# Patient Record
Sex: MALE | Race: BLACK OR AFRICAN AMERICAN | Employment: OTHER | RURAL
[De-identification: names, ages, dates, MRNs, and addresses within clinical notes are randomized per-mention and may not be internally consistent; named-entity substitution may affect disease eponyms.]

---

## 2017-01-16 ENCOUNTER — TELEPHONE (OUTPATIENT)
Dept: FAMILY MEDICINE CLINIC | Age: 82
End: 2017-01-16

## 2017-01-17 NOTE — TELEPHONE ENCOUNTER
Would try arthritis strength Tylenol supplemented with Aleve twice daily first let us know if not improved

## 2017-01-25 ENCOUNTER — OFFICE VISIT (OUTPATIENT)
Dept: FAMILY MEDICINE CLINIC | Age: 82
End: 2017-01-25

## 2017-01-25 VITALS
DIASTOLIC BLOOD PRESSURE: 86 MMHG | OXYGEN SATURATION: 99 % | SYSTOLIC BLOOD PRESSURE: 152 MMHG | TEMPERATURE: 98.1 F | BODY MASS INDEX: 34.4 KG/M2 | HEIGHT: 72 IN | RESPIRATION RATE: 12 BRPM | WEIGHT: 254 LBS | HEART RATE: 72 BPM

## 2017-01-25 DIAGNOSIS — G31.84 MCI (MILD COGNITIVE IMPAIRMENT) WITH MEMORY LOSS: ICD-10-CM

## 2017-01-25 DIAGNOSIS — R91.8 MULTIPLE LUNG NODULES: ICD-10-CM

## 2017-01-25 DIAGNOSIS — J45.909 UNCOMPLICATED ASTHMA, UNSPECIFIED ASTHMA SEVERITY: ICD-10-CM

## 2017-01-25 DIAGNOSIS — I10 ESSENTIAL HYPERTENSION: Primary | ICD-10-CM

## 2017-01-25 DIAGNOSIS — R35.1 BPH ASSOCIATED WITH NOCTURIA: ICD-10-CM

## 2017-01-25 DIAGNOSIS — E78.5 HYPERLIPIDEMIA, UNSPECIFIED HYPERLIPIDEMIA TYPE: ICD-10-CM

## 2017-01-25 DIAGNOSIS — N40.1 BPH ASSOCIATED WITH NOCTURIA: ICD-10-CM

## 2017-01-25 DIAGNOSIS — M10.9 GOUTY ARTHROPATHY: ICD-10-CM

## 2017-01-25 DIAGNOSIS — Z85.038 HISTORY OF MALIGNANT NEOPLASM OF LARGE INTESTINE: ICD-10-CM

## 2017-01-25 DIAGNOSIS — N18.2 CHRONIC KIDNEY DISEASE, STAGE 2 (MILD): ICD-10-CM

## 2017-01-25 NOTE — PROGRESS NOTES
Subjective:  Jennifer Low presents for follow-up of chronic problems. Doing well no interval problems. No chest pain, no angina no shortness of breath. No orthopnea or PND. No dependent edema. No abdominal pain, change in bowel habits, no blood in stool or black stools. No urinary frequency, urgency, dysuria. No change in voiding pattern or stream, no significant nocturia. No problems with medications and is compliant. Misunderstood medication directions last visit started on losartan Benicar was DC'd. He stopped taking HCTZ and amlodipine also. Brings in blood pressure readings systolic 589-874 range diastolic's in the 15G-27M. Continues to complain of low back pain had been on tramadol weaned off of that now on acetaminophen. Doing fairly well occasional exacerbation. No radicular symptoms no falls or injuries      Problem list reviewed as part of this encounter. Past Medical History   Diagnosis Date    Asthma     Chronic kidney disease     Dermatophytosis of groin and perianal area 2010    Edema 2008    Encounter for long-term (current) use of other medications 2010    Gout, unspecified 2010    Gouty arthropathy, unspecified 2010    Hypertension     Hypertrophy of prostate without urinary obstruction and other lower urinary tract symptoms (LUTS) 2008    Impotence of organic origin 2008    Memory loss 2009    Obesity, unspecified 2010    Orthostatic hypotension 2008    Other and unspecified hyperlipidemia 2008    Other atopic dermatitis and related conditions 2012    Palpitations 2010    Peripheral angiopathy in diseases classified elsewhere [facility]) 2010    Personal history of malignant neoplasm of rectum, rectosigmoid junction, and anus 2011    Tinea nigra 2011    Unspecified pruritic disorder 2011      Medication list reviewed and updated.    Review of Systems -as per the above in previous documentation    Objective:  Visit Vitals    /86 (BP 1 Location: Left arm, BP Patient Position: Sitting)    Pulse 72    Temp 98.1 °F (36.7 °C) (Temporal)    Resp 12    Ht 6' (1.829 m)    Wt 254 lb (115.2 kg)    SpO2 99%    BMI 34.45 kg/m2     Alert and oriented. No acute distress. Slow to change position and initiate gait  HEENT   Eyes pupils equal, round, react to light and accommodation. Extraocular movements intact. Nose patent. Mouth and throat is clear. Neck supple full range of motion no thyromegaly. No carotid bruits. No significant lymphadenopathy  Lungs clear to auscultation without wheezes, rales, or rhonchi. Heart  RRR,  S1 & S2 are normal intensity. No murmur; no gallop  Abdomen bowel sounds active. No tenderness, guarding, rebound, masses, organomegaly. Back no CVA tenderness. Extremities without clubbing, cyanosis, + edema bilateral  Neuro HMF intact. Cranial nerves II through XII grossly normal.  Motor is 5 over 5 and symmetrical.   Results for orders placed or performed in visit on 31/75/28   METABOLIC PANEL, COMPREHENSIVE   Result Value Ref Range    Glucose 83 65 - 99 mg/dL    BUN 19 8 - 27 mg/dL    Creatinine 1.18 0.76 - 1.27 mg/dL    GFR est non-AA 58 (L) >59 mL/min/1.73    GFR est AA 67 >59 mL/min/1.73    BUN/Creatinine ratio 16 10 - 22    Sodium 144 134 - 144 mmol/L    Potassium 4.3 3.5 - 5.2 mmol/L    Chloride 106 97 - 108 mmol/L    CO2 20 18 - 29 mmol/L    Calcium 9.0 8.6 - 10.2 mg/dL    Protein, total 6.7 6.0 - 8.5 g/dL    Albumin 3.6 3.5 - 4.7 g/dL    GLOBULIN, TOTAL 3.1 1.5 - 4.5 g/dL    A-G Ratio 1.2 1.1 - 2.5    Bilirubin, total 0.5 0.0 - 1.2 mg/dL    Alk.  phosphatase 64 39 - 117 IU/L    AST 16 0 - 40 IU/L    ALT 11 0 - 44 IU/L   LIPID PANEL   Result Value Ref Range    Cholesterol, total 252 (H) 100 - 199 mg/dL    Triglyceride 87 0 - 149 mg/dL    HDL Cholesterol 55 >39 mg/dL    VLDL, calculated 17 5 - 40 mg/dL    LDL, calculated 180 (H) 0 - 99 mg/dL   CBC WITH AUTOMATED DIFF   Result Value Ref Range    WBC 4.8 3.4 - 10.8 x10E3/uL    RBC 4.13 (L) 4.14 - 5.80 x10E6/uL    HGB 13.0 12.6 - 17.7 g/dL    HCT 39.9 37.5 - 51.0 %    MCV 97 79 - 97 fL    MCH 31.5 26.6 - 33.0 pg    MCHC 32.6 31.5 - 35.7 g/dL    RDW 17.6 (H) 12.3 - 15.4 %    PLATELET 125 472 - 541 x10E3/uL    NEUTROPHILS 61 %    Lymphocytes 27 %    MONOCYTES 11 %    EOSINOPHILS 0 %    BASOPHILS 1 %    ABS. NEUTROPHILS 3.0 1.4 - 7.0 x10E3/uL    Abs Lymphocytes 1.3 0.7 - 3.1 x10E3/uL    ABS. MONOCYTES 0.5 0.1 - 0.9 x10E3/uL    ABS. EOSINOPHILS 0.0 0.0 - 0.4 x10E3/uL    ABS. BASOPHILS 0.0 0.0 - 0.2 x10E3/uL    IMMATURE GRANULOCYTES 0 %    ABS. IMM. GRANS. 0.0 0.0 - 0.1 x10E3/uL   TSH 3RD GENERATION   Result Value Ref Range    TSH 1.240 0.450 - 4.500 uIU/mL   CEA   Result Value Ref Range    CEA 2.8 0.0 - 4.7 ng/mL   CKD REPORT   Result Value Ref Range    Interpretation Note          Assessment:  Encounter Diagnoses   Name Primary? 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 Orders Placed This Encounter    METABOLIC PANEL, COMPREHENSIVE    LIPID PANEL   He is resistant to starting statin, will repeat lipid study. Will restart HCTZ and continue losartan given his tendency to lower extremity edema will continue to hold on amlodipine which he has not been taking.   Elevate the legs as much as possible  Continue to monitor blood pressure bring in for review  Follow-up 3 months or as needed            (Please note that portions of this note were completed with a voice recognition program. Efforts were made to edit the dictations but occasionally words are mis-transcribed.)

## 2017-01-25 NOTE — MR AVS SNAPSHOT
Visit Information Date & Time Provider Department Dept. Phone Encounter #  
 1/25/2017  3:45 PM Daphney Hunt [phone] [id number] Follow-up Instructions Return in about 4 months (around 5/25/2017) for Follow up. Upcoming Health Maintenance Date Due DTaP/Tdap/Td series (1 - Tdap) 3/16/1956 GLAUCOMA SCREENING Q2Y 3/16/2001 Pneumococcal 65+ High/Highest Risk (2 of 2 - PCV13) 12/6/2014 MEDICARE YEARLY EXAM 9/21/2017 Allergies as of 1/25/2017  Review Complete On: 1/25/2017 By: Iesha Benitez RN Severity Noted Reaction Type Reactions Cat Hair Std Allergenic Ext  07/17/2013    Unknown (comments) Codeine  07/17/2013    Unknown (comments) Ragweed  07/17/2013    Unknown (comments) Mixed ragweed/pollen extract,tree extract Current Immunizations  Reviewed on 11/17/2015 Name Date Influenza High Dose Vaccine PF 11/2/2016  4:24 PM  
 Influenza Vaccine 10/7/2014, 12/6/2013 Influenza Vaccine Maria Isabel Goody) 11/17/2015 Pneumococcal Polysaccharide (PPSV-23) 12/6/2013 Not reviewed this visit You Were Diagnosed With   
  
 Codes Comments Essential hypertension    -  Primary ICD-10-CM: I10 
ICD-9-CM: 401.9 Obesity, Class II, BMI 35-39.9, with comorbidity (HCC)     ICD-10-CM: E66.01 
ICD-9-CM: 278.01 Gouty arthropathy     ICD-10-CM: M10.9 ICD-9-CM: 274.00 Uncomplicated asthma, unspecified asthma severity     ICD-10-CM: J45.909 ICD-9-CM: 493.90 Chronic kidney disease, stage 2 (mild)     ICD-10-CM: N18.2 ICD-9-CM: 087. 2 Hyperlipidemia, unspecified hyperlipidemia type     ICD-10-CM: E78.5 ICD-9-CM: 272.4 Multiple lung nodules     ICD-10-CM: R91.8 ICD-9-CM: 793.19 History of malignant neoplasm of large intestine     ICD-10-CM: Z85.038 
ICD-9-CM: V10.05   
 BPH associated with nocturia     ICD-10-CM: N40.1, R35.1 ICD-9-CM: 600.01, 788.43   
 MCI (mild cognitive impairment) with memory loss     ICD-10-CM: G31.84 ICD-9-CM: 331.83, 780.93 Vitals BP Pulse Temp Resp Height(growth percentile) 152/86 (BP 1 Location: Left arm, BP Patient Position: Sitting) 72 98.1 °F (36.7 °C) (Temporal) 12 6' (1.829 m) Weight(growth percentile) SpO2 BMI Smoking Status 254 lb (115.2 kg) 99% 34.45 kg/m2 Former Smoker BMI and BSA Data Body Mass Index Body Surface Area 34.45 kg/m 2 2.42 m 2 Preferred Pharmacy Pharmacy Name [address], [facility] [phone] Your Updated Medication List  
  
   
This list is accurate as of: 1/25/17  4:26 PM.  Always use your most recent med list.  
  
  
  
  
 aspirin 325 mg tablet Commonly known as:  ASPIRIN Take 162.5 mg by mouth. 1/2 qd Benefiber Clear 3 gram/3.5 gram Powd Generic drug:  wheat dextrin Take  by mouth. docusate sodium 100 mg capsule Commonly known as:  Angie Dilling Take 1 Cap by mouth daily. doxazosin 4 mg tablet Commonly known as:  CARDURA Take  by mouth daily. hydroCHLOROthiazide 12.5 mg tablet Commonly known as:  HYDRODIURIL Take 1 Tab by mouth daily. Indications: HYPERTENSION  
  
 losartan 50 mg tablet Commonly known as:  COZAAR Take 1 Tab by mouth daily. meclizine 12.5 mg tablet Commonly known as:  ANTIVERT Take  by mouth as needed. Indications: VERTIGO We Performed the Following LIPID PANEL [24764 CPT(R)] METABOLIC PANEL, COMPREHENSIVE [46731 CPT(R)] Follow-up Instructions Return in about 4 months (around 5/25/2017) for Follow up. Introducing \[facility] [place] [facility]\"" & HEALTH SERVICES! Rowan Garcia introduces [employer] patient portal. Now you can access parts of your medical record, email your doctor's office, and request medication refills online. 1. In your internet browser, go to [email or web address] 2. Click on the First Time User? Click Here link in the Sign In box. You will see the New Member Sign Up page. 3. Enter your Triage Access Code exactly as it appears below. You will not need to use this code after youve completed the sign-up process. If you do not sign up before the expiration date, you must request a new code. · Triage Access Code: [email or web address] Expires: 4/25/2017  4:11 PM 
 
4. Enter the last four digits of your Social Security Number (xxxx) and Date of Birth (mm/dd/yyyy) as indicated and click Submit. You will be taken to the next sign-up page. 5. Create a Triage ID. This will be your Triage login ID and cannot be changed, so think of one that is secure and easy to remember. 6. Create a Triage password. You can change your password at any time. 7. Enter your Password Reset Question and Answer. This can be used at a later time if you forget your password. 8. Enter your e-mail address. You will receive e-mail notification when new information is available in [address] 9. Click Sign Up. You can now view and download portions of your medical record. 10. Click the Download Summary menu link to download a portable copy of your medical information. If you have questions, please visit the Frequently Asked Questions section of the Triage website. Remember, Triage is NOT to be used for urgent needs. For medical emergencies, dial 911. Now available from your iPhone and Android! Please provide this summary of care documentation to your next provider. Your primary care clinician is listed as Johnny Jean. If you have any questions after today's visit, please call [phone].

## 2017-01-26 LAB
ALBUMIN SERPL-MCNC: 3.6 G/DL (ref 3.5–4.7)
ALBUMIN/GLOB SERPL: 1.2 {RATIO} (ref 1.1–2.5)
ALP SERPL-CCNC: 63 IU/L (ref 39–117)
ALT SERPL-CCNC: 17 IU/L (ref 0–44)
AST SERPL-CCNC: 21 IU/L (ref 0–40)
BILIRUB SERPL-MCNC: 0.5 MG/DL (ref 0–1.2)
BUN SERPL-MCNC: 15 MG/DL (ref 8–27)
BUN/CREAT SERPL: 13 (ref 10–22)
CALCIUM SERPL-MCNC: 8.9 MG/DL (ref 8.6–10.2)
CHLORIDE SERPL-SCNC: 104 MMOL/L (ref 96–106)
CHOLEST SERPL-MCNC: 227 MG/DL (ref 100–199)
CO2 SERPL-SCNC: 23 MMOL/L (ref 18–29)
CREAT SERPL-MCNC: 1.14 MG/DL (ref 0.76–1.27)
GLOBULIN SER CALC-MCNC: 3.1 G/DL (ref 1.5–4.5)
GLUCOSE SERPL-MCNC: 79 MG/DL (ref 65–99)
HDLC SERPL-MCNC: 63 MG/DL
LDLC SERPL CALC-MCNC: 148 MG/DL (ref 0–99)
POTASSIUM SERPL-SCNC: 4.5 MMOL/L (ref 3.5–5.2)
PROT SERPL-MCNC: 6.7 G/DL (ref 6–8.5)
SODIUM SERPL-SCNC: 146 MMOL/L (ref 134–144)
SPECIMEN STATUS REPORT, ROLRST: NORMAL
TRIGL SERPL-MCNC: 78 MG/DL (ref 0–149)
VLDLC SERPL CALC-MCNC: 16 MG/DL (ref 5–40)

## 2017-01-26 NOTE — PROGRESS NOTES
Comprehensive metabolic panel is normal, Restoril is elevated at 227 LDL cholesterol 148 slightly improved from what he had previously, HDL level is good at 63.   Would continue to watch diet cholesterol and saturated fats especially

## 2017-03-23 RX ORDER — LOSARTAN POTASSIUM 50 MG/1
TABLET ORAL
Qty: 90 TAB | Refills: 4 | Status: SHIPPED | OUTPATIENT
Start: 2017-03-23 | End: 2018-06-04 | Stop reason: SDUPTHER

## 2017-07-10 ENCOUNTER — OFFICE VISIT (OUTPATIENT)
Dept: FAMILY MEDICINE CLINIC | Age: 82
End: 2017-07-10

## 2017-07-10 VITALS
SYSTOLIC BLOOD PRESSURE: 160 MMHG | TEMPERATURE: 98.1 F | WEIGHT: 242.8 LBS | OXYGEN SATURATION: 100 % | HEART RATE: 69 BPM | BODY MASS INDEX: 32.89 KG/M2 | DIASTOLIC BLOOD PRESSURE: 80 MMHG | HEIGHT: 72 IN | RESPIRATION RATE: 16 BRPM

## 2017-07-10 DIAGNOSIS — N40.1 BPH ASSOCIATED WITH NOCTURIA: ICD-10-CM

## 2017-07-10 DIAGNOSIS — H61.23 CERUMEN DEBRIS ON TYMPANIC MEMBRANE, BILATERAL: ICD-10-CM

## 2017-07-10 DIAGNOSIS — R35.1 BPH ASSOCIATED WITH NOCTURIA: ICD-10-CM

## 2017-07-10 DIAGNOSIS — H81.10 BPV (BENIGN POSITIONAL VERTIGO), UNSPECIFIED LATERALITY: ICD-10-CM

## 2017-07-10 DIAGNOSIS — E53.8 VITAMIN B12 DEFICIENCY: ICD-10-CM

## 2017-07-10 DIAGNOSIS — E78.5 HYPERLIPIDEMIA, UNSPECIFIED HYPERLIPIDEMIA TYPE: ICD-10-CM

## 2017-07-10 DIAGNOSIS — I10 ESSENTIAL HYPERTENSION: Primary | ICD-10-CM

## 2017-07-10 DIAGNOSIS — R41.3 MEMORY IMPAIRMENT: ICD-10-CM

## 2017-07-10 DIAGNOSIS — D75.89 MACROCYTOSIS WITHOUT ANEMIA: ICD-10-CM

## 2017-07-10 RX ORDER — NAPROXEN SODIUM 220 MG
220 TABLET ORAL 2 TIMES DAILY WITH MEALS
COMMUNITY
End: 2018-05-11

## 2017-07-10 RX ORDER — MECLIZINE HCL 12.5 MG 12.5 MG/1
12.5 TABLET ORAL
Qty: 30 TAB | Refills: 1 | Status: SHIPPED | OUTPATIENT
Start: 2017-07-10 | End: 2018-05-05

## 2017-07-10 RX ORDER — AMMONIUM LACTATE 12 G/100G
LOTION TOPICAL
Refills: 10 | COMMUNITY
Start: 2017-05-10 | End: 2021-01-01

## 2017-07-10 NOTE — PROGRESS NOTES
Ina Sutherland is a 81 y.o. male who presents to the office today with the following:  Chief Complaint   Patient presents with    Medication Refill    Dizziness       Allergies   Allergen Reactions    Cat Hair Std Allergenic Ext Unknown (comments)    Codeine Unknown (comments)    Ragweed Unknown (comments)     Mixed ragweed/pollen extract,tree extract       Current Outpatient Prescriptions   Medication Sig    ammonium lactate (LAC-HYDRIN) 12 % lotion APPLY THIN LAYER TWICE A DAY FOR 60 DAYS FOR DRY SCALY SKIN BOTH FEET    naproxen sodium (ALEVE) 220 mg tablet Take 220 mg by mouth two (2) times daily (with meals).  meclizine (ANTIVERT) 12.5 mg tablet Take 1 Tab by mouth three (3) times daily as needed. Indications: VERTIGO    losartan (COZAAR) 50 mg tablet TAKE 1 TABLET DAILY    docusate sodium (COLACE) 100 mg capsule Take 1 Cap by mouth daily.  hydrochlorothiazide (HYDRODIURIL) 12.5 mg tablet Take 1 Tab by mouth daily. Indications: HYPERTENSION    aspirin (ASPIRIN) 325 mg tablet Take 162.5 mg by mouth. 1/2 qd    doxazosin (CARDURA) 4 mg tablet Take  by mouth daily. No current facility-administered medications for this visit.         Past Medical History:   Diagnosis Date    Asthma     Chronic kidney disease     Dermatophytosis of groin and perianal area 2010    Edema 2008    Encounter for long-term (current) use of other medications 2010    Gout, unspecified 2010    Gouty arthropathy, unspecified 2010    Hypertension     Hypertrophy of prostate without urinary obstruction and other lower urinary tract symptoms (LUTS) 2008    Impotence of organic origin 2008    Memory loss 2009    Obesity, unspecified 2010    Orthostatic hypotension 2008    Other and unspecified hyperlipidemia 2008    Other atopic dermatitis and related conditions 2012    Palpitations 2010    Peripheral angiopathy in diseases classified elsewhere [facility]) 2010    Personal history of malignant neoplasm of rectum, rectosigmoid junction, and anus 2011    Tinea nigra 2011    Unspecified pruritic disorder 2011       Past Surgical History:   Procedure Laterality Date    ENDOSCOPY, COLON, DIAGNOSTIC  06/2011 05/2007, 01/2004,  12/2000    HX COLECTOMY  1991    COLON CANCER S/P RESECTION    HX HERNIA REPAIR  1992    INCISIONAL       History   Smoking Status    Former Smoker    Packs/day: 1.00    Years: 29.00    Types: Cigarettes    Start date: 1/1/1949   Garlin  Quit date: 1/1/1978   Smokeless Tobacco    Never Used       Family History   Problem Relation Age of Onset    Other Mother      PULMONARY EDEMA    Other Father      PAD    Heart Attack Father     Coronary Artery Disease Father          History of Present Illness:  Patient here for medication refill and complaints of dizziness    Patient states over the last 2 weeks he is intermittently feeling dizzy and unsteady on his feet. He states this occurs mostly when he walks. He states the room does not spin but states the symptoms are very much like his previous episodes of benign positional vertigo he has had in the past.  He was previously given a prescription for Antivert which did help. It is out now and is requesting a refill. No associated chest pain palpitations. No syncope. He does have a cane at home but has not been using it. I recommended he do so. He does have some impacted cerumen but otherwise no ENT complaints. He has had a history of orthostatic hypotension in the past    Patient does have hypertension. Blood pressure is up a bit today. He tells me he is compliant with his medications. He is on low-dose hydrochlorothiazide. Again no cardiac complaints. Most recent basic metabolic profile normal.    He does have chronic lower extremity edema. As noted on low-dose hydrochlorothiazide. He states that is unchanged over time. No complaints of chest pain shortness of breath today. He does have hyperlipidemia. Most recent cholesterol 227. He used to be on medications but stopped it because of cost.  He does not want to go back on medications at this point. He does have a history of BPH. He is on Cardura. He tells me he does follow with urology. Remote history of gout. On no medications with no recent complaints. Remote history of colon cancer. He has had follow-up scopes in the past.  He was told the last time he had a scope that he did not need a repeat scope until he was 81years old. No current GI complaints. History of a lung nodule found on CAT scan. Follow-up CT in 2016 shows her to be stable and appears benign. Some history in the chart of memory impairment. Patient's memory today of recent events medications and appointment seems quite good. Review of Systems:    Review of systems negative except as noted above      Physical Exam:  Visit Vitals    /80    Pulse 69    Temp 98.1 °F (36.7 °C) (Oral)    Resp 16    Ht 6' (1.829 m)    Wt 242 lb 12.8 oz (110.1 kg)    SpO2 100%    BMI 32.93 kg/m2     Vitals:    07/10/17 0929 07/10/17 1021   BP: 122/80 160/80   Pulse: 69    Resp: 16    Temp: 98.1 °F (36.7 °C)    TempSrc: Oral    SpO2: 100%    Weight: 242 lb 12.8 oz (110.1 kg)    Height: 6' (1.829 m)     patient was in no acute distress. Vital signs as above on recheck. No orthostatic changes  Patient did move slowly. It took several attempts for him to get out of the chair. There was no tremor noted. Head was normal cephalic  Both TMs are obscured with cerumen  Nose within normal limits no significant congestion  OP within normal limits  Neck no nodes or masses no bruits  Chest was clear no wheezes rhonchi or rales  Cor regular rate and rhythm no S3 no S4 no murmurs no ectopy  Abdomen obese no masses soft nontender  Prostate exam from urology  Extremities had 2+ edema bilaterally  EKG showed normal sinus rhythm. Negative T waves lateral leads.   T-wave changes appear less and and fewer leads than on previous EKGs. Patient has no cardiac complaints. We did discuss referral back to cardiology for screening. He declines at this point    Office course. Ears were flushed with peroxide and water. He tolerated the procedure well. Underlying TM and ear canals appeared normal.  Patient is hopeful that this will take care of his dizziness. Assessment/Plan:  1. Essential hypertension  Blood pressure up a bit up from previous today. However given his symptoms of postural lightheadedness I am not going to increase his regiment today pending further workup. I am getting some routine lab work and EKG today. I have asked him to increase his fluids. I will see him back in 2 weeks for review and follow-up    2. Hyperlipidemia, unspecified hyperlipidemia type  Patient declining therapy  - AMB POC EKG ROUTINE W/ 12 LEADS, INTER & REP  - CBC WITH AUTOMATED DIFF  - METABOLIC PANEL, BASIC  - TSH 3RD GENERATION  - URINALYSIS W/ RFLX MICROSCOPIC    3. BPH associated with nocturia  Followed by urology    4. BPV (benign positional vertigo), unspecified laterality  Symptoms today could be consistent with some postural lightheadedness. It has been quite warm lately. I am encouraging patient to increase his fluids. He however is quite clear that his symptoms are exactly like his previous vertigo symptoms which did improve with Antivert. He did ask for refill today. I told him to use this with caution as it will make him drowsy  - meclizine (ANTIVERT) 12.5 mg tablet; Take 1 Tab by mouth three (3) times daily as needed. Indications: VERTIGO  Dispense: 30 Tab; Refill: 1    5. Cerumen debris on tympanic membrane, bilateral    - REMOVAL IMPACTED CERUMEN IRRIGATION/LVG UNILAT      Patient Instructions   same meds    Use antivert if needed  Stand slowly  Use cane   recheck 2 weeks    Increase fluid intake        Continue current therapy plan except for indicated above.  Verbal and written instructions (see AVS) provided.  Patient expresses understanding of diagnosis and treatment plan. Follow-up Disposition:  Return in about 2 weeks (around 7/24/2017). Bisi Sifuentes.  Tg Mccray MD

## 2017-07-10 NOTE — PATIENT INSTRUCTIONS
same meds    Use antivert if needed  Stand slowly  Use cane   recheck 2 weeks    Increase fluid intake

## 2017-07-10 NOTE — MR AVS SNAPSHOT
Visit Information Date & Time Provider Department Dept. Phone Encounter #  
 7/10/2017  9:30 AM Juan Conley MD [address] [place] [address] [phone] [id number] Follow-up Instructions Return in about 2 weeks (around 7/24/2017). Follow-up and Disposition History Upcoming Health Maintenance Date Due DTaP/Tdap/Td series (1 - Tdap) 3/16/1957 GLAUCOMA SCREENING Q2Y 3/16/2001 Pneumococcal 65+ Low/Medium Risk (2 of 2 - PCV13) 12/6/2014 INFLUENZA AGE 9 TO ADULT 8/1/2017 MEDICARE YEARLY EXAM 9/21/2017 Allergies as of 7/10/2017  Review Complete On: 7/10/2017 By: Juan Conley MD  
  
 Severity Noted Reaction Type Reactions Cat Hair Std Allergenic Ext  07/17/2013    Unknown (comments) Codeine  07/17/2013    Unknown (comments) Ragweed  07/17/2013    Unknown (comments) Mixed ragweed/pollen extract,tree extract Current Immunizations  Reviewed on 11/17/2015 Name Date Influenza High Dose Vaccine PF 11/2/2016  4:24 PM  
 Influenza Vaccine 10/7/2014, 12/6/2013 Influenza Vaccine Paulino Montilla) 11/17/2015 Pneumococcal Polysaccharide (PPSV-23) 12/6/2013 Not reviewed this visit You Were Diagnosed With   
  
 Codes Comments Essential hypertension    -  Primary ICD-10-CM: I10 
ICD-9-CM: 401.9 Hyperlipidemia, unspecified hyperlipidemia type     ICD-10-CM: E78.5 ICD-9-CM: 272.4 BPH associated with nocturia     ICD-10-CM: N40.1, R35.1 ICD-9-CM: 600.01, 788.43   
 BPV (benign positional vertigo), unspecified laterality     ICD-10-CM: H81.10 ICD-9-CM: 386.11 Cerumen debris on tympanic membrane, bilateral     ICD-10-CM: H61.23 
ICD-9-CM: 380.4 Vitals BP Pulse Temp Resp Height(growth percentile) Weight(growth percentile) 160/80 69 98.1 °F (36.7 °C) (Oral) 16 6' (1.829 m) 242 lb 12.8 oz (110.1 kg) SpO2 BMI Smoking Status 100% 32.93 kg/m2 Former Smoker Vitals History BMI and BSA Data Body Mass Index Body Surface Area  
 32.93 kg/m 2 2.36 m 2 Preferred Pharmacy Pharmacy Name Phone [address] [facility] [phone] Your Updated Medication List  
  
   
This list is accurate as of: 7/10/17 11:13 AM.  Always use your most recent med list.  
  
  
  
  
 ALEVE 220 mg tablet Generic drug:  naproxen sodium Take 220 mg by mouth two (2) times daily (with meals). ammonium lactate 12 % lotion Commonly known as:  LAC-HYDRIN  
APPLY THIN LAYER TWICE A DAY FOR 60 DAYS FOR DRY SCALY SKIN BOTH FEET  
  
 aspirin 325 mg tablet Commonly known as:  ASPIRIN Take 162.5 mg by mouth. 1/2 qd  
  
 docusate sodium 100 mg capsule Commonly known as:  Iola Dolin Take 1 Cap by mouth daily. doxazosin 4 mg tablet Commonly known as:  CARDURA Take  by mouth daily. hydroCHLOROthiazide 12.5 mg tablet Commonly known as:  HYDRODIURIL Take 1 Tab by mouth daily. Indications: HYPERTENSION  
  
 losartan 50 mg tablet Commonly known as:  COZAAR  
TAKE 1 TABLET DAILY  
  
 meclizine 12.5 mg tablet Commonly known as:  ANTIVERT Take 1 Tab by mouth three (3) times daily as needed. Indications: VERTIGO Prescriptions Sent to Pharmacy Refills  
 meclizine (ANTIVERT) 12.5 mg tablet 1 Sig: Take 1 Tab by mouth three (3) times daily as needed. Indications: VERTIGO Class: Normal  
 Pharmacy: [address], [address] Ph #: [phone] Route: Oral  
  
We Performed the Following AMB POC EKG ROUTINE W/ 12 LEADS, INTER & REP [63473 CPT(R)] CBC WITH AUTOMATED DIFF [34723 CPT(R)] METABOLIC PANEL, BASIC [14347 CPT(R)] REMOVAL IMPACTED CERUMEN IRRIGATION/LVG UNILAT U643147 CPT(R)] TSH 3RD GENERATION [12384 CPT(R)] URINALYSIS W/ RFLX MICROSCOPIC [57519 CPT(R)] Follow-up Instructions Return in about 2 weeks (around 7/24/2017). Patient Instructions   
same meds Use antivert if needed Stand slowly Use cane 
 recheck 2 weeks Increase fluid intake Patient Instructions History Introducing [facility] & HEALTH SERVICES! Radha Nava introduces [employer] patient portal. Now you can access parts of your medical record, email your doctor's office, and request medication refills online. 1. In your internet browser, go to [email or web address] 2. Click on the First Time User? Click Here link in the Sign In box. You will see the New Member Sign Up page. 3. Enter your [employer] Access Code exactly as it appears below. You will not need to use this code after youve completed the sign-up process. If you do not sign up before the expiration date, you must request a new code. · [employer] Access Code: [email or web address] Expires: 10/8/2017 10:16 AM 
 
4. Enter the last four digits of your Social Security Number (xxxx) and Date of Birth (mm/dd/yyyy) as indicated and click Submit. You will be taken to the next sign-up page. 5. Create a H[employer]ernater ID. This will be your [employer] login ID and cannot be changed, so think of one that is secure and easy to remember. 6. Create a [employer] password. You can change your password at any time. 7. Enter your Password Reset Question and Answer. This can be used at a later time if you forget your password. 8. Enter your e-mail address. You will receive e-mail notification when new information is available in [address] 9. Click Sign Up. You can now view and download portions of your medical record. 10. Click the Download Summary menu link to download a portable copy of your medical information. If you have questions, please visit the Frequently Asked Questions section of the H[employer] website. Remember, [employer] is NOT to be used for urgent needs. For medical emergencies, dial 911. Now available from your iPhone and Android! Please provide this summary of care documentation to your next provider. Your primary care clinician is listed as Fiordaliza Ricardo. If you have any questions after today's visit, please call [phone].

## 2017-07-11 LAB
BASOPHILS # BLD AUTO: 0 X10E3/UL (ref 0–0.2)
BASOPHILS NFR BLD AUTO: 0 %
BUN SERPL-MCNC: 22 MG/DL (ref 8–27)
BUN/CREAT SERPL: 21 (ref 10–24)
CALCIUM SERPL-MCNC: 9.3 MG/DL (ref 8.6–10.2)
CHLORIDE SERPL-SCNC: 106 MMOL/L (ref 96–106)
CO2 SERPL-SCNC: 25 MMOL/L (ref 18–29)
CREAT SERPL-MCNC: 1.03 MG/DL (ref 0.76–1.27)
EOSINOPHIL # BLD AUTO: 0 X10E3/UL (ref 0–0.4)
EOSINOPHIL NFR BLD AUTO: 0 %
ERYTHROCYTE [DISTWIDTH] IN BLOOD BY AUTOMATED COUNT: 17.4 % (ref 12.3–15.4)
GLUCOSE SERPL-MCNC: 93 MG/DL (ref 65–99)
HCT VFR BLD AUTO: 44.3 % (ref 37.5–51)
HGB BLD-MCNC: 14.8 G/DL (ref 12.6–17.7)
IMM GRANULOCYTES # BLD: 0 X10E3/UL (ref 0–0.1)
IMM GRANULOCYTES NFR BLD: 0 %
LYMPHOCYTES # BLD AUTO: 1 X10E3/UL (ref 0.7–3.1)
LYMPHOCYTES NFR BLD AUTO: 26 %
MCH RBC QN AUTO: 33.6 PG (ref 26.6–33)
MCHC RBC AUTO-ENTMCNC: 33.4 G/DL (ref 31.5–35.7)
MCV RBC AUTO: 101 FL (ref 79–97)
MONOCYTES # BLD AUTO: 0.3 X10E3/UL (ref 0.1–0.9)
MONOCYTES NFR BLD AUTO: 8 %
NEUTROPHILS # BLD AUTO: 2.5 X10E3/UL (ref 1.4–7)
NEUTROPHILS NFR BLD AUTO: 66 %
PLATELET # BLD AUTO: 260 X10E3/UL (ref 150–379)
POTASSIUM SERPL-SCNC: 4.3 MMOL/L (ref 3.5–5.2)
RBC # BLD AUTO: 4.41 X10E6/UL (ref 4.14–5.8)
SODIUM SERPL-SCNC: 144 MMOL/L (ref 134–144)
TSH SERPL DL<=0.005 MIU/L-ACNC: 0.88 UIU/ML (ref 0.45–4.5)
WBC # BLD AUTO: 3.8 X10E3/UL (ref 3.4–10.8)

## 2017-07-11 NOTE — PROGRESS NOTES
Labs all normal except mild increase in MCV  Given his dizziness I would like to check a B12 level  Patient to continue his current medication  Come back to the clinic for B12 level  Order is in chart  He should keep his plan follow-up with me in 2 weeks

## 2017-07-11 NOTE — PROGRESS NOTES
Patient advised of lab results per Dr. Sharri Araujo. Patient also advised to come in to have B12 level drawn per Dr. Sharri Araujo.

## 2017-07-20 ENCOUNTER — CLINICAL SUPPORT (OUTPATIENT)
Dept: FAMILY MEDICINE CLINIC | Age: 82
End: 2017-07-20

## 2017-07-20 DIAGNOSIS — R63.4 WEIGHT LOSS: Primary | ICD-10-CM

## 2017-07-20 NOTE — MR AVS SNAPSHOT
Visit Information Date & Time Provider Department Dept. Phone Encounter #  
 7/20/2017  1:15 PM [address] [address] [address] [phone] [id number] Your Appointments 7/20/2017  1:15 PM  
Nurse Visit with [address] [facility] ([facility]) Appt Note: B12 level for Dr. Ananth Cartwright; .  
 [address]  
[phone]  
  
   
 [address] [facility] [address]  
  
    
 8/7/2017 10:00 AM  
ESTABLISHED PATIENT with Darlin Meza MD  
[address] ([facility]) Appt Note: 2 wk F/U which patient wanted to wait a month [address] [facility] [address]  
[address] [facility] [address] [facility] [address] Upcoming Health Maintenance Date Due DTaP/Tdap/Td series (1 - Tdap) 3/16/1956 GLAUCOMA SCREENING Q2Y 3/16/2001 Pneumococcal 65+ Low/Medium Risk (2 of 2 - PCV13) 12/6/2014 INFLUENZA AGE 9 TO ADULT 8/1/2017 MEDICARE YEARLY EXAM 9/21/2017 Allergies as of 7/20/2017  Review Complete On: 7/10/2017 By: Darlin Meza MD  
  
 Severity Noted Reaction Type Reactions Cat Hair Std Allergenic Ext  07/17/2013    Unknown (comments) Codeine  07/17/2013    Unknown (comments) Ragweed  07/17/2013    Unknown (comments) Mixed ragweed/pollen extract,tree extract Current Immunizations  Reviewed on 11/17/2015 Name Date Influenza High Dose Vaccine PF 11/2/2016  4:24 PM  
 Influenza Vaccine 10/7/2014, 12/6/2013 Influenza Vaccine Aung Kappa) 11/17/2015 Pneumococcal Polysaccharide (PPSV-23) 12/6/2013 Not reviewed this visit Vitals Smoking Status Former Smoker Preferred Pharmacy Pharmacy Name Phone [address] [facility] [phone] Your Updated Medication List  
  
   
This list is accurate as of: 7/20/17  1:00 PM.  Always use your most recent med list.  
  
  
  
  
 ALEVE 220 mg tablet Generic drug:  naproxen sodium Take 220 mg by mouth two (2) times daily (with meals). ammonium lactate 12 % lotion Commonly known as:  LAC-HYDRIN  
APPLY THIN LAYER TWICE A DAY FOR 60 DAYS FOR DRY SCALY SKIN BOTH FEET  
  
 aspirin 325 mg tablet Commonly known as:  ASPIRIN Take 162.5 mg by mouth. 1/2 qd  
  
 docusate sodium 100 mg capsule Commonly known as:  Eilleen Schneiders Take 1 Cap by mouth daily. doxazosin 4 mg tablet Commonly known as:  CARDURA Take  by mouth daily. hydroCHLOROthiazide 12.5 mg tablet Commonly known as:  HYDRODIURIL Take 1 Tab by mouth daily. Indications: HYPERTENSION  
  
 losartan 50 mg tablet Commonly known as:  COZAAR  
TAKE 1 TABLET DAILY  
  
 meclizine 12.5 mg tablet Commonly known as:  ANTIVERT Take 1 Tab by mouth three (3) times daily as needed. Indications: VERTIGO Introducing \[facility]\"" & HEALTH SERVICES! Dori Ro introduces [employer] patient portal. Now you can access parts of your medical record, email your doctor's office, and request medication refills online. 1. In your internet browser, go to [email or web address] 2. Click on the First Time User? Click Here link in the Sign In box. You will see the New Member Sign Up page. 3. Enter your [facility] Access Code exactly as it appears below. You will not need to use this code after youve completed the sign-up process. If you do not sign up before the expiration date, you must request a new code. · [employer] [facility] Access Code: [email or web address] Expires: 10/8/2017 10:16 AM 
 
4. Enter the last four digits of your Social Security Number (xxxx) and Date of Birth (mm/dd/yyyy) as indicated and click Submit. You will be taken to the next sign-up page. 5. Create a [facility] ID.  This will be your [facility] login ID and cannot be changed, so think of one that is secure and easy to remember. 6. Create a StudyEgg password. You can change your password at any time. 7. Enter your Password Reset Question and Answer. This can be used at a later time if you forget your password. 8. Enter your e-mail address. You will receive e-mail notification when new information is available in [address] 9. Click Sign Up. You can now view and download portions of your medical record. 10. Click the Download Summary menu link to download a portable copy of your medical information. If you have questions, please visit the Frequently Asked Questions section of the [employer] website. Remember, [employer] is NOT to be used for urgent needs. For medical emergencies, dial 911. Now available from your iPhone and Android! Please provide this summary of care documentation to your next provider. Your primary care clinician is listed as Meagan Brown. If you have any questions after today's visit, please call [phone].

## 2017-07-21 LAB
FOLATE SERPL-MCNC: 5 NG/ML
VIT B12 SERPL-MCNC: 56 PG/ML (ref 211–946)

## 2017-07-23 PROBLEM — E53.8 VITAMIN B12 DEFICIENCY: Status: ACTIVE | Noted: 2017-07-23

## 2017-07-23 RX ORDER — LANOLIN ALCOHOL/MO/W.PET/CERES
CREAM (GRAM) TOPICAL
Qty: 180 TAB | Refills: 3 | Status: SHIPPED | OUTPATIENT
Start: 2017-07-23 | End: 2018-06-04 | Stop reason: SDUPTHER

## 2017-07-23 NOTE — PROGRESS NOTES
Vitamin B12 levels are low  Patient should start vitamin D B12 supplementation 1000 mcg daily(2  500 mcg tabs)  Prescription has been called to [facility]  He should keep planned follow-up with me in 2 weeks  Recheck vitamin B12 level 1 month

## 2017-08-07 ENCOUNTER — OFFICE VISIT (OUTPATIENT)
Dept: FAMILY MEDICINE CLINIC | Age: 82
End: 2017-08-07

## 2017-08-07 VITALS
TEMPERATURE: 98.1 F | BODY MASS INDEX: 32.94 KG/M2 | HEIGHT: 72 IN | SYSTOLIC BLOOD PRESSURE: 160 MMHG | HEART RATE: 76 BPM | WEIGHT: 243.2 LBS | RESPIRATION RATE: 16 BRPM | DIASTOLIC BLOOD PRESSURE: 70 MMHG | OXYGEN SATURATION: 100 %

## 2017-08-07 DIAGNOSIS — I10 ESSENTIAL HYPERTENSION: Primary | ICD-10-CM

## 2017-08-07 DIAGNOSIS — E53.8 VITAMIN B12 DEFICIENCY: ICD-10-CM

## 2017-08-07 DIAGNOSIS — H81.10 BPV (BENIGN POSITIONAL VERTIGO), UNSPECIFIED LATERALITY: ICD-10-CM

## 2017-08-07 DIAGNOSIS — R25.1 TREMOR: ICD-10-CM

## 2017-08-07 RX ORDER — HYDROCHLOROTHIAZIDE 25 MG/1
25 TABLET ORAL DAILY
Qty: 90 TAB | Refills: 1 | Status: SHIPPED | OUTPATIENT
Start: 2017-08-07 | End: 2018-05-18

## 2017-08-07 NOTE — PATIENT INSTRUCTIONS
Continue current medications  increase HCTZ to 25 mg daily (replace the 12.5 mg)    Work on diet and exercise    Consider seeing neurologist to discuss possible parkinsons    Keep planned follow up visit

## 2017-08-07 NOTE — MR AVS SNAPSHOT
Visit Information Date & Time Provider Department Dept. Phone Encounter #  
 8/7/2017 10:00 AM Darlin Meza MD [address] [place] [address] [phone] [id number] Follow-up Instructions Return in about 2 months (around 10/7/2017) for recheck and med wellness. Upcoming Health Maintenance Date Due DTaP/Tdap/Td series (1 - Tdap) 3[birth year]16/1956 GLAUCOMA SCREENING Q2Y 3/16/2001 Pneumococcal 65+ Low/Medium Risk (2 of 2 - PCV13) 12/6/2014 INFLUENZA AGE 9 TO ADULT 8/1/2017 MEDICARE YEARLY EXAM 9/21/2017 Allergies as of 8/7/2017  Review Complete On: 8/7/2017 By: Isaiah Chavez LPN Severity Noted Reaction Type Reactions Cat Hair Std Allergenic Ext  07/17/2013    Unknown (comments) Codeine  07/17/2013    Unknown (comments) Ragweed  07/17/2013    Unknown (comments) Mixed ragweed/pollen extract,tree extract Current Immunizations  Reviewed on 11/17/2015 Name Date Influenza High Dose Vaccine PF 11/2/2016  4:24 PM  
 Influenza Vaccine 10/7/2014, 12/6/2013 Influenza Vaccine Aung Kappa) 11/17/2015 Pneumococcal Polysaccharide (PPSV-23) 12/6/2013 Not reviewed this visit You Were Diagnosed With   
  
 Codes Comments Essential hypertension    -  Primary ICD-10-CM: I10 
ICD-9-CM: 401.9 Gouty arthropathy     ICD-10-CM: M10.9 ICD-9-CM: 274.00 Vitamin B12 deficiency     ICD-10-CM: E53.8 ICD-9-CM: 266.2 Tremor     ICD-10-CM: R25.1 ICD-9-CM: 911. 0 Vitals BP Pulse Temp Resp Height(growth percentile) Weight(growth percentile) 142/80 76 98.1 °F (36.7 °C) (Oral) 16 6' (1.829 m) 243 lb 3.2 oz (110.3 kg) SpO2 BMI Smoking Status 100% 32.98 kg/m2 Former Smoker BMI and BSA Data Body Mass Index Body Surface Area 32.98 kg/m 2 2.37 m 2 Preferred Pharmacy Pharmacy Name Phone [address] [facility] [phone] Your Updated Medication List  
  
   
This list is accurate as of: 8/7/17 10:12 AM.  Always use your most recent med list.  
  
  
  
  
 ALEVE 220 mg tablet Generic drug:  naproxen sodium Take 220 mg by mouth two (2) times daily (with meals). ammonium lactate 12 % lotion Commonly known as:  LAC-HYDRIN  
APPLY THIN LAYER TWICE A DAY FOR 60 DAYS FOR DRY SCALY SKIN BOTH FEET  
  
 aspirin 325 mg tablet Commonly known as:  ASPIRIN Take 162.5 mg by mouth. 1/2 qd  
  
 cyanocobalamin 500 mcg tablet Commonly known as:  VITAMIN B12  
2 tablets daily  
  
 docusate sodium 100 mg capsule Commonly known as:  Norma Booze Take 1 Cap by mouth daily. doxazosin 4 mg tablet Commonly known as:  CARDURA Take  by mouth daily. hydroCHLOROthiazide 25 mg tablet Commonly known as:  HYDRODIURIL Take 1 Tab by mouth daily. losartan 50 mg tablet Commonly known as:  COZAAR  
TAKE 1 TABLET DAILY  
  
 meclizine 12.5 mg tablet Commonly known as:  ANTIVERT Take 1 Tab by mouth three (3) times daily as needed. Indications: VERTIGO Prescriptions Sent to Pharmacy Refills  
 hydroCHLOROthiazide (HYDRODIURIL) 25 mg tablet 1 Sig: Take 1 Tab by mouth daily. Class: Normal  
 Pharmacy: [address], [address] [place] [address] #: [phone] Route: Oral  
  
Follow-up Instructions Return in about 2 months (around 10/7/2017) for recheck and med wellness. Patient Instructions Continue current medications 
increase HCTZ to 25 mg daily (replace the 12.5 mg) Work on diet and exercise Consider seeing neurologist to discuss possible parkinsons Keep planned follow up visit Introducing [facility] & HEALTH SERVICES! Jimenez Johnson introduces [employer] patient portal. Now you can access parts of your medical record, email your doctor's office, and request medication refills online.    
 
1. In your internet browser, go to [email or web address] 2. Click on the First Time User? Click Here link in the Sign In box. You will see the New Member Sign Up page. 3. Enter your [facility] Access Code exactly as it appears below. You will not need to use this code after youve completed the sign-up process. If you do not sign up before the expiration date, you must request a new code. · [facility] Access Code: [email or web address] Expires: 10/8/2017 10:16 AM 
 
4. Enter the last four digits of your Social Security Number (xxxx) and Date of Birth (mm/dd/yyyy) as indicated and click Submit. You will be taken to the next sign-up page. 5. Create a Archivet ID. This will be your [facility] login ID and cannot be changed, so think of one that is secure and easy to remember. 6. Create a [facility] password. You can change your password at any time. 7. Enter your Password Reset Question and Answer. This can be used at a later time if you forget your password. 8. Enter your e-mail address. You will receive e-mail notification when new information is available in [address] 9. Click Sign Up. You can now view and download portions of your medical record. 10. Click the Download Summary menu link to download a portable copy of your medical information. If you have questions, please visit the Frequently Asked Questions section of the [facility] website. Remember, [facility] is NOT to be used for urgent needs. For medical emergencies, dial 911. Now available from your iPhone and Android! Please provide this summary of care documentation to your next provider. Your primary care clinician is listed as Juliann Williamson. If you have any questions after today's visit, please call [phone].

## 2017-08-07 NOTE — PROGRESS NOTES
Abdirahman Wang is a 81 y.o. male who presents to the office today with the following:  Chief Complaint   Patient presents with    Hypertension    Follow-up       Allergies   Allergen Reactions    Cat Hair Std Allergenic Ext Unknown (comments)    Codeine Unknown (comments)    Ragweed Unknown (comments)     Mixed ragweed/pollen extract,tree extract       Current Outpatient Prescriptions   Medication Sig    hydroCHLOROthiazide (HYDRODIURIL) 25 mg tablet Take 1 Tab by mouth daily.  cyanocobalamin (VITAMIN B12) 500 mcg tablet 2 tablets daily    ammonium lactate (LAC-HYDRIN) 12 % lotion APPLY THIN LAYER TWICE A DAY FOR 60 DAYS FOR DRY SCALY SKIN BOTH FEET    naproxen sodium (ALEVE) 220 mg tablet Take 220 mg by mouth two (2) times daily (with meals).  meclizine (ANTIVERT) 12.5 mg tablet Take 1 Tab by mouth three (3) times daily as needed. Indications: VERTIGO    losartan (COZAAR) 50 mg tablet TAKE 1 TABLET DAILY    docusate sodium (COLACE) 100 mg capsule Take 1 Cap by mouth daily.  aspirin (ASPIRIN) 325 mg tablet Take 162.5 mg by mouth. 1/2 qd    doxazosin (CARDURA) 4 mg tablet Take  by mouth daily. No current facility-administered medications for this visit.         Past Medical History:   Diagnosis Date    Asthma     Chronic kidney disease     Dermatophytosis of groin and perianal area 2010    Edema 2008    Encounter for long-term (current) use of other medications 2010    Gout, unspecified 2010    Gouty arthropathy, unspecified 2010    Hypertension     Hypertrophy of prostate without urinary obstruction and other lower urinary tract symptoms (LUTS) 2008    Impotence of organic origin 2008    Memory loss 2009    Obesity, unspecified 2010    Orthostatic hypotension 2008    Other and unspecified hyperlipidemia 2008    Other atopic dermatitis and related conditions 2012    Palpitations 2010    Peripheral angiopathy in diseases classified elsewhere [facility]) 2010    Personal history of malignant neoplasm of rectum, rectosigmoid junction, and anus 2011    Tinea nigra 2011    Unspecified pruritic disorder 2011    Vitamin B12 deficiency 7/23/2017       Past Surgical History:   Procedure Laterality Date    ENDOSCOPY, COLON, DIAGNOSTIC  06/2011 05/2007, 01/2004,  12/2000    HX COLECTOMY  1991    COLON CANCER S/P RESECTION    HX HERNIA REPAIR  1992    INCISIONAL       History   Smoking Status    Former Smoker    Packs/day: 1.00    Years: 29.00    Types: Cigarettes    Start date: 1/1/1949   [facility] Quit date: 1/1/1978   Smokeless Tobacco    Never Used       Family History   Problem Relation Age of Onset    Other Mother      PULMONARY EDEMA    Other Father      PAD    Heart Attack Father     Coronary Artery Disease Father          History of Present Illness:  Patient here for follow-up from visit last month    At the last visit patient was complaining of dizziness and being off balance. He states it felt like his previous vertigo. Exam was relatively benign except for his blood pressure was up and he did have cerumen impaction. We did flush his ears. Lab work including CBC thyroid functions and chemistry panel essentially normal I did refill his prescription for Antivert. He states he never needed to use it because the dizziness stopped. No further complaints    Patient does have hypertension. Blood pressure is up again today. He tells me he is compliant with his medications. He is on Cozaar and low-dose hydrochlorothiazide. Again no cardiac complaints. recent basic metabolic profile normal.    He does have chronic lower extremity edema. As noted on low-dose hydrochlorothiazide. He states that is unchanged over time. No complaints of chest pain or shortness of breath today. He does have hyperlipidemia. Most recent cholesterol 227. He used to be on medications but stopped it because of cost.  He does not want to go back on medications at this point.     He does have a history of BPH. He is on Cardura. He tells me he does follow with urology. CBC did reveal some macrocytosis. I did check B12 and folate levels. His B12 levels were low. I did start B12 replacement. He is compliant with this    Remote history of gout. On no medications with no recent complaints. Remote history of colon cancer. He has had follow-up scopes in the past.  He was told the last time he had a scope that he did not need a repeat scope until he was 81years old. No current GI complaints. History of a lung nodule found on CAT scan. Follow-up CT in 2016 shows her to be stable and appears benign. Some history in the chart of memory impairment. Patient's memory today of recent events medications and appointment seems quite good. As noted recent thyroid functions were normal.  His B12 level was low when he is now on replacement    I did note the patient was somewhat hypokinetic. He had some minimal cogwheeling. He states he occasionally does have some tremor. I think he may have some early parkinsonian features. We discussed having him seen by neurology for their thoughts and evaluation. He declines at present. I asked him to think about this    Patient has been losing weight. His weight was up a bit today      He will be due for routine Medicare wellness visit in September and we are scheduling this for the first part of October      Review of Systems:    Review of systems negative except as noted above      Physical Exam:  Visit Vitals    /80    Pulse 76    Temp 98.1 °F (36.7 °C) (Oral)    Resp 16    Ht 6' (1.829 m)    Wt 243 lb 3.2 oz (110.3 kg)    SpO2 100%    BMI 32.98 kg/m2     Vitals:    08/07/17 0937   BP: 142/80   Pulse: 76   Resp: 16   Temp: 98.1 °F (36.7 °C)   TempSrc: Oral   SpO2: 100%   Weight: 243 lb 3.2 oz (110.3 kg)   Height: 6' (1.829 m)    patient was in no acute distress. Vital signs as above on recheck. Patient did move slowly.   It took several attempts for him to get out of the chair. He did have an intermittent tremor left hand   head was normal cephalic  Neck no nodes or masses no bruits  Chest was clear no wheezes rhonchi or rales  Cor regular rate and rhythm no S3 no S4 no murmurs no ectopy  Abdomen obese no masses soft nontender  Prostate exam from urology  Extremities had 2+ edema bilaterally  EKG 2017 showed normal sinus rhythm. Negative T waves lateral leads. T-wave changes appear less and and fewer leads than on previous EKGs. Patient has no cardiac complaints. We did discuss referral back to cardiology for screening. He declines at this point  As noted patient was hypokinetic. He did walk slowly. He did have some minimal cogwheeling noted    1. Essential hypertension  Not optimally controlled. Given his edema I will increase his HIDA chlorothiazide. I am bringing back the first part of October for recheck, lab work and [facility] wellness visit    2. BPV (benign positional vertigo), unspecified laterality  Resolved    3. Vitamin B12 deficiency  Now on replacement    4. Tremor  Suspicious for early Parkinson's. Recommending neurology evaluation      Patient Instructions   Continue current medications  increase HCTZ to 25 mg daily (replace the 12.5 mg)    Work on diet and exercise    Consider seeing neurologist to discuss possible parkinsons    Keep planned follow up visit         Continue current therapy plan except for indicated above. Verbal and written instructions (see AVS) provided.  Patient expresses understanding of diagnosis and treatment plan. Follow-up Disposition:  Return in about 2 months (around 10/7/2017) for recheck and med wellness. Pretty Caro.  Juan Glaser MD

## 2017-10-30 ENCOUNTER — OFFICE VISIT (OUTPATIENT)
Dept: FAMILY MEDICINE CLINIC | Age: 82
End: 2017-10-30

## 2017-10-30 VITALS
RESPIRATION RATE: 16 BRPM | HEART RATE: 69 BPM | WEIGHT: 237 LBS | SYSTOLIC BLOOD PRESSURE: 130 MMHG | HEIGHT: 72 IN | TEMPERATURE: 97.9 F | DIASTOLIC BLOOD PRESSURE: 70 MMHG | BODY MASS INDEX: 32.1 KG/M2 | OXYGEN SATURATION: 100 %

## 2017-10-30 DIAGNOSIS — Z23 ENCOUNTER FOR IMMUNIZATION: Primary | ICD-10-CM

## 2017-10-30 DIAGNOSIS — E53.8 VITAMIN B12 DEFICIENCY: ICD-10-CM

## 2017-10-30 DIAGNOSIS — K59.00 CONSTIPATION, UNSPECIFIED CONSTIPATION TYPE: ICD-10-CM

## 2017-10-30 DIAGNOSIS — I10 ESSENTIAL HYPERTENSION: ICD-10-CM

## 2017-10-30 DIAGNOSIS — R63.4 WEIGHT LOSS: ICD-10-CM

## 2017-10-30 DIAGNOSIS — Z85.038 HISTORY OF MALIGNANT NEOPLASM OF LARGE INTESTINE: ICD-10-CM

## 2017-10-30 DIAGNOSIS — Z00.00 MEDICARE ANNUAL WELLNESS VISIT, SUBSEQUENT: ICD-10-CM

## 2017-10-30 DIAGNOSIS — E78.5 HYPERLIPIDEMIA, UNSPECIFIED HYPERLIPIDEMIA TYPE: ICD-10-CM

## 2017-10-30 DIAGNOSIS — R25.1 TREMOR: ICD-10-CM

## 2017-10-30 RX ORDER — LACTULOSE 10 G/15ML
30 SOLUTION ORAL; RECTAL
Qty: 480 ML | Refills: 2 | Status: SHIPPED | OUTPATIENT
Start: 2017-10-30 | End: 2018-05-05

## 2017-10-30 NOTE — MR AVS SNAPSHOT
Visit Information Date & Time Provider Department Dept. Phone Encounter #  
 10/30/2017  9:00 AM Shelia Alba MD [address] [place] [address] [phone] [id number] Follow-up Instructions Return in about 2 months (around 12/30/2017). Upcoming Health Maintenance Date Due DTaP/Tdap/Td series (1 - Tdap) 3/16/1956 Pneumococcal 65+ Low/Medium Risk (2 of 2 - PCV13) 12/6/2014 INFLUENZA AGE 9 TO ADULT 8/1/2017 MEDICARE YEARLY EXAM 9/21/2017 GLAUCOMA SCREENING Q2Y 10/30/2019 Allergies as of 10/30/2017  Review Complete On: 10/30/2017 By: Shelia Alba MD  
  
 Severity Noted Reaction Type Reactions Cat Hair Std Allergenic Ext  07/17/2013    Unknown (comments) Codeine  07/17/2013    Unknown (comments) Ragweed  07/17/2013    Unknown (comments) Mixed ragweed/pollen extract,tree extract Current Immunizations  Reviewed on 11/17/2015 Name Date Influenza High Dose Vaccine PF  Incomplete, 11/2/2016  4:24 PM  
 Influenza Vaccine 10/7/2014, 12/6/2013 Influenza Vaccine Nadean Morris) 11/17/2015 Pneumococcal Conjugate (PCV-13)  Incomplete Pneumococcal Polysaccharide (PPSV-23) 12/6/2013 Not reviewed this visit You Were Diagnosed With   
  
 Codes Comments Encounter for immunization    -  Primary ICD-10-CM: O20 ICD-9-CM: V03.89 Medicare annual wellness visit, subsequent     ICD-10-CM: Z00.00 ICD-9-CM: V70.0 Essential hypertension     ICD-10-CM: I10 
ICD-9-CM: 401.9 Vitamin B12 deficiency     ICD-10-CM: E53.8 ICD-9-CM: 266.2 Tremor     ICD-10-CM: R25.1 ICD-9-CM: 781.0 Weight loss     ICD-10-CM: R63.4 ICD-9-CM: 783.21 Hyperlipidemia, unspecified hyperlipidemia type     ICD-10-CM: E78.5 ICD-9-CM: 272.4 Constipation, unspecified constipation type     ICD-10-CM: K59.00 ICD-9-CM: 564.00 History of malignant neoplasm of large intestine     ICD-10-CM: Z85.038 
ICD-9-CM: V10.05 Vitals BP Pulse Temp Resp Height(growth percentile) Weight(growth percentile) 130/70 69 97.9 °F (36.6 °C) (Oral) 16 6' (1.829 m) 237 lb (107.5 kg) SpO2 BMI Smoking Status 100% 32.14 kg/m2 Former Smoker BMI and BSA Data Body Mass Index Body Surface Area  
 32.14 kg/m 2 2.34 m 2 Preferred Pharmacy Pharmacy Name Phone [address] [facility] [phone] Your Updated Medication List  
  
   
This list is accurate as of: 10/30/17 10:07 AM.  Always use your most recent med list.  
  
  
  
  
 ALEVE 220 mg tablet Generic drug:  naproxen sodium Take 220 mg by mouth two (2) times daily (with meals). ammonium lactate 12 % lotion Commonly known as:  LAC-HYDRIN  
APPLY THIN LAYER TWICE A DAY FOR 60 DAYS FOR DRY SCALY SKIN BOTH FEET  
  
 aspirin 325 mg tablet Commonly known as:  ASPIRIN Take 162.5 mg by mouth. 1/2 qd  
  
 cyanocobalamin 500 mcg tablet Commonly known as:  VITAMIN B12  
2 tablets daily  
  
 doxazosin 4 mg tablet Commonly known as:  CARDURA Take  by mouth daily. hydroCHLOROthiazide 25 mg tablet Commonly known as:  HYDRODIURIL Take 1 Tab by mouth daily. lactulose 10 gram/15 mL solution Commonly known as:  Sherre Fonder Take 45 mL by mouth nightly. losartan 50 mg tablet Commonly known as:  COZAAR  
TAKE 1 TABLET DAILY  
  
 meclizine 12.5 mg tablet Commonly known as:  ANTIVERT Take 1 Tab by mouth three (3) times daily as needed. Indications: VERTIGO Prescriptions Sent to Pharmacy Refills  
 lactulose (CHRONULAC) 10 gram/15 mL solution 2 Sig: Take 45 mL by mouth nightly. Class: Normal  
 Pharmacy: [address], [address] [place] [address] Ph #: [phone] Route: Oral  
  
We Performed the Following CBC WITH AUTOMATED DIFF [32243 CPT(R)] INFLUENZA VIRUS VACCINE, HIGH DOSE SEASONAL, PRESERVATIVE FREE [27250 CPT(R)] METABOLIC PANEL, COMPREHENSIVE [81337 CPT(R)] PNEUMOCOCCAL CONJ VACCINE 13 VALENT IM [id number] CPT(R)] VITAMIN B12 [id number] CPT(R)] Follow-up Instructions Return in about 2 months (around 12/30/2017). To-Do List   
 10/30/2017 Lab:  OCCULT BLOOD, IMMUNOASSAY (FIT) Patient Instructions Vaccine Information Statement Influenza (Flu) Vaccine (Inactivated or Recombinant): What you need to know Many Vaccine Information Statements are available in Mongolian and other languages. See www.immunize.org/vis Hojas de Información Sobre Vacunas están disponibles en Español y en muchos otros idiomas. Visite www.immunize.org/vis 1. Why get vaccinated? Influenza (flu) is a contagious disease that spreads around the [place] [facility] every year, usually between October and May. Flu is caused by influenza viruses, and is spread mainly by coughing, sneezing, and close contact. Anyone can get flu. Flu strikes suddenly and can last several days. Symptoms vary by age, but can include: 
 fever/chills  sore throat  muscle aches  fatigue  cough  headache  runny or stuffy nose Flu can also lead to pneumonia and blood infections, and cause diarrhea and seizures in children. If you have a medical condition, such as heart or lung disease, flu can make it worse. Flu is more dangerous for some people. Infants and young children, people 72years of age and older, pregnant women, and people with certain health conditions or a weakened immune system are at greatest risk. Each year thousands of people in the [facility] die from flu, and many more are hospitalized. Flu vaccine can: 
 keep you from getting flu, 
 make flu less severe if you do get it, and 
 keep you from spreading flu to your family and other people. 2. Inactivated and recombinant flu vaccines A dose of flu vaccine is recommended every flu season. Children 6 months through 6years of age may need two doses during the same flu season. Everyone else needs only one dose each flu season. Some inactivated flu vaccines contain a very small amount of a mercury-based preservative called thimerosal. Studies have not shown thimerosal in vaccines to be harmful, but flu vaccines that do not contain thimerosal are available. There is no live flu virus in flu shots. They cannot cause the flu. There are many flu viruses, and they are always changing. Each year a new flu vaccine is made to protect against three or four viruses that are likely to cause disease in the upcoming flu season. But even when the vaccine doesnt exactly match these viruses, it may still provide some protection Flu vaccine cannot prevent: 
 flu that is caused by a virus not covered by the vaccine, or 
 illnesses that look like flu but are not. It takes about 2 weeks for protection to develop after vaccination, and protection lasts through the flu season. 3. Some people should not get this vaccine Tell the person who is giving you the vaccine:  If you have any severe, life-threatening allergies. If you ever had a life-threatening allergic reaction after a dose of flu vaccine, or have a severe allergy to any part of this vaccine, you may be advised not to get vaccinated. Most, but not all, types of flu vaccine contain a small amount of egg protein.  If you ever had Guillain-Barré Syndrome (also called GBS). Some people with a history of GBS should not get this vaccine. This should be discussed with your doctor.  If you are not feeling well. It is usually okay to get flu vaccine when you have a mild illness, but you might be asked to come back when you feel better. 4. Risks of a vaccine reaction With any medicine, including vaccines, there is a chance of reactions. These are usually mild and go away on their own, but serious reactions are also possible. Most people who get a flu shot do not have any problems with it. Minor problems following a flu shot include:  
 soreness, redness, or swelling where the shot was given  hoarseness  sore, red or itchy eyes  cough  fever  aches  headache  itching  fatigue If these problems occur, they usually begin soon after the shot and last 1 or 2 days. More serious problems following a flu shot can include the following:  There may be a small increased risk of Guillain-Barré Syndrome (GBS) after inactivated flu vaccine. This risk has been estimated at 1 or 2 additional cases per million people vaccinated. This is much lower than the risk of severe complications from flu, which can be prevented by flu vaccine.  Young children who get the flu shot along with pneumococcal vaccine (PCV13) and/or DTaP vaccine at the same time might be slightly more likely to have a seizure caused by fever. Ask your doctor for more information. Tell your doctor if a child who is getting flu vaccine has ever had a seizure. Problems that could happen after any injected vaccine:  People sometimes faint after a medical procedure, including vaccination. Sitting or lying down for about 15 minutes can help prevent fainting, and injuries caused by a fall. Tell your doctor if you feel dizzy, or have vision changes or ringing in the ears.  Some people get severe pain in the shoulder and have difficulty moving the arm where a shot was given. This happens very rarely.  Any medication can cause a severe allergic reaction. Such reactions from a vaccine are very rare, estimated at about 1 in a million doses, and would happen within a few minutes to a few hours after the vaccination. As with any medicine, there is a very remote chance of a vaccine causing a serious injury or death. The safety of vaccines is always being monitored. For more information, visit: www.cdc.gov/vaccinesafety/ 
 
 
The [facility] Vaccine Injury Compensation Program (VICP) is a federal program that was created to compensate people who may have been injured by certain vaccines. Persons who believe they may have been injured by a vaccine can learn about the program and about filing a claim by calling 1-461.903.7641 or visiting the [employer] website at www.[facility].gov/vaccinecompensation. There is a time limit to file a claim for compensation. 7. How can I learn more?  Ask your healthcare provider. He or she can give you the vaccine package insert or suggest other sources of information.  Call your local or state health department.  Contact the Centers for Disease Control and Prevention (CDC): 
- Call 5-515.927.1764 (1-800-CDC-INFO) or 
- Visit CDCs website at www.cdc.gov/flu Vaccine Information Statement Inactivated Influenza Vaccine 8/7/2015 
42 ROSALINDAustyn Cruz [employer] [id number] Department of Health and [employer] Centers for Disease Control and Prevention Office Use Only Continue current medications Healthy diet Lab work today Stool test 
 
Keep planned follow up visit Dr Evaristo Flores recommends colon scope and referral to neurology Introducing [facility] SERVICES! [facility] introduces OnLive patient portal. Now you can access parts of your medical record, email your doctor's office, and request medication refills online. 1. In your internet browser, go to [email or web address]://[email or web address]. [employer] 2. Click on the First Time User? Click Here link in the Sign In box. You will see the New Member Sign Up page. 3. Enter your OnLive Access Code exactly as it appears below. You will not need to use this code after youve completed the sign-up process. If you do not sign up before the expiration date, you must request a new code. · OnLive Access Code: [email or web address] Expires: 1/28/2018  9:10 AM 
 
4. Enter the last four digits of your Social Security Number (xxxx) and Date of Birth (mm/dd/yyyy) as indicated and click Submit. You will be taken to the next sign-up page. 5. Create a OnLive ID. This will be your OnLive login ID and cannot be changed, so think of one that is secure and easy to remember. 6. Create a OnLive password. You can change your password at any time. 7. Enter your Password Reset Question and Answer. This can be used at a later time if you forget your password. 8. Enter your e-mail address. You will receive e-mail notification when new information is available in [address] 9. Click Sign Up. You can now view and download portions of your medical record. 10. Click the Download Summary menu link to download a portable copy of your medical information. If you have questions, please visit the Frequently Asked Questions section of the OnLive website.  Remember, OnLive is NOT to be used for urgent needs. For medical emergencies, dial 911. Now available from your iPhone and Android! Please provide this summary of care documentation to your next provider. Your primary care clinician is listed as Nova Goldberg. If you have any questions after today's visit, please call [phone].

## 2017-10-30 NOTE — PROGRESS NOTES
Richard Shafer is a 81 y.o. male and presents for annual Medicare Wellness Visit. Problem List: Reviewed with patient and discussed risk factors. Patient Active Problem List   Diagnosis Code    Gouty arthropathy M10.9    Asthma J45.909    Chronic kidney disease N18.9    Hyperlipidemia E78.5    Hypertension I10    Weight loss R63.4    Constipation K59.00    Multiple lung nodules R91.8    History of malignant neoplasm of large intestine Z85.038    EKG abnormality R94.31    Obesity, Class II, BMI 35-39.9, with comorbidity E66.9    BPH associated with nocturia N40.1, R35.1    MCI (mild cognitive impairment) with memory loss G31.84    Vitamin B12 deficiency E53.8       Current medical providers:  Patient Care Team:  RAMIRO Chi as PCP - General    PSH: Reviewed with patient  Past Surgical History:   Procedure Laterality Date    ENDOSCOPY, COLON, DIAGNOSTIC  06/2011 05/2007, 01/2004,  12/2000    HX COLECTOMY  1991    COLON CANCER S/P RESECTION    HX HERNIA Jose Benjamin        SH: Reviewed with patient  Social History   Substance Use Topics    Smoking status: Former Smoker     Packs/day: 1.00     Years: 29.00     Types: Cigarettes     Start date: 1/1/1949     Quit date: 1/1/1978    Smokeless tobacco: Never Used    Alcohol use No       FH: Reviewed with patient  Family History   Problem Relation Age of Onset    Other Mother      PULMONARY EDEMA    Other Father      PAD    Heart Attack Father     Coronary Artery Disease Father        Medications/Allergies: Reviewed with patient  Current Outpatient Prescriptions on File Prior to Visit   Medication Sig Dispense Refill    hydroCHLOROthiazide (HYDRODIURIL) 25 mg tablet Take 1 Tab by mouth daily.  90 Tab 1    cyanocobalamin (VITAMIN B12) 500 mcg tablet 2 tablets daily 180 Tab 3    ammonium lactate (LAC-HYDRIN) 12 % lotion APPLY THIN LAYER TWICE A DAY FOR 60 DAYS FOR DRY SCALY SKIN BOTH FEET  10    naproxen sodium (ALEVE) 220 mg tablet Take 220 mg by mouth two (2) times daily (with meals).  meclizine (ANTIVERT) 12.5 mg tablet Take 1 Tab by mouth three (3) times daily as needed. Indications: VERTIGO 30 Tab 1    losartan (COZAAR) 50 mg tablet TAKE 1 TABLET DAILY 90 Tab 4    docusate sodium (COLACE) 100 mg capsule Take 1 Cap by mouth daily. 90 Cap 1    aspirin (ASPIRIN) 325 mg tablet Take 162.5 mg by mouth. 1/2 qd      doxazosin (CARDURA) 4 mg tablet Take  by mouth daily. No current facility-administered medications on file prior to visit. Allergies   Allergen Reactions    Cat Hair Std Allergenic Ext Unknown (comments)    Codeine Unknown (comments)    Ragweed Unknown (comments)     Mixed ragweed/pollen extract,tree extract       Objective:  Visit Vitals    /70    Pulse 69    Temp 97.9 °F (36.6 °C) (Oral)    Resp 16    Ht 6' (1.829 m)    Wt 237 lb (107.5 kg)    SpO2 100%    BMI 32.14 kg/m2    Body mass index is 32.14 kg/(m^2). Assessment of cognitive impairment: Alert and oriented x 3    Depression Screen:   PHQ over the last two weeks 10/30/2017   Little interest or pleasure in doing things Not at all   Feeling down, depressed or hopeless Not at all   Total Score PHQ 2 0       Fall Risk Assessment:    Fall Risk Assessment, last 12 mths 10/30/2017   Able to walk? Yes   Fall in past 12 months? No   Fall with injury? -   Number of falls in past 12 months -   Fall Risk Score -       Functional Ability:   Does the patient exhibit a steady gait? yes   How long did it take the patient to get up and walk from a sitting position? 4 seconds   Is the patient self reliant?  (ie can do own laundry, meals, household chores)  yes   Does the patient handle his/her own medications? yes     Does the patient handle his/her own money? yes     Is the patients home safe (ie good lighting, handrails on stairs and bath, etc.)? yes     Did you notice or did patient express any hearing difficulties?    yes Did you notice or did patient express any vision difficulties?   no     Were distance and reading eye charts used? no       Advance Care Planning:   Patient was offered the opportunity to discuss advance care planning:  yes     Does patient have an Advance Directive:  no   If no, did you provide information on [facility]? yes       Plan: Flu shot and Prevnar given today  See separate ACP note    No orders of the defined types were placed in this encounter. Health Maintenance   Topic Date Due    DTaP/Tdap/Td series (1 - Tdap) 03/16/1956    GLAUCOMA SCREENING Q2Y  03/16/2001    Pneumococcal 65+ Low/Medium Risk (2 of 2 - PCV13) 12/06/2014    INFLUENZA AGE 9 TO ADULT  08/01/2017    MEDICARE YEARLY EXAM  09/21/2017    ZOSTER VACCINE AGE 60>  Addressed       *Patient verbalized understanding and agreement with the plan. A copy of the After Visit Summary with personalized health plan was given to the patient today.

## 2017-10-30 NOTE — PATIENT INSTRUCTIONS
Vaccine Information Statement    Influenza (Flu) Vaccine (Inactivated or Recombinant): What you need to know    Many Vaccine Information Statements are available in Vietnamese and other languages. See www.immunize.org/vis  Hojas de Información Sobre Vacunas están disponibles en Español y en muchos otros idiomas. Visite www.immunize.org/vis    1. Why get vaccinated? Influenza (flu) is a contagious disease that spreads around the [place] every year, usually between October and May. Flu is caused by influenza viruses, and is spread mainly by coughing, sneezing, and close contact. Anyone can get flu. Flu strikes suddenly and can last several days. Symptoms vary by age, but can include:   fever/chills   sore throat   muscle aches   fatigue   cough   headache    runny or stuffy nose    Flu can also lead to pneumonia and blood infections, and cause diarrhea and seizures in children. If you have a medical condition, such as heart or lung disease, flu can make it worse. Flu is more dangerous for some people. Infants and young children, people 72years of age and older, pregnant women, and people with certain health conditions or a weakened immune system are at greatest risk. Each year thousands of people in the [facility] die from flu, and many more are hospitalized. Flu vaccine can:   keep you from getting flu,   make flu less severe if you do get it, and   keep you from spreading flu to your family and other people. 2. Inactivated and recombinant flu vaccines    A dose of flu vaccine is recommended every flu season. Children 6 months through 6years of age may need two doses during the same flu season. Everyone else needs only one dose each flu season.        Some inactivated flu vaccines contain a very small amount of a mercury-based preservative called thimerosal. Studies have not shown thimerosal in vaccines to be harmful, but flu vaccines that do not contain thimerosal are available. There is no live flu virus in flu shots. They cannot cause the flu. There are many flu viruses, and they are always changing. Each year a new flu vaccine is made to protect against three or four viruses that are likely to cause disease in the upcoming flu season. But even when the vaccine doesnt exactly match these viruses, it may still provide some protection    Flu vaccine cannot prevent:   flu that is caused by a virus not covered by the vaccine, or   illnesses that look like flu but are not. It takes about 2 weeks for protection to develop after vaccination, and protection lasts through the flu season. 3. Some people should not get this vaccine    Tell the person who is giving you the vaccine:     If you have any severe, life-threatening allergies. If you ever had a life-threatening allergic reaction after a dose of flu vaccine, or have a severe allergy to any part of this vaccine, you may be advised not to get vaccinated. Most, but not all, types of flu vaccine contain a small amount of egg protein.  If you ever had Guillain-Barré Syndrome (also called GBS). Some people with a history of GBS should not get this vaccine. This should be discussed with your doctor.  If you are not feeling well. It is usually okay to get flu vaccine when you have a mild illness, but you might be asked to come back when you feel better. 4. Risks of a vaccine reaction    With any medicine, including vaccines, there is a chance of reactions. These are usually mild and go away on their own, but serious reactions are also possible. Most people who get a flu shot do not have any problems with it.      Minor problems following a flu shot include:    soreness, redness, or swelling where the shot was given     hoarseness   sore, red or itchy eyes   cough   fever   aches   headache   itching   fatigue  If these problems occur, they usually begin soon after the shot and last 1 or 2 days. More serious problems following a flu shot can include the following:     There may be a small increased risk of Guillain-Barré Syndrome (GBS) after inactivated flu vaccine. This risk has been estimated at 1 or 2 additional cases per million people vaccinated. This is much lower than the risk of severe complications from flu, which can be prevented by flu vaccine.  Young children who get the flu shot along with pneumococcal vaccine (PCV13) and/or DTaP vaccine at the same time might be slightly more likely to have a seizure caused by fever. Ask your doctor for more information. Tell your doctor if a child who is getting flu vaccine has ever had a seizure. Problems that could happen after any injected vaccine:      People sometimes faint after a medical procedure, including vaccination. Sitting or lying down for about 15 minutes can help prevent fainting, and injuries caused by a fall. Tell your doctor if you feel dizzy, or have vision changes or ringing in the ears.  Some people get severe pain in the shoulder and have difficulty moving the arm where a shot was given. This happens very rarely.  Any medication can cause a severe allergic reaction. Such reactions from a vaccine are very rare, estimated at about 1 in a million doses, and would happen within a few minutes to a few hours after the vaccination. As with any medicine, there is a very remote chance of a vaccine causing a serious injury or death. The safety of vaccines is always being monitored. For more information, visit: www.cdc.gov/vaccinesafety/    5. What if there is a serious reaction? What should I look for?  Look for anything that concerns you, such as signs of a severe allergic reaction, very high fever, or unusual behavior.     Signs of a severe allergic reaction can include hives, swelling of the face and throat, difficulty breathing, a fast heartbeat, dizziness, and weakness  usually within a few minutes to a few hours after the vaccination. What should I do?  If you think it is a severe allergic reaction or other emergency that cant wait, call 9-1-1 and get the person to the nearest hospital. Otherwise, call your doctor.  Reactions should be reported to the Vaccine Adverse Event Reporting System (VAERS). Your doctor should file this report, or you can do it yourself through  the VAERS web site at www.vaers. [facility].gov, or by calling 2-139.806.1938. VAERS does not give medical advice. 6. The National Vaccine Injury Compensation Program    The [facility] Vaccine Injury Compensation Program (VICP) is a federal program that was created to compensate people who may have been injured by certain vaccines. Persons who believe they may have been injured by a vaccine can learn about the program and about filing a claim by calling 5-689.907.2925 or visiting the [employer] website at www.[facility].gov/vaccinecompensation. There is a time limit to file a claim for compensation. 7. How can I learn more?  Ask your healthcare provider. He or she can give you the vaccine package insert or suggest other sources of information.  Call your local or state health department.  Contact the Centers for Disease Control and Prevention (CDC):  - Call 1-223.138.1164 (1-800-CDC-INFO) or  - Visit CDCs website at www.cdc.gov/flu    Vaccine Information Statement   Inactivated Influenza Vaccine   8/7/2015  42 POLLY Kim [id number]    Department of Health and Human Services  Centers for Disease Control and Prevention    Office Use Only            Continue current medications    Healthy diet    Lab work today    Stool test    Keep planned follow up visit     Dr Bhanu Lauren recommends colon scope and referral to neurology

## 2017-10-30 NOTE — PROGRESS NOTES
Marcene Primrose is a 81 y.o. male who presents to the office today with the following:  Chief Complaint   Patient presents with    Annual Wellness Visit       Allergies   Allergen Reactions    Cat Hair Std Allergenic Ext Unknown (comments)    Codeine Unknown (comments)    Ragweed Unknown (comments)     Mixed ragweed/pollen extract,tree extract       Current Outpatient Prescriptions   Medication Sig    lactulose (CHRONULAC) 10 gram/15 mL solution Take 45 mL by mouth nightly.  hydroCHLOROthiazide (HYDRODIURIL) 25 mg tablet Take 1 Tab by mouth daily.  cyanocobalamin (VITAMIN B12) 500 mcg tablet 2 tablets daily    ammonium lactate (LAC-HYDRIN) 12 % lotion APPLY THIN LAYER TWICE A DAY FOR 60 DAYS FOR DRY SCALY SKIN BOTH FEET    naproxen sodium (ALEVE) 220 mg tablet Take 220 mg by mouth two (2) times daily (with meals).  meclizine (ANTIVERT) 12.5 mg tablet Take 1 Tab by mouth three (3) times daily as needed. Indications: VERTIGO    losartan (COZAAR) 50 mg tablet TAKE 1 TABLET DAILY    aspirin (ASPIRIN) 325 mg tablet Take 162.5 mg by mouth. 1/2 qd    doxazosin (CARDURA) 4 mg tablet Take  by mouth daily. No current facility-administered medications for this visit.         Past Medical History:   Diagnosis Date    Asthma     Chronic kidney disease     Dermatophytosis of groin and perianal area 2010    Edema 2008    Encounter for long-term (current) use of other medications 2010    Gout, unspecified 2010    Gouty arthropathy, unspecified 2010    Hypertension     Hypertrophy of prostate without urinary obstruction and other lower urinary tract symptoms (LUTS) 2008    Impotence of organic origin 2008    Memory loss 2009    Obesity, unspecified 2010    Orthostatic hypotension 2008    Other and unspecified hyperlipidemia 2008    Other atopic dermatitis and related conditions 2012    Palpitations 2010    Peripheral angiopathy in diseases classified elsewhere [facility]) 2010    Personal history of malignant neoplasm of rectum, rectosigmoid junction, and anus 2011    Tinea nigra 2011    Unspecified pruritic disorder 2011    Vitamin B12 deficiency 7/23/2017       Past Surgical History:   Procedure Laterality Date    ENDOSCOPY, COLON, DIAGNOSTIC  06/2011 05/2007, 01/2004,  12/2000    HX COLECTOMY  1991    COLON CANCER S/P RESECTION    HX HERNIA REPAIR  1992    INCISIONAL       History   Smoking Status    Former Smoker    Packs/day: 1.00    Years: 29.00    Types: Cigarettes    Start date: 1/1/1949   [facility] Quit date: 1/1/1978   Smokeless Tobacco    Never Used       Family History   Problem Relation Age of Onset    Other Mother      PULMONARY EDEMA    Other Father      PAD    Heart Attack Father     Coronary Artery Disease Father          History of Present Illness:  Patient here for Medicare wellness visit and ongoing medical follow-up    Patient does have hypertension. Hydrochlorothiazide was increased last visit. Compliant with his medications. Blood pressure in good control today. Patient on aspirin. .  Again no cardiac complaints. Most recent basic metabolic profile normal.  Repeat lab work ordered today. He does have chronic lower extremity edema. As noted on low-dose hydrochlorothiazide. He states that is unchanged over time. No complaints of chest pain or shortness of breath today. He does have hyperlipidemia. Most recent cholesterol 227. He used to be on medications but stopped it because of cost.  He does not want to go back on medications at this point. Does not want his lipids checked    He does have a history of BPH. He is on Cardura. He tells me he does follow with urology. Patient states his recent PSA was normal.  He has a follow-up with them in the spring. No current complaints    CBC did reveal some macrocytosis. I did check B12 and folate levels. His B12 levels were low. I did start B12 replacement. He is compliant with this.   Repeat lab work ordered today    Remote history of gout. On no medications with no recent complaints. Remote history of colon cancer. He has had follow-up scopes in the past.  He was told the last time he had a scope that he did not need a repeat scope until he was 81years old. No current GI complaints. Patient has had some ongoing weight loss. I have recommended repeat colon evaluation. He declines colonoscopy at this point. I will check Hemoccult fit. He is aware I think colonoscopy is important    History of a lung nodule found on CAT scan. Follow-up CT in 2016 shows her to be stable and appears benign. Some history in the chart of memory impairment. Patient's memory today of recent events medications and appointment seems quite good. As noted recent thyroid functions were normal.  His B12 level was low when he is now on replacement    I did note the patient was somewhat hypokinetic. He had some minimal cogwheeling. He states he occasionally does have some tremor. I think he has some early parkinsonian features. We discussed having him seen by neurology for their thoughts and evaluation. He continues to decline evaluation by neurology. I asked him again to think about this    Patient has been losing weight. His weight was up a bit at the last visit the back down again today. Patient states he is not depressed. He is eating well. He has no GI complaints. Previous lab work including recent thyroid functions normal.  No other specific complaints. He thinks it is because he is getting older. I think it may well be due to his underlying Parkinson's. I am also concerned given his history of colon cancer. I am checking lab work today and a Hemoccult fit. I am going to bring him back in 2 months. He is can let me know if he changes his mind about colonoscopy or referral to neurology    Patient does have some issues with constipation. He was on Colace. He states that his was not helping.   He was only moving his bowels about once every 5 days. He borrowed his sister's lactulose and is taking 2 tablespoons in the morning and this is working very well and he would like to continue with that. Prescription was written today      Flu shot 10/17  Pneumovax in the past  Prevnar given 10/17  Patient has declined Zostavax      Review of Systems:    Review of systems negative except as noted above      Physical Exam:  Visit Vitals    /70    Pulse 69    Temp 97.9 °F (36.6 °C) (Oral)    Resp 16    Ht 6' (1.829 m)    Wt 237 lb (107.5 kg)    SpO2 100%    BMI 32.14 kg/m2     Vitals:    10/30/17 0901   BP: 130/70   Pulse: 69   Resp: 16   Temp: 97.9 °F (36.6 °C)   TempSrc: Oral   SpO2: 100%   Weight: 237 lb (107.5 kg)   Height: 6' (1.829 m)    patient was in no acute distress. Vital signs as above on recheck. Patient did move slowly. It took several attempts for him to get out of the chair. He did have an intermittent tremor left hand   head was normal cephalic   External ears were normal.  Ear canals did have cerumen. Visualized TMs clear. Hearing was normal  Eyes PERRLA. EOMs full. He does see his eye doctor every year. No glaucoma  OP was within normal limits  Neck no nodes or masses no bruits  Chest was clear no wheezes rhonchi or rales  Cor regular rate and rhythm no S3 no S4 no murmurs no ectopy  Abdomen obese no masses soft nontender  Prostate exam from urology  Extremities had 2+ edema bilaterally  EKG 2017 showed normal sinus rhythm. Negative T waves lateral leads. T-wave changes appear less and and fewer leads than on previous EKGs. Patient has no cardiac complaints. We did discuss referral back to cardiology for screening. He declines at this point  As noted patient was hypokinetic. He did walk slowly. He did have some minimal cogwheeling noted    1. Medicare annual wellness visit, subsequent  See separate Medicare wellness note    2.  Encounter for immunization    - Influenza virus vaccine (FLUZONE HIGH-DOSE) 65 years and older (79674)  - PNEUMOCOCCAL CONJ VACCINE 13 VALENT IM (Age 48 and over)    3. Essential hypertension  Controlled on his current medication. Will check lab work today and continue current meds  - CBC WITH AUTOMATED DIFF  - METABOLIC PANEL, COMPREHENSIVE    4. Vitamin B12 deficiency  Now on replacement  - VITAMIN B12    5. Tremor  Suspect Parkinson's. Recommending neurology evaluation. Patient declining    6. Weight loss  Suspect secondary to underlying Parkinson's. Concern given history of colon cancer. Patient declining colonoscopy or neurology evaluation. Checking lab work and stool tests. 7. Hyperlipidemia, unspecified hyperlipidemia type  Declining therapy    8. Constipation, unspecified constipation type    - lactulose (CHRONULAC) 10 gram/15 mL solution; Take 45 mL by mouth nightly. Dispense: 480 mL; Refill: 2    9. History of malignant neoplasm of large intestine    - OCCULT BLOOD, IMMUNOASSAY (FIT); Future      Patient Instructions     Vaccine Information Statement    Influenza (Flu) Vaccine (Inactivated or Recombinant): What you need to know    Many Vaccine Information Statements are available in [place]ic and other languages. See www.immunize.org/vis  Hojas de Información Sobre Vacunas están disponibles en Español y en muchos otros idiomas. Visite www.immunize.org/vis    1. Why get vaccinated? Influenza (flu) is a contagious disease that spreads around the [place] every year, usually between October and May. Flu is caused by influenza viruses, and is spread mainly by coughing, sneezing, and close contact. Anyone can get flu. Flu strikes suddenly and can last several days. Symptoms vary by age, but can include:   fever/chills   sore throat   muscle aches   fatigue   cough   headache    runny or stuffy nose    Flu can also lead to pneumonia and blood infections, and cause diarrhea and seizures in children.   If you have a medical condition, such as heart or lung disease, flu can make it worse. Flu is more dangerous for some people. Infants and young children, people 72years of age and older, pregnant women, and people with certain health conditions or a weakened immune system are at greatest risk. Each year thousands of people in the [facility] die from flu, and many more are hospitalized. Flu vaccine can:   keep you from getting flu,   make flu less severe if you do get it, and   keep you from spreading flu to your family and other people. 2. Inactivated and recombinant flu vaccines    A dose of flu vaccine is recommended every flu season. Children 6 months through 6years of age may need two doses during the same flu season. Everyone else needs only one dose each flu season. Some inactivated flu vaccines contain a very small amount of a mercury-based preservative called thimerosal. Studies have not shown thimerosal in vaccines to be harmful, but flu vaccines that do not contain thimerosal are available. There is no live flu virus in flu shots. They cannot cause the flu. There are many flu viruses, and they are always changing. Each year a new flu vaccine is made to protect against three or four viruses that are likely to cause disease in the upcoming flu season. But even when the vaccine doesnt exactly match these viruses, it may still provide some protection    Flu vaccine cannot prevent:   flu that is caused by a virus not covered by the vaccine, or   illnesses that look like flu but are not. It takes about 2 weeks for protection to develop after vaccination, and protection lasts through the flu season. 3. Some people should not get this vaccine    Tell the person who is giving you the vaccine:     If you have any severe, life-threatening allergies.     If you ever had a life-threatening allergic reaction after a dose of flu vaccine, or have a severe allergy to any part of this vaccine, you may be advised not to get vaccinated. Most, but not all, types of flu vaccine contain a small amount of egg protein.  If you ever had Guillain-Barré Syndrome (also called GBS). Some people with a history of GBS should not get this vaccine. This should be discussed with your doctor.  If you are not feeling well. It is usually okay to get flu vaccine when you have a mild illness, but you might be asked to come back when you feel better. 4. Risks of a vaccine reaction    With any medicine, including vaccines, there is a chance of reactions. These are usually mild and go away on their own, but serious reactions are also possible. Most people who get a flu shot do not have any problems with it. Minor problems following a flu shot include:    soreness, redness, or swelling where the shot was given     hoarseness   sore, red or itchy eyes   cough   fever   aches   headache   itching   fatigue  If these problems occur, they usually begin soon after the shot and last 1 or 2 days. More serious problems following a flu shot can include the following:     There may be a small increased risk of Guillain-Barré Syndrome (GBS) after inactivated flu vaccine. This risk has been estimated at 1 or 2 additional cases per million people vaccinated. This is much lower than the risk of severe complications from flu, which can be prevented by flu vaccine.  Young children who get the flu shot along with pneumococcal vaccine (PCV13) and/or DTaP vaccine at the same time might be slightly more likely to have a seizure caused by fever. Ask your doctor for more information. Tell your doctor if a child who is getting flu vaccine has ever had a seizure. Problems that could happen after any injected vaccine:      People sometimes faint after a medical procedure, including vaccination. Sitting or lying down for about 15 minutes can help prevent fainting, and injuries caused by a fall.  Tell your doctor if you feel dizzy, or have vision changes or ringing in the ears.  Some people get severe pain in the shoulder and have difficulty moving the arm where a shot was given. This happens very rarely.  Any medication can cause a severe allergic reaction. Such reactions from a vaccine are very rare, estimated at about 1 in a million doses, and would happen within a few minutes to a few hours after the vaccination. As with any medicine, there is a very remote chance of a vaccine causing a serious injury or death. The safety of vaccines is always being monitored. For more information, visit: www.cdc.gov/vaccinesafety/    5. What if there is a serious reaction? What should I look for?  Look for anything that concerns you, such as signs of a severe allergic reaction, very high fever, or unusual behavior. Signs of a severe allergic reaction can include hives, swelling of the face and throat, difficulty breathing, a fast heartbeat, dizziness, and weakness  usually within a few minutes to a few hours after the vaccination. What should I do?  If you think it is a severe allergic reaction or other emergency that cant wait, call 9-1-1 and get the person to the nearest hospital. Otherwise, call your doctor.  Reactions should be reported to the Vaccine Adverse Event Reporting System (VAERS). Your doctor should file this report, or you can do it yourself through  the VAERS web site at www.vaers. hhs.gov, or by calling 9-435.399.4025. VAERS does not give medical advice. 6. The National Vaccine Injury Compensation Program    The [facility] Vaccine Injury Compensation Program (VICP) is a federal program that was created to compensate people who may have been injured by certain vaccines. Persons who believe they may have been injured by a vaccine can learn about the program and about filing a claim by calling 3-709.697.9744 or visiting the [employer] website at www.[facility].gov/vaccinecompensation.   There is a time limit to file a claim for compensation. 7. How can I learn more?  Ask your healthcare provider. He or she can give you the vaccine package insert or suggest other sources of information.  Call your local or state health department.  Contact the Centers for Disease Control and Prevention (CDC):  - Call 2-325.370.5698 (1-800-CDC-INFO) or  - Visit CDCs website at www.cdc.gov/flu    Vaccine Information Statement   Inactivated Influenza Vaccine   8/7/2015  42 UAustyn Guzman [id number]    Department of Health and Human Services  Centers for Disease Control and Prevention    Office Use Only            Continue current medications    Healthy diet    Lab work today    Stool test    Keep planned follow up visit     Dr Delmar Boeck recommends colon scope and referral to neurology        Continue current therapy plan except for indicated above. Verbal and written instructions (see AVS) provided.  Patient expresses understanding of diagnosis and treatment plan. Follow-up Disposition:  Return in about 2 months (around 12/30/2017). Bjorn Balloon. Delmar Boeck, MD

## 2017-10-30 NOTE — ACP (ADVANCE CARE PLANNING)
Advance care planning discussed with patient,he do not have one form given so he can discuss this with his family.

## 2017-11-01 LAB
ALBUMIN SERPL-MCNC: 3.9 G/DL (ref 3.5–4.7)
ALBUMIN/GLOB SERPL: 1 {RATIO} (ref 1.2–2.2)
ALP SERPL-CCNC: 68 IU/L (ref 39–117)
ALT SERPL-CCNC: 9 IU/L (ref 0–44)
AST SERPL-CCNC: 21 IU/L (ref 0–40)
BASOPHILS # BLD AUTO: 0 X10E3/UL (ref 0–0.2)
BASOPHILS NFR BLD AUTO: 0 %
BILIRUB SERPL-MCNC: 0.5 MG/DL (ref 0–1.2)
BUN SERPL-MCNC: 19 MG/DL (ref 8–27)
BUN/CREAT SERPL: 14 (ref 10–24)
CALCIUM SERPL-MCNC: 9.4 MG/DL (ref 8.6–10.2)
CHLORIDE SERPL-SCNC: 107 MMOL/L (ref 96–106)
CO2 SERPL-SCNC: 17 MMOL/L (ref 18–29)
CREAT SERPL-MCNC: 1.4 MG/DL (ref 0.76–1.27)
EOSINOPHIL # BLD AUTO: 0 X10E3/UL (ref 0–0.4)
EOSINOPHIL NFR BLD AUTO: 0 %
ERYTHROCYTE [DISTWIDTH] IN BLOOD BY AUTOMATED COUNT: 13.7 % (ref 12.3–15.4)
GFR SERPLBLD CREATININE-BSD FMLA CKD-EPI: 47 ML/MIN/1.73
GFR SERPLBLD CREATININE-BSD FMLA CKD-EPI: 54 ML/MIN/1.73
GLOBULIN SER CALC-MCNC: 3.8 G/DL (ref 1.5–4.5)
GLUCOSE SERPL-MCNC: 95 MG/DL (ref 65–99)
HCT VFR BLD AUTO: 45 % (ref 37.5–51)
HEMOCCULT STL QL IA: NEGATIVE
HGB BLD-MCNC: 14.6 G/DL (ref 12.6–17.7)
IMM GRANULOCYTES # BLD: 0 X10E3/UL (ref 0–0.1)
IMM GRANULOCYTES NFR BLD: 0 %
INTERPRETATION: NORMAL
LYMPHOCYTES # BLD AUTO: 1.3 X10E3/UL (ref 0.7–3.1)
LYMPHOCYTES NFR BLD AUTO: 24 %
MCH RBC QN AUTO: 28.8 PG (ref 26.6–33)
MCHC RBC AUTO-ENTMCNC: 32.4 G/DL (ref 31.5–35.7)
MCV RBC AUTO: 89 FL (ref 79–97)
MONOCYTES # BLD AUTO: 0.4 X10E3/UL (ref 0.1–0.9)
MONOCYTES NFR BLD AUTO: 7 %
NEUTROPHILS # BLD AUTO: 3.7 X10E3/UL (ref 1.4–7)
NEUTROPHILS NFR BLD AUTO: 69 %
PLATELET # BLD AUTO: 240 X10E3/UL (ref 150–379)
POTASSIUM SERPL-SCNC: 4.9 MMOL/L (ref 3.5–5.2)
PROT SERPL-MCNC: 7.7 G/DL (ref 6–8.5)
RBC # BLD AUTO: 5.07 X10E6/UL (ref 4.14–5.8)
SODIUM SERPL-SCNC: 145 MMOL/L (ref 134–144)
VIT B12 SERPL-MCNC: 555 PG/ML (ref 211–946)
WBC # BLD AUTO: 5.4 X10E3/UL (ref 3.4–10.8)

## 2017-11-01 NOTE — PROGRESS NOTES
Stool test negative for blood  This is good news  Patient should continue his current medication and keep planned follow-up

## 2017-11-01 NOTE — PROGRESS NOTES
Labs all stable and acceptable except slight rising creatinine which is not significant at this point.   We will follow  Patient to continue his current medication  Turn in Hemoccult fit test  Keep planned follow-up

## 2018-02-12 ENCOUNTER — OFFICE VISIT (OUTPATIENT)
Dept: FAMILY MEDICINE CLINIC | Age: 83
End: 2018-02-12

## 2018-02-12 VITALS
WEIGHT: 237 LBS | RESPIRATION RATE: 16 BRPM | SYSTOLIC BLOOD PRESSURE: 170 MMHG | HEART RATE: 62 BPM | OXYGEN SATURATION: 98 % | BODY MASS INDEX: 32.1 KG/M2 | DIASTOLIC BLOOD PRESSURE: 78 MMHG | HEIGHT: 72 IN

## 2018-02-12 DIAGNOSIS — I10 ESSENTIAL HYPERTENSION: Primary | ICD-10-CM

## 2018-02-12 DIAGNOSIS — G20 PARKINSON DISEASE (HCC): ICD-10-CM

## 2018-02-12 RX ORDER — DOCUSATE SODIUM 100 MG/1
100 CAPSULE, LIQUID FILLED ORAL DAILY
COMMUNITY
End: 2018-05-05

## 2018-02-12 NOTE — PROGRESS NOTES
Chief Complaint   Patient presents with    Hypertension         HPI:      Holli Guthrie is a 81 y.o. male. History of tremor. He feels he has slowed down. Speech is slow. Handwriting is difficult, small, shaky. Notes tremor in right hand when doing dishes. Still driving. No close calls or accidents. Right knee is painful at times. Does not take NSAIDs dailyl       BP has been up. Has history of renal cyst and is followed by Dr Tye Leos for BPH. There is a history of surgery for colon cancer. Allergies   Allergen Reactions    Cat Hair Std Allergenic Ext Unknown (comments)    Codeine Unknown (comments)    Ragweed Unknown (comments)     Mixed ragweed/pollen extract,tree extract       Current Outpatient Prescriptions   Medication Sig    docusate sodium (COLACE) 100 mg capsule Take 100 mg by mouth daily. Indications: [facility] liquid brand, 2 tbs daily    hydroCHLOROthiazide (HYDRODIURIL) 25 mg tablet Take 1 Tab by mouth daily.  cyanocobalamin (VITAMIN B12) 500 mcg tablet 2 tablets daily    ammonium lactate (LAC-HYDRIN) 12 % lotion APPLY THIN LAYER TWICE A DAY FOR 60 DAYS FOR DRY SCALY SKIN BOTH FEET    naproxen sodium (ALEVE) 220 mg tablet Take 220 mg by mouth two (2) times daily (with meals).  meclizine (ANTIVERT) 12.5 mg tablet Take 1 Tab by mouth three (3) times daily as needed. Indications: VERTIGO    losartan (COZAAR) 50 mg tablet TAKE 1 TABLET DAILY    aspirin (ASPIRIN) 325 mg tablet Take 162.5 mg by mouth. 1/2 qd    doxazosin (CARDURA) 4 mg tablet Take  by mouth daily.  lactulose (CHRONULAC) 10 gram/15 mL solution Take 45 mL by mouth nightly. No current facility-administered medications for this visit.         Past Medical History:   Diagnosis Date    Asthma     Chronic kidney disease     Dermatophytosis of groin and perianal area 2010    Edema 2008    Encounter for long-term (current) use of other medications 2010    Gout, unspecified 2010    Gouty arthropathy, unspecified 2010    Hypertension     Hypertrophy of prostate without urinary obstruction and other lower urinary tract symptoms (LUTS) 2008    Impotence of organic origin 2008    Memory loss 2009    Obesity, unspecified 2010    Orthostatic hypotension 2008    Other and unspecified hyperlipidemia 2008    Other atopic dermatitis and related conditions 2012    Palpitations 2010    Peripheral angiopathy in diseases classified elsewhere [facility]) 2010    Personal history of malignant neoplasm of rectum, rectosigmoid junction, and anus 2011    Tinea nigra 2011    Unspecified pruritic disorder 2011    Vitamin B12 deficiency 7/23/2017         ROS:  Denies fever, chills, cough, chest pain, SOB,  nausea, vomiting, or diarrhea. Denies wt loss, wt gain, hemoptysis, hematochezia or melena. Physical Examination:    /78 (BP 1 Location: Left arm, BP Patient Position: Sitting)  Pulse 62  Resp 16  Ht 6' (1.829 m)  Wt 237 lb (107.5 kg)  SpO2 98%  BMI 32.14 kg/m2    General: Alert and Ox3, very slow speech. HEENT:  NC/AT, EOMI, OP: clear  Neck:  Supple, no adenopathy, JVD, mass or bruit  Chest:  Clear to Ausculation, without wheezes, rales, rubs or ronchi  Cardiac: RRR  Abdomen:  +BS, soft, nontender without palpable HSM  Extremities:  No cyanosis, clubbing or edema  Neurologic:  Bradykinetic. Virtually no arm swing. cogwheeling bilaterally. En bloc turning. Masked facies with reduced frequency of blinking. Skin: no rash  Lymphadenopathy: no cervical or supraclavicular nodes      ASSESSMENT AND PLAN:     1. He appears to have Parkinson's disease: Will arrange for expert consultation with neurology to confirm diagnosis and to obtain best treatment options for him. 2.  HTN:  Remains up. Home BP readings would help before increasing meds  3. Rising Cr: repeat labs today  4. RTC in 3 months. Orders Placed This Encounter    docusate sodium (COLACE) 100 mg capsule     Sig: Take 100 mg by mouth daily.  Indications: [facility] liquid brand, 2 tbs daily       Matheus Marcum MD, [id number]

## 2018-02-12 NOTE — MR AVS SNAPSHOT
[address] [facility], [place] 
 
 
 [address] 
[phone] Patient: Melvin Fuentes MRN: [id number] Visit Information Date & Time Provider Department Dept. Phone Encounter #  
 2/12/2018 10:00 AM Sammie Tao [place] [phone] [id number] Your Appointments 5/8/2018 10:30 AM  
ESTABLISHED PATIENT with Sammie Tao MD  
[address] [place] ([facility]) Appt Note: 3 mo f/u  
 [address] [facility] [address]  
[address] [facility] 9449 [facility] [address] Upcoming Health Maintenance Date Due  
 MEDICARE YEARLY EXAM 10/31/2018 GLAUCOMA SCREENING Q2Y 10/30/2019 DTaP/Tdap/Td series (2 - Td) 2/12/2028 Allergies as of 2/12/2018  Review Complete On: 2/12/2018 By: Sammie Tao MD  
  
 Severity Noted Reaction Type Reactions Cat Hair Std Allergenic Ext  07/17/2013    Unknown (comments) Codeine  07/17/2013    Unknown (comments) Ragweed  07/17/2013    Unknown (comments) Mixed ragweed/pollen extract,tree extract Current Immunizations  Reviewed on 11/17/2015 Name Date Influenza High Dose Vaccine PF 10/30/2017, 11/2/2016  4:24 PM  
 Influenza Vaccine 10/7/2014, 12/6/2013 Influenza Vaccine [place] Birks) 11/17/2015 Pneumococcal Conjugate (PCV-13) 10/30/2017 Pneumococcal Polysaccharide (PPSV-23) 12/6/2013 Not reviewed this visit You Were Diagnosed With   
  
 Codes Comments Essential hypertension    -  Primary ICD-10-CM: I10 
ICD-9-CM: 401.9 Parkinson disease ([facility] [address]     ICD-10-CM: G20 
ICD-9-CM: 332.0 Vitals BP Pulse Resp Height(growth percentile) Weight(growth percentile) SpO2  
 170/78 (BP 1 Location: Left arm, BP Patient Position: Sitting) 62 16 6' (1.829 m) 237 lb (107.5 kg) 98% BMI Smoking Status 32.14 kg/m2 Former Smoker Vitals History BMI and BSA Data Body Mass Index Body Surface Area  
 32.14 kg/m 2 2.34 m 2 Preferred Pharmacy Pharmacy Name Phone 100 Linda [facility] [phone] Your Updated Medication List  
  
   
This list is accurate as of: 2/12/18 11:17 AM.  Always use your most recent med list.  
  
  
  
  
 ALEVE 220 mg tablet Generic drug:  naproxen sodium Take 220 mg by mouth two (2) times daily (with meals). ammonium lactate 12 % lotion Commonly known as:  LAC-HYDRIN  
APPLY THIN LAYER TWICE A DAY FOR 60 DAYS FOR DRY SCALY SKIN BOTH FEET  
  
 aspirin 325 mg tablet Commonly known as:  ASPIRIN Take 162.5 mg by mouth. 1/2 qd COLACE 100 mg capsule Generic drug:  docusate sodium Take 100 mg by mouth daily. Indications: [facility] liquid brand, 2 tbs daily  
  
 cyanocobalamin 500 mcg tablet Commonly known as:  VITAMIN B12  
2 tablets daily  
  
 doxazosin 4 mg tablet Commonly known as:  CARDURA Take  by mouth daily. hydroCHLOROthiazide 25 mg tablet Commonly known as:  HYDRODIURIL Take 1 Tab by mouth daily. lactulose 10 gram/15 mL solution Commonly known as:  Jeanell Downs Take 45 mL by mouth nightly. losartan 50 mg tablet Commonly known as:  COZAAR  
TAKE 1 TABLET DAILY  
  
 meclizine 12.5 mg tablet Commonly known as:  ANTIVERT Take 1 Tab by mouth three (3) times daily as needed. Indications: VERTIGO We Performed the Following METABOLIC PANEL, COMPREHENSIVE [40553 CPT(R)] REFERRAL TO NEUROLOGY [AWS60 Custom] Comments:  
 Walks in as a new patient without a previous diagnosis of Parkinson's disease. Has  Classic findings. Please evaluate for diagnosis and optimal treatment recommendations Thanks Bhaskar Mcgrath Referral Information Referral ID Referred By Referred To  
  
 [id number] Star PAZ MD   
   [address]   
   MOB 3 L[address] , [address] Phone: [phone] Fax: [phone] Visits Status Start Date End Date 1 New Request 2/12/18 2/12/19 If your referral has a status of pending review or denied, additional information will be sent to support the outcome of this decision. Introducing [facility] SERVICES! Alison Ceballos introduces Shopline patient portal. Now you can access parts of your medical record, email your doctor's office, and request medication refills online. 1. In your internet browser, go to [email or web address] 2. Click on the First Time User? Click Here link in the Sign In box. You will see the New Member Sign Up page. 3. Enter your Shopline Access Code exactly as it appears below. You will not need to use this code after youve completed the sign-up process. If you do not sign up before the expiration date, you must request a new code. · Shopline Access Code: [email or web address] Expires: 5/2/2018  2:18 PM 
 
4. Enter the last four digits of your Social Security Number (xxxx) and Date of Birth (mm/dd/yyyy) as indicated and click Submit. You will be taken to the next sign-up page. 5. Create a Shopline ID. This will be your Shopline login ID and cannot be changed, so think of one that is secure and easy to remember. 6. Create a Shopline password. You can change your password at any time. 7. Enter your Password Reset Question and Answer. This can be used at a later time if you forget your password. 8. Enter your e-mail address. You will receive e-mail notification when new information is available in [address] 9. Click Sign Up. You can now view and download portions of your medical record. 10. Click the Download Summary menu link to download a portable copy of your medical information. If you have questions, please visit the Frequently Asked Questions section of the Shopline website.  Remember, Shopline is NOT to be used for urgent needs. For medical emergencies, dial 911. Now available from your iPhone and Android! Please provide this summary of care documentation to your next provider. Your primary care clinician is listed as Babar Hernandez. If you have any questions after today's visit, please call [phone].

## 2018-02-12 NOTE — ACP (ADVANCE CARE PLANNING)
Patient states they do not have an Advance Directive but wife Luiz Martines would make decisions for him.

## 2018-02-13 LAB
ALBUMIN SERPL-MCNC: 3.8 G/DL (ref 3.5–4.7)
ALBUMIN/GLOB SERPL: 1.1 {RATIO} (ref 1.2–2.2)
ALP SERPL-CCNC: 60 IU/L (ref 39–117)
ALT SERPL-CCNC: 10 IU/L (ref 0–44)
AST SERPL-CCNC: 19 IU/L (ref 0–40)
BILIRUB SERPL-MCNC: 0.5 MG/DL (ref 0–1.2)
BUN SERPL-MCNC: 22 MG/DL (ref 8–27)
BUN/CREAT SERPL: 18 (ref 10–24)
CALCIUM SERPL-MCNC: 9.5 MG/DL (ref 8.6–10.2)
CHLORIDE SERPL-SCNC: 106 MMOL/L (ref 96–106)
CO2 SERPL-SCNC: 20 MMOL/L (ref 18–29)
CREAT SERPL-MCNC: 1.25 MG/DL (ref 0.76–1.27)
GFR SERPLBLD CREATININE-BSD FMLA CKD-EPI: 54 ML/MIN/1.73
GFR SERPLBLD CREATININE-BSD FMLA CKD-EPI: 62 ML/MIN/1.73
GLOBULIN SER CALC-MCNC: 3.5 G/DL (ref 1.5–4.5)
GLUCOSE SERPL-MCNC: 90 MG/DL (ref 65–99)
INTERPRETATION: NORMAL
POTASSIUM SERPL-SCNC: 5.2 MMOL/L (ref 3.5–5.2)
PROT SERPL-MCNC: 7.3 G/DL (ref 6–8.5)
SODIUM SERPL-SCNC: 144 MMOL/L (ref 134–144)

## 2018-05-08 ENCOUNTER — OFFICE VISIT (OUTPATIENT)
Dept: FAMILY MEDICINE CLINIC | Age: 83
End: 2018-05-08

## 2018-05-08 VITALS
HEART RATE: 68 BPM | RESPIRATION RATE: 22 BRPM | TEMPERATURE: 97.6 F | HEIGHT: 72 IN | WEIGHT: 231.4 LBS | OXYGEN SATURATION: 95 % | BODY MASS INDEX: 31.34 KG/M2 | SYSTOLIC BLOOD PRESSURE: 122 MMHG | DIASTOLIC BLOOD PRESSURE: 68 MMHG

## 2018-05-08 DIAGNOSIS — G20 PARKINSON DISEASE (HCC): ICD-10-CM

## 2018-05-08 DIAGNOSIS — R31.0 GROSS HEMATURIA: Primary | ICD-10-CM

## 2018-05-08 DIAGNOSIS — R53.1 WEAKNESS: ICD-10-CM

## 2018-05-08 NOTE — MR AVS SNAPSHOT
[address] [place] [address]  
[phone] Patient: Evelin Sullivan MRN: [id number] Visit Information Date & Time Provider Department Dept. Phone Encounter #  
 5/8/2018 10:30 AM Ema Hilario MD [address] [place] [address] [phone] [id number] Upcoming Health Maintenance Date Due Influenza Age 5 to Adult 8/1/2018 MEDICARE YEARLY EXAM 10/31/2018 GLAUCOMA SCREENING Q2Y 10/30/2019 DTaP/Tdap/Td series (2 - Td) 2/12/2028 Allergies as of 5/8/2018  Review Complete On: 5/8/2018 By: Tommy Bañuelos RN Severity Noted Reaction Type Reactions Cat Hair Std Allergenic Ext  07/17/2013    Unknown (comments) Codeine  07/17/2013    Unknown (comments) Ragweed  07/17/2013    Unknown (comments) Mixed ragweed/pollen extract,tree extract Current Immunizations  Reviewed on 11/17/2015 Name Date Influenza High Dose Vaccine PF 10/30/2017, 11/2/2016  4:24 PM  
 Influenza Vaccine 10/7/2014, 12/6/2013 Influenza Vaccine Mace Payor) 11/17/2015 Pneumococcal Conjugate (PCV-13) 10/30/2017 Pneumococcal Polysaccharide (PPSV-23) 12/6/2013 Not reviewed this visit Vitals BP Pulse Temp Resp Height(growth percentile) 122/68 (BP 1 Location: Left arm, BP Patient Position: Sitting) 68 97.6 °F (36.4 °C) (Temporal) 22 6' (1.829 m) Weight(growth percentile) SpO2 BMI Smoking Status 231 lb 6.4 oz (105 kg) 95% 31.38 kg/m2 Former Smoker Vitals History BMI and BSA Data Body Mass Index Body Surface Area  
 31.38 kg/m 2 2.31 m 2 Preferred Pharmacy Pharmacy Name Phone Sue Trejo, [facility] [phone] Your Updated Medication List  
  
   
This list is accurate as of 5/8/18 11:55 AM.  Always use your most recent med list.  
  
  
  
  
 ALEVE 220 mg tablet Generic drug:  naproxen sodium Take 220 mg by mouth two (2) times daily (with meals). ammonium lactate 12 % lotion Commonly known as:  LAC-HYDRIN  
APPLY THIN LAYER TWICE A DAY FOR 60 DAYS FOR DRY SCALY SKIN BOTH FEET  
  
 aspirin 325 mg tablet Commonly known as:  ASPIRIN Take 162.5 mg by mouth. 1/2 qd  
  
 ciprofloxacin HCl 500 mg tablet Commonly known as:  CIPRO Take 1 Tab by mouth two (2) times a day for 10 days. cyanocobalamin 500 mcg tablet Commonly known as:  VITAMIN B12  
2 tablets daily  
  
 doxazosin 4 mg tablet Commonly known as:  CARDURA Take  by mouth daily. hydroCHLOROthiazide 25 mg tablet Commonly known as:  HYDRODIURIL Take 1 Tab by mouth daily. losartan 50 mg tablet Commonly known as:  COZAAR  
TAKE 1 TABLET DAILY Introducing [facility] & HEALTH SERVICES! [employer] introduces CrowdRise patient portal. Now you can access parts of your medical record, email your doctor's office, and request medication refills online. 1. In your internet browser, go to [email or web address] 2. Click on the First Time User? Click Here link in the Sign In box. You will see the New Member Sign Up page. 3. Enter your CrowdRise Access Code exactly as it appears below. You will not need to use this code after youve completed the sign-up process. If you do not sign up before the expiration date, you must request a new code. · CrowdRise Access Code: [email or web address] Expires: 8/3/2018  4:58 PM 
 
4. Enter the last four digits of your Social Security Number (xxxx) and Date of Birth (mm/dd/yyyy) as indicated and click Submit. You will be taken to the next sign-up page. 5. Create a The Fabrict ID. This will be your CrowdRise login ID and cannot be changed, so think of one that is secure and easy to remember. 6. Create a CrowdRise password. You can change your password at any time. 7. Enter your Password Reset Question and Answer. This can be used at a later time if you forget your password. 8. Enter your e-mail address. You will receive e-mail notification when new information is available in [address] 9. Click Sign Up. You can now view and download portions of your medical record. 10. Click the Download Summary menu link to download a portable copy of your medical information. If you have questions, please visit the Frequently Asked Questions section of the Offerboxx website. Remember, Offerboxx is NOT to be used for urgent needs. For medical emergencies, dial 911. Now available from your iPhone and Android! Please provide this summary of care documentation to your next provider. Your primary care clinician is listed as Alize Siddiqi. If you have any questions after today's visit, please call [phone].

## 2018-05-08 NOTE — PROGRESS NOTES
Chief Complaint   Patient presents with    Blood in Urine     f/u [facility] ER Sunday 5-05-18    Lethargy     f/u [facility] ER Saturday 5-         HPI:      Liz Leavitt is a 81 y.o. male who very likely has end stage parkinson's disease--last seen in Feb 2018 and scheduled to see Neurology--was unable to keep appt. He fell 4 days ago and was noted to be sluggish and lethargic and his wife brought him to the ER at [facility] Sat:  Workup included CXR, CT Head and labs. UA remarkable for hematuria. Discharged home. Sunday, he awoke and was unable to void. Returned to the ER at [facility] and a espinosa catheter was placed. Discharged home with advice to see Dr Sonja Garza who has followed him for years for a renal cyst.  No known prostate cancer. Last PSA 2.8. Arrives in the office today weak and noting that he has had pure blood in the espinosa bag for the past 2 days. Remains weak and unsteady when standing or walking. Allergies   Allergen Reactions    Cat Hair Std Allergenic Ext Unknown (comments)    Codeine Unknown (comments)    Ragweed Unknown (comments)     Mixed ragweed/pollen extract,tree extract       Current Outpatient Prescriptions   Medication Sig    ciprofloxacin HCl (CIPRO) 500 mg tablet Take 1 Tab by mouth two (2) times a day for 10 days.  hydroCHLOROthiazide (HYDRODIURIL) 25 mg tablet Take 1 Tab by mouth daily.  cyanocobalamin (VITAMIN B12) 500 mcg tablet 2 tablets daily    ammonium lactate (LAC-HYDRIN) 12 % lotion APPLY THIN LAYER TWICE A DAY FOR 60 DAYS FOR DRY SCALY SKIN BOTH FEET    naproxen sodium (ALEVE) 220 mg tablet Take 220 mg by mouth two (2) times daily (with meals).  losartan (COZAAR) 50 mg tablet TAKE 1 TABLET DAILY    aspirin (ASPIRIN) 325 mg tablet Take 162.5 mg by mouth. 1/2 qd    doxazosin (CARDURA) 4 mg tablet Take  by mouth daily. No current facility-administered medications for this visit.         Past Medical History:   Diagnosis Date    Asthma     Chronic kidney disease     Dermatophytosis of groin and perianal area 2010    Edema 2008    Encounter for long-term (current) use of other medications 2010    Gout, unspecified 2010    Gouty arthropathy, unspecified 2010    Hypertension     Hypertrophy of prostate without urinary obstruction and other lower urinary tract symptoms (LUTS) 2008    Impotence of organic origin 2008    Memory loss 2009    Obesity, unspecified 2010    Orthostatic hypotension 2008    Other and unspecified hyperlipidemia 2008    Other atopic dermatitis and related conditions 2012    Palpitations 2010    Peripheral angiopathy in diseases classified elsewhere [facility]) 2010    Personal history of malignant neoplasm of rectum, rectosigmoid junction, and anus 2011    Tinea nigra 2011    Unspecified pruritic disorder 2011    Vitamin B12 deficiency 7/23/2017         ROS:  Denies fever, chills, cough, chest pain, SOB,  nausea, vomiting, or diarrhea. Denies wt loss, wt gain, hemoptysis, hematochezia or melena. Physical Examination:    /68 (BP 1 Location: Left arm, BP Patient Position: Sitting)  Pulse 68  Temp 97.6 °F (36.4 °C) (Temporal)   Resp 22  Ht 6' (1.829 m)  Wt 231 lb 6.4 oz (105 kg)  SpO2 95%  BMI 31.38 kg/m2    General: Alert and Ox3, very slow speech. Appears weak and frail. Requires assist x2 to safely stand  HEENT:  NC/AT, EOMI, OP: clear  Neck:  Supple, no adenopathy, JVD, mass or bruit  Chest:  Clear to Ausculation, without wheezes, rales, rubs or ronchi  Cardiac: RRR  Abdomen:  +BS, soft, nontender without palpable HSM; Stone bag contains bright red blood. Extremities:  No cyanosis, clubbing or edema  Neurologic:  Bradykinetic. Virtually no arm swing. cogwheeling bilaterally. En bloc turning. Masked facies with reduced frequency of blinking. Skin: no rash  Lymphadenopathy: no cervical or supraclavicular nodes      ASSESSMENT AND PLAN:     1. Hematuria:  Etiology is not clear.   Will need imaging of the kidneys and bladder. Will leave Stone in place. Spoke with Dr Jarvis Kelly who would be able to see him on Thursday. Need to exclude bladder or renal tumor as well as other concerns. 2.  Weakness and debility:  Failing as an outpatient. Would benefit from PT  3. Parkinson's disease:  Tele neuro eval for PD.  4.  Spoke with Dr Joy Uriarte who will see him in the ER at [facility]. I was able to contact Dr Jarvis Kelly who is in [place] and he indicated that he could see him Thursday if needed. No orders of the defined types were placed in this encounter.       Mj Schultz MD, Evin Del Valle

## 2018-05-08 NOTE — PROGRESS NOTES
1. Have you been to the ER, urgent care clinic since your last visit? Hospitalized since your last visit? yes [facility] ER 5-  lethargyand 5- hematuria    2. Have you seen or consulted any other health care providers outside of the [address] [place] [address] since your last visit? Include any pap smears or colon screening.  No

## 2018-05-11 ENCOUNTER — TELEPHONE (OUTPATIENT)
Dept: FAMILY MEDICINE CLINIC | Age: 83
End: 2018-05-11

## 2018-05-11 PROBLEM — Z51.89 ENCOUNTER FOR REHABILITATION: Status: ACTIVE | Noted: 2018-05-11

## 2018-05-11 NOTE — TELEPHONE ENCOUNTER
----- Message from [facility]anner sent at 5/11/2018  9:43 AM EDT -----  Regarding: Dr. Doron Leon  Pt is d/c from  [facility] 5/11 for gross hemoteria and needs a f/up appt with Dr. Kristen Segovia within two weeks? Pt best contact [phone].

## 2018-05-18 ENCOUNTER — HOME HEALTH ADMISSION (OUTPATIENT)
Dept: HOME HEALTH SERVICES | Facility: HOME HEALTH | Age: 83
End: 2018-05-18
Payer: MEDICARE

## 2018-05-20 ENCOUNTER — HOME CARE VISIT (OUTPATIENT)
Dept: SCHEDULING | Facility: HOME HEALTH | Age: 83
End: 2018-05-20
Payer: MEDICARE

## 2018-05-20 VITALS
TEMPERATURE: 99.4 F | DIASTOLIC BLOOD PRESSURE: 60 MMHG | WEIGHT: 237 LBS | RESPIRATION RATE: 18 BRPM | HEIGHT: 72 IN | OXYGEN SATURATION: 98 % | BODY MASS INDEX: 32.1 KG/M2 | HEART RATE: 77 BPM | SYSTOLIC BLOOD PRESSURE: 100 MMHG

## 2018-05-20 PROCEDURE — 3331090001 HH PPS REVENUE CREDIT

## 2018-05-20 PROCEDURE — G0299 HHS/HOSPICE OF RN EA 15 MIN: HCPCS

## 2018-05-20 PROCEDURE — 400013 HH SOC

## 2018-05-20 PROCEDURE — 3331090002 HH PPS REVENUE DEBIT

## 2018-05-20 NOTE — Clinical Note
dr muro, admitted to [facility] for sn, p.t.,o.t., hha and MSW. pt doesnot have and no scripts given on discharge for flomax and proscar.  pt to see dr Honora Najjar 5-24-18 re  this. thanks

## 2018-05-21 ENCOUNTER — HOME CARE VISIT (OUTPATIENT)
Dept: SCHEDULING | Facility: HOME HEALTH | Age: 83
End: 2018-05-21
Payer: MEDICARE

## 2018-05-21 ENCOUNTER — HOME CARE VISIT (OUTPATIENT)
Dept: HOME HEALTH SERVICES | Facility: HOME HEALTH | Age: 83
End: 2018-05-21
Payer: MEDICARE

## 2018-05-21 VITALS
OXYGEN SATURATION: 98 % | TEMPERATURE: 98.3 F | SYSTOLIC BLOOD PRESSURE: 148 MMHG | HEART RATE: 61 BPM | RESPIRATION RATE: 18 BRPM | DIASTOLIC BLOOD PRESSURE: 62 MMHG

## 2018-05-21 PROCEDURE — 3331090001 HH PPS REVENUE CREDIT

## 2018-05-21 PROCEDURE — G0152 HHCP-SERV OF OT,EA 15 MIN: HCPCS

## 2018-05-21 PROCEDURE — 3331090002 HH PPS REVENUE DEBIT

## 2018-05-22 ENCOUNTER — HOME CARE VISIT (OUTPATIENT)
Dept: HOME HEALTH SERVICES | Facility: HOME HEALTH | Age: 83
End: 2018-05-22
Payer: MEDICARE

## 2018-05-22 PROCEDURE — G0155 HHCP-SVS OF CSW,EA 15 MIN: HCPCS

## 2018-05-22 PROCEDURE — 3331090002 HH PPS REVENUE DEBIT

## 2018-05-22 PROCEDURE — 3331090001 HH PPS REVENUE CREDIT

## 2018-05-23 PROCEDURE — 3331090002 HH PPS REVENUE DEBIT

## 2018-05-23 PROCEDURE — 3331090001 HH PPS REVENUE CREDIT

## 2018-05-24 PROCEDURE — 3331090002 HH PPS REVENUE DEBIT

## 2018-05-24 PROCEDURE — 3331090001 HH PPS REVENUE CREDIT

## 2018-05-25 ENCOUNTER — HOME CARE VISIT (OUTPATIENT)
Dept: SCHEDULING | Facility: HOME HEALTH | Age: 83
End: 2018-05-25
Payer: MEDICARE

## 2018-05-25 ENCOUNTER — HOME CARE VISIT (OUTPATIENT)
Dept: HOME HEALTH SERVICES | Facility: HOME HEALTH | Age: 83
End: 2018-05-25
Payer: MEDICARE

## 2018-05-25 PROCEDURE — 3331090001 HH PPS REVENUE CREDIT

## 2018-05-25 PROCEDURE — G0299 HHS/HOSPICE OF RN EA 15 MIN: HCPCS

## 2018-05-25 PROCEDURE — 3331090002 HH PPS REVENUE DEBIT

## 2018-05-26 VITALS
TEMPERATURE: 98.3 F | SYSTOLIC BLOOD PRESSURE: 148 MMHG | DIASTOLIC BLOOD PRESSURE: 62 MMHG | HEART RATE: 60 BPM | RESPIRATION RATE: 18 BRPM | OXYGEN SATURATION: 98 %

## 2018-05-26 PROCEDURE — 3331090002 HH PPS REVENUE DEBIT

## 2018-05-26 PROCEDURE — 3331090001 HH PPS REVENUE CREDIT

## 2018-05-27 PROCEDURE — 3331090002 HH PPS REVENUE DEBIT

## 2018-05-27 PROCEDURE — 3331090001 HH PPS REVENUE CREDIT

## 2018-05-28 PROCEDURE — 3331090001 HH PPS REVENUE CREDIT

## 2018-05-28 PROCEDURE — 3331090002 HH PPS REVENUE DEBIT

## 2018-05-29 ENCOUNTER — HOME CARE VISIT (OUTPATIENT)
Dept: SCHEDULING | Facility: HOME HEALTH | Age: 83
End: 2018-05-29
Payer: MEDICARE

## 2018-05-29 VITALS
OXYGEN SATURATION: 99 % | RESPIRATION RATE: 18 BRPM | DIASTOLIC BLOOD PRESSURE: 76 MMHG | TEMPERATURE: 98.4 F | HEART RATE: 58 BPM | SYSTOLIC BLOOD PRESSURE: 138 MMHG

## 2018-05-29 PROCEDURE — 3331090002 HH PPS REVENUE DEBIT

## 2018-05-29 PROCEDURE — G0299 HHS/HOSPICE OF RN EA 15 MIN: HCPCS

## 2018-05-29 PROCEDURE — G0151 HHCP-SERV OF PT,EA 15 MIN: HCPCS

## 2018-05-29 PROCEDURE — 3331090001 HH PPS REVENUE CREDIT

## 2018-05-30 ENCOUNTER — HOME CARE VISIT (OUTPATIENT)
Dept: SCHEDULING | Facility: HOME HEALTH | Age: 83
End: 2018-05-30
Payer: MEDICARE

## 2018-05-30 VITALS
TEMPERATURE: 98.8 F | HEART RATE: 50 BPM | SYSTOLIC BLOOD PRESSURE: 178 MMHG | OXYGEN SATURATION: 98 % | RESPIRATION RATE: 18 BRPM | DIASTOLIC BLOOD PRESSURE: 72 MMHG

## 2018-05-30 VITALS
HEART RATE: 56 BPM | SYSTOLIC BLOOD PRESSURE: 160 MMHG | OXYGEN SATURATION: 98 % | TEMPERATURE: 98.4 F | DIASTOLIC BLOOD PRESSURE: 72 MMHG | RESPIRATION RATE: 16 BRPM

## 2018-05-30 PROCEDURE — G0152 HHCP-SERV OF OT,EA 15 MIN: HCPCS

## 2018-05-30 PROCEDURE — 3331090002 HH PPS REVENUE DEBIT

## 2018-05-30 PROCEDURE — 3331090001 HH PPS REVENUE CREDIT

## 2018-05-31 ENCOUNTER — HOME CARE VISIT (OUTPATIENT)
Dept: HOME HEALTH SERVICES | Facility: HOME HEALTH | Age: 83
End: 2018-05-31
Payer: MEDICARE

## 2018-05-31 ENCOUNTER — HOME CARE VISIT (OUTPATIENT)
Dept: SCHEDULING | Facility: HOME HEALTH | Age: 83
End: 2018-05-31
Payer: MEDICARE

## 2018-05-31 PROCEDURE — 3331090001 HH PPS REVENUE CREDIT

## 2018-05-31 PROCEDURE — G0299 HHS/HOSPICE OF RN EA 15 MIN: HCPCS

## 2018-05-31 PROCEDURE — 3331090002 HH PPS REVENUE DEBIT

## 2018-06-01 ENCOUNTER — HOME CARE VISIT (OUTPATIENT)
Dept: SCHEDULING | Facility: HOME HEALTH | Age: 83
End: 2018-06-01
Payer: MEDICARE

## 2018-06-01 VITALS
SYSTOLIC BLOOD PRESSURE: 172 MMHG | TEMPERATURE: 98.7 F | HEART RATE: 67 BPM | OXYGEN SATURATION: 96 % | DIASTOLIC BLOOD PRESSURE: 88 MMHG | RESPIRATION RATE: 16 BRPM

## 2018-06-01 VITALS
SYSTOLIC BLOOD PRESSURE: 152 MMHG | OXYGEN SATURATION: 98 % | RESPIRATION RATE: 18 BRPM | HEART RATE: 55 BPM | DIASTOLIC BLOOD PRESSURE: 80 MMHG | TEMPERATURE: 97.3 F

## 2018-06-01 PROCEDURE — 3331090001 HH PPS REVENUE CREDIT

## 2018-06-01 PROCEDURE — 3331090002 HH PPS REVENUE DEBIT

## 2018-06-01 PROCEDURE — G0151 HHCP-SERV OF PT,EA 15 MIN: HCPCS

## 2018-06-01 PROCEDURE — G0152 HHCP-SERV OF OT,EA 15 MIN: HCPCS

## 2018-06-01 NOTE — PROGRESS NOTES
I have called [facility] and spoke to Reji, I have let her know that Dr. Jazmine Steiner is not the PCP for this patient, Dr. Beena Mendez is. She will let Consuelo Montana RN know so she can send notes/orders to the correct provider.

## 2018-06-02 VITALS
HEART RATE: 56 BPM | TEMPERATURE: 99.2 F | SYSTOLIC BLOOD PRESSURE: 156 MMHG | OXYGEN SATURATION: 99 % | DIASTOLIC BLOOD PRESSURE: 78 MMHG | RESPIRATION RATE: 18 BRPM

## 2018-06-02 PROCEDURE — 3331090001 HH PPS REVENUE CREDIT

## 2018-06-02 PROCEDURE — 3331090002 HH PPS REVENUE DEBIT

## 2018-06-03 PROCEDURE — 3331090001 HH PPS REVENUE CREDIT

## 2018-06-03 PROCEDURE — 3331090002 HH PPS REVENUE DEBIT

## 2018-06-04 ENCOUNTER — HOME CARE VISIT (OUTPATIENT)
Dept: SCHEDULING | Facility: HOME HEALTH | Age: 83
End: 2018-06-04
Payer: MEDICARE

## 2018-06-04 ENCOUNTER — OFFICE VISIT (OUTPATIENT)
Dept: FAMILY MEDICINE CLINIC | Age: 83
End: 2018-06-04

## 2018-06-04 VITALS
SYSTOLIC BLOOD PRESSURE: 160 MMHG | WEIGHT: 239 LBS | DIASTOLIC BLOOD PRESSURE: 68 MMHG | RESPIRATION RATE: 16 BRPM | HEART RATE: 54 BPM | OXYGEN SATURATION: 100 % | HEIGHT: 72 IN | BODY MASS INDEX: 32.37 KG/M2

## 2018-06-04 VITALS
RESPIRATION RATE: 18 BRPM | OXYGEN SATURATION: 98 % | DIASTOLIC BLOOD PRESSURE: 70 MMHG | HEART RATE: 55 BPM | SYSTOLIC BLOOD PRESSURE: 138 MMHG | TEMPERATURE: 98.3 F

## 2018-06-04 DIAGNOSIS — I10 ESSENTIAL HYPERTENSION: ICD-10-CM

## 2018-06-04 DIAGNOSIS — R60.0 BILATERAL LEG EDEMA: ICD-10-CM

## 2018-06-04 DIAGNOSIS — G20 PARKINSON DISEASE (HCC): Primary | ICD-10-CM

## 2018-06-04 PROCEDURE — 3331090001 HH PPS REVENUE CREDIT

## 2018-06-04 PROCEDURE — G0152 HHCP-SERV OF OT,EA 15 MIN: HCPCS

## 2018-06-04 PROCEDURE — 3331090002 HH PPS REVENUE DEBIT

## 2018-06-04 RX ORDER — CHLORTHALIDONE 25 MG/1
25 TABLET ORAL DAILY
Qty: 90 TAB | Refills: 4 | Status: SHIPPED | OUTPATIENT
Start: 2018-06-04 | End: 2019-09-17 | Stop reason: SDUPTHER

## 2018-06-04 RX ORDER — CARBIDOPA AND LEVODOPA 25; 100 MG/1; MG/1
1 TABLET ORAL 3 TIMES DAILY
Qty: 90 TAB | Refills: 4 | Status: SHIPPED | OUTPATIENT
Start: 2018-06-04 | End: 2018-12-11

## 2018-06-04 RX ORDER — LANOLIN ALCOHOL/MO/W.PET/CERES
CREAM (GRAM) TOPICAL
Qty: 180 TAB | Refills: 4 | Status: SHIPPED | OUTPATIENT
Start: 2018-06-04 | End: 2019-07-23 | Stop reason: SDUPTHER

## 2018-06-04 RX ORDER — CARBIDOPA AND LEVODOPA 25; 100 MG/1; MG/1
1 TABLET ORAL 3 TIMES DAILY
Qty: 270 TAB | Refills: 4 | Status: SHIPPED | OUTPATIENT
Start: 2018-06-04 | End: 2018-06-04 | Stop reason: SDUPTHER

## 2018-06-04 NOTE — MR AVS SNAPSHOT
[address] [place] [address] [place] [address] 
[phone] Patient: Oscar Dejesus MRN: [id number] Visit Information Date & Time Provider Department Dept. Phone Encounter #  
 6/4/2018  8:00 AM Hank Vinson MD [address] [place] [address] [phone] [id number] Follow-up Instructions Return in about 6 weeks (around 7/16/2018). Follow-up and Disposition History Upcoming Health Maintenance Date Due Influenza Age 5 to Adult 8/1/2018 MEDICARE YEARLY EXAM 10/31/2018 GLAUCOMA SCREENING Q2Y 10/30/2019 DTaP/Tdap/Td series (2 - Td) 2/12/2028 Allergies as of 6/4/2018  Review Complete On: 6/4/2018 By: Hank Vinson MD  
  
 Severity Noted Reaction Type Reactions Cat Hair Std Allergenic Ext  07/17/2013    Unknown (comments) Codeine  07/17/2013    Unknown (comments) Ragweed  07/17/2013    Unknown (comments) Mixed ragweed/pollen extract,tree extract Current Immunizations  Reviewed on 11/17/2015 Name Date Influenza High Dose Vaccine PF 10/30/2017, 11/2/2016  4:24 PM  
 Influenza Vaccine 10/7/2014, 12/6/2013 Influenza Vaccine Geoffry Gourd) 11/17/2015 Pneumococcal Conjugate (PCV-13) 10/30/2017 Pneumococcal Polysaccharide (PPSV-23) 12/6/2013 Not reviewed this visit You Were Diagnosed With   
  
 Codes Comments Parkinson disease ([facility]    -  Primary ICD-10-CM: G20 
ICD-9-CM: 332.0 Essential hypertension     ICD-10-CM: I10 
ICD-9-CM: 401.9 Bilateral leg edema     ICD-10-CM: R60.0 ICD-9-CM: 461. 3 Vitals BP Pulse Resp Height(growth percentile) Weight(growth percentile) SpO2  
 160/68 (BP 1 Location: Left arm, BP Patient Position: Sitting) (!) 54 16 6' (1.829 m) 239 lb (108.4 kg) 100% BMI Smoking Status 32.41 kg/m2 Former Smoker BMI and BSA Data  Body Mass Index Body Surface Area  
 32.41 kg/m 2 2.35 m 2  
  
  
 Preferred Pharmacy Pharmacy Name Phone Sue Trejo, [facility] [phone] Your Updated Medication List  
  
   
This list is accurate as of 18  8:51 AM.  Always use your most recent med list.  
  
  
  
  
 ammonium lactate 12 % lotion Commonly known as:  LAC-HYDRIN  
APPLY THIN LAYER TWICE A DAY FOR 60 DAYS FOR DRY SCALY SKIN BOTH FEET  
  
 aspirin 325 mg tablet Commonly known as:  ASPIRIN Take 162.5 mg by mouth. 1/2 qd  
  
 carbidopa-levodopa  mg per tablet Commonly known as:  SINEMET Take 1 Tab by mouth three (3) times daily. chlorthalidone 25 mg tablet Commonly known as:  Charlesetta Clear Take 1 Tab by mouth daily. cyanocobalamin 500 mcg tablet Commonly known as:  VITAMIN B12  
2 po daily  
  
 finasteride 5 mg tablet Commonly known as:  PROSCAR Take 5 mg by mouth daily. take at bedtime  
  
 losartan 50 mg tablet Commonly known as:  COZAAR Take 50 mg by mouth daily. 1 tab by mouth  
  
 tamsulosin 0.4 mg capsule Commonly known as:  FLOMAX Take 1 Cap by mouth daily. Prescriptions Sent to Pharmacy Refills  
 carbidopa-levodopa (SINEMET)  mg per tablet 4 Sig: Take 1 Tab by mouth three (3) times daily. Class: Normal  
 Pharmacy: [facility]/pharmacy #[id number] Haley Mortensen, [address] Ph #: [phone] Route: Oral  
 chlorthalidone (HYGROTEN) 25 mg tablet 4 Sig: Take 1 Tab by mouth daily. Class: Normal  
 Pharmacy: [address] [place] [address], [address] [place] [address] Ph #: [phone] Route: Oral  
 cyanocobalamin (VITAMIN B12) 500 mcg tablet 4 Si po daily Class: Normal  
 Pharmacy: [address] [place] [address], [address] [place] [address] Ph #: [phone] Follow-up Instructions Return in about 6 weeks (around 2018). To-Do List   
 2018 2:00 PM  
 Appointment with Lorenzo Clark OT at [place] [address]  
  
 06/05/2018 To Be Determined Appointment with Mikael Kowalski RN at [place] [address]  
  
 06/07/2018 To Be Determined Appointment with Fiordaliza Duggan PTA at [place] [address]  
  
 06/08/2018 To Be Determined Appointment with Mikael Kowalski RN at [place] [address]  
  
 06/08/2018 2:00 PM  
  Appointment with Lorenzo Clark OT at [place] [address]  
  
 06/11/2018 To Be Determined Appointment with Fiordaliza Duggan PTA at [place] [address]  
  
 06/14/2018 To Be Determined Appointment with Fiordaliza Duggan PTA at [place] [address] Introducing [facility] & HEALTH SERVICES! Katya Clayton introduces 42Networks patient portal. Now you can access parts of your medical record, email your doctor's office, and request medication refills online. 1. In your internet browser, go to [email or web address] 2. Click on the First Time User? Click Here link in the Sign In box. You will see the New Member Sign Up page. 3. Enter your 42Networks Access Code exactly as it appears below. You will not need to use this code after youve completed the sign-up process. If you do not sign up before the expiration date, you must request a new code. · 42Networks Access Code: [email or web address] Expires: 8/3/2018  4:58 PM 
 
4. Enter the last four digits of your Social Security Number (xxxx) and Date of Birth (mm/dd/yyyy) as indicated and click Submit. You will be taken to the next sign-up page. 5. Create a Tailt ID. This will be your 42Networks login ID and cannot be changed, so think of one that is secure and easy to remember. 6. Create a Tailt password. You can change your password at any time. 7. Enter your Password Reset Question and Answer.  This can be used at a later time if you forget your password. 8. Enter your e-mail address. You will receive e-mail notification when new information is available in [employer]. 9. Click Sign Up. You can now view and download portions of your medical record. 10. Click the Download Summary menu link to download a portable copy of your medical information. If you have questions, please visit the Frequently Asked Questions section of the [email or web address] website. Remember, Swink.tv is NOT to be used for urgent needs. For medical emergencies, dial 911. Now available from your iPhone and Android! Please provide this summary of care documentation to your next provider. Your primary care clinician is listed as Angel Phillip. If you have any questions after today's visit, please call [phone].

## 2018-06-04 NOTE — PROGRESS NOTES
Chief Complaint   Patient presents with   [facility] Follow Up         HPI:      Rima Knapp is a 81 y.o. male discharged May 11 after admission to [facility] . [facility] with LUTS and Parkinsons. He still has HH and PT/OT. Doing much better with Sinemet. No longer taking HCTZ. He has previously weighed as much as 260 as recently as 2014. Allergies   Allergen Reactions    Cat Hair Std Allergenic Ext Unknown (comments)    Codeine Unknown (comments)    Ragweed Unknown (comments)     Mixed ragweed/pollen extract,tree extract       Current Outpatient Prescriptions   Medication Sig    carbidopa-levodopa (SINEMET)  mg per tablet Take 1 Tab by mouth three (3) times daily.  finasteride (PROSCAR) 5 mg tablet Take 5 mg by mouth daily. take at bedtime    losartan (COZAAR) 50 mg tablet Take 50 mg by mouth daily. 1 tab by mouth    cyanocobalamin (VITAMIN B12) 500 mcg tablet Take 500 mcg by mouth daily. takes 2 tabs by mouth    ammonium lactate (LAC-HYDRIN) 12 % lotion APPLY THIN LAYER TWICE A DAY FOR 60 DAYS FOR DRY SCALY SKIN BOTH FEET    aspirin (ASPIRIN) 325 mg tablet Take 162.5 mg by mouth. 1/2 qd    tamsulosin (FLOMAX) 0.4 mg capsule Take 1 Cap by mouth daily. No current facility-administered medications for this visit.         Past Medical History:   Diagnosis Date    Asthma     Chronic kidney disease     Dermatophytosis of groin and perianal area 2010    Edema 2008    Encounter for long-term (current) use of other medications 2010    Gout, unspecified 2010    Gouty arthropathy, unspecified 2010    Hypertension     Hypertrophy of prostate without urinary obstruction and other lower urinary tract symptoms (LUTS) 2008    Impotence of organic origin 2008    Memory loss 2009    Obesity, unspecified 2010    Orthostatic hypotension 2008    Other and unspecified hyperlipidemia 2008    Other atopic dermatitis and related conditions 2012    Palpitations 2010    Peripheral angiopathy in diseases classified elsewhere [facility]) 2010    Personal history of malignant neoplasm of rectum, rectosigmoid junction, and anus 2011    Tinea nigra 2011    Unspecified pruritic disorder 2011    Vitamin B12 deficiency 7/23/2017         ROS:  Denies fever, chills, cough, chest pain, SOB,  nausea, vomiting, or diarrhea. Denies wt loss, wt gain, hemoptysis, hematochezia or melena. Complains of burning sensation in feet--sees Dr Michelle Alvarado. Physical Examination:    /68 (BP 1 Location: Left arm, BP Patient Position: Sitting)  Pulse (!) 54  Resp 16  Ht 6' (1.829 m)  Wt 239 lb (108.4 kg)  SpO2 100%  BMI 32.41 kg/m2    General: Alert and Ox3, Fluent speech  HEENT:  NC/AT, EOMI, OP: clear  Neck:  Supple, no adenopathy, JVD, mass or bruit  Chest:  Clear to Ausculation, without wheezes, rales, rubs or ronchi  Cardiac: RRR  Abdomen:  +BS, soft, nontender without palpable HSM  Extremities:  BLE edema  Neurologic:  Ambulatory with wheeled walker, CN 2-12 grossly intact. Moves all extremities. Slow speech. Bradykinetic. Skin: no rash  Lymphadenopathy: no cervical or supraclavicular nodes    Wt Readings from Last 3 Encounters:   06/04/18 239 lb (108.4 kg)   05/20/18 237 lb (107.5 kg)   05/18/18 220 lb 9.6 oz (100.1 kg)       ASSESSMENT AND PLAN:     1. Parkinsons disease:  Needs refills sent to [facility] and [facility]       He is much less bradykinetic today  2. Hematuria: resolved and is under the care of Dr Fab Elizabeth  3. LE edema:  Needs to avoid salt and salty foods. Low dose diuretic  4. Follow up in 6 weeks. 5.  HTN:  Start Chlorthalidone 25 mg daily  Orders Placed This Encounter    DISCONTD: carbidopa-levodopa (SINEMET)  mg per tablet     Sig: Take 1 Tab by mouth three (3) times daily. Dispense:  270 Tab     Refill:  4    carbidopa-levodopa (SINEMET)  mg per tablet     Sig: Take 1 Tab by mouth three (3) times daily.      Dispense:  90 Tab     Refill:  4       Zach Johnson MD, [address]

## 2018-06-05 ENCOUNTER — HOME CARE VISIT (OUTPATIENT)
Dept: SCHEDULING | Facility: HOME HEALTH | Age: 83
End: 2018-06-05
Payer: MEDICARE

## 2018-06-05 VITALS
TEMPERATURE: 97.7 F | RESPIRATION RATE: 18 BRPM | OXYGEN SATURATION: 98 % | SYSTOLIC BLOOD PRESSURE: 138 MMHG | HEART RATE: 60 BPM | DIASTOLIC BLOOD PRESSURE: 70 MMHG

## 2018-06-05 VITALS
SYSTOLIC BLOOD PRESSURE: 185 MMHG | DIASTOLIC BLOOD PRESSURE: 82 MMHG | HEART RATE: 55 BPM | OXYGEN SATURATION: 98 % | TEMPERATURE: 98.1 F | RESPIRATION RATE: 16 BRPM

## 2018-06-05 PROCEDURE — 3331090002 HH PPS REVENUE DEBIT

## 2018-06-05 PROCEDURE — G0299 HHS/HOSPICE OF RN EA 15 MIN: HCPCS

## 2018-06-05 PROCEDURE — 3331090001 HH PPS REVENUE CREDIT

## 2018-06-05 PROCEDURE — G0157 HHC PT ASSISTANT EA 15: HCPCS

## 2018-06-06 PROCEDURE — 3331090001 HH PPS REVENUE CREDIT

## 2018-06-06 PROCEDURE — 3331090002 HH PPS REVENUE DEBIT

## 2018-06-07 PROCEDURE — 3331090001 HH PPS REVENUE CREDIT

## 2018-06-07 PROCEDURE — 3331090002 HH PPS REVENUE DEBIT

## 2018-06-08 ENCOUNTER — HOME CARE VISIT (OUTPATIENT)
Dept: SCHEDULING | Facility: HOME HEALTH | Age: 83
End: 2018-06-08
Payer: MEDICARE

## 2018-06-08 VITALS
OXYGEN SATURATION: 99 % | SYSTOLIC BLOOD PRESSURE: 135 MMHG | RESPIRATION RATE: 17 BRPM | TEMPERATURE: 97.7 F | HEART RATE: 67 BPM | DIASTOLIC BLOOD PRESSURE: 64 MMHG

## 2018-06-08 VITALS — RESPIRATION RATE: 18 BRPM

## 2018-06-08 VITALS
RESPIRATION RATE: 18 BRPM | DIASTOLIC BLOOD PRESSURE: 60 MMHG | SYSTOLIC BLOOD PRESSURE: 122 MMHG | OXYGEN SATURATION: 98 % | HEART RATE: 79 BPM | TEMPERATURE: 98.3 F

## 2018-06-08 PROCEDURE — G0157 HHC PT ASSISTANT EA 15: HCPCS

## 2018-06-08 PROCEDURE — 3331090001 HH PPS REVENUE CREDIT

## 2018-06-08 PROCEDURE — 3331090002 HH PPS REVENUE DEBIT

## 2018-06-08 PROCEDURE — G0299 HHS/HOSPICE OF RN EA 15 MIN: HCPCS

## 2018-06-08 PROCEDURE — G0152 HHCP-SERV OF OT,EA 15 MIN: HCPCS

## 2018-06-09 PROCEDURE — 3331090002 HH PPS REVENUE DEBIT

## 2018-06-09 PROCEDURE — 3331090001 HH PPS REVENUE CREDIT

## 2018-06-10 PROCEDURE — 3331090002 HH PPS REVENUE DEBIT

## 2018-06-10 PROCEDURE — 3331090001 HH PPS REVENUE CREDIT

## 2018-06-11 ENCOUNTER — HOME CARE VISIT (OUTPATIENT)
Dept: SCHEDULING | Facility: HOME HEALTH | Age: 83
End: 2018-06-11
Payer: MEDICARE

## 2018-06-11 VITALS
RESPIRATION RATE: 18 BRPM | TEMPERATURE: 98 F | HEART RATE: 62 BPM | SYSTOLIC BLOOD PRESSURE: 108 MMHG | DIASTOLIC BLOOD PRESSURE: 60 MMHG | OXYGEN SATURATION: 99 %

## 2018-06-11 PROCEDURE — 3331090001 HH PPS REVENUE CREDIT

## 2018-06-11 PROCEDURE — 3331090002 HH PPS REVENUE DEBIT

## 2018-06-11 PROCEDURE — G0299 HHS/HOSPICE OF RN EA 15 MIN: HCPCS

## 2018-06-12 PROCEDURE — 3331090002 HH PPS REVENUE DEBIT

## 2018-06-12 PROCEDURE — 3331090001 HH PPS REVENUE CREDIT

## 2018-06-13 ENCOUNTER — HOME CARE VISIT (OUTPATIENT)
Dept: SCHEDULING | Facility: HOME HEALTH | Age: 83
End: 2018-06-13
Payer: MEDICARE

## 2018-06-13 VITALS
SYSTOLIC BLOOD PRESSURE: 140 MMHG | OXYGEN SATURATION: 98 % | DIASTOLIC BLOOD PRESSURE: 80 MMHG | TEMPERATURE: 98.3 F | RESPIRATION RATE: 18 BRPM | HEART RATE: 64 BPM

## 2018-06-13 VITALS
RESPIRATION RATE: 18 BRPM | DIASTOLIC BLOOD PRESSURE: 84 MMHG | HEART RATE: 58 BPM | SYSTOLIC BLOOD PRESSURE: 172 MMHG | OXYGEN SATURATION: 98 % | TEMPERATURE: 98.7 F

## 2018-06-13 PROCEDURE — 3331090002 HH PPS REVENUE DEBIT

## 2018-06-13 PROCEDURE — G0152 HHCP-SERV OF OT,EA 15 MIN: HCPCS

## 2018-06-13 PROCEDURE — 3331090001 HH PPS REVENUE CREDIT

## 2018-06-13 PROCEDURE — G0151 HHCP-SERV OF PT,EA 15 MIN: HCPCS

## 2018-06-14 ENCOUNTER — HOME CARE VISIT (OUTPATIENT)
Dept: SCHEDULING | Facility: HOME HEALTH | Age: 83
End: 2018-06-14
Payer: MEDICARE

## 2018-06-14 ENCOUNTER — HOME CARE VISIT (OUTPATIENT)
Dept: HOME HEALTH SERVICES | Facility: HOME HEALTH | Age: 83
End: 2018-06-14
Payer: MEDICARE

## 2018-06-14 PROCEDURE — 3331090001 HH PPS REVENUE CREDIT

## 2018-06-14 PROCEDURE — 3331090002 HH PPS REVENUE DEBIT

## 2018-06-14 PROCEDURE — G0157 HHC PT ASSISTANT EA 15: HCPCS

## 2018-06-15 PROCEDURE — 3331090001 HH PPS REVENUE CREDIT

## 2018-06-15 PROCEDURE — 3331090002 HH PPS REVENUE DEBIT

## 2018-06-16 PROCEDURE — 3331090002 HH PPS REVENUE DEBIT

## 2018-06-16 PROCEDURE — 3331090001 HH PPS REVENUE CREDIT

## 2018-06-17 PROCEDURE — 3331090002 HH PPS REVENUE DEBIT

## 2018-06-17 PROCEDURE — 3331090001 HH PPS REVENUE CREDIT

## 2018-06-18 ENCOUNTER — HOME CARE VISIT (OUTPATIENT)
Dept: SCHEDULING | Facility: HOME HEALTH | Age: 83
End: 2018-06-18
Payer: MEDICARE

## 2018-06-18 VITALS
DIASTOLIC BLOOD PRESSURE: 66 MMHG | RESPIRATION RATE: 18 BRPM | OXYGEN SATURATION: 97 % | SYSTOLIC BLOOD PRESSURE: 146 MMHG | TEMPERATURE: 97.7 F | HEART RATE: 56 BPM

## 2018-06-18 VITALS
OXYGEN SATURATION: 99 % | DIASTOLIC BLOOD PRESSURE: 91 MMHG | HEART RATE: 61 BPM | TEMPERATURE: 97.9 F | RESPIRATION RATE: 18 BRPM | SYSTOLIC BLOOD PRESSURE: 176 MMHG

## 2018-06-18 PROCEDURE — 3331090002 HH PPS REVENUE DEBIT

## 2018-06-18 PROCEDURE — G0157 HHC PT ASSISTANT EA 15: HCPCS

## 2018-06-18 PROCEDURE — 3331090001 HH PPS REVENUE CREDIT

## 2018-06-19 ENCOUNTER — HOME CARE VISIT (OUTPATIENT)
Dept: SCHEDULING | Facility: HOME HEALTH | Age: 83
End: 2018-06-19
Payer: MEDICARE

## 2018-06-19 VITALS
SYSTOLIC BLOOD PRESSURE: 100 MMHG | DIASTOLIC BLOOD PRESSURE: 58 MMHG | BODY MASS INDEX: 30.95 KG/M2 | TEMPERATURE: 98.1 F | HEART RATE: 78 BPM | OXYGEN SATURATION: 98 % | WEIGHT: 228.2 LBS | RESPIRATION RATE: 18 BRPM

## 2018-06-19 PROCEDURE — G0299 HHS/HOSPICE OF RN EA 15 MIN: HCPCS

## 2018-06-19 PROCEDURE — 3331090001 HH PPS REVENUE CREDIT

## 2018-06-19 PROCEDURE — 3331090002 HH PPS REVENUE DEBIT

## 2018-06-20 PROCEDURE — 3331090001 HH PPS REVENUE CREDIT

## 2018-06-20 PROCEDURE — 3331090002 HH PPS REVENUE DEBIT

## 2018-06-21 ENCOUNTER — HOME CARE VISIT (OUTPATIENT)
Dept: HOME HEALTH SERVICES | Facility: HOME HEALTH | Age: 83
End: 2018-06-21
Payer: MEDICARE

## 2018-06-21 ENCOUNTER — HOME CARE VISIT (OUTPATIENT)
Dept: SCHEDULING | Facility: HOME HEALTH | Age: 83
End: 2018-06-21
Payer: MEDICARE

## 2018-06-21 VITALS
HEART RATE: 65 BPM | OXYGEN SATURATION: 99 % | RESPIRATION RATE: 18 BRPM | TEMPERATURE: 97.7 F | DIASTOLIC BLOOD PRESSURE: 54 MMHG | SYSTOLIC BLOOD PRESSURE: 108 MMHG

## 2018-06-21 PROCEDURE — 3331090001 HH PPS REVENUE CREDIT

## 2018-06-21 PROCEDURE — 3331090002 HH PPS REVENUE DEBIT

## 2018-06-21 PROCEDURE — G0157 HHC PT ASSISTANT EA 15: HCPCS

## 2018-06-22 ENCOUNTER — HOME CARE VISIT (OUTPATIENT)
Dept: SCHEDULING | Facility: HOME HEALTH | Age: 83
End: 2018-06-22
Payer: MEDICARE

## 2018-06-22 VITALS
DIASTOLIC BLOOD PRESSURE: 64 MMHG | HEART RATE: 62 BPM | SYSTOLIC BLOOD PRESSURE: 134 MMHG | OXYGEN SATURATION: 98 % | RESPIRATION RATE: 18 BRPM | TEMPERATURE: 98.4 F

## 2018-06-22 PROCEDURE — G0152 HHCP-SERV OF OT,EA 15 MIN: HCPCS

## 2018-06-22 PROCEDURE — 3331090002 HH PPS REVENUE DEBIT

## 2018-06-22 PROCEDURE — 3331090001 HH PPS REVENUE CREDIT

## 2018-06-23 PROCEDURE — 3331090002 HH PPS REVENUE DEBIT

## 2018-06-23 PROCEDURE — 3331090001 HH PPS REVENUE CREDIT

## 2018-06-24 PROCEDURE — 3331090002 HH PPS REVENUE DEBIT

## 2018-06-24 PROCEDURE — 3331090001 HH PPS REVENUE CREDIT

## 2018-06-25 PROCEDURE — 3331090002 HH PPS REVENUE DEBIT

## 2018-06-25 PROCEDURE — 3331090001 HH PPS REVENUE CREDIT

## 2018-06-26 ENCOUNTER — HOME CARE VISIT (OUTPATIENT)
Dept: SCHEDULING | Facility: HOME HEALTH | Age: 83
End: 2018-06-26
Payer: MEDICARE

## 2018-06-26 PROCEDURE — 3331090001 HH PPS REVENUE CREDIT

## 2018-06-26 PROCEDURE — 3331090002 HH PPS REVENUE DEBIT

## 2018-06-26 PROCEDURE — G0151 HHCP-SERV OF PT,EA 15 MIN: HCPCS

## 2018-06-27 VITALS
SYSTOLIC BLOOD PRESSURE: 119 MMHG | TEMPERATURE: 98.1 F | HEART RATE: 65 BPM | DIASTOLIC BLOOD PRESSURE: 64 MMHG | OXYGEN SATURATION: 98 %

## 2018-06-27 PROCEDURE — 3331090001 HH PPS REVENUE CREDIT

## 2018-06-27 PROCEDURE — 3331090002 HH PPS REVENUE DEBIT

## 2018-06-28 PROCEDURE — 3331090001 HH PPS REVENUE CREDIT

## 2018-06-28 PROCEDURE — 3331090002 HH PPS REVENUE DEBIT

## 2018-06-29 ENCOUNTER — HOME CARE VISIT (OUTPATIENT)
Dept: SCHEDULING | Facility: HOME HEALTH | Age: 83
End: 2018-06-29
Payer: MEDICARE

## 2018-06-29 VITALS
SYSTOLIC BLOOD PRESSURE: 142 MMHG | DIASTOLIC BLOOD PRESSURE: 72 MMHG | HEART RATE: 69 BPM | TEMPERATURE: 98.5 F | OXYGEN SATURATION: 96 %

## 2018-06-29 PROCEDURE — G0151 HHCP-SERV OF PT,EA 15 MIN: HCPCS

## 2018-06-29 PROCEDURE — 3331090001 HH PPS REVENUE CREDIT

## 2018-06-29 PROCEDURE — 3331090002 HH PPS REVENUE DEBIT

## 2018-06-30 PROCEDURE — 3331090001 HH PPS REVENUE CREDIT

## 2018-06-30 PROCEDURE — 3331090002 HH PPS REVENUE DEBIT

## 2018-07-01 PROCEDURE — 3331090002 HH PPS REVENUE DEBIT

## 2018-07-01 PROCEDURE — 3331090001 HH PPS REVENUE CREDIT

## 2018-07-02 PROCEDURE — 3331090002 HH PPS REVENUE DEBIT

## 2018-07-02 PROCEDURE — 3331090001 HH PPS REVENUE CREDIT

## 2018-07-03 ENCOUNTER — HOME CARE VISIT (OUTPATIENT)
Dept: SCHEDULING | Facility: HOME HEALTH | Age: 83
End: 2018-07-03
Payer: MEDICARE

## 2018-07-03 PROCEDURE — G0157 HHC PT ASSISTANT EA 15: HCPCS

## 2018-07-03 PROCEDURE — 3331090001 HH PPS REVENUE CREDIT

## 2018-07-03 PROCEDURE — 3331090002 HH PPS REVENUE DEBIT

## 2018-07-04 VITALS — DIASTOLIC BLOOD PRESSURE: 80 MMHG | HEART RATE: 65 BPM | OXYGEN SATURATION: 98 % | SYSTOLIC BLOOD PRESSURE: 138 MMHG

## 2018-07-04 PROCEDURE — 3331090001 HH PPS REVENUE CREDIT

## 2018-07-04 PROCEDURE — 3331090002 HH PPS REVENUE DEBIT

## 2018-07-05 PROCEDURE — 3331090001 HH PPS REVENUE CREDIT

## 2018-07-05 PROCEDURE — 3331090002 HH PPS REVENUE DEBIT

## 2018-07-06 ENCOUNTER — HOME CARE VISIT (OUTPATIENT)
Dept: SCHEDULING | Facility: HOME HEALTH | Age: 83
End: 2018-07-06
Payer: MEDICARE

## 2018-07-06 PROCEDURE — 3331090003 HH PPS REVENUE ADJ

## 2018-07-06 PROCEDURE — 3331090001 HH PPS REVENUE CREDIT

## 2018-07-06 PROCEDURE — 3331090002 HH PPS REVENUE DEBIT

## 2018-07-06 PROCEDURE — G0151 HHCP-SERV OF PT,EA 15 MIN: HCPCS

## 2018-07-07 PROCEDURE — 3331090002 HH PPS REVENUE DEBIT

## 2018-07-07 PROCEDURE — 3331090001 HH PPS REVENUE CREDIT

## 2018-07-08 PROCEDURE — 3331090002 HH PPS REVENUE DEBIT

## 2018-07-08 PROCEDURE — 3331090001 HH PPS REVENUE CREDIT

## 2018-07-09 VITALS
TEMPERATURE: 98.3 F | SYSTOLIC BLOOD PRESSURE: 132 MMHG | DIASTOLIC BLOOD PRESSURE: 69 MMHG | OXYGEN SATURATION: 96 % | HEART RATE: 60 BPM

## 2018-07-09 PROCEDURE — 3331090002 HH PPS REVENUE DEBIT

## 2018-07-09 PROCEDURE — 3331090001 HH PPS REVENUE CREDIT

## 2018-07-10 PROCEDURE — 3331090001 HH PPS REVENUE CREDIT

## 2018-07-10 PROCEDURE — 3331090002 HH PPS REVENUE DEBIT

## 2018-07-11 PROCEDURE — 3331090002 HH PPS REVENUE DEBIT

## 2018-07-11 PROCEDURE — 3331090001 HH PPS REVENUE CREDIT

## 2018-07-11 RX ORDER — LOSARTAN POTASSIUM 50 MG/1
TABLET ORAL
Qty: 90 TAB | Refills: 4 | Status: SHIPPED | OUTPATIENT
Start: 2018-07-11 | End: 2018-07-16 | Stop reason: ALTCHOICE

## 2018-07-12 PROCEDURE — 3331090001 HH PPS REVENUE CREDIT

## 2018-07-12 PROCEDURE — 3331090002 HH PPS REVENUE DEBIT

## 2018-07-16 ENCOUNTER — OFFICE VISIT (OUTPATIENT)
Dept: FAMILY MEDICINE CLINIC | Age: 83
End: 2018-07-16

## 2018-07-16 VITALS
WEIGHT: 226 LBS | OXYGEN SATURATION: 99 % | DIASTOLIC BLOOD PRESSURE: 54 MMHG | SYSTOLIC BLOOD PRESSURE: 104 MMHG | BODY MASS INDEX: 30.61 KG/M2 | HEART RATE: 68 BPM | HEIGHT: 72 IN | RESPIRATION RATE: 20 BRPM

## 2018-07-16 DIAGNOSIS — G20 PARKINSON DISEASE (HCC): Primary | ICD-10-CM

## 2018-07-16 DIAGNOSIS — I10 ESSENTIAL HYPERTENSION: ICD-10-CM

## 2018-07-16 DIAGNOSIS — R35.1 BPH ASSOCIATED WITH NOCTURIA: ICD-10-CM

## 2018-07-16 DIAGNOSIS — R60.0 BILATERAL LEG EDEMA: ICD-10-CM

## 2018-07-16 DIAGNOSIS — N40.1 BPH ASSOCIATED WITH NOCTURIA: ICD-10-CM

## 2018-07-16 NOTE — MR AVS SNAPSHOT
[address] [place] [address] [place] [address] 
[phone] Patient: Bety Pereira MRN: [id number] Visit Information Date & Time Provider Department Dept. Phone Encounter #  
 7/16/2018  1:30 PM Wilton EchevarriaDaphney 72 [phone] [id number] Follow-up Instructions Return in about 2 months (around 9/16/2018). Follow-up and Disposition History Your Appointments 9/12/2018 11:30 AM  
ESTABLISHED PATIENT with Janann Gitelman, MD [address] ([facility]) Appt Note: 2 mo F/U  
 1000 [facility]. [address] [facility],5[address] [address] [phone]  
  
   
 [address] [address] Upcoming Health Maintenance Date Due Influenza Age 5 to Adult 8/1/2018 MEDICARE YEARLY EXAM 10/31/2018 GLAUCOMA SCREENING Q2Y 10/30/2019 DTaP/Tdap/Td series (2 - Td) 2/12/2028 Allergies as of 7/16/2018  Review Complete On: 7/16/2018 By: Wilton Echevarria MD  
  
 Severity Noted Reaction Type Reactions Cat Hair Std Allergenic Ext  07/17/2013    Unknown (comments) Codeine  07/17/2013    Unknown (comments) Ragweed  07/17/2013    Unknown (comments) Mixed ragweed/pollen extract,tree extract Current Immunizations  Reviewed on 11/17/2015 Name Date Influenza High Dose Vaccine PF 10/30/2017, 11/2/2016  4:24 PM  
 Influenza Vaccine 10/7/2014, 12/6/2013 Influenza Vaccine Breanne Rosemarie) 11/17/2015 Pneumococcal Conjugate (PCV-13) 10/30/2017 Pneumococcal Polysaccharide (PPSV-23) 12/6/2013 Not reviewed this visit You Were Diagnosed With   
  
 Codes Comments Parkinson disease ([facility] [address]    -  Primary ICD-10-CM: G20 
ICD-9-CM: 332.0 Essential hypertension     ICD-10-CM: I10 
ICD-9-CM: 401.9 BPH associated with nocturia     ICD-10-CM: N40.1, R35.1 ICD-9-CM: 600.01, 788.43 Bilateral leg edema     ICD-10-CM: R60.0 ICD-9-CM: 922. 3 Vitals BP Pulse Resp Height(growth percentile) Weight(growth percentile) SpO2  
 104/54 (BP 1 Location: Left arm, BP Patient Position: Sitting) 68 20 6' (1.829 m) 226 lb (102.5 kg) 99% BMI Smoking Status 30.65 kg/m2 Former Smoker BMI and BSA Data Body Mass Index Body Surface Area  
 30.65 kg/m 2 2.28 m 2 Preferred Pharmacy Pharmacy Name Phone Sue Trejo, [facility] [phone] Your Updated Medication List  
  
   
This list is accurate as of 7/16/18  2:24 PM.  Always use your most recent med list.  
  
  
  
  
 ammonium lactate 12 % lotion Commonly known as:  LAC-HYDRIN  
APPLY THIN LAYER TWICE A DAY FOR 60 DAYS FOR DRY SCALY SKIN BOTH FEET  
  
 aspirin 325 mg tablet Commonly known as:  ASPIRIN Take 162.5 mg by mouth. 1/2 qd  
  
 carbidopa-levodopa  mg per tablet Commonly known as:  SINEMET Take 1 Tab by mouth three (3) times daily. chlorthalidone 25 mg tablet Commonly known as:  Geetha Nicely Take 1 Tab by mouth daily. cyanocobalamin 500 mcg tablet Commonly known as:  VITAMIN B12  
2 po daily  
  
 finasteride 5 mg tablet Commonly known as:  PROSCAR Take 5 mg by mouth daily. take at bedtime  
  
 tamsulosin 0.4 mg capsule Commonly known as:  FLOMAX Take 1 Cap by mouth daily. Follow-up Instructions Return in about 2 months (around 9/16/2018). Introducing [facility]! Romayne Duster introduces ExtendEvent patient portal. Now you can access parts of your medical record, email your doctor's office, and request medication refills online. 1. In your internet browser, go to [email or web address] 2. Click on the First Time User? Click Here link in the Sign In box. You will see the New Member Sign Up page. 3. Enter your Extend[employer] Access Code exactly as it appears below.  You will not need to use this code after youve completed the sign-up process. If you do not sign up before the expiration date, you must request a new code. · [employer] Access Code: [email or web address] Expires: 8/3/2018  4:58 PM 
 
4. Enter the last four digits of your Social Security Number (xxxx) and Date of Birth (mm/dd/yyyy) as indicated and click Submit. You will be taken to the next sign-up page. 5. Create a [employer] ID. This will be your [employer] login ID and cannot be changed, so think of one that is secure and easy to remember. 6. Create a [employer] password. You can change your password at any time. 7. Enter your Password Reset Question and Answer. This can be used at a later time if you forget your password. 8. Enter your e-mail address. You will receive e-mail notification when new information is available in [address] 9. Click Sign Up. You can now view and download portions of your medical record. 10. Click the Download Summary menu link to download a portable copy of your medical information. If you have questions, please visit the Frequently Asked Questions section of the [employer] website. Remember, [employer] is NOT to be used for urgent needs. For medical emergencies, dial 911. Now available from your iPhone and Android! Please provide this summary of care documentation to your next provider. Your primary care clinician is listed as Wilton Echevarria. If you have any questions after today's visit, please call [phone].

## 2018-07-16 NOTE — PROGRESS NOTES
Chief Complaint   Patient presents with   [facility] Follow Up         HPI:      Annamarie Medina is a 81 y.o. male discharged May 11 after admission to [facility] with LUTS and Parkinsons. He still has HH and PT/OT. Doing much better with Sinemet. No longer taking HCTZ. He has previously weighed as much as 260 as recently as 2014. Started on Chlorthalidone 2 weeks ago    Allergies   Allergen Reactions    Cat Hair Std Allergenic Ext Unknown (comments)    Codeine Unknown (comments)    Ragweed Unknown (comments)     Mixed ragweed/pollen extract,tree extract       Current Outpatient Prescriptions   Medication Sig    carbidopa-levodopa (SINEMET)  mg per tablet Take 1 Tab by mouth three (3) times daily.  finasteride (PROSCAR) 5 mg tablet Take 5 mg by mouth daily. take at bedtime    losartan (COZAAR) 50 mg tablet Take 50 mg by mouth daily. 1 tab by mouth    cyanocobalamin (VITAMIN B12) 500 mcg tablet Take 500 mcg by mouth daily. takes 2 tabs by mouth    ammonium lactate (LAC-HYDRIN) 12 % lotion APPLY THIN LAYER TWICE A DAY FOR 60 DAYS FOR DRY SCALY SKIN BOTH FEET    aspirin (ASPIRIN) 325 mg tablet Take 162.5 mg by mouth. 1/2 qd    tamsulosin (FLOMAX) 0.4 mg capsule Take 1 Cap by mouth daily. No current facility-administered medications for this visit.         Past Medical History:   Diagnosis Date    Asthma     Chronic kidney disease     Dermatophytosis of groin and perianal area 2010    Edema 2008    Encounter for long-term (current) use of other medications 2010    Gout, unspecified 2010    Gouty arthropathy, unspecified 2010    Hypertension     Hypertrophy of prostate without urinary obstruction and other lower urinary tract symptoms (LUTS) 2008    Impotence of organic origin 2008    Memory loss 2009    Obesity, unspecified 2010    Orthostatic hypotension 2008    Other and unspecified hyperlipidemia 2008    Other atopic dermatitis and related conditions 2012    Palpitations 2010    Peripheral angiopathy in diseases classified elsewhere [facility]) 2010    Personal history of malignant neoplasm of rectum, rectosigmoid junction, and anus 2011    Tinea nigra 2011    Unspecified pruritic disorder 2011    Vitamin B12 deficiency 7/23/2017         ROS:  Denies fever, chills, cough, chest pain, SOB,  nausea, vomiting, or diarrhea. Denies wt loss, wt gain, hemoptysis, hematochezia or melena. Complains of burning sensation in feet--sees Dr Leonel Yadav. Physical Examination:    /68 (BP 1 Location: Left arm, BP Patient Position: Sitting)  Pulse (!) 54  Resp 16  Ht 6' (1.829 m)  Wt 239 lb (108.4 kg)  SpO2 100%  BMI 32.41 kg/m2    General: Alert and Ox3, Fluent speech  HEENT:  NC/AT, EOMI, OP: clear  Neck:  Supple, no adenopathy, JVD, mass or bruit  Chest:  Clear to Ausculation, without wheezes, rales, rubs or ronchi  Cardiac: RRR  Abdomen:  +BS, soft, nontender without palpable HSM  Extremities:  BLE edema  Neurologic:  Ambulatory with wheeled walker, CN 2-12 grossly intact. Moves all extremities. Slow speech. Bradykinetic. Skin: no rash  Lymphadenopathy: no cervical or supraclavicular nodes    Wt Readings from Last 3 Encounters:   07/16/18 226 lb (102.5 kg)   06/19/18 228 lb 3.2 oz (103.5 kg)   06/04/18 239 lb (108.4 kg)       ASSESSMENT AND PLAN:     1. Parkinsons disease:  He is much less bradykinetic today  2. Hematuria: resolved and is under the care of Dr Tino Haywood  3. LE edema:  Improved. Weight is down 13 pounds  4. Follow up in 6 weeks. 5.  HTN:  Excellent--in fact, it is too low. Stop Losartan for now  6. He has agreed to no longer drive  Orders Placed This Encounter    DISCONTD: carbidopa-levodopa (SINEMET)  mg per tablet     Sig: Take 1 Tab by mouth three (3) times daily. Dispense:  270 Tab     Refill:  4    carbidopa-levodopa (SINEMET)  mg per tablet     Sig: Take 1 Tab by mouth three (3) times daily.      Dispense:  90 Tab     Refill:  4       Shaan Corolla Esthela Galindo MD, [address]

## 2019-03-04 ENCOUNTER — OFFICE VISIT (OUTPATIENT)
Dept: NEUROLOGY | Age: 84
End: 2019-03-04

## 2019-03-04 VITALS
SYSTOLIC BLOOD PRESSURE: 146 MMHG | WEIGHT: 234 LBS | DIASTOLIC BLOOD PRESSURE: 67 MMHG | OXYGEN SATURATION: 97 % | HEART RATE: 70 BPM | BODY MASS INDEX: 31.69 KG/M2 | HEIGHT: 72 IN

## 2019-03-04 DIAGNOSIS — I50.9 CONGESTIVE HEART FAILURE, UNSPECIFIED HF CHRONICITY, UNSPECIFIED HEART FAILURE TYPE (HCC): Primary | ICD-10-CM

## 2019-03-04 RX ORDER — CARBIDOPA AND LEVODOPA 25; 250 MG/1; MG/1
TABLET ORAL
Qty: 120 TAB | Refills: 5 | Status: SHIPPED | OUTPATIENT
Start: 2019-03-04 | End: 2019-03-07 | Stop reason: SDUPTHER

## 2019-03-04 NOTE — LETTER
3/4/2019 1:25 PM 
 
Patient:  Ky Macias YOB: 1935 Date of Visit: 3/4/2019 Dear Peng Chun MD 
[address] [address] VIA In Basket 
 : Thank you for referring Mr. Fili Dinero to me for evaluation/treatment. Below are the relevant portions of my assessment and plan of care. If you have questions, please do not hesitate to call me. I look forward to following Mr. MachadoRosecami Muñiz along with you. Sincerely, Abraham Davis MD

## 2019-03-04 NOTE — LETTER
3/4/2019 1:22 PM 
 
Patient:  Tito Chavez YOB: 1935 Date of Visit: 3/4/2019 Dear Manuelito Malcolm MD 
[address] [facility],[address] [address] VIA In Basket 
 : Thank you for referring Mr. Ancelmo Guo to me for evaluation/treatment. Below are the relevant portions of my assessment and plan of care. HISTORY OF PRESENT ILLNESS Tito Chavez is a 81 y.o. male. This patient is an 45-year-old -American male who comes in today for problems with his gait. He has been diagnosed with Parkinson's disease. His wife states that his walking has been abnormal for 3-4 years. He is not a terribly good historian. His wife states that he will get in the bed at night and stay in there to an hour to an hour and a half and then get up and sit in his motorized chair that has the ability to recline. He does have a 4+ lower extremity edema. He is currently taking carbidopa/levodopa 25/100 p.o. 4 times daily. Review of Systems Constitutional:  
     Review of systems is positive for constipation, memory loss, muscle weakness, shortness of breath, complete review of systems done all others negative Current Outpatient Medications on File Prior to Visit Medication Sig Dispense Refill  chlorthalidone (HYGROTEN) 25 mg tablet Take 1 Tab by mouth daily. 90 Tab 4  
 cyanocobalamin (VITAMIN B12) 500 mcg tablet 2 po daily 180 Tab 4  
 finasteride (PROSCAR) 5 mg tablet Take 5 mg by mouth daily. take at bedtime  tamsulosin (FLOMAX) 0.4 mg capsule Take 1 Cap by mouth daily. 30 Cap 0  
 ammonium lactate (LAC-HYDRIN) 12 % lotion APPLY THIN LAYER TWICE A DAY FOR 60 DAYS FOR DRY SCALY SKIN BOTH FEET  10  
 aspirin (ASPIRIN) 325 mg tablet Take 162.5 mg by mouth. 1/2 qd No current facility-administered medications on file prior to visit. He is on Sinemet 25/101 p.o. 4 times daily Past Medical History:  
Diagnosis Date  Asthma  Chronic kidney disease  Dermatophytosis of groin and perianal area 2010  Edema 2008  Encounter for long-term (current) use of other medications 2010  Gout, unspecified 2010  Gouty arthropathy, unspecified 2010  Hypertension  Hypertrophy of prostate without urinary obstruction and other lower urinary tract symptoms (LUTS) 2008  Impotence of organic origin 2008  Memory loss 2009  Obesity, unspecified 2010  Orthostatic hypotension 2008  Other and unspecified hyperlipidemia 2008  Other atopic dermatitis and related conditions 2012  Palpitations 2010  Peripheral angiopathy in diseases classified elsewhere [facility]) 2010  Personal history of malignant neoplasm of rectum, rectosigmoid junction, and anus 2011  Tinea nigra 2011  Unspecified pruritic disorder 2011  Vitamin B12 deficiency 2017 Family History Problem Relation Age of Onset [place] Other Mother PULMONARY EDEMA  Other Father PAD  Heart Attack Father  Coronary Artery Disease Father Social History Tobacco Use  Smoking status: Former Smoker Packs/day: 1.00 Years: 29.00 Pack years: 29.00 Types: Cigarettes Start date: 1949 Last attempt to quit: 1978 Years since quittin.1  Smokeless tobacco: Never Used Substance Use Topics  Alcohol use: No  
  Frequency: Never Binge frequency: Never  Drug use: No  
 
/67   Pulse 70   Ht 6' (1.829 m)   Wt 234 lb (106.1 kg)   SpO2 97%   BMI 31.74 kg/m² Physical Exam 
Constitutional: Oriented to person, place, and time, appears well-developed and well-nourished. No distress. He sits in a stooped posture, he has difficulty elevating his head to stay level with the examiner. HENT:  
Head: Normocephalic and atraumatic. Mouth/Throat: Oropharynx is clear and moist. No oropharyngeal exudate.   
Eyes: Conjunctivae and EOM are normal. Pupils are equal, round, and reactive to light. No scleral icterus. Neck: Normal range of motion. Neck supple. No thyromegaly present. Cardiovascular: Normal rate, regular rhythm and normal heart sounds. Musculoskeletal: Normal range of motion, he has marked edema in both feet and lower extremities Lymphadenopathy: no cervical adenopathy. Neurological: Alert and oriented to person, place, and time. Strength testing is abnormal throughout, he has diffuse weakness, and deep tendon reflexes are normal in his upper extremities I can get none in his lower extremities Displays no atrophy and he has problems with told him that that we will will let see what the medicine does not does not do for that No cranial nerve deficit or sensory deficit. Exhibits normal muscle tone. Displays a negative Romberg sign, no seizure activity. Gait is very parkinsonian. No Babinski's sign on the right side. No Babinski's sign on the left side. Speech, language are normal, he is mildly demented. Visual fields are full to confrontation, funduscopic exam reveals flat discs, the retina and vasculature are normal  
Skin: Skin is warm and dry. No rash noted, not diaphoretic. No erythema. Psychiatric: Normal mood and affect,  behavior is normal. J 
Vitals reviewed. ASSESSMENT and PLAN  Or He has marked parkinsonism, he is currently taking Sinemet 25 100 4 times daily. I am going to increase his Sinemet to the 25-50 and put him on a 4 times daily, I have asked his wife to call us if he develops any nausea or excess movements. I also cautioned her that he may have some visual hallucinations of the carbidopa is increased suddenly. He is quite parkinsonian at this point. I explained to them that this was a progressive disease that he could not slow. I will see him back in 4 months, I have asked his wife to call me if he has any problems with the medication BILATERAL LE EDEMA I am going to refer him to cardiology, I do know if it is possible to diurese this man because I do not know his cardiac function, If you have questions, please do not hesitate to call me. I look forward to following Austyn Jamshid Roque along with you. Sincerely, Yousuf Chavez MD

## 2019-03-04 NOTE — PATIENT INSTRUCTIONS

## 2019-03-04 NOTE — PROGRESS NOTES
HISTORY OF PRESENT ILLNESS  Leatha Salmeron is a 81 y.o. male. This patient is an 71-year-old -American male who comes in today for problems with his gait. He has been diagnosed with Parkinson's disease. His wife states that his walking has been abnormal for 3-4 years. He is not a terribly good historian. His wife states that he will get in the bed at night and stay in there to an hour to an hour and a half and then get up and sit in his motorized chair that has the ability to recline. He does have a 4+ lower extremity edema. He is currently taking carbidopa/levodopa 25/100 p.o. 4 times daily. Review of Systems   Constitutional:        Review of systems is positive for constipation, memory loss, muscle weakness, shortness of breath, complete review of systems done all others negative     Current Outpatient Medications on File Prior to Visit   Medication Sig Dispense Refill    chlorthalidone (HYGROTEN) 25 mg tablet Take 1 Tab by mouth daily. 90 Tab 4    cyanocobalamin (VITAMIN B12) 500 mcg tablet 2 po daily 180 Tab 4    finasteride (PROSCAR) 5 mg tablet Take 5 mg by mouth daily. take at bedtime      tamsulosin (FLOMAX) 0.4 mg capsule Take 1 Cap by mouth daily. 30 Cap 0    ammonium lactate (LAC-HYDRIN) 12 % lotion APPLY THIN LAYER TWICE A DAY FOR 60 DAYS FOR DRY SCALY SKIN BOTH FEET  10    aspirin (ASPIRIN) 325 mg tablet Take 162.5 mg by mouth. 1/2 qd       No current facility-administered medications on file prior to visit.     He is on Sinemet 25/101 p.o. 4 times daily  Past Medical History:   Diagnosis Date    Asthma     Chronic kidney disease     Dermatophytosis of groin and perianal area 2010    Edema 2008    Encounter for long-term (current) use of other medications 2010    Gout, unspecified 2010    Gouty arthropathy, unspecified 2010    Hypertension     Hypertrophy of prostate without urinary obstruction and other lower urinary tract symptoms (LUTS) 2008    Impotence of organic origin 2008    Memory loss 2009    Obesity, unspecified 2010    Orthostatic hypotension 2008    Other and unspecified hyperlipidemia 2008    Other atopic dermatitis and related conditions 2012    Palpitations 2010    Peripheral angiopathy in diseases classified elsewhere [facility]) 2010    Personal history of malignant neoplasm of rectum, rectosigmoid junction, and anus 2011    Tinea nigra 2011    Unspecified pruritic disorder 2011    Vitamin B12 deficiency 2017     Family History   Problem Relation Age of Onset    Other Mother         PULMONARY EDEMA    Other Father         PAD    Heart Attack Father     Coronary Artery Disease Father      Social History     Tobacco Use    Smoking status: Former Smoker     Packs/day: 1.00     Years: 29.00     Pack years: 29.00     Types: Cigarettes     Start date: 1949     Last attempt to quit: 1978     Years since quittin.1    Smokeless tobacco: Never Used   Substance Use Topics    Alcohol use: No     Frequency: Never     Binge frequency: Never    Drug use: No     /67   Pulse 70   Ht 6' (1.829 m)   Wt 234 lb (106.1 kg)   SpO2 97%   BMI 31.74 kg/m²     Physical Exam  Constitutional: Oriented to person, place, and time, appears well-developed and well-nourished. No distress. He sits in a stooped posture, he has difficulty elevating his head to stay level with the examiner. HENT:   Head: Normocephalic and atraumatic. Mouth/Throat: Oropharynx is clear and moist. No oropharyngeal exudate. Eyes: Conjunctivae and EOM are normal. Pupils are equal, round, and reactive to light. No scleral icterus. Neck: Normal range of motion. Neck supple. No thyromegaly present. Cardiovascular: Normal rate, regular rhythm and normal heart sounds. Musculoskeletal: Normal range of motion, he has marked edema in both feet and lower extremities  Lymphadenopathy: no cervical adenopathy.    Neurological: Alert and oriented to person, place, and time.   Strength testing is abnormal throughout, he has diffuse weakness, and deep tendon reflexes are normal in his upper extremities I can get none in his lower extremities  Displays no atrophy and he has problems with told him that that we will will let see what the medicine does not does not do for that  No cranial nerve deficit or sensory deficit. Exhibits normal muscle tone. Displays a negative Romberg sign, no seizure activity. Gait is very parkinsonian. No Babinski's sign on the right side. No Babinski's sign on the left side. Speech, language are normal, he is mildly demented. Visual fields are full to confrontation, funduscopic exam reveals flat discs, the retina and vasculature are normal   Skin: Skin is warm and dry. No rash noted, not diaphoretic. No erythema. Psychiatric: Normal mood and affect,  behavior is normal. J  Vitals reviewed. ASSESSMENT and PLAN  PARKINSONS  He has marked parkinsonism, he is currently taking Sinemet 25 100 4 times daily. I am going to increase his Sinemet to the 25-50 and put him on a 4 times daily, I have asked his wife to call us if he develops any nausea or excess movements. I also cautioned her that he may have some visual hallucinations of the carbidopa is increased suddenly. He is quite parkinsonian at this point. I explained to them that this was a progressive disease that he could not slow.   I will see him back in 4 months, I have asked his wife to call me if he has any problems with the medication  BILATERAL LE EDEMA  I am going to refer him to cardiology, I do know if it is possible to diurese this man because I do not know his cardiac function,

## 2019-03-07 ENCOUNTER — DOCUMENTATION ONLY (OUTPATIENT)
Dept: NEUROLOGY | Age: 84
End: 2019-03-07

## 2019-03-07 RX ORDER — CARBIDOPA AND LEVODOPA 25; 250 MG/1; MG/1
TABLET ORAL
Qty: 360 TAB | Refills: 3 | Status: SHIPPED | OUTPATIENT
Start: 2019-03-07 | End: 2019-09-19 | Stop reason: SDUPTHER

## 2019-03-07 NOTE — TELEPHONE ENCOUNTER
Received refill request for 90 day supply for  carbidopa from [facility]   Last filled for 30 days on 3/4/19  Dr. Royce Mckeon please fill

## 2019-03-11 RX ORDER — CARBIDOPA AND LEVODOPA 25; 250 MG/1; MG/1
TABLET ORAL
Qty: 360 TAB | Refills: 3 | Status: CANCELLED | OUTPATIENT
Start: 2019-03-11

## 2019-04-15 ENCOUNTER — OFFICE VISIT (OUTPATIENT)
Dept: CARDIOLOGY CLINIC | Age: 84
End: 2019-04-15

## 2019-04-15 VITALS
SYSTOLIC BLOOD PRESSURE: 114 MMHG | OXYGEN SATURATION: 98 % | RESPIRATION RATE: 16 BRPM | WEIGHT: 232.4 LBS | BODY MASS INDEX: 30.8 KG/M2 | DIASTOLIC BLOOD PRESSURE: 58 MMHG | HEART RATE: 73 BPM | HEIGHT: 73 IN

## 2019-04-15 DIAGNOSIS — G20 PARKINSON DISEASE (HCC): ICD-10-CM

## 2019-04-15 DIAGNOSIS — I10 ESSENTIAL HYPERTENSION: ICD-10-CM

## 2019-04-15 DIAGNOSIS — R60.0 BILATERAL LEG EDEMA: Primary | ICD-10-CM

## 2019-04-15 DIAGNOSIS — E78.2 MIXED HYPERLIPIDEMIA: ICD-10-CM

## 2019-04-15 RX ORDER — LANOLIN ALCOHOL/MO/W.PET/CERES
28 CREAM (GRAM) TOPICAL
COMMUNITY

## 2019-04-15 NOTE — PROGRESS NOTES
1. Have you been to the ER, urgent care clinic since your last visit? Hospitalized since your last visit? NO.    2. Have you seen or consulted any other health care providers outside of the [address] [place] [address] since your last visit? Include any pap smears or colon screening. SEEN BY PCP. SEEN BY PODIATRY.         Chief Complaint   Patient presents with    New Patient     REFERRED BY NEUROLOGY - SWELLING IN FEET AND LEGS, FAMILY HX OF HEART DISEASE

## 2019-04-15 NOTE — PROGRESS NOTES
Norma Orellana, [facility]    Subjective/HPI:     Mr. Zechariah Unger is a 81 y.o. male is here for new patient consultation. He has a PMHx of Parkinson's disease, HTN and HLD. He was referred by Dr. Luz Winslow for evaluation of bilateral LE edema  He is here with his wife, who provides most of the history. She notes lower extremity edema has been chronic for years. She notes improvement in swelling when he elevates his legs. He notes no complaints of leg pain with walking. He does follow with podiatry who perform routine foot care including clipping toenails and tinea nigra of the left foot. He denies complaints of shortness of breath, chest pains, dizziness, orthopnea, or PND. His wife denies previous cardiac history of MI, arrhythmias or heart failure. He has family history of heart disease -- he reports his sister's \"heart shudders\" as a result of chemotherapy. He was a previous smoker, quit 33 years ago.          PCP Provider  Amira Rogers MD  Past Medical History:   Diagnosis Date    Asthma     Chronic kidney disease     Dermatophytosis of groin and perianal area 2010    Edema 2008    Encounter for long-term (current) use of other medications 2010    Gout, unspecified 2010    Gouty arthropathy, unspecified 2010    Hypertension     Hypertrophy of prostate without urinary obstruction and other lower urinary tract symptoms (LUTS) 2008    Impotence of organic origin 2008    Memory loss 2009    Obesity, unspecified 2010    Orthostatic hypotension 2008    Other and unspecified hyperlipidemia 2008    Other atopic dermatitis and related conditions 2012    Palpitations 2010    Peripheral angiopathy in diseases classified elsewhere [facility]) 2010    Personal history of malignant neoplasm of rectum, rectosigmoid junction, and anus 2011    Tinea nigra 2011    Unspecified pruritic disorder 2011    Vitamin B12 deficiency 7/23/2017      Past Surgical History:   Procedure Laterality Date    ENDOSCOPY, COLON, DIAGNOSTIC  2011, 2004,  2000    HX COLECTOMY      COLON CANCER S/P RESECTION    HX HERNIA REPAIR  1992    INCISIONAL     Family History   Problem Relation Age of Onset    Other Mother         PULMONARY EDEMA    Other Father         PAD    Heart Attack Father     Coronary Artery Disease Father      Social History     Socioeconomic History    Marital status:      Spouse name: Not on file    Number of children: Not on file    Years of education: Not on file    Highest education level: Not on file   Occupational History    Not on file   Social Needs    Financial resource strain: Not on file    Food insecurity:     Worry: Not on file     Inability: Not on file    Transportation needs:     Medical: Not on file     Non-medical: Not on file   Tobacco Use    Smoking status: Former Smoker     Packs/day: 1.00     Years: 29.00     Pack years: 29.00     Types: Cigarettes     Start date: 1949     Last attempt to quit: 1978     Years since quittin.3    Smokeless tobacco: Never Used   Substance and Sexual Activity    Alcohol use: No     Frequency: Never     Binge frequency: Never    Drug use: No    Sexual activity: Never   Lifestyle    Physical activity:     Days per week: Not on file     Minutes per session: Not on file    Stress: Not on file   Relationships    Social connections:     Talks on phone: Not on file     Gets together: Not on file     Attends Restoration service: Not on file     Active member of club or organization: Not on file     Attends meetings of clubs or organizations: Not on file     Relationship status: Not on file    Intimate partner violence:     Fear of current or ex partner: Not on file     Emotionally abused: Not on file     Physically abused: Not on file     Forced sexual activity: Not on file   Other Topics Concern     Service No    Blood Transfusions No    Caffeine Concern No    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    Hobby Hazards No    Sleep Concern Not Asked    Stress Concern No    Weight Concern Yes     Comment: loss of weight    Special Diet No    Back Care Yes     Comment: pain from kidney cyst    Exercise No    Bike Helmet No     Comment: n/a    Seat Belt Yes    Self-Exams Not Asked   Social History Narrative    Not on file       Allergies   Allergen Reactions    Cat Hair Std Allergenic Ext Unknown (comments)    Codeine Unknown (comments)    Ragweed Unknown (comments)     Mixed ragweed/pollen extract,tree extract        Current Outpatient Medications   Medication Sig    ferrous sulfate (IRON) 325 mg (65 mg iron) tablet Take 28 mg by mouth. Indications: 1 TAB ON MONDAY,WEDNESDAY, AND FRIDAY    carbidopa-levodopa (SINEMET-25/250)  mg per tablet 1 po qid    chlorthalidone (HYGROTEN) 25 mg tablet Take 1 Tab by mouth daily.  cyanocobalamin (VITAMIN B12) 500 mcg tablet 2 po daily    finasteride (PROSCAR) 5 mg tablet Take 5 mg by mouth daily. take at bedtime    tamsulosin (FLOMAX) 0.4 mg capsule Take 1 Cap by mouth daily.  ammonium lactate (LAC-HYDRIN) 12 % lotion APPLY THIN LAYER TWICE A DAY FOR 60 DAYS FOR DRY SCALY SKIN BOTH FEET    aspirin (ASPIRIN) 325 mg tablet Take 162.5 mg by mouth. 1/2 qd     No current facility-administered medications for this visit. I have reviewed the problem list, allergy list, medical history, family, social history and medications. Review of Symptoms:    ROS    Physical Exam:      General: Well developed, in no acute distress, cooperative and alert  HEENT: No carotid bruits, no JVD, trach is midline. Neck Supple, PEERL, EOM intact. Heart:  reg rate and rhythm; normal S1/S2; no murmurs, gallops or rubs. Respiratory: Clear bilaterally x 4, no wheezing or rales  Abdomen:   Soft, non-tender, no distention, no masses. + BS. Extremities:  Normal cap refill, no cyanosis, atraumatic.   Bilateral 2-3+ edema, non-pitting LE; Tinea nigra of the sole of the left foot. Neuro: A&Ox3, speech clear, gait stable. Skin: Skin color is normal. No rashes or lesions. Non diaphoretic  Vascular: 2+ pulses symmetric in all extremities    Vitals:    04/15/19 0955 04/15/19 1009   BP: 116/66 114/58   Pulse: 73    Resp: 16    SpO2: 98%    Weight: 232 lb 6.4 oz (105.4 kg)    Height: 6' 1\" (1.854 m)        Cardiographics    ECG: sinus rhythm  No results found for this or any previous visit. Cardiology Labs:  Lab Results   Component Value Date/Time    Cholesterol, total 227 (H) 01/25/2017 04:11 PM    HDL Cholesterol 63 01/25/2017 04:11 PM    LDL, calculated 148 (H) 01/25/2017 04:11 PM    Triglyceride 78 01/25/2017 04:11 PM       Lab Results   Component Value Date/Time    Sodium 138 05/16/2018 05:15 AM    Potassium 3.9 05/16/2018 05:15 AM    Chloride 104 05/16/2018 05:15 AM    CO2 30 05/16/2018 05:15 AM    Anion gap 4 (L) 05/16/2018 05:15 AM    Glucose 93 05/16/2018 05:15 AM    BUN 29 (H) 05/16/2018 05:15 AM    Creatinine 1.45 (H) 05/16/2018 05:15 AM    BUN/Creatinine ratio 20 05/16/2018 05:15 AM    GFR est AA 56 (L) 05/16/2018 05:15 AM    GFR est non-AA 47 (L) 05/16/2018 05:15 AM    Calcium 8.9 05/16/2018 05:15 AM    Bilirubin, total 0.6 05/08/2018 01:22 PM    AST (SGOT) 26 05/08/2018 01:22 PM    Alk. phosphatase 56 05/08/2018 01:22 PM    Protein, total 7.6 05/08/2018 01:22 PM    Albumin 3.0 (L) 05/08/2018 01:22 PM    Globulin 4.6 (H) 05/08/2018 01:22 PM    A-G Ratio 0.7 (L) 05/08/2018 01:22 PM    ALT (SGPT) 20 05/08/2018 01:22 PM           Assessment:     Assessment:       ICD-10-CM ICD-9-CM    1. Bilateral leg edema R60.0 782.3 DUPLEX LOWER EXT VENOUS BILAT   2. Essential hypertension I10 401.9 AMB POC EKG ROUTINE W/ 12 LEADS, INTER & REP   3. Mixed hyperlipidemia E78.2 272.2 LIPID PANEL        Plan:     1. Bilateral leg edema  Likely dependent edema given sedentary lifestyle and limited mobility 2/2 Parkinson's disease.   Echo in 5/2018 with preserved LVEF 60-65% with grade 1 DD, with no significant valvular disease. Will obtain venous reflux study  Would recommend conservative management of his edema, with elevation as much as possible and to consider compression stockings. Diuretics were previously prescribed, however were discontinued due to worsening renal function. 2. Essential hypertension  BP well controlled; continue anti-hypertensive therapy and low sodium diet. - AMB POC EKG ROUTINE W/ 12 LEADS, INTER & REP    3. Mixed hyperlipidemia  Will check lipids today. Was previously on statin therapy, but this was discontinued for unknown reason. Encouraged low fat, low cholesterol diet. Dillon Winters NP       Patient seen and examined by me with nurse practitioner. José Miguel Garsia personally performed all components of the history, physical, and medical decision making and agree with the assessment and plan with minor modifications as noted. Check LE venous reflux study. D/w pt and family.      Tran Gracia MD

## 2019-05-17 ENCOUNTER — TELEPHONE (OUTPATIENT)
Dept: CARDIOLOGY CLINIC | Age: 84
End: 2019-05-17

## 2019-05-17 NOTE — TELEPHONE ENCOUNTER
----- Message from Gogo Vasquez MD sent at 5/15/2019  5:34 PM EDT -----  Inform him on LE venous doppler no DVT is noted. He has some superficial vein insufficiency. But with his baseline medical condition recommend compression stocking and leg elevation.

## 2019-05-17 NOTE — TELEPHONE ENCOUNTER
Attempted to contact patient however gentleman who answered the phone stated he was not available at the time

## 2019-05-28 ENCOUNTER — TELEPHONE (OUTPATIENT)
Dept: CARDIOLOGY CLINIC | Age: 84
End: 2019-05-28

## 2019-07-01 ENCOUNTER — OFFICE VISIT (OUTPATIENT)
Dept: NEUROLOGY | Age: 84
End: 2019-07-01

## 2019-07-01 VITALS
HEIGHT: 73 IN | OXYGEN SATURATION: 98 % | HEART RATE: 74 BPM | BODY MASS INDEX: 31.19 KG/M2 | DIASTOLIC BLOOD PRESSURE: 65 MMHG | SYSTOLIC BLOOD PRESSURE: 143 MMHG

## 2019-07-01 DIAGNOSIS — R53.1 WEAKNESS GENERALIZED: ICD-10-CM

## 2019-07-01 DIAGNOSIS — G20 PARKINSON DISEASE (HCC): Primary | ICD-10-CM

## 2019-07-01 DIAGNOSIS — G20 PARKINSON DISEASE (HCC): ICD-10-CM

## 2019-07-01 DIAGNOSIS — R60.0 BILATERAL LEG EDEMA: ICD-10-CM

## 2019-07-01 DIAGNOSIS — G31.84 MCI (MILD COGNITIVE IMPAIRMENT) WITH MEMORY LOSS: ICD-10-CM

## 2019-07-01 RX ORDER — RASAGILINE 1 MG/1
TABLET ORAL
Qty: 30 TAB | Refills: 3 | Status: SHIPPED | OUTPATIENT
Start: 2019-07-01 | End: 2019-07-01 | Stop reason: SDUPTHER

## 2019-07-01 RX ORDER — RASAGILINE 1 MG/1
TABLET ORAL
Qty: 30 TAB | Refills: 3 | Status: SHIPPED | OUTPATIENT
Start: 2019-07-01 | End: 2020-01-29

## 2019-07-01 NOTE — PROGRESS NOTES
[address] [facility],[address]  [address]   [address] [facility] [address] [place] [address] [facility]   [phone] Office   [phone] Fax           Date:  19     Name:  Marty Marmolejo  :  1935  MRN:  [id number]     PCP:  Kellen Rubio MD    Chief Complaint   Patient presents with    Follow-up     parkinson     HISTORY OF PRESENT ILLNESS: Follow up for Parkinson's disease. At his last office visit, the Sinemet was increased to 25/250. This is made only some minimal improvement. He has not had any increase in rigidity, tremors, or freezing. His movements are still slow but he is steady. No falls. No hallucinations or delusions. Short-term memory loss. Current Outpatient Medications   Medication Sig    ferrous sulfate (IRON) 325 mg (65 mg iron) tablet Take 28 mg by mouth. Indications: 1 TAB ON MONDAY,WEDNESDAY, AND FRIDAY    carbidopa-levodopa (SINEMET-25/250)  mg per tablet 1 po qid    chlorthalidone (HYGROTEN) 25 mg tablet Take 1 Tab by mouth daily.  cyanocobalamin (VITAMIN B12) 500 mcg tablet 2 po daily    finasteride (PROSCAR) 5 mg tablet Take 5 mg by mouth daily. take at bedtime    tamsulosin (FLOMAX) 0.4 mg capsule Take 1 Cap by mouth daily.  ammonium lactate (LAC-HYDRIN) 12 % lotion APPLY THIN LAYER TWICE A DAY FOR 60 DAYS FOR DRY SCALY SKIN BOTH FEET    aspirin (ASPIRIN) 325 mg tablet Take 162.5 mg by mouth. 1/2 qd     No current facility-administered medications for this visit.       Allergies   Allergen Reactions    Cat Hair Std Allergenic Ext Unknown (comments)    Codeine Unknown (comments)    Ragweed Unknown (comments)     Mixed ragweed/pollen extract,tree extract     Past Medical History:   Diagnosis Date    Asthma     Chronic kidney disease     Dermatophytosis of groin and perianal area 2010    Edema 2008    Encounter for long-term (current) use of other medications 2010    Gout, unspecified 2010    Gouty arthropathy, unspecified 2010    Hypertension     Hypertrophy of prostate without urinary obstruction and other lower urinary tract symptoms (LUTS) 2008    Impotence of organic origin 2008    Memory loss 2009    Obesity, unspecified 2010    Orthostatic hypotension 2008    Other and unspecified hyperlipidemia 2008    Other atopic dermatitis and related conditions 2012    Palpitations 2010    Peripheral angiopathy in diseases classified elsewhere [facility]) 2010    Personal history of malignant neoplasm of rectum, rectosigmoid junction, and anus 2011    Tinea nigra 2011    Unspecified pruritic disorder 2011    Vitamin B12 deficiency 2017     Past Surgical History:   Procedure Laterality Date    ENDOSCOPY, COLON, DIAGNOSTIC  2011, 2004,  2000    HX COLECTOMY      COLON CANCER S/P RESECTION    HX HERNIA REPAIR  1992    INCISIONAL     Social History     Socioeconomic History    Marital status:      Spouse name: Not on file    Number of children: Not on file    Years of education: Not on file    Highest education level: Not on file   Occupational History    Not on file   Social Needs    Financial resource strain: Not on file    Food insecurity:     Worry: Not on file     Inability: Not on file    Transportation needs:     Medical: Not on file     Non-medical: Not on file   Tobacco Use    Smoking status: Former Smoker     Packs/day: 1.00     Years: 29.00     Pack years: 29.00     Types: Cigarettes     Start date: 1949     Last attempt to quit: 1978     Years since quittin.5    Smokeless tobacco: Never Used   Substance and Sexual Activity    Alcohol use: No     Frequency: Never     Binge frequency: Never    Drug use: No    Sexual activity: Not Currently   Lifestyle    Physical activity:     Days per week: Not on file     Minutes per session: Not on file    Stress: Not on file   Relationships    Social connections:     Talks on phone: Not on file     Gets together: Not on file     Attends Scientologist service: Not on file     Active member of club or organization: Not on file     Attends meetings of clubs or organizations: Not on file     Relationship status: Not on file    Intimate partner violence:     Fear of current or ex partner: Not on file     Emotionally abused: Not on file     Physically abused: Not on file     Forced sexual activity: Not on file   Other Topics Concern     Service No    Blood Transfusions No    Caffeine Concern No    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    Hobby Hazards No    Sleep Concern Not Asked    Stress Concern No    Weight Concern Yes     Comment: loss of weight    Special Diet No    Back Care Yes     Comment: pain from kidney cyst    Exercise No    Bike Helmet No     Comment: n/a    Seat Belt Yes    Self-Exams Not Asked   Social History Narrative    Not on file     Family History   Problem Relation Age of Onset    Other Mother         PULMONARY EDEMA    Other Father         PAD    Heart Attack Father     Coronary Artery Disease Father        PHYSICAL EXAMINATION:    Visit Vitals  /65   Pulse 74   Ht 6' 1\" (1.854 m)   SpO2 98%   BMI 31.19 kg/m²     General:  Well defined, nourished, and groomed individual in no acute distress. Neck: Supple, nontender, no bruits, no pain with resistance to active range of motion. Heart: Regular rate and rhythm, no murmurs, rub, or gallop. Normal S1S2. Lungs:  Clear to auscultation bilaterally with equal chest expansion, no cough, no wheeze  Musculoskeletal:  Bilateral lower extremity edema. Full range of motion of joints. Psych:  Good mood and bright affect    NEUROLOGICAL EXAMINATION:     Mental Status:   Alert and oriented to person, place, and time with recent and remote memory intact. Attention span and concentration are normal. Speech is fluent with a full fund of knowledge. Voice quality is low but clear.      Cranial Nerves:  I: smell Not tested   II: visual fields Full to confrontation   II: pupils Equal, round, reactive to light   II: optic disc No papilledema   III,VII: ptosis none   III,IV,VI: extraocular muscles  Saccadic pursuit. V: mastication normal   V: facial light touch sensation  normal   VII: facial muscle function   symmetric   VIII: hearing symmetric   IX: soft palate elevation  normal   XI: trapezius strength  5/5   XI: sternocleidomastoid strength 5/5   XI: neck flexion strength  5/5   XII: tongue  midline     Motor Examination: Normal tone and bulk with generalized weakness. No cogwheel rigidity or clonus present. Sensory exam:  Normal throughout to pinprick, temperature, and vibration sense. Normal proprioception. Coordination:  Finger to nose and rapid arm movement testing demonstrated significant bradykinesia. No significant tremor. Gait and Station:  Decreased arm swing, shuffled gait, stooped posture. No pronator drift. No muscle wasting or fasiculations noted. Reflexes:  DTRs 2+ throughout. ASSESSMENT AND PLAN    ICD-10-CM ICD-9-CM    1. Parkinson disease ([facility] [address] G20 332.0 REFERRAL TO PHYSICAL THERAPY      DISCONTINUED: rasagiline (AZILECT) 1 mg tablet   2. MCI (mild cognitive impairment) with memory loss G31.84 331.83      780.93    3. Bilateral leg edema R60.0 782.3    4. Weakness generalized R53.1 780.79 REFERRAL TO PHYSICAL THERAPY     Continue present sInemet  Add Azilect as he still seems under treated. PT for LSVT Big, strength and balance  Follow up in three months    Radha Chery

## 2019-07-01 NOTE — PATIENT INSTRUCTIONS

## 2019-07-09 ENCOUNTER — DOCUMENTATION ONLY (OUTPATIENT)
Dept: NEUROLOGY | Age: 84
End: 2019-07-09

## 2019-07-23 RX ORDER — LANOLIN ALCOHOL/MO/W.PET/CERES
CREAM (GRAM) TOPICAL
Qty: 180 TAB | Refills: 4 | Status: SHIPPED | OUTPATIENT
Start: 2019-07-23 | End: 2020-08-24

## 2019-09-17 RX ORDER — CHLORTHALIDONE 25 MG/1
TABLET ORAL
Qty: 90 TAB | Refills: 4 | Status: SHIPPED | OUTPATIENT
Start: 2019-09-17 | End: 2019-09-19 | Stop reason: SDUPTHER

## 2020-01-01 ENCOUNTER — OFFICE VISIT (OUTPATIENT)
Dept: CARDIOLOGY CLINIC | Age: 85
End: 2020-01-01
Payer: MEDICARE

## 2020-01-01 VITALS
TEMPERATURE: 98.1 F | OXYGEN SATURATION: 99 % | SYSTOLIC BLOOD PRESSURE: 122 MMHG | BODY MASS INDEX: 27.83 KG/M2 | DIASTOLIC BLOOD PRESSURE: 74 MMHG | RESPIRATION RATE: 16 BRPM | WEIGHT: 210 LBS | HEART RATE: 61 BPM | HEIGHT: 73 IN

## 2020-01-01 DIAGNOSIS — I10 ESSENTIAL HYPERTENSION: ICD-10-CM

## 2020-01-01 DIAGNOSIS — I48.0 PAROXYSMAL ATRIAL FIBRILLATION (HCC): Primary | ICD-10-CM

## 2020-01-01 DIAGNOSIS — Z09 HOSPITAL DISCHARGE FOLLOW-UP: ICD-10-CM

## 2020-01-01 DIAGNOSIS — G20 PARKINSON DISEASE (HCC): ICD-10-CM

## 2020-01-01 PROCEDURE — G8419 CALC BMI OUT NRM PARAM NOF/U: HCPCS | Performed by: INTERNAL MEDICINE

## 2020-01-01 PROCEDURE — G8536 NO DOC ELDER MAL SCRN: HCPCS | Performed by: INTERNAL MEDICINE

## 2020-01-01 PROCEDURE — G8752 SYS BP LESS 140: HCPCS | Performed by: INTERNAL MEDICINE

## 2020-01-01 PROCEDURE — 1111F DSCHRG MED/CURRENT MED MERGE: CPT | Performed by: INTERNAL MEDICINE

## 2020-01-01 PROCEDURE — G8754 DIAS BP LESS 90: HCPCS | Performed by: INTERNAL MEDICINE

## 2020-01-01 PROCEDURE — G8427 DOCREV CUR MEDS BY ELIG CLIN: HCPCS | Performed by: INTERNAL MEDICINE

## 2020-01-01 PROCEDURE — 99214 OFFICE O/P EST MOD 30 MIN: CPT | Performed by: INTERNAL MEDICINE

## 2020-01-01 PROCEDURE — 3288F FALL RISK ASSESSMENT DOCD: CPT | Performed by: INTERNAL MEDICINE

## 2020-01-01 PROCEDURE — 1100F PTFALLS ASSESS-DOCD GE2>/YR: CPT | Performed by: INTERNAL MEDICINE

## 2020-01-01 PROCEDURE — G8432 DEP SCR NOT DOC, RNG: HCPCS | Performed by: INTERNAL MEDICINE

## 2020-01-01 RX ORDER — CHLORTHALIDONE 25 MG/1
TABLET ORAL
Qty: 90 TAB | Refills: 3 | Status: SHIPPED | OUTPATIENT
Start: 2020-01-01 | End: 2021-01-01

## 2020-01-28 DIAGNOSIS — G20 PARKINSON DISEASE (HCC): ICD-10-CM

## 2020-01-29 RX ORDER — RASAGILINE 1 MG/1
TABLET ORAL
Qty: 30 TAB | Refills: 12 | Status: SHIPPED | OUTPATIENT
Start: 2020-01-29 | End: 2020-01-01 | Stop reason: ALTCHOICE

## 2020-08-24 RX ORDER — LANOLIN ALCOHOL/MO/W.PET/CERES
CREAM (GRAM) TOPICAL
Qty: 180 TAB | Refills: 3 | Status: SHIPPED | OUTPATIENT
Start: 2020-08-24 | End: 2021-01-01 | Stop reason: SDUPTHER

## 2020-11-23 PROBLEM — I48.91 ATRIAL FIBRILLATION (HCC): Status: ACTIVE | Noted: 2020-01-01

## 2020-11-23 PROBLEM — I48.91 A-FIB (HCC): Status: ACTIVE | Noted: 2020-01-01

## 2020-12-15 NOTE — PROGRESS NOTES
Chief Complaint   Patient presents with   [facility] Follow Up     follow up from er for A FIB     Verified patient with two patient identifiers. Medications reviewed/approved by Dr. Domitila Garcia. 1. Have you been to the ER, urgent care clinic since your last visit? Hospitalized since your last visit?no    2. Have you seen or consulted any other health care providers outside of the [address] [place] [address] since your last visit? Include any pap smears or colon screening.  No    Wife temp 98.0

## 2020-12-15 NOTE — PROGRESS NOTES
Billy Pina is a 81 y.o. male is here for hospital f/u/establish cardiology care. Hx hypertension, Parkinson's, colon ca, BPH, dyslipidemia, memory issues, CKD, gout, orthostatic hypotension. At [facility] 11/23-11/24/20 with new onset paroxysmal afib. Patient presented to the ED with  symptoms of palpitation and lightheadedness during the night.  He awoke during the night and noted balance difficulty ambulating. Dusty Davison states he felt like his blood pressure was elevated.  He was brought to the ED via EMS, his wife accompanied them. Dusty Davison apparently has had recurrent falls recently. Nazario Chavira has been no head injury or loss of consciousness.  His wife noted 3 fall episodes during the past week.  She felt he was weaker today than usual. Dusty Davison has appreciated some increased memory loss over the past several months.  Patient denied any complaint of chest discomfort or shortness of breath. Monitoring on arrival in the ED documented atrial fibrillation with heart rates up to 140, with usual heart rate in the 100-1 10 range.  He was administered Cardizem  mg and Eliquis 2.5 mg in the ED. Trop neg, EKG non-ischemic, other labs as noted. Converted back to NSR and remained in sinus rhythm overnight with resolution of sx. Echo 11/23/20 with mild LVH, LVEF 65-70, mild MR, otherwise normal.. CHADs2-VASC 3, started on Eliquis 2.5mg bid + Cardizem  qd  Doing well post hosp d/c, no current CV sx or complaints. Has seen PCP in f/u The patient denies chest pain/ shortness of breath, orthopnea, PND, LE edema, palpitations, syncope, presyncope or fatigue.        Patient Active Problem List    Diagnosis Date Noted    Atrial fibrillation ([address] 11/23/2020    A-fib ([address] 11/23/2020    Parkinson disease ([address] 06/04/2018    Bilateral leg edema 06/04/2018    Encounter for rehabilitation 05/11/2018   Ermelindalynn Purchase hematuria 05/08/2018    Tremor 10/30/2017    Essential hypertension 10/30/2017    Vitamin B12 deficiency 07/23/2017    BPH associated with nocturia 01/13/2016    MCI (mild cognitive impairment) with memory loss 01/13/2016    History of malignant neoplasm of large intestine 01/15/2015    EKG abnormality 01/15/2015    Obesity, Class II, BMI 35-39.9, with comorbidity 01/15/2015    Multiple lung nodules 07/23/2014    Weight loss 07/08/2014    Constipation 07/08/2014    Gouty arthropathy     Asthma     Chronic kidney disease     Hyperlipidemia     Hypertension       Darrian Juarez MD  Past Medical History:   Diagnosis Date    Asthma     Chronic kidney disease     Dermatophytosis of groin and perianal area 2010    Edema 2008    Encounter for long-term (current) use of other medications 2010    Gout, unspecified 2010    Gouty arthropathy, unspecified 2010    Hypertension     Hypertrophy of prostate without urinary obstruction and other lower urinary tract symptoms (LUTS) 2008    Impotence of organic origin 2008    Memory loss 2009    Obesity, unspecified 2010    Orthostatic hypotension 2008    Other and unspecified hyperlipidemia 2008    Other atopic dermatitis and related conditions 2012    Palpitations 2010    Peripheral angiopathy in diseases classified elsewhere [facility]) 2010    Personal history of malignant neoplasm of rectum, rectosigmoid junction, and anus 2011    Tinea nigra 2011    Unspecified pruritic disorder 2011    Vitamin B12 deficiency 7/23/2017      Past Surgical History:   Procedure Laterality Date    ENDOSCOPY, COLON, DIAGNOSTIC  06/2011 05/2007, 01/2004,  12/2000    HX COLECTOMY  1991    COLON CANCER S/P RESECTION    HX HERNIA REPAIR  1992    INCISIONAL     Allergies   Allergen Reactions    Cat Hair Std Allergenic Ext Unknown (comments)    Codeine Unknown (comments)    Ragweed Unknown (comments)     Mixed ragweed/pollen extract,tree extract      Family History   Problem Relation Age of Onset    Other Mother         PULMONARY EDEMA    Other Father         PAD    Heart Attack Father     Coronary Artery Disease Father       Social History     Socioeconomic History    Marital status:      Spouse name: Not on file    Number of children: Not on file    Years of education: Not on file    Highest education level: Not on file   Occupational History    Not on file   Social Needs    Financial resource strain: Not on file    Food insecurity     Worry: Not on file     Inability: Not on file    Transportation needs     Medical: Not on file     Non-medical: Not on file   Tobacco Use    Smoking status: Former Smoker     Packs/day: 1.00     Years: 29.00     Pack years: 29.00     Types: Cigarettes     Start date: 1949     Last attempt to quit: 1978     Years since quittin.9    Smokeless tobacco: Never Used   Substance and Sexual Activity    Alcohol use: No     Frequency: Never     Binge frequency: Never    Drug use: No    Sexual activity: Not Currently   Lifestyle    Physical activity     Days per week: Not on file     Minutes per session: Not on file    Stress: Not on file   Relationships    Social connections     Talks on phone: Not on file     Gets together: Not on file     Attends [employer]ism service: Not on file     Active member of club or organization: Not on file     Attends meetings of clubs or organizations: Not on file     Relationship status: Not on file    Intimate partner violence     Fear of current or ex partner: Not on file     Emotionally abused: Not on file     Physically abused: Not on file     Forced sexual activity: Not on file   Other Topics Concern     Service No    Blood Transfusions No    Caffeine Concern No    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  apixaban (ELIQUIS) 2.5 mg tablet Take 1 Tab by mouth two (2) times a day.  aspirin 81 mg chewable tablet Take 1 Tab by mouth daily.  carbidopa-levodopa (SINEMET)  mg per tablet TAKE 1 TABLET FOUR TIMES A DAY FOR PARKINSONS DISEASE    cyanocobalamin (VITAMIN B12) 500 mcg tablet TAKE 2 TABLETS DAILY    ferrous sulfate (IRON) 325 mg (65 mg iron) tablet Take 28 mg by mouth. Indications: 1 TAB ON MONDAY,WEDNESDAY, AND FRIDAY    finasteride (PROSCAR) 5 mg tablet Take 5 mg by mouth daily. take at bedtime    tamsulosin (FLOMAX) 0.4 mg capsule Take 1 Cap by mouth daily.  ammonium lactate (LAC-HYDRIN) 12 % lotion APPLY THIN LAYER TWICE A DAY FOR 60 DAYS FOR DRY SCALY SKIN BOTH FEET     No current facility-administered medications for this visit. Review of Symptoms:    CONST  No weight change. No fever, chills, sweats    ENT No visual changes, URI sx, sore throat    CV  See HPI   RESP  No cough, or sputum, wheezing. Also see HPI   GI  No abdominal pain or change in bowel habits. No heartburn or dysphagia. No melena or rectal bleeding.   No dysuria, urgency, frequency, hematuria   MSKEL  No joint pain, swelling. No muscle pain. SKIN  No rash or lesions. NEURO  No headache, syncope, or seizure. No weakness, loss of sensation, or paresthesias. PSYCH  No low mood or depression  No anxiety. HE/LYMPH  No easy bruising, abnormal bleeding, or enlarged glands.         Physical ExamPhysical Exam:    Visit Vitals  /74 (BP 1 Location: Left arm, BP Patient Position: Sitting)   Pulse 61   Temp 98.1 °F (36.7 °C)   Resp 16   Ht 6' 1\" (1.854 m)   Wt 210 lb (95.3 kg)   SpO2 99%   BMI 27.71 kg/m²     Gen: NAD  HEENT:  PERRL, throat clear  Neck: no adenopathy, no thyromegaly, no JVD   Heart:  Regular,Nl S1S2,  no murmur, gallop or rub. Lungs:  clear  Abdomen:   Soft, non-tender, bowel sounds are active.    Extremities:  No edema  Pulse: symmetric  Neuro: A&O times 3, No focal neuro deficits    Cardiographics      Labs:   Lab Results   Component Value Date/Time    Sodium 139 11/24/2020 06:06 AM    Sodium 139 11/23/2020 09:24 AM    Sodium 142 10/13/2020 11:59 AM    Sodium 138 08/13/2019 11:07 AM    Sodium 138 05/16/2018 05:15 AM    Potassium 3.6 11/24/2020 06:06 AM    Potassium 3.1 (L) 11/23/2020 09:24 AM    Potassium 3.8 10/13/2020 11:59 AM    Potassium 4.0 08/13/2019 11:07 AM    Potassium 3.9 05/16/2018 05:15 AM    Chloride 103 11/24/2020 06:06 AM    Chloride 104 11/23/2020 09:24 AM    Chloride 102 10/13/2020 11:59 AM    Chloride 98 08/13/2019 11:07 AM    Chloride 104 05/16/2018 05:15 AM    CO2 29 11/24/2020 06:06 AM    CO2 29 11/23/2020 09:24 AM    CO2 25 10/13/2020 11:59 AM    CO2 27 08/13/2019 11:07 AM    CO2 30 05/16/2018 05:15 AM    Anion gap 7 11/24/2020 06:06 AM    Anion gap 6 11/23/2020 09:24 AM    Anion gap 4 (L) 05/16/2018 05:15 AM    Anion gap 5 05/15/2018 05:05 AM    Anion gap 8 05/13/2018 04:00 AM    Glucose 91 11/24/2020 06:06 AM    Glucose 94 11/23/2020 09:24 AM    Glucose 85 10/13/2020 11:59 AM    Glucose 84 08/13/2019 11:07 AM    Glucose 93 05/16/2018 05:15 AM    BUN 25 (H) 11/24/2020 06:06 AM    BUN 27 (H) 11/23/2020 09:24 AM    BUN 28 (H) 10/13/2020 11:59 AM    BUN 23 08/13/2019 11:07 AM    BUN 29 (H) 05/16/2018 05:15 AM    Creatinine 1.33 (H) 11/24/2020 06:06 AM    Creatinine 1.68 (H) 11/23/2020 09:24 AM    Creatinine 1.37 (H) 10/13/2020 11:59 AM    Creatinine 1.50 (H) 08/13/2019 11:07 AM    Creatinine 1.45 (H) 05/16/2018 05:15 AM    BUN/Creatinine ratio 19 11/24/2020 06:06 AM    BUN/Creatinine ratio 16 11/23/2020 09:24 AM    BUN/Creatinine ratio 20 10/13/2020 11:59 AM    BUN/Creatinine ratio 15 08/13/2019 11:07 AM BUN/Creatinine ratio 20 05/16/2018 05:15 AM    GFR est AA >60 11/24/2020 06:06 AM    GFR est AA 47 (L) 11/23/2020 09:24 AM    GFR est AA 54 (L) 10/13/2020 11:59 AM    GFR est AA 49 (L) 08/13/2019 11:07 AM    GFR est AA 56 (L) 05/16/2018 05:15 AM    GFR est non-AA 51 (L) 11/24/2020 06:06 AM    GFR est non-AA 39 (L) 11/23/2020 09:24 AM    GFR est non-AA 47 (L) 10/13/2020 11:59 AM    GFR est non-AA 42 (L) 08/13/2019 11:07 AM    GFR est non-AA 47 (L) 05/16/2018 05:15 AM    Calcium 8.8 11/24/2020 06:06 AM    Calcium 9.4 11/23/2020 09:24 AM    Calcium 9.4 10/13/2020 11:59 AM    Calcium 9.4 08/13/2019 11:07 AM    Calcium 8.9 05/16/2018 05:15 AM    Bilirubin, total 0.5 11/23/2020 09:24 AM    Bilirubin, total 0.5 10/13/2020 11:59 AM    Bilirubin, total 0.5 08/13/2019 11:07 AM    Bilirubin, total 0.6 05/08/2018 01:22 PM    Bilirubin, total 0.5 05/05/2018 05:30 PM    Alk. phosphatase 56 11/23/2020 09:24 AM    Alk. phosphatase 51 10/13/2020 11:59 AM    Alk. phosphatase 52 08/13/2019 11:07 AM    Alk. phosphatase 56 05/08/2018 01:22 PM    Alk.  phosphatase 60 05/05/2018 05:30 PM    Protein, total 6.9 11/23/2020 09:24 AM    Protein, total 6.6 10/13/2020 11:59 AM    Protein, total 6.8 08/13/2019 11:07 AM    Protein, total 7.6 05/08/2018 01:22 PM    Protein, total 7.4 05/05/2018 05:30 PM    Albumin 2.8 (L) 11/23/2020 09:24 AM    Albumin 3.4 (L) 10/13/2020 11:59 AM    Albumin 3.8 08/13/2019 11:07 AM    Albumin 3.0 (L) 05/08/2018 01:22 PM    Albumin 3.1 (L) 05/05/2018 05:30 PM    Globulin 4.1 (H) 11/23/2020 09:24 AM    Globulin 4.6 (H) 05/08/2018 01:22 PM    Globulin 4.3 (H) 05/05/2018 05:30 PM    A-G Ratio 0.7 (L) 11/23/2020 09:24 AM    A-G Ratio 1.1 (L) 10/13/2020 11:59 AM    A-G Ratio 1.3 08/13/2019 11:07 AM    A-G Ratio 0.7 (L) 05/08/2018 01:22 PM    A-G Ratio 0.7 (L) 05/05/2018 05:30 PM    ALT (SGPT) 7 (L) 11/23/2020 09:24 AM    ALT (SGPT) 5 10/13/2020 11:59 AM    ALT (SGPT) 5 08/13/2019 11:07 AM    ALT (SGPT) 20 05/08/2018 01:22 PM    ALT (SGPT) 21 05/05/2018 05:30 PM     Lab Results   Component Value Date/Time    CK 94 05/05/2018 06:00 PM     Lab Results   Component Value Date/Time    Cholesterol, total 261 (H) 10/13/2020 11:59 AM    Cholesterol, total 227 (H) 01/25/2017 04:11 PM    Cholesterol, total 252 (H) 09/20/2016 09:33 AM    Cholesterol, total 285 (H) 09/10/2015 09:39 AM    Cholesterol, total 160 10/07/2014 10:35 AM    HDL Cholesterol 81 10/13/2020 11:59 AM    HDL Cholesterol 63 01/25/2017 04:11 PM    HDL Cholesterol 55 09/20/2016 09:33 AM    HDL Cholesterol 54 09/10/2015 09:39 AM    HDL Cholesterol 53 10/07/2014 10:35 AM    LDL, calculated 148 (H) 01/25/2017 04:11 PM    LDL, calculated 180 (H) 09/20/2016 09:33 AM    LDL, calculated 215 (H) 09/10/2015 09:39 AM    LDL, calculated 89 10/07/2014 10:35 AM    LDL, calculated 70 05/19/2014 10:47 AM    LDL Chol Calc (NIH) 172 (H) 10/13/2020 11:59 AM    Triglyceride 54 10/13/2020 11:59 AM    Triglyceride 78 01/25/2017 04:11 PM    Triglyceride 87 09/20/2016 09:33 AM    Triglyceride 82 09/10/2015 09:39 AM    Triglyceride 92 10/07/2014 10:35 AM     No results found for this or any previous visit.     Assessment:         Patient Active Problem List    Diagnosis Date Noted    Atrial fibrillation ([facility] [address] 11/23/2020    A-fib ([facility] 11/23/2020    Parkinson disease ([facility] 06/04/2018    Bilateral leg edema 06/04/2018    Encounter for rehabilitation 05/11/2018   Livia Snooks hematuria 05/08/2018    Tremor 10/30/2017    Essential hypertension 10/30/2017    Vitamin B12 deficiency 07/23/2017    BPH associated with nocturia 01/13/2016    MCI (mild cognitive impairment) with memory loss 01/13/2016    History of malignant neoplasm of large intestine 01/15/2015    EKG abnormality 01/15/2015    Obesity, Class II, BMI 35-39.9, with comorbidity 01/15/2015    Multiple lung nodules 07/23/2014    Weight loss 07/08/2014    Constipation 07/08/2014    Gouty arthropathy     Asthma     Chronic kidney disease     Hyperlipidemia     Hypertension       81 y.o. male is here for hospital f/u/establish cardiology care. Hx hypertension, Parkinson's, colon ca, BPH, dyslipidemia, memory issues, CKD, gout, orthostatic hypotension. At [facility] 11/23-11/24/20 with new onset paroxysmal afib. Patient presented to the ED with  symptoms of palpitation and lightheadedness during the night.  He awoke during the night and noted balance difficulty ambulating. Evelyn Shi states he felt like his blood pressure was elevated.  He was brought to the ED via EMS, his wife accompanied them. Evelyn Shi apparently has had recurrent falls recently. Miracle Guzmán has been no head injury or loss of consciousness.  His wife noted 3 fall episodes during the past week.  She felt he was weaker today than usual. Evelyn Shi has appreciated some increased memory loss over the past several months.  Patient denied any complaint of chest discomfort or shortness of breath. Monitoring on arrival in the ED documented atrial fibrillation with heart rates up to 140, with usual heart rate in the 100-1 10 range.  He was administered Cardizem  mg and Eliquis 2.5 mg in the ED. Trop neg, EKG non-ischemic, other labs as noted. Converted back to NSR and remained in sinus rhythm overnight with resolution of sx. Echo 11/23/20 with mild LVH, LVEF 65-70, mild MR, otherwise normal.. CHADs2-VASC 3, started on Eliquis 2.5mg bid + Cardizem  qd  Doing well post hosp d/c, no current CV sx or complaints. Has seen PCP in f/u      Plan:     Doing well with no adverse cardiac symptoms. Lipids and labs followed by PCP. Continue current care and f/u in 6 months.     Malik Rod MD

## 2021-01-01 ENCOUNTER — OFFICE VISIT (OUTPATIENT)
Dept: CARDIOLOGY CLINIC | Age: 86
End: 2021-01-01
Payer: MEDICARE

## 2021-01-01 ENCOUNTER — TRANSCRIBE ORDER (OUTPATIENT)
Dept: FAMILY MEDICINE CLINIC | Age: 86
End: 2021-01-01

## 2021-01-01 ENCOUNTER — APPOINTMENT (OUTPATIENT)
Dept: CT IMAGING | Age: 86
DRG: 812 | End: 2021-01-01
Attending: EMERGENCY MEDICINE
Payer: MEDICARE

## 2021-01-01 ENCOUNTER — ANESTHESIA EVENT (OUTPATIENT)
Dept: SURGERY | Age: 86
DRG: 812 | End: 2021-01-01
Payer: MEDICARE

## 2021-01-01 ENCOUNTER — HOSPITAL ENCOUNTER (OUTPATIENT)
Age: 86
Setting detail: OBSERVATION
Discharge: NURSING, REHAB OR LONG-TERM CARE | End: 2021-07-30
Attending: EMERGENCY MEDICINE | Admitting: HOSPITALIST
Payer: MEDICARE

## 2021-01-01 ENCOUNTER — TELEPHONE (OUTPATIENT)
Dept: FAMILY MEDICINE CLINIC | Age: 86
End: 2021-01-01

## 2021-01-01 ENCOUNTER — APPOINTMENT (OUTPATIENT)
Dept: CT IMAGING | Age: 86
End: 2021-01-01
Attending: EMERGENCY MEDICINE
Payer: MEDICARE

## 2021-01-01 ENCOUNTER — HOSPITAL ENCOUNTER (OUTPATIENT)
Age: 86
Setting detail: OBSERVATION
Discharge: HOME HEALTH | End: 2021-06-10
Attending: FAMILY MEDICINE | Admitting: FAMILY MEDICINE
Payer: MEDICARE

## 2021-01-01 ENCOUNTER — APPOINTMENT (OUTPATIENT)
Dept: GENERAL RADIOLOGY | Age: 86
End: 2021-01-01
Attending: FAMILY MEDICINE
Payer: MEDICARE

## 2021-01-01 ENCOUNTER — OFFICE VISIT (OUTPATIENT)
Dept: FAMILY MEDICINE CLINIC | Age: 86
End: 2021-01-01
Payer: MEDICARE

## 2021-01-01 ENCOUNTER — HOSPITAL ENCOUNTER (EMERGENCY)
Age: 86
Discharge: HOME | End: 2021-07-14
Attending: EMERGENCY MEDICINE
Payer: MEDICARE

## 2021-01-01 ENCOUNTER — HOSPITAL ENCOUNTER (INPATIENT)
Age: 86
LOS: 7 days | Discharge: HOME HEALTH | DRG: 948 | End: 2021-05-24
Attending: INTERNAL MEDICINE | Admitting: INTERNAL MEDICINE
Payer: MEDICARE

## 2021-01-01 ENCOUNTER — HOSPITAL ENCOUNTER (INPATIENT)
Age: 86
LOS: 21 days | Discharge: HOME HEALTH | DRG: 948 | End: 2021-08-20
Attending: INTERNAL MEDICINE | Admitting: INTERNAL MEDICINE
Payer: MEDICARE

## 2021-01-01 ENCOUNTER — TELEPHONE (OUTPATIENT)
Dept: CARDIOLOGY CLINIC | Age: 86
End: 2021-01-01

## 2021-01-01 ENCOUNTER — APPOINTMENT (OUTPATIENT)
Dept: GENERAL RADIOLOGY | Age: 86
DRG: 812 | End: 2021-01-01
Attending: EMERGENCY MEDICINE
Payer: MEDICARE

## 2021-01-01 ENCOUNTER — VIRTUAL VISIT (OUTPATIENT)
Dept: FAMILY MEDICINE CLINIC | Age: 86
End: 2021-01-01

## 2021-01-01 ENCOUNTER — ANESTHESIA (OUTPATIENT)
Dept: SURGERY | Age: 86
DRG: 812 | End: 2021-01-01
Payer: MEDICARE

## 2021-01-01 ENCOUNTER — HOSPITAL ENCOUNTER (EMERGENCY)
Age: 86
Discharge: HOME | End: 2021-08-23
Attending: FAMILY MEDICINE
Payer: MEDICARE

## 2021-01-01 ENCOUNTER — HOSPITAL ENCOUNTER (INPATIENT)
Age: 86
LOS: 1 days | Discharge: NURSING, REHAB OR LONG-TERM CARE | DRG: 812 | End: 2021-05-17
Attending: EMERGENCY MEDICINE | Admitting: INTERNAL MEDICINE
Payer: MEDICARE

## 2021-01-01 VITALS
HEART RATE: 62 BPM | WEIGHT: 210.2 LBS | SYSTOLIC BLOOD PRESSURE: 136 MMHG | HEIGHT: 75 IN | RESPIRATION RATE: 20 BRPM | BODY MASS INDEX: 26.14 KG/M2 | OXYGEN SATURATION: 97 % | TEMPERATURE: 96.9 F | DIASTOLIC BLOOD PRESSURE: 80 MMHG

## 2021-01-01 VITALS
RESPIRATION RATE: 20 BRPM | DIASTOLIC BLOOD PRESSURE: 64 MMHG | BODY MASS INDEX: 25.83 KG/M2 | WEIGHT: 212.08 LBS | SYSTOLIC BLOOD PRESSURE: 138 MMHG | HEART RATE: 70 BPM | OXYGEN SATURATION: 98 % | HEIGHT: 76 IN | TEMPERATURE: 98.6 F

## 2021-01-01 VITALS
RESPIRATION RATE: 16 BRPM | TEMPERATURE: 99.4 F | SYSTOLIC BLOOD PRESSURE: 156 MMHG | WEIGHT: 203.8 LBS | BODY MASS INDEX: 25.34 KG/M2 | HEIGHT: 75 IN | DIASTOLIC BLOOD PRESSURE: 66 MMHG | HEART RATE: 81 BPM | OXYGEN SATURATION: 98 %

## 2021-01-01 VITALS
SYSTOLIC BLOOD PRESSURE: 120 MMHG | OXYGEN SATURATION: 99 % | RESPIRATION RATE: 22 BRPM | DIASTOLIC BLOOD PRESSURE: 60 MMHG | BODY MASS INDEX: 27.35 KG/M2 | TEMPERATURE: 97.3 F | WEIGHT: 206.4 LBS | HEIGHT: 73 IN | HEART RATE: 63 BPM

## 2021-01-01 VITALS
OXYGEN SATURATION: 96 % | HEART RATE: 72 BPM | TEMPERATURE: 97.8 F | DIASTOLIC BLOOD PRESSURE: 91 MMHG | BODY MASS INDEX: 30.11 KG/M2 | HEIGHT: 72 IN | SYSTOLIC BLOOD PRESSURE: 181 MMHG | WEIGHT: 222.3 LBS | RESPIRATION RATE: 20 BRPM

## 2021-01-01 VITALS
HEART RATE: 70 BPM | BODY MASS INDEX: 28.85 KG/M2 | TEMPERATURE: 98.4 F | SYSTOLIC BLOOD PRESSURE: 154 MMHG | HEIGHT: 72 IN | WEIGHT: 213 LBS | RESPIRATION RATE: 16 BRPM | OXYGEN SATURATION: 95 % | DIASTOLIC BLOOD PRESSURE: 71 MMHG

## 2021-01-01 VITALS
DIASTOLIC BLOOD PRESSURE: 82 MMHG | WEIGHT: 220.9 LBS | TEMPERATURE: 98.4 F | BODY MASS INDEX: 29.96 KG/M2 | RESPIRATION RATE: 18 BRPM | OXYGEN SATURATION: 99 % | HEART RATE: 74 BPM | SYSTOLIC BLOOD PRESSURE: 181 MMHG

## 2021-01-01 VITALS
SYSTOLIC BLOOD PRESSURE: 118 MMHG | WEIGHT: 225.3 LBS | BODY MASS INDEX: 30.51 KG/M2 | HEART RATE: 83 BPM | RESPIRATION RATE: 18 BRPM | TEMPERATURE: 97.7 F | DIASTOLIC BLOOD PRESSURE: 64 MMHG | DIASTOLIC BLOOD PRESSURE: 56 MMHG | HEART RATE: 73 BPM | SYSTOLIC BLOOD PRESSURE: 126 MMHG | HEIGHT: 72 IN | OXYGEN SATURATION: 93 % | OXYGEN SATURATION: 100 % | TEMPERATURE: 97.5 F | RESPIRATION RATE: 16 BRPM

## 2021-01-01 VITALS
WEIGHT: 215 LBS | HEIGHT: 72 IN | RESPIRATION RATE: 20 BRPM | BODY MASS INDEX: 29.12 KG/M2 | OXYGEN SATURATION: 100 % | TEMPERATURE: 96.6 F

## 2021-01-01 VITALS
WEIGHT: 218.48 LBS | TEMPERATURE: 98.3 F | OXYGEN SATURATION: 100 % | RESPIRATION RATE: 20 BRPM | HEIGHT: 72 IN | SYSTOLIC BLOOD PRESSURE: 171 MMHG | DIASTOLIC BLOOD PRESSURE: 73 MMHG | BODY MASS INDEX: 29.59 KG/M2 | HEART RATE: 77 BPM

## 2021-01-01 VITALS — SYSTOLIC BLOOD PRESSURE: 160 MMHG | DIASTOLIC BLOOD PRESSURE: 80 MMHG

## 2021-01-01 VITALS
HEART RATE: 66 BPM | OXYGEN SATURATION: 99 % | RESPIRATION RATE: 18 BRPM | SYSTOLIC BLOOD PRESSURE: 136 MMHG | DIASTOLIC BLOOD PRESSURE: 78 MMHG | TEMPERATURE: 97.8 F

## 2021-01-01 DIAGNOSIS — T14.8XXA ABRASION: ICD-10-CM

## 2021-01-01 DIAGNOSIS — M10.9 GOUTY ARTHROPATHY: ICD-10-CM

## 2021-01-01 DIAGNOSIS — R29.6 FREQUENT FALLS: ICD-10-CM

## 2021-01-01 DIAGNOSIS — R53.1 WEAKNESS: Primary | ICD-10-CM

## 2021-01-01 DIAGNOSIS — I10 ESSENTIAL HYPERTENSION: ICD-10-CM

## 2021-01-01 DIAGNOSIS — E86.1 HYPOVOLEMIA: ICD-10-CM

## 2021-01-01 DIAGNOSIS — G20 PARKINSON DISEASE (HCC): ICD-10-CM

## 2021-01-01 DIAGNOSIS — I95.1 ORTHOSTATIC HYPOTENSION: Primary | ICD-10-CM

## 2021-01-01 DIAGNOSIS — R03.0 ELEVATED BLOOD PRESSURE READING: ICD-10-CM

## 2021-01-01 DIAGNOSIS — K27.4 GASTROINTESTINAL HEMORRHAGE ASSOCIATED WITH PEPTIC ULCER: Primary | ICD-10-CM

## 2021-01-01 DIAGNOSIS — E87.6 HYPOKALEMIA: ICD-10-CM

## 2021-01-01 DIAGNOSIS — Z00.00 MEDICARE ANNUAL WELLNESS VISIT, SUBSEQUENT: Primary | ICD-10-CM

## 2021-01-01 DIAGNOSIS — R60.0 BILATERAL LEG EDEMA: ICD-10-CM

## 2021-01-01 DIAGNOSIS — N30.01 ACUTE CYSTITIS WITH HEMATURIA: Primary | ICD-10-CM

## 2021-01-01 DIAGNOSIS — N30.01 ACUTE CYSTITIS WITH HEMATURIA: ICD-10-CM

## 2021-01-01 DIAGNOSIS — D64.9 ANEMIA, UNSPECIFIED TYPE: Primary | ICD-10-CM

## 2021-01-01 DIAGNOSIS — G20 PARKINSON DISEASE (HCC): Primary | ICD-10-CM

## 2021-01-01 DIAGNOSIS — K27.4 GASTROINTESTINAL HEMORRHAGE ASSOCIATED WITH PEPTIC ULCER: ICD-10-CM

## 2021-01-01 DIAGNOSIS — I48.0 PAROXYSMAL ATRIAL FIBRILLATION (HCC): Primary | ICD-10-CM

## 2021-01-01 DIAGNOSIS — L03.116 CELLULITIS OF HEEL, LEFT: ICD-10-CM

## 2021-01-01 DIAGNOSIS — I48.0 PAROXYSMAL ATRIAL FIBRILLATION (HCC): ICD-10-CM

## 2021-01-01 DIAGNOSIS — E87.6 HYPOKALEMIA: Primary | ICD-10-CM

## 2021-01-01 DIAGNOSIS — I95.1 ORTHOSTATIC HYPOTENSION: ICD-10-CM

## 2021-01-01 DIAGNOSIS — R53.1 WEAKNESS GENERALIZED: ICD-10-CM

## 2021-01-01 DIAGNOSIS — R29.6 FALLS FREQUENTLY: Primary | ICD-10-CM

## 2021-01-01 DIAGNOSIS — L03.116 CELLULITIS OF HEEL, LEFT: Primary | ICD-10-CM

## 2021-01-01 DIAGNOSIS — G20 PARKINSON'S DISEASE (HCC): ICD-10-CM

## 2021-01-01 DIAGNOSIS — M10.9 GOUTY ARTHROPATHY: Primary | ICD-10-CM

## 2021-01-01 DIAGNOSIS — E53.8 CYANOCOBALAMIN DEFICIENCY: ICD-10-CM

## 2021-01-01 DIAGNOSIS — M54.6 MIDLINE THORACIC BACK PAIN, UNSPECIFIED CHRONICITY: ICD-10-CM

## 2021-01-01 DIAGNOSIS — G60.9 IDIOPATHIC PERIPHERAL NEUROPATHY: ICD-10-CM

## 2021-01-01 DIAGNOSIS — R60.9 PERIPHERAL EDEMA: ICD-10-CM

## 2021-01-01 LAB
ALBUMIN SERPL-MCNC: 2.4 G/DL (ref 3.5–5)
ALBUMIN SERPL-MCNC: 2.5 G/DL (ref 3.5–5)
ALBUMIN SERPL-MCNC: 2.6 G/DL (ref 3.5–5)
ALBUMIN SERPL-MCNC: 2.7 G/DL (ref 3.5–5)
ALBUMIN SERPL-MCNC: 2.9 G/DL (ref 3.5–5)
ALBUMIN SERPL-MCNC: 3.2 G/DL (ref 3.5–5)
ALBUMIN/GLOB SERPL: 0.6 {RATIO} (ref 1.1–2.2)
ALBUMIN/GLOB SERPL: 0.7 {RATIO} (ref 1.1–2.2)
ALBUMIN/GLOB SERPL: 0.8 {RATIO} (ref 1.1–2.2)
ALP SERPL-CCNC: 47 U/L (ref 45–117)
ALP SERPL-CCNC: 48 U/L (ref 45–117)
ALP SERPL-CCNC: 48 U/L (ref 45–117)
ALP SERPL-CCNC: 54 U/L (ref 45–117)
ALP SERPL-CCNC: 58 U/L (ref 45–117)
ALP SERPL-CCNC: 61 U/L (ref 45–117)
ALP SERPL-CCNC: 62 U/L (ref 45–117)
ALP SERPL-CCNC: 62 U/L (ref 45–117)
ALP SERPL-CCNC: 65 U/L (ref 45–117)
ALT SERPL-CCNC: 10 U/L (ref 12–78)
ALT SERPL-CCNC: 10 U/L (ref 12–78)
ALT SERPL-CCNC: 18 U/L (ref 12–78)
ALT SERPL-CCNC: 6 U/L (ref 12–78)
ALT SERPL-CCNC: 7 U/L (ref 12–78)
ALT SERPL-CCNC: 7 U/L (ref 12–78)
ALT SERPL-CCNC: 8 U/L (ref 12–78)
ALT SERPL-CCNC: 9 U/L (ref 12–78)
ALT SERPL-CCNC: 9 U/L (ref 12–78)
ANION GAP SERPL CALC-SCNC: 10 MMOL/L (ref 5–15)
ANION GAP SERPL CALC-SCNC: 10 MMOL/L (ref 5–15)
ANION GAP SERPL CALC-SCNC: 11 MMOL/L (ref 5–15)
ANION GAP SERPL CALC-SCNC: 11 MMOL/L (ref 5–15)
ANION GAP SERPL CALC-SCNC: 5 MMOL/L (ref 5–15)
ANION GAP SERPL CALC-SCNC: 5 MMOL/L (ref 5–15)
ANION GAP SERPL CALC-SCNC: 6 MMOL/L (ref 5–15)
ANION GAP SERPL CALC-SCNC: 7 MMOL/L (ref 5–15)
ANION GAP SERPL CALC-SCNC: 8 MMOL/L (ref 5–15)
ANION GAP SERPL CALC-SCNC: 9 MMOL/L (ref 5–15)
ANION GAP SERPL CALC-SCNC: 9 MMOL/L (ref 5–15)
APPEARANCE UR: ABNORMAL
APPEARANCE UR: CLEAR
AST SERPL-CCNC: 10 U/L (ref 15–37)
AST SERPL-CCNC: 11 U/L (ref 15–37)
AST SERPL-CCNC: 16 U/L (ref 15–37)
AST SERPL-CCNC: 19 U/L (ref 15–37)
AST SERPL-CCNC: 20 U/L (ref 15–37)
AST SERPL-CCNC: 21 U/L (ref 15–37)
AST SERPL-CCNC: 21 U/L (ref 15–37)
AST SERPL-CCNC: 24 U/L (ref 15–37)
AST SERPL-CCNC: 28 U/L (ref 15–37)
ATRIAL RATE: 122 BPM
ATRIAL RATE: 78 BPM
ATRIAL RATE: 80 BPM
ATRIAL RATE: 81 BPM
ATRIAL RATE: 84 BPM
ATRIAL RATE: 88 BPM
BACTERIA SPEC CULT: NORMAL
BACTERIA SPEC CULT: NORMAL
BACTERIA URNS QL MICRO: ABNORMAL /HPF
BACTERIA URNS QL MICRO: NEGATIVE /HPF
BASOPHILS # BLD: 0 K/UL (ref 0–0.1)
BASOPHILS NFR BLD: 0 % (ref 0–1)
BASOPHILS NFR BLD: 1 % (ref 0–1)
BILIRUB SERPL-MCNC: 0.4 MG/DL (ref 0.2–1)
BILIRUB SERPL-MCNC: 0.5 MG/DL (ref 0.2–1)
BILIRUB SERPL-MCNC: 0.6 MG/DL (ref 0.2–1)
BILIRUB SERPL-MCNC: 0.8 MG/DL (ref 0.2–1)
BILIRUB SERPL-MCNC: 1.1 MG/DL (ref 0.2–1)
BILIRUB SERPL-MCNC: 1.1 MG/DL (ref 0.2–1)
BILIRUB SERPL-MCNC: 1.2 MG/DL (ref 0.2–1)
BILIRUB UR QL CFM: NEGATIVE
BILIRUB UR QL: NEGATIVE
BNP SERPL-MCNC: 3736 PG/ML (ref 0–450)
BUN SERPL-MCNC: 15 MG/DL (ref 6–20)
BUN SERPL-MCNC: 16 MG/DL (ref 6–20)
BUN SERPL-MCNC: 16 MG/DL (ref 6–20)
BUN SERPL-MCNC: 17 MG/DL (ref 6–20)
BUN SERPL-MCNC: 18 MG/DL (ref 6–20)
BUN SERPL-MCNC: 18 MG/DL (ref 6–20)
BUN SERPL-MCNC: 19 MG/DL (ref 6–20)
BUN SERPL-MCNC: 19 MG/DL (ref 6–20)
BUN SERPL-MCNC: 20 MG/DL (ref 6–20)
BUN SERPL-MCNC: 21 MG/DL (ref 6–20)
BUN SERPL-MCNC: 25 MG/DL (ref 6–20)
BUN SERPL-MCNC: 28 MG/DL (ref 6–20)
BUN SERPL-MCNC: 28 MG/DL (ref 6–20)
BUN SERPL-MCNC: 30 MG/DL (ref 6–20)
BUN SERPL-MCNC: 38 MG/DL (ref 6–20)
BUN/CREAT SERPL: 12 (ref 12–20)
BUN/CREAT SERPL: 13 (ref 12–20)
BUN/CREAT SERPL: 13 (ref 12–20)
BUN/CREAT SERPL: 14 (ref 12–20)
BUN/CREAT SERPL: 14 (ref 12–20)
BUN/CREAT SERPL: 15 (ref 12–20)
BUN/CREAT SERPL: 15 (ref 12–20)
BUN/CREAT SERPL: 16 (ref 12–20)
BUN/CREAT SERPL: 16 (ref 12–20)
BUN/CREAT SERPL: 17 (ref 12–20)
BUN/CREAT SERPL: 17 (ref 12–20)
BUN/CREAT SERPL: 18 (ref 12–20)
BUN/CREAT SERPL: 19 (ref 12–20)
BUN/CREAT SERPL: 19 (ref 12–20)
BUN/CREAT SERPL: 21 (ref 12–20)
BUN/CREAT SERPL: 22 (ref 12–20)
BUN/CREAT SERPL: 25 (ref 12–20)
CALCIUM SERPL-MCNC: 8 MG/DL (ref 8.5–10.1)
CALCIUM SERPL-MCNC: 8.2 MG/DL (ref 8.5–10.1)
CALCIUM SERPL-MCNC: 8.2 MG/DL (ref 8.5–10.1)
CALCIUM SERPL-MCNC: 8.3 MG/DL (ref 8.5–10.1)
CALCIUM SERPL-MCNC: 8.3 MG/DL (ref 8.5–10.1)
CALCIUM SERPL-MCNC: 8.4 MG/DL (ref 8.5–10.1)
CALCIUM SERPL-MCNC: 8.4 MG/DL (ref 8.5–10.1)
CALCIUM SERPL-MCNC: 8.5 MG/DL (ref 8.5–10.1)
CALCIUM SERPL-MCNC: 8.6 MG/DL (ref 8.5–10.1)
CALCIUM SERPL-MCNC: 8.6 MG/DL (ref 8.5–10.1)
CALCIUM SERPL-MCNC: 8.8 MG/DL (ref 8.5–10.1)
CALCIUM SERPL-MCNC: 8.9 MG/DL (ref 8.5–10.1)
CALCIUM SERPL-MCNC: 9 MG/DL (ref 8.5–10.1)
CALCIUM SERPL-MCNC: 9.1 MG/DL (ref 8.5–10.1)
CALCIUM SERPL-MCNC: 9.4 MG/DL (ref 8.5–10.1)
CALCULATED P AXIS, ECG09: 110 DEGREES
CALCULATED P AXIS, ECG09: 19 DEGREES
CALCULATED P AXIS, ECG09: 36 DEGREES
CALCULATED P AXIS, ECG09: 39 DEGREES
CALCULATED R AXIS, ECG10: -11 DEGREES
CALCULATED R AXIS, ECG10: -5 DEGREES
CALCULATED R AXIS, ECG10: -9 DEGREES
CALCULATED R AXIS, ECG10: 1 DEGREES
CALCULATED R AXIS, ECG10: 22 DEGREES
CALCULATED R AXIS, ECG10: 8 DEGREES
CALCULATED T AXIS, ECG11: -164 DEGREES
CALCULATED T AXIS, ECG11: -169 DEGREES
CALCULATED T AXIS, ECG11: -179 DEGREES
CALCULATED T AXIS, ECG11: 155 DEGREES
CALCULATED T AXIS, ECG11: 170 DEGREES
CALCULATED T AXIS, ECG11: 79 DEGREES
CEA SERPL-MCNC: 1.7 NG/ML
CHLORIDE SERPL-SCNC: 103 MMOL/L (ref 97–108)
CHLORIDE SERPL-SCNC: 103 MMOL/L (ref 97–108)
CHLORIDE SERPL-SCNC: 104 MMOL/L (ref 97–108)
CHLORIDE SERPL-SCNC: 105 MMOL/L (ref 97–108)
CHLORIDE SERPL-SCNC: 106 MMOL/L (ref 97–108)
CHLORIDE SERPL-SCNC: 106 MMOL/L (ref 97–108)
CHLORIDE SERPL-SCNC: 107 MMOL/L (ref 97–108)
CHLORIDE SERPL-SCNC: 108 MMOL/L (ref 97–108)
CHLORIDE SERPL-SCNC: 109 MMOL/L (ref 97–108)
CHLORIDE SERPL-SCNC: 111 MMOL/L (ref 97–108)
CHOLEST SERPL-MCNC: 229 MG/DL
CO2 SERPL-SCNC: 24 MMOL/L (ref 21–32)
CO2 SERPL-SCNC: 24 MMOL/L (ref 21–32)
CO2 SERPL-SCNC: 25 MMOL/L (ref 21–32)
CO2 SERPL-SCNC: 26 MMOL/L (ref 21–32)
CO2 SERPL-SCNC: 27 MMOL/L (ref 21–32)
CO2 SERPL-SCNC: 28 MMOL/L (ref 21–32)
CO2 SERPL-SCNC: 28 MMOL/L (ref 21–32)
CO2 SERPL-SCNC: 29 MMOL/L (ref 21–32)
CO2 SERPL-SCNC: 30 MMOL/L (ref 21–32)
COLOR UR: ABNORMAL
COLOR UR: NORMAL
COMMENT, HOLDF: NORMAL
COMMENT, HOLDF: NORMAL
CREAT SERPL-MCNC: 1.09 MG/DL (ref 0.7–1.3)
CREAT SERPL-MCNC: 1.09 MG/DL (ref 0.7–1.3)
CREAT SERPL-MCNC: 1.12 MG/DL (ref 0.7–1.3)
CREAT SERPL-MCNC: 1.14 MG/DL (ref 0.7–1.3)
CREAT SERPL-MCNC: 1.15 MG/DL (ref 0.7–1.3)
CREAT SERPL-MCNC: 1.18 MG/DL (ref 0.7–1.3)
CREAT SERPL-MCNC: 1.22 MG/DL (ref 0.7–1.3)
CREAT SERPL-MCNC: 1.22 MG/DL (ref 0.7–1.3)
CREAT SERPL-MCNC: 1.23 MG/DL (ref 0.7–1.3)
CREAT SERPL-MCNC: 1.27 MG/DL (ref 0.7–1.3)
CREAT SERPL-MCNC: 1.29 MG/DL (ref 0.7–1.3)
CREAT SERPL-MCNC: 1.31 MG/DL (ref 0.7–1.3)
CREAT SERPL-MCNC: 1.32 MG/DL (ref 0.7–1.3)
CREAT SERPL-MCNC: 1.33 MG/DL (ref 0.7–1.3)
CREAT SERPL-MCNC: 1.34 MG/DL (ref 0.7–1.3)
CREAT SERPL-MCNC: 1.51 MG/DL (ref 0.7–1.3)
CREAT SERPL-MCNC: 1.54 MG/DL (ref 0.7–1.3)
CRP SERPL-MCNC: 1.68 MG/DL (ref 0–0.6)
DIAGNOSIS, 93000: NORMAL
DIFFERENTIAL METHOD BLD: ABNORMAL
EOSINOPHIL # BLD: 0 K/UL (ref 0–0.4)
EOSINOPHIL NFR BLD: 0 % (ref 0–7)
EPITH CASTS URNS QL MICRO: ABNORMAL /LPF
EPITH CASTS URNS QL MICRO: NORMAL /LPF
ERYTHROCYTE [DISTWIDTH] IN BLOOD BY AUTOMATED COUNT: 14.1 % (ref 11.5–14.5)
ERYTHROCYTE [DISTWIDTH] IN BLOOD BY AUTOMATED COUNT: 14.2 % (ref 11.5–14.5)
ERYTHROCYTE [DISTWIDTH] IN BLOOD BY AUTOMATED COUNT: 14.3 % (ref 11.5–14.5)
ERYTHROCYTE [DISTWIDTH] IN BLOOD BY AUTOMATED COUNT: 14.4 % (ref 11.5–14.5)
ERYTHROCYTE [DISTWIDTH] IN BLOOD BY AUTOMATED COUNT: 14.4 % (ref 11.5–14.5)
ERYTHROCYTE [DISTWIDTH] IN BLOOD BY AUTOMATED COUNT: 14.5 % (ref 11.5–14.5)
ERYTHROCYTE [DISTWIDTH] IN BLOOD BY AUTOMATED COUNT: 14.5 % (ref 11.5–14.5)
ERYTHROCYTE [DISTWIDTH] IN BLOOD BY AUTOMATED COUNT: 14.6 % (ref 11.5–14.5)
ERYTHROCYTE [DISTWIDTH] IN BLOOD BY AUTOMATED COUNT: 14.7 % (ref 11.5–14.5)
ERYTHROCYTE [DISTWIDTH] IN BLOOD BY AUTOMATED COUNT: 15.2 % (ref 11.5–14.5)
ERYTHROCYTE [DISTWIDTH] IN BLOOD BY AUTOMATED COUNT: 15.3 % (ref 11.5–14.5)
ERYTHROCYTE [DISTWIDTH] IN BLOOD BY AUTOMATED COUNT: 15.3 % (ref 11.5–14.5)
FERRITIN SERPL-MCNC: 50 NG/ML (ref 26–388)
FOLATE SERPL-MCNC: 4.1 NG/ML (ref 5–21)
GLOBULIN SER CALC-MCNC: 3.7 G/DL (ref 2–4)
GLOBULIN SER CALC-MCNC: 3.7 G/DL (ref 2–4)
GLOBULIN SER CALC-MCNC: 3.9 G/DL (ref 2–4)
GLOBULIN SER CALC-MCNC: 3.9 G/DL (ref 2–4)
GLOBULIN SER CALC-MCNC: 4 G/DL (ref 2–4)
GLOBULIN SER CALC-MCNC: 4.1 G/DL (ref 2–4)
GLOBULIN SER CALC-MCNC: 4.2 G/DL (ref 2–4)
GLOBULIN SER CALC-MCNC: 4.3 G/DL (ref 2–4)
GLOBULIN SER CALC-MCNC: 4.3 G/DL (ref 2–4)
GLUCOSE BLD STRIP.AUTO-MCNC: 70 MG/DL (ref 65–117)
GLUCOSE BLD STRIP.AUTO-MCNC: 87 MG/DL (ref 65–117)
GLUCOSE SERPL-MCNC: 100 MG/DL (ref 65–100)
GLUCOSE SERPL-MCNC: 101 MG/DL (ref 65–100)
GLUCOSE SERPL-MCNC: 118 MG/DL (ref 65–100)
GLUCOSE SERPL-MCNC: 80 MG/DL (ref 65–100)
GLUCOSE SERPL-MCNC: 81 MG/DL (ref 65–100)
GLUCOSE SERPL-MCNC: 82 MG/DL (ref 65–100)
GLUCOSE SERPL-MCNC: 85 MG/DL (ref 65–100)
GLUCOSE SERPL-MCNC: 88 MG/DL (ref 65–100)
GLUCOSE SERPL-MCNC: 89 MG/DL (ref 65–100)
GLUCOSE SERPL-MCNC: 90 MG/DL (ref 65–100)
GLUCOSE SERPL-MCNC: 94 MG/DL (ref 65–100)
GLUCOSE SERPL-MCNC: 94 MG/DL (ref 65–100)
GLUCOSE SERPL-MCNC: 95 MG/DL (ref 65–100)
GLUCOSE SERPL-MCNC: 96 MG/DL (ref 65–100)
GLUCOSE SERPL-MCNC: 99 MG/DL (ref 65–100)
GLUCOSE UR STRIP.AUTO-MCNC: NEGATIVE MG/DL
HCT VFR BLD AUTO: 26.3 % (ref 36.6–50.3)
HCT VFR BLD AUTO: 27.3 % (ref 36.6–50.3)
HCT VFR BLD AUTO: 27.8 % (ref 36.6–50.3)
HCT VFR BLD AUTO: 28 % (ref 36.6–50.3)
HCT VFR BLD AUTO: 29.6 % (ref 36.6–50.3)
HCT VFR BLD AUTO: 34 % (ref 36.6–50.3)
HCT VFR BLD AUTO: 34.8 % (ref 36.6–50.3)
HCT VFR BLD AUTO: 34.8 % (ref 36.6–50.3)
HCT VFR BLD AUTO: 35 % (ref 36.6–50.3)
HCT VFR BLD AUTO: 37.3 % (ref 36.6–50.3)
HCT VFR BLD AUTO: 40.2 % (ref 36.6–50.3)
HCT VFR BLD AUTO: 40.7 % (ref 36.6–50.3)
HCT VFR BLD AUTO: 42.2 % (ref 36.6–50.3)
HCT VFR BLD AUTO: 43.7 % (ref 36.6–50.3)
HDLC SERPL-MCNC: 84 MG/DL
HDLC SERPL: 2.7 {RATIO} (ref 0–5)
HEMOCCULT STL QL: POSITIVE
HGB BLD-MCNC: 10.6 G/DL (ref 12.1–17)
HGB BLD-MCNC: 10.9 G/DL (ref 12.1–17)
HGB BLD-MCNC: 11.2 G/DL (ref 12.1–17)
HGB BLD-MCNC: 11.4 G/DL (ref 12.1–17)
HGB BLD-MCNC: 11.9 G/DL (ref 12.1–17)
HGB BLD-MCNC: 13 G/DL (ref 12.1–17)
HGB BLD-MCNC: 13.1 G/DL (ref 12.1–17)
HGB BLD-MCNC: 13.6 G/DL (ref 12.1–17)
HGB BLD-MCNC: 13.8 G/DL (ref 12.1–17)
HGB BLD-MCNC: 8.5 G/DL (ref 12.1–17)
HGB BLD-MCNC: 8.7 G/DL (ref 12.1–17)
HGB BLD-MCNC: 8.8 G/DL (ref 12.1–17)
HGB BLD-MCNC: 8.8 G/DL (ref 12.1–17)
HGB BLD-MCNC: 8.9 G/DL (ref 12.1–17)
HGB BLD-MCNC: 9.6 G/DL (ref 12.1–17)
HGB UR QL STRIP: ABNORMAL
HGB UR QL STRIP: ABNORMAL
HGB UR QL STRIP: NEGATIVE
HGB UR QL STRIP: NEGATIVE
HYALINE CASTS URNS QL MICRO: ABNORMAL /LPF (ref 0–5)
IMM GRANULOCYTES # BLD AUTO: 0 K/UL (ref 0–0.04)
IMM GRANULOCYTES NFR BLD AUTO: 0 % (ref 0–0.5)
IMM GRANULOCYTES NFR BLD AUTO: 1 % (ref 0–0.5)
IRON SATN MFR SERPL: 18 % (ref 20–50)
IRON SERPL-MCNC: 53 UG/DL (ref 65–175)
KETONES UR QL STRIP.AUTO: NEGATIVE MG/DL
LACTATE SERPL-SCNC: 0.8 MMOL/L (ref 0.4–2)
LDLC SERPL CALC-MCNC: 131.8 MG/DL (ref 0–100)
LEUKOCYTE ESTERASE UR QL STRIP.AUTO: ABNORMAL
LEUKOCYTE ESTERASE UR QL STRIP.AUTO: NEGATIVE
LIPID PROFILE,FLP: ABNORMAL
LYMPHOCYTES # BLD: 0.4 K/UL (ref 0.8–3.5)
LYMPHOCYTES # BLD: 0.6 K/UL (ref 0.8–3.5)
LYMPHOCYTES # BLD: 0.8 K/UL (ref 0.8–3.5)
LYMPHOCYTES # BLD: 0.9 K/UL (ref 0.8–3.5)
LYMPHOCYTES # BLD: 1 K/UL (ref 0.8–3.5)
LYMPHOCYTES # BLD: 1 K/UL (ref 0.8–3.5)
LYMPHOCYTES # BLD: 1.1 K/UL (ref 0.8–3.5)
LYMPHOCYTES # BLD: 1.2 K/UL (ref 0.8–3.5)
LYMPHOCYTES # BLD: 1.3 K/UL (ref 0.8–3.5)
LYMPHOCYTES # BLD: 1.3 K/UL (ref 0.8–3.5)
LYMPHOCYTES NFR BLD: 14 % (ref 12–49)
LYMPHOCYTES NFR BLD: 14 % (ref 12–49)
LYMPHOCYTES NFR BLD: 17 % (ref 12–49)
LYMPHOCYTES NFR BLD: 17 % (ref 12–49)
LYMPHOCYTES NFR BLD: 18 % (ref 12–49)
LYMPHOCYTES NFR BLD: 19 % (ref 12–49)
LYMPHOCYTES NFR BLD: 20 % (ref 12–49)
LYMPHOCYTES NFR BLD: 23 % (ref 12–49)
LYMPHOCYTES NFR BLD: 26 % (ref 12–49)
LYMPHOCYTES NFR BLD: 27 % (ref 12–49)
LYMPHOCYTES NFR BLD: 31 % (ref 12–49)
LYMPHOCYTES NFR BLD: 8 % (ref 12–49)
MAGNESIUM SERPL-MCNC: 1.7 MG/DL (ref 1.6–2.4)
MAGNESIUM SERPL-MCNC: 1.7 MG/DL (ref 1.6–2.4)
MAGNESIUM SERPL-MCNC: 1.8 MG/DL (ref 1.6–2.4)
MAGNESIUM SERPL-MCNC: 1.9 MG/DL (ref 1.6–2.4)
MAGNESIUM SERPL-MCNC: 2 MG/DL (ref 1.6–2.4)
MAGNESIUM SERPL-MCNC: 2 MG/DL (ref 1.6–2.4)
MAGNESIUM SERPL-MCNC: 2.1 MG/DL (ref 1.6–2.4)
MCH RBC QN AUTO: 26.8 PG (ref 26–34)
MCH RBC QN AUTO: 26.9 PG (ref 26–34)
MCH RBC QN AUTO: 27 PG (ref 26–34)
MCH RBC QN AUTO: 27 PG (ref 26–34)
MCH RBC QN AUTO: 27.3 PG (ref 26–34)
MCH RBC QN AUTO: 27.4 PG (ref 26–34)
MCH RBC QN AUTO: 27.7 PG (ref 26–34)
MCH RBC QN AUTO: 27.8 PG (ref 26–34)
MCH RBC QN AUTO: 28 PG (ref 26–34)
MCH RBC QN AUTO: 28.3 PG (ref 26–34)
MCHC RBC AUTO-ENTMCNC: 31.1 G/DL (ref 30–36.5)
MCHC RBC AUTO-ENTMCNC: 31.2 G/DL (ref 30–36.5)
MCHC RBC AUTO-ENTMCNC: 31.3 G/DL (ref 30–36.5)
MCHC RBC AUTO-ENTMCNC: 31.4 G/DL (ref 30–36.5)
MCHC RBC AUTO-ENTMCNC: 31.9 G/DL (ref 30–36.5)
MCHC RBC AUTO-ENTMCNC: 32 G/DL (ref 30–36.5)
MCHC RBC AUTO-ENTMCNC: 32.2 G/DL (ref 30–36.5)
MCHC RBC AUTO-ENTMCNC: 32.3 G/DL (ref 30–36.5)
MCHC RBC AUTO-ENTMCNC: 32.4 G/DL (ref 30–36.5)
MCHC RBC AUTO-ENTMCNC: 32.6 G/DL (ref 30–36.5)
MCHC RBC AUTO-ENTMCNC: 32.7 G/DL (ref 30–36.5)
MCHC RBC AUTO-ENTMCNC: 33.1 G/DL (ref 30–36.5)
MCV RBC AUTO: 82.6 FL (ref 80–99)
MCV RBC AUTO: 82.9 FL (ref 80–99)
MCV RBC AUTO: 82.9 FL (ref 80–99)
MCV RBC AUTO: 83.4 FL (ref 80–99)
MCV RBC AUTO: 83.5 FL (ref 80–99)
MCV RBC AUTO: 84 FL (ref 80–99)
MCV RBC AUTO: 85.7 FL (ref 80–99)
MCV RBC AUTO: 85.8 FL (ref 80–99)
MCV RBC AUTO: 85.8 FL (ref 80–99)
MCV RBC AUTO: 87.2 FL (ref 80–99)
MCV RBC AUTO: 87.4 FL (ref 80–99)
MCV RBC AUTO: 87.6 FL (ref 80–99)
MCV RBC AUTO: 88.5 FL (ref 80–99)
MCV RBC AUTO: 89 FL (ref 80–99)
MONOCYTES # BLD: 0.2 K/UL (ref 0–1)
MONOCYTES # BLD: 0.5 K/UL (ref 0–1)
MONOCYTES # BLD: 0.6 K/UL (ref 0–1)
MONOCYTES # BLD: 0.6 K/UL (ref 0–1)
MONOCYTES # BLD: 0.7 K/UL (ref 0–1)
MONOCYTES # BLD: 1 K/UL (ref 0–1)
MONOCYTES NFR BLD: 10 % (ref 5–13)
MONOCYTES NFR BLD: 11 % (ref 5–13)
MONOCYTES NFR BLD: 12 % (ref 5–13)
MONOCYTES NFR BLD: 13 % (ref 5–13)
MONOCYTES NFR BLD: 14 % (ref 5–13)
MONOCYTES NFR BLD: 4 % (ref 5–13)
MONOCYTES NFR BLD: 9 % (ref 5–13)
NEUTS BAND NFR BLD MANUAL: 2 %
NEUTS SEG # BLD: 2.3 K/UL (ref 1.8–8)
NEUTS SEG # BLD: 2.4 K/UL (ref 1.8–8)
NEUTS SEG # BLD: 2.5 K/UL (ref 1.8–8)
NEUTS SEG # BLD: 2.7 K/UL (ref 1.8–8)
NEUTS SEG # BLD: 2.9 K/UL (ref 1.8–8)
NEUTS SEG # BLD: 3.3 K/UL (ref 1.8–8)
NEUTS SEG # BLD: 3.4 K/UL (ref 1.8–8)
NEUTS SEG # BLD: 3.4 K/UL (ref 1.8–8)
NEUTS SEG # BLD: 3.7 K/UL (ref 1.8–8)
NEUTS SEG # BLD: 4.5 K/UL (ref 1.8–8)
NEUTS SEG # BLD: 4.5 K/UL (ref 1.8–8)
NEUTS SEG # BLD: 4.6 K/UL (ref 1.8–8)
NEUTS SEG # BLD: 4.8 K/UL (ref 1.8–8)
NEUTS SEG # BLD: 4.9 K/UL (ref 1.8–8)
NEUTS SEG NFR BLD: 55 % (ref 32–75)
NEUTS SEG NFR BLD: 59 % (ref 32–75)
NEUTS SEG NFR BLD: 61 % (ref 32–75)
NEUTS SEG NFR BLD: 64 % (ref 32–75)
NEUTS SEG NFR BLD: 69 % (ref 32–75)
NEUTS SEG NFR BLD: 70 % (ref 32–75)
NEUTS SEG NFR BLD: 73 % (ref 32–75)
NEUTS SEG NFR BLD: 74 % (ref 32–75)
NEUTS SEG NFR BLD: 76 % (ref 32–75)
NEUTS SEG NFR BLD: 86 % (ref 32–75)
NITRITE UR QL STRIP.AUTO: NEGATIVE
NRBC # BLD: 0 K/UL (ref 0–0.01)
NRBC BLD-RTO: 0 PER 100 WBC
P-R INTERVAL, ECG05: 134 MS
P-R INTERVAL, ECG05: 136 MS
P-R INTERVAL, ECG05: 136 MS
P-R INTERVAL, ECG05: 168 MS
PH UR STRIP: 6 [PH] (ref 5–8)
PH UR STRIP: 6 [PH] (ref 5–8)
PH UR STRIP: 6.5 [PH] (ref 5–8)
PH UR STRIP: 6.5 [PH] (ref 5–8)
PHOSPHATE SERPL-MCNC: 2.5 MG/DL (ref 2.6–4.7)
PHOSPHATE SERPL-MCNC: 2.7 MG/DL (ref 2.6–4.7)
PHOSPHATE SERPL-MCNC: 3.2 MG/DL (ref 2.6–4.7)
PLATELET # BLD AUTO: 130 K/UL (ref 150–400)
PLATELET # BLD AUTO: 134 K/UL (ref 150–400)
PLATELET # BLD AUTO: 191 K/UL (ref 150–400)
PLATELET # BLD AUTO: 207 K/UL (ref 150–400)
PLATELET # BLD AUTO: 210 K/UL (ref 150–400)
PLATELET # BLD AUTO: 213 K/UL (ref 150–400)
PLATELET # BLD AUTO: 215 K/UL (ref 150–400)
PLATELET # BLD AUTO: 228 K/UL (ref 150–400)
PLATELET # BLD AUTO: 235 K/UL (ref 150–400)
PLATELET # BLD AUTO: 241 K/UL (ref 150–400)
PLATELET # BLD AUTO: 251 K/UL (ref 150–400)
PLATELET # BLD AUTO: 262 K/UL (ref 150–400)
PLATELET # BLD AUTO: 277 K/UL (ref 150–400)
PLATELET # BLD AUTO: 298 K/UL (ref 150–400)
PLATELET COMMENTS,PCOM: ABNORMAL
PLATELET COMMENTS,PCOM: ABNORMAL
PMV BLD AUTO: 10.1 FL (ref 8.9–12.9)
PMV BLD AUTO: 11.4 FL (ref 8.9–12.9)
PMV BLD AUTO: 8.7 FL (ref 8.9–12.9)
PMV BLD AUTO: 8.8 FL (ref 8.9–12.9)
PMV BLD AUTO: 8.9 FL (ref 8.9–12.9)
PMV BLD AUTO: 8.9 FL (ref 8.9–12.9)
PMV BLD AUTO: 9.1 FL (ref 8.9–12.9)
PMV BLD AUTO: 9.2 FL (ref 8.9–12.9)
PMV BLD AUTO: 9.2 FL (ref 8.9–12.9)
PMV BLD AUTO: 9.4 FL (ref 8.9–12.9)
PMV BLD AUTO: 9.5 FL (ref 8.9–12.9)
PMV BLD AUTO: 9.5 FL (ref 8.9–12.9)
PMV BLD AUTO: 9.6 FL (ref 8.9–12.9)
PMV BLD AUTO: 9.7 FL (ref 8.9–12.9)
POTASSIUM SERPL-SCNC: 3.3 MMOL/L (ref 3.5–5.1)
POTASSIUM SERPL-SCNC: 3.3 MMOL/L (ref 3.5–5.1)
POTASSIUM SERPL-SCNC: 3.4 MMOL/L (ref 3.5–5.1)
POTASSIUM SERPL-SCNC: 3.4 MMOL/L (ref 3.5–5.1)
POTASSIUM SERPL-SCNC: 3.5 MMOL/L (ref 3.5–5.1)
POTASSIUM SERPL-SCNC: 3.6 MMOL/L (ref 3.5–5.1)
POTASSIUM SERPL-SCNC: 3.7 MMOL/L (ref 3.5–5.1)
POTASSIUM SERPL-SCNC: 3.8 MMOL/L (ref 3.5–5.1)
POTASSIUM SERPL-SCNC: 3.9 MMOL/L (ref 3.5–5.1)
POTASSIUM SERPL-SCNC: 4 MMOL/L (ref 3.5–5.1)
POTASSIUM SERPL-SCNC: 4.4 MMOL/L (ref 3.5–5.1)
PROT SERPL-MCNC: 6.1 G/DL (ref 6.4–8.2)
PROT SERPL-MCNC: 6.3 G/DL (ref 6.4–8.2)
PROT SERPL-MCNC: 6.4 G/DL (ref 6.4–8.2)
PROT SERPL-MCNC: 6.5 G/DL (ref 6.4–8.2)
PROT SERPL-MCNC: 6.6 G/DL (ref 6.4–8.2)
PROT SERPL-MCNC: 6.7 G/DL (ref 6.4–8.2)
PROT SERPL-MCNC: 7 G/DL (ref 6.4–8.2)
PROT SERPL-MCNC: 7.2 G/DL (ref 6.4–8.2)
PROT SERPL-MCNC: 7.4 G/DL (ref 6.4–8.2)
PROT UR STRIP-MCNC: NEGATIVE MG/DL
Q-T INTERVAL, ECG07: 286 MS
Q-T INTERVAL, ECG07: 332 MS
Q-T INTERVAL, ECG07: 396 MS
Q-T INTERVAL, ECG07: 406 MS
Q-T INTERVAL, ECG07: 426 MS
Q-T INTERVAL, ECG07: 452 MS
QRS DURATION, ECG06: 80 MS
QRS DURATION, ECG06: 82 MS
QRS DURATION, ECG06: 84 MS
QRS DURATION, ECG06: 84 MS
QRS DURATION, ECG06: 86 MS
QRS DURATION, ECG06: 90 MS
QTC CALCULATION (BEZET), ECG08: 383 MS
QTC CALCULATION (BEZET), ECG08: 467 MS
QTC CALCULATION (BEZET), ECG08: 479 MS
QTC CALCULATION (BEZET), ECG08: 479 MS
QTC CALCULATION (BEZET), ECG08: 491 MS
QTC CALCULATION (BEZET), ECG08: 515 MS
RBC # BLD AUTO: 3.07 M/UL (ref 4.1–5.7)
RBC # BLD AUTO: 3.18 M/UL (ref 4.1–5.7)
RBC # BLD AUTO: 3.18 M/UL (ref 4.1–5.7)
RBC # BLD AUTO: 3.21 M/UL (ref 4.1–5.7)
RBC # BLD AUTO: 3.45 M/UL (ref 4.1–5.7)
RBC # BLD AUTO: 3.82 M/UL (ref 4.1–5.7)
RBC # BLD AUTO: 3.93 M/UL (ref 4.1–5.7)
RBC # BLD AUTO: 4.17 M/UL (ref 4.1–5.7)
RBC # BLD AUTO: 4.22 M/UL (ref 4.1–5.7)
RBC # BLD AUTO: 4.44 M/UL (ref 4.1–5.7)
RBC # BLD AUTO: 4.85 M/UL (ref 4.1–5.7)
RBC # BLD AUTO: 4.88 M/UL (ref 4.1–5.7)
RBC # BLD AUTO: 4.99 M/UL (ref 4.1–5.7)
RBC # BLD AUTO: 5.11 M/UL (ref 4.1–5.7)
RBC #/AREA URNS HPF: ABNORMAL /HPF (ref 0–5)
RBC #/AREA URNS HPF: NORMAL /HPF (ref 0–5)
RBC MORPH BLD: ABNORMAL
SAMPLES BEING HELD,HOLD: NORMAL
SAMPLES BEING HELD,HOLD: NORMAL
SERVICE CMNT-IMP: NORMAL
SODIUM SERPL-SCNC: 136 MMOL/L (ref 136–145)
SODIUM SERPL-SCNC: 139 MMOL/L (ref 136–145)
SODIUM SERPL-SCNC: 140 MMOL/L (ref 136–145)
SODIUM SERPL-SCNC: 141 MMOL/L (ref 136–145)
SODIUM SERPL-SCNC: 142 MMOL/L (ref 136–145)
SODIUM SERPL-SCNC: 143 MMOL/L (ref 136–145)
SODIUM SERPL-SCNC: 145 MMOL/L (ref 136–145)
SP GR UR REFRACTOMETRY: 1.01 (ref 1–1.03)
SP GR UR REFRACTOMETRY: 1.01 (ref 1–1.03)
SP GR UR REFRACTOMETRY: 1.02 (ref 1–1.03)
SP GR UR REFRACTOMETRY: 1.03 (ref 1–1.03)
TIBC SERPL-MCNC: 288 UG/DL (ref 250–450)
TRIGL SERPL-MCNC: 66 MG/DL (ref ?–150)
TROPONIN I SERPL-MCNC: <0.05 NG/ML
TSH SERPL DL<=0.05 MIU/L-ACNC: 1.37 UIU/ML (ref 0.36–3.74)
UA: UC IF INDICATED,UAUC: ABNORMAL
UA: UC IF INDICATED,UAUC: ABNORMAL
UROBILINOGEN UR QL STRIP.AUTO: 0.2 EU/DL (ref 0.2–1)
UROBILINOGEN UR QL STRIP.AUTO: 4 EU/DL (ref 0.2–1)
VENTRICULAR RATE, ECG03: 108 BPM
VENTRICULAR RATE, ECG03: 119 BPM
VENTRICULAR RATE, ECG03: 78 BPM
VENTRICULAR RATE, ECG03: 80 BPM
VENTRICULAR RATE, ECG03: 84 BPM
VENTRICULAR RATE, ECG03: 88 BPM
VIT B12 SERPL-MCNC: 583 PG/ML (ref 193–986)
VLDLC SERPL CALC-MCNC: 13.2 MG/DL
WBC # BLD AUTO: 3.8 K/UL (ref 4.1–11.1)
WBC # BLD AUTO: 4.1 K/UL (ref 4.1–11.1)
WBC # BLD AUTO: 4.1 K/UL (ref 4.1–11.1)
WBC # BLD AUTO: 4.3 K/UL (ref 4.1–11.1)
WBC # BLD AUTO: 4.5 K/UL (ref 4.1–11.1)
WBC # BLD AUTO: 4.6 K/UL (ref 4.1–11.1)
WBC # BLD AUTO: 4.8 K/UL (ref 4.1–11.1)
WBC # BLD AUTO: 5 K/UL (ref 4.1–11.1)
WBC # BLD AUTO: 5.2 K/UL (ref 4.1–11.1)
WBC # BLD AUTO: 5.5 K/UL (ref 4.1–11.1)
WBC # BLD AUTO: 6.2 K/UL (ref 4.1–11.1)
WBC # BLD AUTO: 6.4 K/UL (ref 4.1–11.1)
WBC # BLD AUTO: 6.4 K/UL (ref 4.1–11.1)
WBC # BLD AUTO: 6.9 K/UL (ref 4.1–11.1)
WBC URNS QL MICRO: ABNORMAL /HPF (ref 0–4)
WBC URNS QL MICRO: NORMAL /HPF (ref 0–4)

## 2021-01-01 PROCEDURE — 36415 COLL VENOUS BLD VENIPUNCTURE: CPT

## 2021-01-01 PROCEDURE — 74011250637 HC RX REV CODE- 250/637: Performed by: INTERNAL MEDICINE

## 2021-01-01 PROCEDURE — 85025 COMPLETE CBC W/AUTO DIFF WBC: CPT

## 2021-01-01 PROCEDURE — 99218 HC RM OBSERVATION: CPT

## 2021-01-01 PROCEDURE — 96374 THER/PROPH/DIAG INJ IV PUSH: CPT

## 2021-01-01 PROCEDURE — 97161 PT EVAL LOW COMPLEX 20 MIN: CPT

## 2021-01-01 PROCEDURE — 84100 ASSAY OF PHOSPHORUS: CPT

## 2021-01-01 PROCEDURE — 97116 GAIT TRAINING THERAPY: CPT

## 2021-01-01 PROCEDURE — 92507 TX SP LANG VOICE COMM INDIV: CPT

## 2021-01-01 PROCEDURE — 97165 OT EVAL LOW COMPLEX 30 MIN: CPT

## 2021-01-01 PROCEDURE — G0439 PPPS, SUBSEQ VISIT: HCPCS | Performed by: NURSE PRACTITIONER

## 2021-01-01 PROCEDURE — 74011250636 HC RX REV CODE- 250/636: Performed by: INTERNAL MEDICINE

## 2021-01-01 PROCEDURE — 94760 N-INVAS EAR/PLS OXIMETRY 1: CPT

## 2021-01-01 PROCEDURE — 65270000011 HC RM PRIVATE SNF

## 2021-01-01 PROCEDURE — 71045 X-RAY EXAM CHEST 1 VIEW: CPT

## 2021-01-01 PROCEDURE — 72125 CT NECK SPINE W/O DYE: CPT

## 2021-01-01 PROCEDURE — G8427 DOCREV CUR MEDS BY ELIG CLIN: HCPCS | Performed by: INTERNAL MEDICINE

## 2021-01-01 PROCEDURE — G8536 NO DOC ELDER MAL SCRN: HCPCS | Performed by: INTERNAL MEDICINE

## 2021-01-01 PROCEDURE — 1111F DSCHRG MED/CURRENT MED MERGE: CPT | Performed by: INTERNAL MEDICINE

## 2021-01-01 PROCEDURE — 97530 THERAPEUTIC ACTIVITIES: CPT

## 2021-01-01 PROCEDURE — 88342 IMHCHEM/IMCYTCHM 1ST ANTB: CPT

## 2021-01-01 PROCEDURE — 99285 EMERGENCY DEPT VISIT HI MDM: CPT

## 2021-01-01 PROCEDURE — 82378 CARCINOEMBRYONIC ANTIGEN: CPT

## 2021-01-01 PROCEDURE — 77030013992 HC SNR POLYP ENDOSC BSC -B: Performed by: SURGERY

## 2021-01-01 PROCEDURE — 93005 ELECTROCARDIOGRAM TRACING: CPT

## 2021-01-01 PROCEDURE — 97535 SELF CARE MNGMENT TRAINING: CPT

## 2021-01-01 PROCEDURE — 96361 HYDRATE IV INFUSION ADD-ON: CPT

## 2021-01-01 PROCEDURE — G8754 DIAS BP LESS 90: HCPCS | Performed by: INTERNAL MEDICINE

## 2021-01-01 PROCEDURE — 77030037878 HC DRSG MEPILEX >48IN BORD MOLN -B

## 2021-01-01 PROCEDURE — 84484 ASSAY OF TROPONIN QUANT: CPT

## 2021-01-01 PROCEDURE — 97110 THERAPEUTIC EXERCISES: CPT

## 2021-01-01 PROCEDURE — 70450 CT HEAD/BRAIN W/O DYE: CPT

## 2021-01-01 PROCEDURE — G8752 SYS BP LESS 140: HCPCS | Performed by: NURSE PRACTITIONER

## 2021-01-01 PROCEDURE — 83735 ASSAY OF MAGNESIUM: CPT

## 2021-01-01 PROCEDURE — 80053 COMPREHEN METABOLIC PANEL: CPT

## 2021-01-01 PROCEDURE — 65270000029 HC RM PRIVATE

## 2021-01-01 PROCEDURE — 74011250637 HC RX REV CODE- 250/637: Performed by: HOSPITALIST

## 2021-01-01 PROCEDURE — 96372 THER/PROPH/DIAG INJ SC/IM: CPT

## 2021-01-01 PROCEDURE — 92523 SPEECH SOUND LANG COMPREHEN: CPT

## 2021-01-01 PROCEDURE — 74011250636 HC RX REV CODE- 250/636: Performed by: HOSPITALIST

## 2021-01-01 PROCEDURE — 82272 OCCULT BLD FECES 1-3 TESTS: CPT

## 2021-01-01 PROCEDURE — 92610 EVALUATE SWALLOWING FUNCTION: CPT

## 2021-01-01 PROCEDURE — 96360 HYDRATION IV INFUSION INIT: CPT

## 2021-01-01 PROCEDURE — G8752 SYS BP LESS 140: HCPCS | Performed by: INTERNAL MEDICINE

## 2021-01-01 PROCEDURE — 74011250637 HC RX REV CODE- 250/637: Performed by: EMERGENCY MEDICINE

## 2021-01-01 PROCEDURE — 81001 URINALYSIS AUTO W/SCOPE: CPT

## 2021-01-01 PROCEDURE — G8427 DOCREV CUR MEDS BY ELIG CLIN: HCPCS | Performed by: NURSE PRACTITIONER

## 2021-01-01 PROCEDURE — G8432 DEP SCR NOT DOC, RNG: HCPCS | Performed by: NURSE PRACTITIONER

## 2021-01-01 PROCEDURE — 80048 BASIC METABOLIC PNL TOTAL CA: CPT

## 2021-01-01 PROCEDURE — 99284 EMERGENCY DEPT VISIT MOD MDM: CPT

## 2021-01-01 PROCEDURE — 96375 TX/PRO/DX INJ NEW DRUG ADDON: CPT

## 2021-01-01 PROCEDURE — 96376 TX/PRO/DX INJ SAME DRUG ADON: CPT

## 2021-01-01 PROCEDURE — 1100F PTFALLS ASSESS-DOCD GE2>/YR: CPT | Performed by: INTERNAL MEDICINE

## 2021-01-01 PROCEDURE — 74011250636 HC RX REV CODE- 250/636: Performed by: FAMILY MEDICINE

## 2021-01-01 PROCEDURE — 72072 X-RAY EXAM THORAC SPINE 3VWS: CPT

## 2021-01-01 PROCEDURE — 92522 EVALUATE SPEECH PRODUCTION: CPT

## 2021-01-01 PROCEDURE — 97166 OT EVAL MOD COMPLEX 45 MIN: CPT

## 2021-01-01 PROCEDURE — 73502 X-RAY EXAM HIP UNI 2-3 VIEWS: CPT

## 2021-01-01 PROCEDURE — 0DB68ZX EXCISION OF STOMACH, VIA NATURAL OR ARTIFICIAL OPENING ENDOSCOPIC, DIAGNOSTIC: ICD-10-PCS | Performed by: SURGERY

## 2021-01-01 PROCEDURE — 76210000063 HC OR PH I REC FIRST 0.5 HR: Performed by: SURGERY

## 2021-01-01 PROCEDURE — G8756 NO BP MEASURE DOC: HCPCS | Performed by: INTERNAL MEDICINE

## 2021-01-01 PROCEDURE — 77030037006 HC FCPS BIOP ENDOSC DISP OCOA -A: Performed by: SURGERY

## 2021-01-01 PROCEDURE — 74011000250 HC RX REV CODE- 250: Performed by: EMERGENCY MEDICINE

## 2021-01-01 PROCEDURE — 77030020268 HC MISC GENERAL SUPPLY: Performed by: SURGERY

## 2021-01-01 PROCEDURE — 83605 ASSAY OF LACTIC ACID: CPT

## 2021-01-01 PROCEDURE — C9113 INJ PANTOPRAZOLE SODIUM, VIA: HCPCS | Performed by: INTERNAL MEDICINE

## 2021-01-01 PROCEDURE — 85018 HEMOGLOBIN: CPT

## 2021-01-01 PROCEDURE — G8417 CALC BMI ABV UP PARAM F/U: HCPCS | Performed by: INTERNAL MEDICINE

## 2021-01-01 PROCEDURE — C9113 INJ PANTOPRAZOLE SODIUM, VIA: HCPCS | Performed by: FAMILY MEDICINE

## 2021-01-01 PROCEDURE — 3288F FALL RISK ASSESSMENT DOCD: CPT | Performed by: INTERNAL MEDICINE

## 2021-01-01 PROCEDURE — 74011250636 HC RX REV CODE- 250/636: Performed by: ANESTHESIOLOGY

## 2021-01-01 PROCEDURE — G8432 DEP SCR NOT DOC, RNG: HCPCS | Performed by: INTERNAL MEDICINE

## 2021-01-01 PROCEDURE — 74011000250 HC RX REV CODE- 250: Performed by: ANESTHESIOLOGY

## 2021-01-01 PROCEDURE — 76060000031 HC ANESTHESIA FIRST 0.5 HR: Performed by: SURGERY

## 2021-01-01 PROCEDURE — 97162 PT EVAL MOD COMPLEX 30 MIN: CPT

## 2021-01-01 PROCEDURE — 3288F FALL RISK ASSESSMENT DOCD: CPT | Performed by: NURSE PRACTITIONER

## 2021-01-01 PROCEDURE — 74011250636 HC RX REV CODE- 250/636: Performed by: EMERGENCY MEDICINE

## 2021-01-01 PROCEDURE — 36600 WITHDRAWAL OF ARTERIAL BLOOD: CPT

## 2021-01-01 PROCEDURE — 99218 PR INITIAL OBSERVATION CARE/DAY 30 MINUTES: CPT | Performed by: SURGERY

## 2021-01-01 PROCEDURE — 83550 IRON BINDING TEST: CPT

## 2021-01-01 PROCEDURE — 74011000250 HC RX REV CODE- 250: Performed by: FAMILY MEDICINE

## 2021-01-01 PROCEDURE — 76010000154 HC OR TIME FIRST 0.5 HR: Performed by: SURGERY

## 2021-01-01 PROCEDURE — G8419 CALC BMI OUT NRM PARAM NOF/U: HCPCS | Performed by: INTERNAL MEDICINE

## 2021-01-01 PROCEDURE — 99496 TRANSJ CARE MGMT HIGH F2F 7D: CPT | Performed by: INTERNAL MEDICINE

## 2021-01-01 PROCEDURE — 82728 ASSAY OF FERRITIN: CPT

## 2021-01-01 PROCEDURE — 99213 OFFICE O/P EST LOW 20 MIN: CPT | Performed by: INTERNAL MEDICINE

## 2021-01-01 PROCEDURE — 87040 BLOOD CULTURE FOR BACTERIA: CPT

## 2021-01-01 PROCEDURE — G8536 NO DOC ELDER MAL SCRN: HCPCS | Performed by: NURSE PRACTITIONER

## 2021-01-01 PROCEDURE — 83880 ASSAY OF NATRIURETIC PEPTIDE: CPT

## 2021-01-01 PROCEDURE — 99214 OFFICE O/P EST MOD 30 MIN: CPT | Performed by: INTERNAL MEDICINE

## 2021-01-01 PROCEDURE — 82962 GLUCOSE BLOOD TEST: CPT

## 2021-01-01 PROCEDURE — 94761 N-INVAS EAR/PLS OXIMETRY MLT: CPT

## 2021-01-01 PROCEDURE — 99213 OFFICE O/P EST LOW 20 MIN: CPT | Performed by: NURSE PRACTITIONER

## 2021-01-01 PROCEDURE — 77030040934 HC CATH DIAG DXTERITY MEDT -A: Performed by: SURGERY

## 2021-01-01 PROCEDURE — 72100 X-RAY EXAM L-S SPINE 2/3 VWS: CPT

## 2021-01-01 PROCEDURE — 87086 URINE CULTURE/COLONY COUNT: CPT

## 2021-01-01 PROCEDURE — G8419 CALC BMI OUT NRM PARAM NOF/U: HCPCS | Performed by: NURSE PRACTITIONER

## 2021-01-01 PROCEDURE — 0DB68ZZ EXCISION OF STOMACH, VIA NATURAL OR ARTIFICIAL OPENING ENDOSCOPIC: ICD-10-PCS | Performed by: SURGERY

## 2021-01-01 PROCEDURE — 86140 C-REACTIVE PROTEIN: CPT

## 2021-01-01 PROCEDURE — 74011000250 HC RX REV CODE- 250: Performed by: INTERNAL MEDICINE

## 2021-01-01 PROCEDURE — 93000 ELECTROCARDIOGRAM COMPLETE: CPT | Performed by: INTERNAL MEDICINE

## 2021-01-01 PROCEDURE — 82746 ASSAY OF FOLIC ACID SERUM: CPT

## 2021-01-01 PROCEDURE — 88305 TISSUE EXAM BY PATHOLOGIST: CPT

## 2021-01-01 PROCEDURE — 2709999900 HC NON-CHARGEABLE SUPPLY: Performed by: SURGERY

## 2021-01-01 PROCEDURE — 99443 PR PHYS/QHP TELEPHONE EVALUATION 21-30 MIN: CPT | Performed by: INTERNAL MEDICINE

## 2021-01-01 PROCEDURE — G8754 DIAS BP LESS 90: HCPCS | Performed by: NURSE PRACTITIONER

## 2021-01-01 PROCEDURE — 1100F PTFALLS ASSESS-DOCD GE2>/YR: CPT | Performed by: NURSE PRACTITIONER

## 2021-01-01 RX ORDER — FAMOTIDINE 20 MG/1
20 TABLET, FILM COATED ORAL 2 TIMES DAILY
Status: DISCONTINUED | OUTPATIENT
Start: 2021-01-01 | End: 2021-01-01 | Stop reason: HOSPADM

## 2021-01-01 RX ORDER — TAMSULOSIN HYDROCHLORIDE 0.4 MG/1
0.4 CAPSULE ORAL
Status: DISCONTINUED | OUTPATIENT
Start: 2021-01-01 | End: 2021-01-01 | Stop reason: HOSPADM

## 2021-01-01 RX ORDER — SODIUM CHLORIDE 0.9 % (FLUSH) 0.9 %
5-40 SYRINGE (ML) INJECTION EVERY 8 HOURS
Status: DISCONTINUED | OUTPATIENT
Start: 2021-01-01 | End: 2021-01-01 | Stop reason: HOSPADM

## 2021-01-01 RX ORDER — POTASSIUM CHLORIDE 750 MG/1
40 TABLET, FILM COATED, EXTENDED RELEASE ORAL
Status: COMPLETED | OUTPATIENT
Start: 2021-01-01 | End: 2021-01-01

## 2021-01-01 RX ORDER — SODIUM CHLORIDE 0.9 % (FLUSH) 0.9 %
5-40 SYRINGE (ML) INJECTION AS NEEDED
Status: CANCELLED | OUTPATIENT
Start: 2021-01-01

## 2021-01-01 RX ORDER — DILTIAZEM HYDROCHLORIDE 120 MG/1
120 CAPSULE, COATED, EXTENDED RELEASE ORAL DAILY
Status: DISCONTINUED | OUTPATIENT
Start: 2021-01-01 | End: 2021-01-01

## 2021-01-01 RX ORDER — SODIUM CHLORIDE 0.9 % (FLUSH) 0.9 %
5-40 SYRINGE (ML) INJECTION AS NEEDED
Status: DISCONTINUED | OUTPATIENT
Start: 2021-01-01 | End: 2021-01-01 | Stop reason: HOSPADM

## 2021-01-01 RX ORDER — GABAPENTIN 100 MG/1
100 CAPSULE ORAL 2 TIMES DAILY
Status: DISCONTINUED | OUTPATIENT
Start: 2021-01-01 | End: 2021-01-01 | Stop reason: HOSPADM

## 2021-01-01 RX ORDER — POLYETHYLENE GLYCOL 3350 17 G/17G
17 POWDER, FOR SOLUTION ORAL DAILY PRN
Status: CANCELLED | OUTPATIENT
Start: 2021-01-01

## 2021-01-01 RX ORDER — TAMSULOSIN HYDROCHLORIDE 0.4 MG/1
0.4 CAPSULE ORAL DAILY
Status: DISCONTINUED | OUTPATIENT
Start: 2021-01-01 | End: 2021-01-01 | Stop reason: HOSPADM

## 2021-01-01 RX ORDER — ACETAMINOPHEN 650 MG/1
650 SUPPOSITORY RECTAL
Status: CANCELLED | OUTPATIENT
Start: 2021-01-01

## 2021-01-01 RX ORDER — FINASTERIDE 5 MG/1
5 TABLET, FILM COATED ORAL
Status: DISCONTINUED | OUTPATIENT
Start: 2021-01-01 | End: 2021-01-01 | Stop reason: HOSPADM

## 2021-01-01 RX ORDER — DOCUSATE SODIUM 100 MG/1
100 CAPSULE, LIQUID FILLED ORAL DAILY
Status: DISCONTINUED | OUTPATIENT
Start: 2021-01-01 | End: 2021-01-01

## 2021-01-01 RX ORDER — SODIUM CHLORIDE 0.9 % (FLUSH) 0.9 %
5-40 SYRINGE (ML) INJECTION EVERY 8 HOURS
Status: CANCELLED | OUTPATIENT
Start: 2021-01-01

## 2021-01-01 RX ORDER — DOCUSATE SODIUM 100 MG/1
100 CAPSULE, LIQUID FILLED ORAL 2 TIMES DAILY
Status: DISCONTINUED | OUTPATIENT
Start: 2021-01-01 | End: 2021-01-01

## 2021-01-01 RX ORDER — FUROSEMIDE 40 MG/1
40 TABLET ORAL 2 TIMES DAILY
Status: DISCONTINUED | OUTPATIENT
Start: 2021-01-01 | End: 2021-01-01 | Stop reason: HOSPADM

## 2021-01-01 RX ORDER — METOPROLOL TARTRATE 25 MG/1
12.5 TABLET, FILM COATED ORAL EVERY 12 HOURS
Qty: 14 TABLET | Refills: 0 | Status: SHIPPED | OUTPATIENT
Start: 2021-01-01 | End: 2021-01-01 | Stop reason: SDUPTHER

## 2021-01-01 RX ORDER — FAMOTIDINE 20 MG/1
20 TABLET, FILM COATED ORAL 2 TIMES DAILY
Status: CANCELLED | OUTPATIENT
Start: 2021-01-01

## 2021-01-01 RX ORDER — LANOLIN ALCOHOL/MO/W.PET/CERES
1000 CREAM (GRAM) TOPICAL DAILY
Status: DISCONTINUED | OUTPATIENT
Start: 2021-01-01 | End: 2021-01-01 | Stop reason: HOSPADM

## 2021-01-01 RX ORDER — CARBIDOPA AND LEVODOPA 25; 250 MG/1; MG/1
1 TABLET ORAL 4 TIMES DAILY
Status: CANCELLED | OUTPATIENT
Start: 2021-01-01

## 2021-01-01 RX ORDER — CEPHALEXIN 250 MG/1
500 CAPSULE ORAL
Status: COMPLETED | OUTPATIENT
Start: 2021-01-01 | End: 2021-01-01

## 2021-01-01 RX ORDER — HYDRALAZINE HYDROCHLORIDE 20 MG/ML
10 INJECTION INTRAMUSCULAR; INTRAVENOUS
Status: COMPLETED | OUTPATIENT
Start: 2021-01-01 | End: 2021-01-01

## 2021-01-01 RX ORDER — SODIUM CHLORIDE 0.9 % (FLUSH) 0.9 %
5-40 SYRINGE (ML) INJECTION AS NEEDED
Status: DISCONTINUED | OUTPATIENT
Start: 2021-01-01 | End: 2021-01-01 | Stop reason: ALTCHOICE

## 2021-01-01 RX ORDER — FAMOTIDINE 20 MG/1
20 TABLET, FILM COATED ORAL EVERY EVENING
Status: DISCONTINUED | OUTPATIENT
Start: 2021-01-01 | End: 2021-01-01

## 2021-01-01 RX ORDER — POTASSIUM CHLORIDE 750 MG/1
30 TABLET, FILM COATED, EXTENDED RELEASE ORAL 2 TIMES DAILY
Status: COMPLETED | OUTPATIENT
Start: 2021-01-01 | End: 2021-01-01

## 2021-01-01 RX ORDER — SODIUM CHLORIDE 9 MG/ML
125 INJECTION, SOLUTION INTRAVENOUS CONTINUOUS
Status: DISCONTINUED | OUTPATIENT
Start: 2021-01-01 | End: 2021-01-01

## 2021-01-01 RX ORDER — METOPROLOL TARTRATE 25 MG/1
12.5 TABLET, FILM COATED ORAL EVERY 12 HOURS
Status: DISCONTINUED | OUTPATIENT
Start: 2021-01-01 | End: 2021-01-01 | Stop reason: HOSPADM

## 2021-01-01 RX ORDER — HYDRALAZINE HYDROCHLORIDE 20 MG/ML
20 INJECTION INTRAMUSCULAR; INTRAVENOUS
Status: CANCELLED | OUTPATIENT
Start: 2021-01-01

## 2021-01-01 RX ORDER — FAMOTIDINE 20 MG/1
20 TABLET, FILM COATED ORAL EVERY EVENING
Qty: 14 TABLET | Refills: 0 | Status: SHIPPED | OUTPATIENT
Start: 2021-01-01 | End: 2021-01-01 | Stop reason: SDUPTHER

## 2021-01-01 RX ORDER — ONDANSETRON 2 MG/ML
4 INJECTION INTRAMUSCULAR; INTRAVENOUS
Status: DISCONTINUED | OUTPATIENT
Start: 2021-01-01 | End: 2021-01-01 | Stop reason: HOSPADM

## 2021-01-01 RX ORDER — FAMOTIDINE 20 MG/1
20 TABLET, FILM COATED ORAL EVERY EVENING
Status: CANCELLED | OUTPATIENT
Start: 2021-01-01

## 2021-01-01 RX ORDER — METOPROLOL TARTRATE 25 MG/1
12.5 TABLET, FILM COATED ORAL EVERY 12 HOURS
Qty: 60 TABLET | Refills: 0 | Status: SHIPPED | OUTPATIENT
Start: 2021-01-01 | End: 2021-01-01 | Stop reason: SDUPTHER

## 2021-01-01 RX ORDER — ENOXAPARIN SODIUM 100 MG/ML
40 INJECTION SUBCUTANEOUS DAILY
Status: DISCONTINUED | OUTPATIENT
Start: 2021-01-01 | End: 2021-01-01 | Stop reason: HOSPADM

## 2021-01-01 RX ORDER — FAMOTIDINE 20 MG/1
20 TABLET, FILM COATED ORAL EVERY EVENING
Status: DISCONTINUED | OUTPATIENT
Start: 2021-01-01 | End: 2021-01-01 | Stop reason: HOSPADM

## 2021-01-01 RX ORDER — POTASSIUM CHLORIDE 750 MG/1
20 TABLET, FILM COATED, EXTENDED RELEASE ORAL DAILY
Status: DISCONTINUED | OUTPATIENT
Start: 2021-01-01 | End: 2021-01-01 | Stop reason: HOSPADM

## 2021-01-01 RX ORDER — POLYETHYLENE GLYCOL 3350 17 G/17G
17 POWDER, FOR SOLUTION ORAL DAILY PRN
Status: DISCONTINUED | OUTPATIENT
Start: 2021-01-01 | End: 2021-01-01 | Stop reason: HOSPADM

## 2021-01-01 RX ORDER — POTASSIUM CHLORIDE 750 MG/1
20 TABLET, FILM COATED, EXTENDED RELEASE ORAL DAILY
Status: CANCELLED | OUTPATIENT
Start: 2021-01-01

## 2021-01-01 RX ORDER — ACETAMINOPHEN 650 MG/1
650 SUPPOSITORY RECTAL
Status: DISCONTINUED | OUTPATIENT
Start: 2021-01-01 | End: 2021-01-01 | Stop reason: HOSPADM

## 2021-01-01 RX ORDER — PROMETHAZINE HYDROCHLORIDE 25 MG/1
12.5 TABLET ORAL
Status: DISCONTINUED | OUTPATIENT
Start: 2021-01-01 | End: 2021-01-01 | Stop reason: HOSPADM

## 2021-01-01 RX ORDER — METOPROLOL TARTRATE 25 MG/1
12.5 TABLET, FILM COATED ORAL EVERY 12 HOURS
Status: CANCELLED | OUTPATIENT
Start: 2021-01-01

## 2021-01-01 RX ORDER — FINASTERIDE 5 MG/1
5 TABLET, FILM COATED ORAL DAILY
Status: DISCONTINUED | OUTPATIENT
Start: 2021-01-01 | End: 2021-01-01 | Stop reason: HOSPADM

## 2021-01-01 RX ORDER — OMADACYCLINE 150 MG/1
TABLET, FILM COATED ORAL
COMMUNITY
Start: 2021-01-01 | End: 2021-01-01

## 2021-01-01 RX ORDER — FLUDROCORTISONE ACETATE 0.1 MG/1
0.1 TABLET ORAL DAILY
Qty: 90 TABLET | Refills: 1 | Status: SHIPPED | OUTPATIENT
Start: 2021-01-01

## 2021-01-01 RX ORDER — LIDOCAINE HYDROCHLORIDE 20 MG/ML
INJECTION, SOLUTION EPIDURAL; INFILTRATION; INTRACAUDAL; PERINEURAL AS NEEDED
Status: DISCONTINUED | OUTPATIENT
Start: 2021-01-01 | End: 2021-01-01 | Stop reason: HOSPADM

## 2021-01-01 RX ORDER — METOPROLOL TARTRATE 25 MG/1
12.5 TABLET, FILM COATED ORAL EVERY 12 HOURS
Qty: 90 TABLET | Refills: 2 | Status: SHIPPED | OUTPATIENT
Start: 2021-01-01

## 2021-01-01 RX ORDER — FAMOTIDINE 20 MG/1
20 TABLET, FILM COATED ORAL EVERY EVENING
Qty: 30 TABLET | Refills: 0 | Status: SHIPPED | OUTPATIENT
Start: 2021-01-01 | End: 2021-01-01 | Stop reason: SDUPTHER

## 2021-01-01 RX ORDER — CEPHALEXIN 500 MG/1
500 CAPSULE ORAL 3 TIMES DAILY
Qty: 21 CAPSULE | Refills: 0 | Status: SHIPPED | OUTPATIENT
Start: 2021-01-01 | End: 2021-01-01

## 2021-01-01 RX ORDER — GABAPENTIN 100 MG/1
100 CAPSULE ORAL 2 TIMES DAILY
Status: CANCELLED | OUTPATIENT
Start: 2021-01-01

## 2021-01-01 RX ORDER — ACETAMINOPHEN 500 MG
500 TABLET ORAL
Status: CANCELLED | OUTPATIENT
Start: 2021-01-01

## 2021-01-01 RX ORDER — CARBIDOPA AND LEVODOPA 25; 250 MG/1; MG/1
1 TABLET ORAL 3 TIMES DAILY
Status: DISCONTINUED | OUTPATIENT
Start: 2021-01-01 | End: 2021-01-01 | Stop reason: HOSPADM

## 2021-01-01 RX ORDER — FERROUS SULFATE 324(65)MG
1 TABLET, DELAYED RELEASE (ENTERIC COATED) ORAL
Status: DISCONTINUED | OUTPATIENT
Start: 2021-01-01 | End: 2021-01-01 | Stop reason: HOSPADM

## 2021-01-01 RX ORDER — BUMETANIDE 0.25 MG/ML
1 INJECTION INTRAMUSCULAR; INTRAVENOUS
Status: COMPLETED | OUTPATIENT
Start: 2021-01-01 | End: 2021-01-01

## 2021-01-01 RX ORDER — ACETAMINOPHEN 325 MG/1
650 TABLET ORAL
Status: DISCONTINUED | OUTPATIENT
Start: 2021-01-01 | End: 2021-01-01 | Stop reason: HOSPADM

## 2021-01-01 RX ORDER — FINASTERIDE 5 MG/1
5 TABLET, FILM COATED ORAL DAILY
Status: CANCELLED | OUTPATIENT
Start: 2021-01-01

## 2021-01-01 RX ORDER — ONDANSETRON 2 MG/ML
4 INJECTION INTRAMUSCULAR; INTRAVENOUS
Status: CANCELLED | OUTPATIENT
Start: 2021-01-01

## 2021-01-01 RX ORDER — LANOLIN ALCOHOL/MO/W.PET/CERES
500 CREAM (GRAM) TOPICAL DAILY
Status: DISCONTINUED | OUTPATIENT
Start: 2021-01-01 | End: 2021-01-01 | Stop reason: HOSPADM

## 2021-01-01 RX ORDER — FAMOTIDINE 20 MG/1
20 TABLET, FILM COATED ORAL EVERY EVENING
Qty: 90 TABLET | Refills: 4 | Status: SHIPPED | OUTPATIENT
Start: 2021-01-01

## 2021-01-01 RX ORDER — SODIUM CHLORIDE, SODIUM LACTATE, POTASSIUM CHLORIDE, CALCIUM CHLORIDE 600; 310; 30; 20 MG/100ML; MG/100ML; MG/100ML; MG/100ML
INJECTION, SOLUTION INTRAVENOUS
Status: DISCONTINUED | OUTPATIENT
Start: 2021-01-01 | End: 2021-01-01 | Stop reason: HOSPADM

## 2021-01-01 RX ORDER — METOPROLOL TARTRATE 25 MG/1
12.5 TABLET, FILM COATED ORAL EVERY 12 HOURS
Qty: 30 TABLET | Refills: 5 | Status: SHIPPED | OUTPATIENT
Start: 2021-01-01 | End: 2021-01-01 | Stop reason: SDUPTHER

## 2021-01-01 RX ORDER — FUROSEMIDE 10 MG/ML
40 INJECTION INTRAMUSCULAR; INTRAVENOUS DAILY
Status: DISCONTINUED | OUTPATIENT
Start: 2021-01-01 | End: 2021-01-01

## 2021-01-01 RX ORDER — DOCUSATE SODIUM 100 MG/1
100 CAPSULE, LIQUID FILLED ORAL 2 TIMES DAILY
Status: DISCONTINUED | OUTPATIENT
Start: 2021-01-01 | End: 2021-01-01 | Stop reason: HOSPADM

## 2021-01-01 RX ORDER — FLUDROCORTISONE ACETATE 0.1 MG/1
0.1 TABLET ORAL DAILY
Status: DISCONTINUED | OUTPATIENT
Start: 2021-01-01 | End: 2021-01-01 | Stop reason: HOSPADM

## 2021-01-01 RX ORDER — CARBIDOPA AND LEVODOPA 25; 250 MG/1; MG/1
1 TABLET ORAL 3 TIMES DAILY
Status: CANCELLED | OUTPATIENT
Start: 2021-01-01

## 2021-01-01 RX ORDER — GLYCOPYRROLATE 0.2 MG/ML
INJECTION INTRAMUSCULAR; INTRAVENOUS AS NEEDED
Status: DISCONTINUED | OUTPATIENT
Start: 2021-01-01 | End: 2021-01-01 | Stop reason: HOSPADM

## 2021-01-01 RX ORDER — LANOLIN ALCOHOL/MO/W.PET/CERES
CREAM (GRAM) TOPICAL
Qty: 180 TABLET | Refills: 3 | Status: SHIPPED | OUTPATIENT
Start: 2021-01-01

## 2021-01-01 RX ORDER — SODIUM CHLORIDE 0.9 % (FLUSH) 0.9 %
5-40 SYRINGE (ML) INJECTION EVERY 8 HOURS
Status: DISCONTINUED | OUTPATIENT
Start: 2021-01-01 | End: 2021-01-01

## 2021-01-01 RX ORDER — TAMSULOSIN HYDROCHLORIDE 0.4 MG/1
0.4 CAPSULE ORAL
Qty: 30 CAPSULE | Refills: 0 | Status: SHIPPED
Start: 2021-01-01

## 2021-01-01 RX ORDER — LANOLIN ALCOHOL/MO/W.PET/CERES
500 CREAM (GRAM) TOPICAL DAILY
Status: CANCELLED | OUTPATIENT
Start: 2021-01-01

## 2021-01-01 RX ORDER — PROMETHAZINE HYDROCHLORIDE 25 MG/1
12.5 TABLET ORAL
Status: CANCELLED | OUTPATIENT
Start: 2021-01-01

## 2021-01-01 RX ORDER — CARBIDOPA AND LEVODOPA 25; 250 MG/1; MG/1
1 TABLET ORAL 4 TIMES DAILY
Status: DISCONTINUED | OUTPATIENT
Start: 2021-01-01 | End: 2021-01-01 | Stop reason: HOSPADM

## 2021-01-01 RX ORDER — LANOLIN ALCOHOL/MO/W.PET/CERES
1000 CREAM (GRAM) TOPICAL DAILY
Status: CANCELLED | OUTPATIENT
Start: 2021-01-01

## 2021-01-01 RX ORDER — TAMSULOSIN HYDROCHLORIDE 0.4 MG/1
0.4 CAPSULE ORAL
Status: CANCELLED | OUTPATIENT
Start: 2021-01-01

## 2021-01-01 RX ORDER — CEPHALEXIN 500 MG/1
500 CAPSULE ORAL 3 TIMES DAILY
Qty: 21 CAPSULE | Refills: 0 | Status: SHIPPED | OUTPATIENT
Start: 2021-01-01 | End: 2021-01-01 | Stop reason: SDUPTHER

## 2021-01-01 RX ORDER — GABAPENTIN 100 MG/1
100 CAPSULE ORAL 2 TIMES DAILY
Qty: 28 CAPSULE | Refills: 0 | Status: SHIPPED | OUTPATIENT
Start: 2021-01-01 | End: 2021-01-01 | Stop reason: SDUPTHER

## 2021-01-01 RX ORDER — GABAPENTIN 100 MG/1
100 CAPSULE ORAL 2 TIMES DAILY
Qty: 60 CAPSULE | Refills: 0 | Status: SHIPPED | OUTPATIENT
Start: 2021-01-01 | End: 2021-01-01 | Stop reason: SDUPTHER

## 2021-01-01 RX ORDER — GABAPENTIN 100 MG/1
100 CAPSULE ORAL 2 TIMES DAILY
Qty: 28 CAPSULE | Refills: 0 | Status: SHIPPED | OUTPATIENT
Start: 2021-01-01 | End: 2021-01-01

## 2021-01-01 RX ORDER — ACETAMINOPHEN 500 MG
500 TABLET ORAL
Status: DISCONTINUED | OUTPATIENT
Start: 2021-01-01 | End: 2021-01-01 | Stop reason: HOSPADM

## 2021-01-01 RX ORDER — ACETAMINOPHEN 325 MG/1
650 TABLET ORAL
Status: CANCELLED | OUTPATIENT
Start: 2021-01-01

## 2021-01-01 RX ORDER — TAMSULOSIN HYDROCHLORIDE 0.4 MG/1
0.4 CAPSULE ORAL DAILY
Status: CANCELLED | OUTPATIENT
Start: 2021-01-01

## 2021-01-01 RX ORDER — HYDRALAZINE HYDROCHLORIDE 20 MG/ML
20 INJECTION INTRAMUSCULAR; INTRAVENOUS
Status: DISCONTINUED | OUTPATIENT
Start: 2021-01-01 | End: 2021-01-01 | Stop reason: HOSPADM

## 2021-01-01 RX ORDER — ENOXAPARIN SODIUM 100 MG/ML
40 INJECTION SUBCUTANEOUS DAILY
Status: CANCELLED | OUTPATIENT
Start: 2021-01-01

## 2021-01-01 RX ORDER — BACITRACIN ZINC 500 [USP'U]/G
1 CREAM TOPICAL
Status: COMPLETED | OUTPATIENT
Start: 2021-01-01 | End: 2021-01-01

## 2021-01-01 RX ORDER — SODIUM CHLORIDE 0.9 % (FLUSH) 0.9 %
5-10 SYRINGE (ML) INJECTION ONCE
Status: DISPENSED | OUTPATIENT
Start: 2021-01-01 | End: 2021-01-01

## 2021-01-01 RX ORDER — SODIUM CHLORIDE 9 MG/ML
100 INJECTION, SOLUTION INTRAVENOUS ONCE
Status: COMPLETED | OUTPATIENT
Start: 2021-01-01 | End: 2021-01-01

## 2021-01-01 RX ORDER — FERROUS SULFATE 324(65)MG
1 TABLET, DELAYED RELEASE (ENTERIC COATED) ORAL
Status: CANCELLED | OUTPATIENT
Start: 2021-01-01

## 2021-01-01 RX ORDER — FUROSEMIDE 40 MG/1
40 TABLET ORAL DAILY
Qty: 30 TABLET | Refills: 2 | Status: SHIPPED | OUTPATIENT
Start: 2021-01-01 | End: 2021-01-01

## 2021-01-01 RX ORDER — HYDRALAZINE HYDROCHLORIDE 20 MG/ML
20 INJECTION INTRAMUSCULAR; INTRAVENOUS
Status: DISCONTINUED | OUTPATIENT
Start: 2021-01-01 | End: 2021-01-01

## 2021-01-01 RX ORDER — POTASSIUM CHLORIDE 20 MEQ/1
20 TABLET, EXTENDED RELEASE ORAL DAILY
Qty: 90 TABLET | Refills: 3 | Status: SHIPPED | OUTPATIENT
Start: 2021-01-01 | End: 2021-01-01 | Stop reason: ALTCHOICE

## 2021-01-01 RX ORDER — PROPOFOL 10 MG/ML
INJECTION, EMULSION INTRAVENOUS AS NEEDED
Status: DISCONTINUED | OUTPATIENT
Start: 2021-01-01 | End: 2021-01-01 | Stop reason: HOSPADM

## 2021-01-01 RX ORDER — FLUDROCORTISONE ACETATE 0.1 MG/1
0.1 TABLET ORAL DAILY
Status: CANCELLED | OUTPATIENT
Start: 2021-01-01

## 2021-01-01 RX ADMIN — Medication 10 ML: at 17:57

## 2021-01-01 RX ADMIN — CARBIDOPA AND LEVODOPA 1 TABLET: 25; 250 TABLET ORAL at 22:39

## 2021-01-01 RX ADMIN — Medication 5 ML: at 22:50

## 2021-01-01 RX ADMIN — FUROSEMIDE 40 MG: 40 TABLET ORAL at 17:49

## 2021-01-01 RX ADMIN — METOPROLOL TARTRATE 12.5 MG: 25 TABLET, FILM COATED ORAL at 21:54

## 2021-01-01 RX ADMIN — POTASSIUM CHLORIDE 20 MEQ: 750 TABLET, FILM COATED, EXTENDED RELEASE ORAL at 10:53

## 2021-01-01 RX ADMIN — FAMOTIDINE 20 MG: 20 TABLET ORAL at 18:00

## 2021-01-01 RX ADMIN — FERROUS SULFATE TAB EC 324 MG (65 MG FE EQUIVALENT) 324 MG: 324 (65 FE) TABLET DELAYED RESPONSE at 10:59

## 2021-01-01 RX ADMIN — FINASTERIDE 5 MG: 5 TABLET, FILM COATED ORAL at 10:41

## 2021-01-01 RX ADMIN — FUROSEMIDE 40 MG: 40 TABLET ORAL at 14:52

## 2021-01-01 RX ADMIN — CARBIDOPA AND LEVODOPA 1 TABLET: 25; 250 TABLET ORAL at 13:46

## 2021-01-01 RX ADMIN — CARBIDOPA AND LEVODOPA 1 TABLET: 25; 250 TABLET ORAL at 21:23

## 2021-01-01 RX ADMIN — FAMOTIDINE 20 MG: 20 TABLET ORAL at 09:55

## 2021-01-01 RX ADMIN — Medication 10 ML: at 06:51

## 2021-01-01 RX ADMIN — CARBIDOPA AND LEVODOPA 1 TABLET: 25; 250 TABLET ORAL at 17:09

## 2021-01-01 RX ADMIN — CARBIDOPA AND LEVODOPA 1 TABLET: 25; 250 TABLET ORAL at 18:10

## 2021-01-01 RX ADMIN — BACITRACIN ZINC 1 PACKET: 500 OINTMENT TOPICAL at 05:50

## 2021-01-01 RX ADMIN — ACETAMINOPHEN 500 MG: 500 TABLET ORAL at 20:02

## 2021-01-01 RX ADMIN — CARBIDOPA AND LEVODOPA 1 TABLET: 25; 250 TABLET ORAL at 21:10

## 2021-01-01 RX ADMIN — METOPROLOL TARTRATE 12.5 MG: 25 TABLET, FILM COATED ORAL at 10:54

## 2021-01-01 RX ADMIN — FINASTERIDE 5 MG: 5 TABLET, FILM COATED ORAL at 08:20

## 2021-01-01 RX ADMIN — CARBIDOPA AND LEVODOPA 1 TABLET: 25; 250 TABLET ORAL at 08:49

## 2021-01-01 RX ADMIN — CARBIDOPA AND LEVODOPA 1 TABLET: 25; 250 TABLET ORAL at 09:13

## 2021-01-01 RX ADMIN — TAMSULOSIN HYDROCHLORIDE 0.4 MG: 0.4 CAPSULE ORAL at 21:40

## 2021-01-01 RX ADMIN — METOPROLOL TARTRATE 12.5 MG: 25 TABLET, FILM COATED ORAL at 09:19

## 2021-01-01 RX ADMIN — Medication 10 ML: at 19:16

## 2021-01-01 RX ADMIN — CYANOCOBALAMIN TAB 1000 MCG 500 MCG: 1000 TAB at 11:21

## 2021-01-01 RX ADMIN — FLUDROCORTISONE ACETATE 0.1 MG: 0.1 TABLET ORAL at 10:42

## 2021-01-01 RX ADMIN — METOPROLOL TARTRATE 12.5 MG: 25 TABLET, FILM COATED ORAL at 09:11

## 2021-01-01 RX ADMIN — CARBIDOPA AND LEVODOPA 1 TABLET: 25; 250 TABLET ORAL at 08:45

## 2021-01-01 RX ADMIN — DOCUSATE SODIUM 100 MG: 100 CAPSULE, LIQUID FILLED ORAL at 17:27

## 2021-01-01 RX ADMIN — GABAPENTIN 100 MG: 100 CAPSULE ORAL at 11:15

## 2021-01-01 RX ADMIN — METOPROLOL TARTRATE 12.5 MG: 25 TABLET, FILM COATED ORAL at 21:46

## 2021-01-01 RX ADMIN — CARBIDOPA AND LEVODOPA 1 TABLET: 25; 250 TABLET ORAL at 17:49

## 2021-01-01 RX ADMIN — CARBIDOPA AND LEVODOPA 1 TABLET: 25; 250 TABLET ORAL at 09:10

## 2021-01-01 RX ADMIN — CARBIDOPA AND LEVODOPA 1 TABLET: 25; 250 TABLET ORAL at 09:44

## 2021-01-01 RX ADMIN — METOPROLOL TARTRATE 12.5 MG: 25 TABLET, FILM COATED ORAL at 10:26

## 2021-01-01 RX ADMIN — Medication 5 ML: at 21:24

## 2021-01-01 RX ADMIN — TAMSULOSIN HYDROCHLORIDE 0.4 MG: 0.4 CAPSULE ORAL at 21:55

## 2021-01-01 RX ADMIN — FINASTERIDE 5 MG: 5 TABLET, FILM COATED ORAL at 22:01

## 2021-01-01 RX ADMIN — FAMOTIDINE 20 MG: 20 TABLET ORAL at 17:26

## 2021-01-01 RX ADMIN — Medication 10 ML: at 22:52

## 2021-01-01 RX ADMIN — CARBIDOPA AND LEVODOPA 1 TABLET: 25; 250 TABLET ORAL at 18:03

## 2021-01-01 RX ADMIN — CYANOCOBALAMIN TAB 1000 MCG 500 MCG: 1000 TAB at 10:44

## 2021-01-01 RX ADMIN — FAMOTIDINE 20 MG: 20 TABLET ORAL at 10:58

## 2021-01-01 RX ADMIN — GABAPENTIN 100 MG: 100 CAPSULE ORAL at 09:46

## 2021-01-01 RX ADMIN — TAMSULOSIN HYDROCHLORIDE 0.4 MG: 0.4 CAPSULE ORAL at 09:52

## 2021-01-01 RX ADMIN — FINASTERIDE 5 MG: 5 TABLET, FILM COATED ORAL at 09:08

## 2021-01-01 RX ADMIN — DOCUSATE SODIUM 100 MG: 100 CAPSULE, LIQUID FILLED ORAL at 17:31

## 2021-01-01 RX ADMIN — ENOXAPARIN SODIUM 40 MG: 40 INJECTION SUBCUTANEOUS at 10:01

## 2021-01-01 RX ADMIN — CARBIDOPA AND LEVODOPA 1 TABLET: 25; 250 TABLET ORAL at 21:36

## 2021-01-01 RX ADMIN — TAMSULOSIN HYDROCHLORIDE 0.4 MG: 0.4 CAPSULE ORAL at 21:24

## 2021-01-01 RX ADMIN — CARBIDOPA AND LEVODOPA 1 TABLET: 25; 250 TABLET ORAL at 12:48

## 2021-01-01 RX ADMIN — CEPHALEXIN 500 MG: 250 CAPSULE ORAL at 09:00

## 2021-01-01 RX ADMIN — METOPROLOL TARTRATE 12.5 MG: 25 TABLET, FILM COATED ORAL at 08:12

## 2021-01-01 RX ADMIN — Medication 10 ML: at 14:00

## 2021-01-01 RX ADMIN — CARBIDOPA AND LEVODOPA 1 TABLET: 25; 250 TABLET ORAL at 18:30

## 2021-01-01 RX ADMIN — CARBIDOPA AND LEVODOPA 1 TABLET: 25; 250 TABLET ORAL at 10:44

## 2021-01-01 RX ADMIN — FINASTERIDE 5 MG: 5 TABLET, FILM COATED ORAL at 08:47

## 2021-01-01 RX ADMIN — TAMSULOSIN HYDROCHLORIDE 0.4 MG: 0.4 CAPSULE ORAL at 21:50

## 2021-01-01 RX ADMIN — METOPROLOL TARTRATE 12.5 MG: 25 TABLET, FILM COATED ORAL at 10:42

## 2021-01-01 RX ADMIN — CARBIDOPA AND LEVODOPA 1 TABLET: 25; 250 TABLET ORAL at 15:27

## 2021-01-01 RX ADMIN — BENZOCAINE, BUTAMBEN, AND TETRACAINE HYDROCHLORIDE 1 SPRAY: .028; .004; .004 AEROSOL, SPRAY TOPICAL at 12:48

## 2021-01-01 RX ADMIN — METOPROLOL TARTRATE 12.5 MG: 25 TABLET, FILM COATED ORAL at 08:45

## 2021-01-01 RX ADMIN — METOPROLOL TARTRATE 12.5 MG: 25 TABLET, FILM COATED ORAL at 09:23

## 2021-01-01 RX ADMIN — CARBIDOPA AND LEVODOPA 1 TABLET: 25; 250 TABLET ORAL at 18:26

## 2021-01-01 RX ADMIN — CARBIDOPA AND LEVODOPA 1 TABLET: 25; 250 TABLET ORAL at 22:36

## 2021-01-01 RX ADMIN — FAMOTIDINE 20 MG: 20 TABLET ORAL at 17:31

## 2021-01-01 RX ADMIN — FUROSEMIDE 40 MG: 40 TABLET ORAL at 18:33

## 2021-01-01 RX ADMIN — Medication 5 ML: at 21:58

## 2021-01-01 RX ADMIN — CARBIDOPA AND LEVODOPA 1 TABLET: 25; 250 TABLET ORAL at 18:22

## 2021-01-01 RX ADMIN — CARBIDOPA AND LEVODOPA 1 TABLET: 25; 250 TABLET ORAL at 16:52

## 2021-01-01 RX ADMIN — PROPOFOL 30 MG: 10 INJECTION, EMULSION INTRAVENOUS at 12:51

## 2021-01-01 RX ADMIN — CARBIDOPA AND LEVODOPA 1 TABLET: 25; 250 TABLET ORAL at 17:31

## 2021-01-01 RX ADMIN — FERROUS SULFATE TAB EC 324 MG (65 MG FE EQUIVALENT) 324 MG: 324 (65 FE) TABLET DELAYED RESPONSE at 09:13

## 2021-01-01 RX ADMIN — CYANOCOBALAMIN TAB 1000 MCG 1000 MCG: 1000 TAB at 10:39

## 2021-01-01 RX ADMIN — FINASTERIDE 5 MG: 5 TABLET, FILM COATED ORAL at 08:12

## 2021-01-01 RX ADMIN — METOPROLOL TARTRATE 12.5 MG: 25 TABLET, FILM COATED ORAL at 21:23

## 2021-01-01 RX ADMIN — FERROUS SULFATE TAB EC 324 MG (65 MG FE EQUIVALENT) 324 MG: 324 (65 FE) TABLET DELAYED RESPONSE at 10:33

## 2021-01-01 RX ADMIN — FAMOTIDINE 20 MG: 20 TABLET ORAL at 17:55

## 2021-01-01 RX ADMIN — ENOXAPARIN SODIUM 40 MG: 40 INJECTION SUBCUTANEOUS at 09:12

## 2021-01-01 RX ADMIN — CARBIDOPA AND LEVODOPA 1 TABLET: 25; 250 TABLET ORAL at 08:17

## 2021-01-01 RX ADMIN — Medication 10 ML: at 13:36

## 2021-01-01 RX ADMIN — TAMSULOSIN HYDROCHLORIDE 0.4 MG: 0.4 CAPSULE ORAL at 22:51

## 2021-01-01 RX ADMIN — FAMOTIDINE 20 MG: 20 TABLET ORAL at 11:23

## 2021-01-01 RX ADMIN — POTASSIUM CHLORIDE 30 MEQ: 750 TABLET, FILM COATED, EXTENDED RELEASE ORAL at 17:43

## 2021-01-01 RX ADMIN — CARBIDOPA AND LEVODOPA 1 TABLET: 25; 250 TABLET ORAL at 21:50

## 2021-01-01 RX ADMIN — METOPROLOL TARTRATE 12.5 MG: 25 TABLET, FILM COATED ORAL at 10:35

## 2021-01-01 RX ADMIN — FLUDROCORTISONE ACETATE 0.1 MG: 0.1 TABLET ORAL at 09:59

## 2021-01-01 RX ADMIN — FAMOTIDINE 20 MG: 20 TABLET ORAL at 18:33

## 2021-01-01 RX ADMIN — FUROSEMIDE 40 MG: 40 TABLET ORAL at 10:59

## 2021-01-01 RX ADMIN — CARBIDOPA AND LEVODOPA 1 TABLET: 25; 250 TABLET ORAL at 20:12

## 2021-01-01 RX ADMIN — METOPROLOL TARTRATE 12.5 MG: 25 TABLET, FILM COATED ORAL at 20:45

## 2021-01-01 RX ADMIN — CARBIDOPA AND LEVODOPA 1 TABLET: 25; 250 TABLET ORAL at 22:23

## 2021-01-01 RX ADMIN — METOPROLOL TARTRATE 12.5 MG: 25 TABLET, FILM COATED ORAL at 09:46

## 2021-01-01 RX ADMIN — METOPROLOL TARTRATE 12.5 MG: 25 TABLET, FILM COATED ORAL at 21:31

## 2021-01-01 RX ADMIN — FLUDROCORTISONE ACETATE 0.1 MG: 0.1 TABLET ORAL at 08:57

## 2021-01-01 RX ADMIN — TAMSULOSIN HYDROCHLORIDE 0.4 MG: 0.4 CAPSULE ORAL at 11:15

## 2021-01-01 RX ADMIN — CARBIDOPA AND LEVODOPA 1 TABLET: 25; 250 TABLET ORAL at 09:59

## 2021-01-01 RX ADMIN — CARBIDOPA AND LEVODOPA 1 TABLET: 25; 250 TABLET ORAL at 21:57

## 2021-01-01 RX ADMIN — HYDRALAZINE HYDROCHLORIDE 10 MG: 20 INJECTION INTRAMUSCULAR; INTRAVENOUS at 15:42

## 2021-01-01 RX ADMIN — SODIUM CHLORIDE, POTASSIUM CHLORIDE, SODIUM LACTATE AND CALCIUM CHLORIDE: 600; 310; 30; 20 INJECTION, SOLUTION INTRAVENOUS at 12:26

## 2021-01-01 RX ADMIN — METOPROLOL TARTRATE 12.5 MG: 25 TABLET, FILM COATED ORAL at 09:02

## 2021-01-01 RX ADMIN — FINASTERIDE 5 MG: 5 TABLET, FILM COATED ORAL at 09:55

## 2021-01-01 RX ADMIN — POTASSIUM CHLORIDE 20 MEQ: 750 TABLET, FILM COATED, EXTENDED RELEASE ORAL at 08:45

## 2021-01-01 RX ADMIN — CARBIDOPA AND LEVODOPA 1 TABLET: 25; 250 TABLET ORAL at 12:49

## 2021-01-01 RX ADMIN — METOPROLOL TARTRATE 12.5 MG: 25 TABLET, FILM COATED ORAL at 20:10

## 2021-01-01 RX ADMIN — FAMOTIDINE 20 MG: 20 TABLET ORAL at 18:02

## 2021-01-01 RX ADMIN — FAMOTIDINE 20 MG: 20 TABLET ORAL at 20:46

## 2021-01-01 RX ADMIN — CARBIDOPA AND LEVODOPA 1 TABLET: 25; 250 TABLET ORAL at 20:55

## 2021-01-01 RX ADMIN — FLUDROCORTISONE ACETATE 0.1 MG: 0.1 TABLET ORAL at 10:54

## 2021-01-01 RX ADMIN — FERROUS SULFATE TAB EC 324 MG (65 MG FE EQUIVALENT) 324 MG: 324 (65 FE) TABLET DELAYED RESPONSE at 11:31

## 2021-01-01 RX ADMIN — METOPROLOL TARTRATE 12.5 MG: 25 TABLET, FILM COATED ORAL at 11:31

## 2021-01-01 RX ADMIN — TAMSULOSIN HYDROCHLORIDE 0.4 MG: 0.4 CAPSULE ORAL at 09:22

## 2021-01-01 RX ADMIN — FERROUS SULFATE TAB EC 324 MG (65 MG FE EQUIVALENT) 324 MG: 324 (65 FE) TABLET DELAYED RESPONSE at 10:52

## 2021-01-01 RX ADMIN — FAMOTIDINE 20 MG: 20 TABLET ORAL at 18:10

## 2021-01-01 RX ADMIN — ENOXAPARIN SODIUM 40 MG: 40 INJECTION SUBCUTANEOUS at 09:50

## 2021-01-01 RX ADMIN — METOPROLOL TARTRATE 12.5 MG: 25 TABLET, FILM COATED ORAL at 10:38

## 2021-01-01 RX ADMIN — GABAPENTIN 100 MG: 100 CAPSULE ORAL at 17:00

## 2021-01-01 RX ADMIN — CARBIDOPA AND LEVODOPA 1 TABLET: 25; 250 TABLET ORAL at 14:07

## 2021-01-01 RX ADMIN — GABAPENTIN 100 MG: 100 CAPSULE ORAL at 09:14

## 2021-01-01 RX ADMIN — CARBIDOPA AND LEVODOPA 1 TABLET: 25; 250 TABLET ORAL at 14:03

## 2021-01-01 RX ADMIN — TAMSULOSIN HYDROCHLORIDE 0.4 MG: 0.4 CAPSULE ORAL at 20:12

## 2021-01-01 RX ADMIN — METOPROLOL TARTRATE 12.5 MG: 25 TABLET, FILM COATED ORAL at 20:54

## 2021-01-01 RX ADMIN — SODIUM CHLORIDE 125 ML/HR: 9 INJECTION, SOLUTION INTRAVENOUS at 06:37

## 2021-01-01 RX ADMIN — METOPROLOL TARTRATE 12.5 MG: 25 TABLET, FILM COATED ORAL at 09:05

## 2021-01-01 RX ADMIN — FAMOTIDINE 20 MG: 20 TABLET ORAL at 10:33

## 2021-01-01 RX ADMIN — CARBIDOPA AND LEVODOPA 1 TABLET: 25; 250 TABLET ORAL at 22:12

## 2021-01-01 RX ADMIN — METOPROLOL TARTRATE 12.5 MG: 25 TABLET, FILM COATED ORAL at 21:19

## 2021-01-01 RX ADMIN — METOPROLOL TARTRATE 12.5 MG: 25 TABLET, FILM COATED ORAL at 21:56

## 2021-01-01 RX ADMIN — ENOXAPARIN SODIUM 40 MG: 40 INJECTION SUBCUTANEOUS at 10:54

## 2021-01-01 RX ADMIN — FLUDROCORTISONE ACETATE 0.1 MG: 0.1 TABLET ORAL at 09:20

## 2021-01-01 RX ADMIN — POTASSIUM CHLORIDE 20 MEQ: 750 TABLET, FILM COATED, EXTENDED RELEASE ORAL at 09:10

## 2021-01-01 RX ADMIN — FERROUS SULFATE TAB EC 324 MG (65 MG FE EQUIVALENT) 324 MG: 324 (65 FE) TABLET DELAYED RESPONSE at 09:23

## 2021-01-01 RX ADMIN — GABAPENTIN 100 MG: 100 CAPSULE ORAL at 09:47

## 2021-01-01 RX ADMIN — FAMOTIDINE 20 MG: 20 TABLET ORAL at 11:30

## 2021-01-01 RX ADMIN — FAMOTIDINE 20 MG: 20 TABLET ORAL at 09:12

## 2021-01-01 RX ADMIN — TAMSULOSIN HYDROCHLORIDE 0.4 MG: 0.4 CAPSULE ORAL at 21:18

## 2021-01-01 RX ADMIN — POTASSIUM CHLORIDE 20 MEQ: 750 TABLET, FILM COATED, EXTENDED RELEASE ORAL at 10:40

## 2021-01-01 RX ADMIN — FAMOTIDINE 20 MG: 20 TABLET ORAL at 17:39

## 2021-01-01 RX ADMIN — CARBIDOPA AND LEVODOPA 1 TABLET: 25; 250 TABLET ORAL at 09:00

## 2021-01-01 RX ADMIN — CARBIDOPA AND LEVODOPA 1 TABLET: 25; 250 TABLET ORAL at 12:14

## 2021-01-01 RX ADMIN — CARBIDOPA AND LEVODOPA 1 TABLET: 25; 250 TABLET ORAL at 21:47

## 2021-01-01 RX ADMIN — TAMSULOSIN HYDROCHLORIDE 0.4 MG: 0.4 CAPSULE ORAL at 10:39

## 2021-01-01 RX ADMIN — CARBIDOPA AND LEVODOPA 1 TABLET: 25; 250 TABLET ORAL at 13:34

## 2021-01-01 RX ADMIN — CARBIDOPA AND LEVODOPA 1 TABLET: 25; 250 TABLET ORAL at 10:38

## 2021-01-01 RX ADMIN — TAMSULOSIN HYDROCHLORIDE 0.4 MG: 0.4 CAPSULE ORAL at 21:47

## 2021-01-01 RX ADMIN — FLUDROCORTISONE ACETATE 0.1 MG: 0.1 TABLET ORAL at 09:11

## 2021-01-01 RX ADMIN — CARBIDOPA AND LEVODOPA 1 TABLET: 25; 250 TABLET ORAL at 11:30

## 2021-01-01 RX ADMIN — Medication 10 ML: at 21:40

## 2021-01-01 RX ADMIN — FAMOTIDINE 20 MG: 20 TABLET ORAL at 17:13

## 2021-01-01 RX ADMIN — HYDRALAZINE HYDROCHLORIDE 20 MG: 20 INJECTION INTRAMUSCULAR; INTRAVENOUS at 07:27

## 2021-01-01 RX ADMIN — CYANOCOBALAMIN TAB 1000 MCG 1000 MCG: 1000 TAB at 09:13

## 2021-01-01 RX ADMIN — CYANOCOBALAMIN TAB 1000 MCG 500 MCG: 1000 TAB at 10:08

## 2021-01-01 RX ADMIN — FAMOTIDINE 20 MG: 20 TABLET ORAL at 17:50

## 2021-01-01 RX ADMIN — CARBIDOPA AND LEVODOPA 1 TABLET: 25; 250 TABLET ORAL at 08:59

## 2021-01-01 RX ADMIN — FUROSEMIDE 40 MG: 40 TABLET ORAL at 17:26

## 2021-01-01 RX ADMIN — ENOXAPARIN SODIUM 40 MG: 40 INJECTION SUBCUTANEOUS at 08:11

## 2021-01-01 RX ADMIN — CARBIDOPA AND LEVODOPA 1 TABLET: 25; 250 TABLET ORAL at 21:49

## 2021-01-01 RX ADMIN — ENOXAPARIN SODIUM 40 MG: 40 INJECTION SUBCUTANEOUS at 10:44

## 2021-01-01 RX ADMIN — CARBIDOPA AND LEVODOPA 1 TABLET: 25; 250 TABLET ORAL at 13:37

## 2021-01-01 RX ADMIN — FLUDROCORTISONE ACETATE 0.1 MG: 0.1 TABLET ORAL at 09:08

## 2021-01-01 RX ADMIN — CARBIDOPA AND LEVODOPA 1 TABLET: 25; 250 TABLET ORAL at 14:30

## 2021-01-01 RX ADMIN — FAMOTIDINE 20 MG: 20 TABLET ORAL at 09:26

## 2021-01-01 RX ADMIN — CYANOCOBALAMIN TAB 1000 MCG 500 MCG: 1000 TAB at 10:43

## 2021-01-01 RX ADMIN — CARBIDOPA AND LEVODOPA 1 TABLET: 25; 250 TABLET ORAL at 22:08

## 2021-01-01 RX ADMIN — POTASSIUM CHLORIDE 20 MEQ: 750 TABLET, FILM COATED, EXTENDED RELEASE ORAL at 09:55

## 2021-01-01 RX ADMIN — FAMOTIDINE 20 MG: 20 TABLET ORAL at 17:44

## 2021-01-01 RX ADMIN — FAMOTIDINE 20 MG: 20 TABLET ORAL at 17:28

## 2021-01-01 RX ADMIN — ENOXAPARIN SODIUM 40 MG: 40 INJECTION SUBCUTANEOUS at 09:37

## 2021-01-01 RX ADMIN — METOPROLOL TARTRATE 12.5 MG: 25 TABLET, FILM COATED ORAL at 10:44

## 2021-01-01 RX ADMIN — FINASTERIDE 5 MG: 5 TABLET, FILM COATED ORAL at 10:08

## 2021-01-01 RX ADMIN — TAMSULOSIN HYDROCHLORIDE 0.4 MG: 0.4 CAPSULE ORAL at 22:12

## 2021-01-01 RX ADMIN — SODIUM CHLORIDE 125 ML/HR: 9 INJECTION, SOLUTION INTRAVENOUS at 11:07

## 2021-01-01 RX ADMIN — METOPROLOL TARTRATE 12.5 MG: 25 TABLET, FILM COATED ORAL at 20:11

## 2021-01-01 RX ADMIN — FINASTERIDE 5 MG: 5 TABLET, FILM COATED ORAL at 10:43

## 2021-01-01 RX ADMIN — CYANOCOBALAMIN TAB 1000 MCG 1000 MCG: 1000 TAB at 11:28

## 2021-01-01 RX ADMIN — Medication 10 ML: at 17:44

## 2021-01-01 RX ADMIN — POTASSIUM CHLORIDE 20 MEQ: 750 TABLET, FILM COATED, EXTENDED RELEASE ORAL at 08:12

## 2021-01-01 RX ADMIN — CARBIDOPA AND LEVODOPA 1 TABLET: 25; 250 TABLET ORAL at 14:04

## 2021-01-01 RX ADMIN — FINASTERIDE 5 MG: 5 TABLET, FILM COATED ORAL at 09:45

## 2021-01-01 RX ADMIN — FAMOTIDINE 20 MG: 20 TABLET ORAL at 09:18

## 2021-01-01 RX ADMIN — METOPROLOL TARTRATE 12.5 MG: 25 TABLET, FILM COATED ORAL at 11:13

## 2021-01-01 RX ADMIN — CARBIDOPA AND LEVODOPA 1 TABLET: 25; 250 TABLET ORAL at 22:00

## 2021-01-01 RX ADMIN — METOPROLOL TARTRATE 12.5 MG: 25 TABLET, FILM COATED ORAL at 09:51

## 2021-01-01 RX ADMIN — CYANOCOBALAMIN TAB 1000 MCG 500 MCG: 1000 TAB at 09:19

## 2021-01-01 RX ADMIN — CARBIDOPA AND LEVODOPA 1 TABLET: 25; 250 TABLET ORAL at 09:55

## 2021-01-01 RX ADMIN — CARBIDOPA AND LEVODOPA 1 TABLET: 25; 250 TABLET ORAL at 13:12

## 2021-01-01 RX ADMIN — FAMOTIDINE 20 MG: 20 TABLET ORAL at 08:57

## 2021-01-01 RX ADMIN — CARBIDOPA AND LEVODOPA 1 TABLET: 25; 250 TABLET ORAL at 17:44

## 2021-01-01 RX ADMIN — FUROSEMIDE 40 MG: 40 TABLET ORAL at 09:00

## 2021-01-01 RX ADMIN — CARBIDOPA AND LEVODOPA 1 TABLET: 25; 250 TABLET ORAL at 17:50

## 2021-01-01 RX ADMIN — FLUDROCORTISONE ACETATE 0.1 MG: 0.1 TABLET ORAL at 08:49

## 2021-01-01 RX ADMIN — CARBIDOPA AND LEVODOPA 1 TABLET: 25; 250 TABLET ORAL at 16:36

## 2021-01-01 RX ADMIN — FUROSEMIDE 40 MG: 40 TABLET ORAL at 18:01

## 2021-01-01 RX ADMIN — CARBIDOPA AND LEVODOPA 1 TABLET: 25; 250 TABLET ORAL at 21:15

## 2021-01-01 RX ADMIN — CYANOCOBALAMIN TAB 1000 MCG 500 MCG: 1000 TAB at 09:00

## 2021-01-01 RX ADMIN — FINASTERIDE 5 MG: 5 TABLET, FILM COATED ORAL at 09:17

## 2021-01-01 RX ADMIN — FINASTERIDE 5 MG: 5 TABLET, FILM COATED ORAL at 09:20

## 2021-01-01 RX ADMIN — FINASTERIDE 5 MG: 5 TABLET, FILM COATED ORAL at 10:50

## 2021-01-01 RX ADMIN — POTASSIUM CHLORIDE 20 MEQ: 750 TABLET, FILM COATED, EXTENDED RELEASE ORAL at 11:31

## 2021-01-01 RX ADMIN — ENOXAPARIN SODIUM 40 MG: 40 INJECTION SUBCUTANEOUS at 09:19

## 2021-01-01 RX ADMIN — CARBIDOPA AND LEVODOPA 1 TABLET: 25; 250 TABLET ORAL at 09:01

## 2021-01-01 RX ADMIN — METOPROLOL TARTRATE 12.5 MG: 25 TABLET, FILM COATED ORAL at 21:49

## 2021-01-01 RX ADMIN — FLUDROCORTISONE ACETATE 0.1 MG: 0.1 TABLET ORAL at 11:31

## 2021-01-01 RX ADMIN — METOPROLOL TARTRATE 12.5 MG: 25 TABLET, FILM COATED ORAL at 10:31

## 2021-01-01 RX ADMIN — POTASSIUM CHLORIDE 20 MEQ: 750 TABLET, FILM COATED, EXTENDED RELEASE ORAL at 09:23

## 2021-01-01 RX ADMIN — CYANOCOBALAMIN TAB 1000 MCG 500 MCG: 1000 TAB at 09:12

## 2021-01-01 RX ADMIN — METOPROLOL TARTRATE 12.5 MG: 25 TABLET, FILM COATED ORAL at 20:06

## 2021-01-01 RX ADMIN — FAMOTIDINE 20 MG: 20 TABLET ORAL at 18:01

## 2021-01-01 RX ADMIN — FERROUS SULFATE TAB EC 324 MG (65 MG FE EQUIVALENT) 324 MG: 324 (65 FE) TABLET DELAYED RESPONSE at 09:22

## 2021-01-01 RX ADMIN — Medication 10 ML: at 17:11

## 2021-01-01 RX ADMIN — FINASTERIDE 5 MG: 5 TABLET, FILM COATED ORAL at 09:27

## 2021-01-01 RX ADMIN — Medication 10 ML: at 22:00

## 2021-01-01 RX ADMIN — PROPOFOL 20 MG: 10 INJECTION, EMULSION INTRAVENOUS at 12:58

## 2021-01-01 RX ADMIN — METOPROLOL TARTRATE 12.5 MG: 25 TABLET, FILM COATED ORAL at 09:20

## 2021-01-01 RX ADMIN — GABAPENTIN 100 MG: 100 CAPSULE ORAL at 17:27

## 2021-01-01 RX ADMIN — CARBIDOPA AND LEVODOPA 1 TABLET: 25; 250 TABLET ORAL at 10:42

## 2021-01-01 RX ADMIN — CYANOCOBALAMIN TAB 1000 MCG 500 MCG: 1000 TAB at 08:45

## 2021-01-01 RX ADMIN — CYANOCOBALAMIN TAB 1000 MCG 500 MCG: 1000 TAB at 09:17

## 2021-01-01 RX ADMIN — METOPROLOL TARTRATE 12.5 MG: 25 TABLET, FILM COATED ORAL at 22:46

## 2021-01-01 RX ADMIN — Medication 10 ML: at 22:23

## 2021-01-01 RX ADMIN — FERROUS SULFATE TAB EC 324 MG (65 MG FE EQUIVALENT) 324 MG: 324 (65 FE) TABLET DELAYED RESPONSE at 11:15

## 2021-01-01 RX ADMIN — FERROUS SULFATE TAB EC 324 MG (65 MG FE EQUIVALENT) 324 MG: 324 (65 FE) TABLET DELAYED RESPONSE at 09:45

## 2021-01-01 RX ADMIN — FERROUS SULFATE TAB EC 324 MG (65 MG FE EQUIVALENT) 324 MG: 324 (65 FE) TABLET DELAYED RESPONSE at 08:45

## 2021-01-01 RX ADMIN — CARBIDOPA AND LEVODOPA 1 TABLET: 25; 250 TABLET ORAL at 09:05

## 2021-01-01 RX ADMIN — Medication 10 ML: at 21:43

## 2021-01-01 RX ADMIN — ENOXAPARIN SODIUM 40 MG: 40 INJECTION SUBCUTANEOUS at 08:53

## 2021-01-01 RX ADMIN — FERROUS SULFATE TAB EC 324 MG (65 MG FE EQUIVALENT) 324 MG: 324 (65 FE) TABLET DELAYED RESPONSE at 09:20

## 2021-01-01 RX ADMIN — FINASTERIDE 5 MG: 5 TABLET, FILM COATED ORAL at 09:22

## 2021-01-01 RX ADMIN — FERROUS SULFATE TAB EC 324 MG (65 MG FE EQUIVALENT) 324 MG: 324 (65 FE) TABLET DELAYED RESPONSE at 09:16

## 2021-01-01 RX ADMIN — GABAPENTIN 100 MG: 100 CAPSULE ORAL at 17:57

## 2021-01-01 RX ADMIN — FUROSEMIDE 40 MG: 40 TABLET ORAL at 10:53

## 2021-01-01 RX ADMIN — FAMOTIDINE 20 MG: 20 TABLET ORAL at 17:00

## 2021-01-01 RX ADMIN — TAMSULOSIN HYDROCHLORIDE 0.4 MG: 0.4 CAPSULE ORAL at 10:34

## 2021-01-01 RX ADMIN — GABAPENTIN 100 MG: 100 CAPSULE ORAL at 08:24

## 2021-01-01 RX ADMIN — CARBIDOPA AND LEVODOPA 1 TABLET: 25; 250 TABLET ORAL at 09:45

## 2021-01-01 RX ADMIN — POTASSIUM CHLORIDE 20 MEQ: 750 TABLET, FILM COATED, EXTENDED RELEASE ORAL at 08:17

## 2021-01-01 RX ADMIN — FAMOTIDINE 20 MG: 20 TABLET ORAL at 17:49

## 2021-01-01 RX ADMIN — FERROUS SULFATE TAB EC 324 MG (65 MG FE EQUIVALENT) 324 MG: 324 (65 FE) TABLET DELAYED RESPONSE at 11:28

## 2021-01-01 RX ADMIN — FAMOTIDINE 20 MG: 20 TABLET ORAL at 08:17

## 2021-01-01 RX ADMIN — FAMOTIDINE 20 MG: 20 TABLET ORAL at 17:30

## 2021-01-01 RX ADMIN — METOPROLOL TARTRATE 12.5 MG: 25 TABLET, FILM COATED ORAL at 21:12

## 2021-01-01 RX ADMIN — PANTOPRAZOLE SODIUM 40 MG: 40 INJECTION, POWDER, FOR SOLUTION INTRAVENOUS at 22:24

## 2021-01-01 RX ADMIN — FINASTERIDE 5 MG: 5 TABLET, FILM COATED ORAL at 09:59

## 2021-01-01 RX ADMIN — POTASSIUM CHLORIDE 20 MEQ: 750 TABLET, FILM COATED, EXTENDED RELEASE ORAL at 10:42

## 2021-01-01 RX ADMIN — FERROUS SULFATE TAB EC 324 MG (65 MG FE EQUIVALENT) 324 MG: 324 (65 FE) TABLET DELAYED RESPONSE at 10:40

## 2021-01-01 RX ADMIN — FERROUS SULFATE TAB EC 324 MG (65 MG FE EQUIVALENT) 324 MG: 324 (65 FE) TABLET DELAYED RESPONSE at 09:11

## 2021-01-01 RX ADMIN — CYANOCOBALAMIN TAB 1000 MCG 500 MCG: 1000 TAB at 10:00

## 2021-01-01 RX ADMIN — FUROSEMIDE 40 MG: 40 TABLET ORAL at 09:27

## 2021-01-01 RX ADMIN — Medication 10 ML: at 21:35

## 2021-01-01 RX ADMIN — ENOXAPARIN SODIUM 40 MG: 40 INJECTION SUBCUTANEOUS at 08:46

## 2021-01-01 RX ADMIN — POTASSIUM CHLORIDE 20 MEQ: 750 TABLET, FILM COATED, EXTENDED RELEASE ORAL at 09:19

## 2021-01-01 RX ADMIN — TAMSULOSIN HYDROCHLORIDE 0.4 MG: 0.4 CAPSULE ORAL at 21:02

## 2021-01-01 RX ADMIN — ACETAMINOPHEN 500 MG: 500 TABLET ORAL at 18:36

## 2021-01-01 RX ADMIN — FINASTERIDE 5 MG: 5 TABLET, FILM COATED ORAL at 08:16

## 2021-01-01 RX ADMIN — CARBIDOPA AND LEVODOPA 1 TABLET: 25; 250 TABLET ORAL at 17:32

## 2021-01-01 RX ADMIN — FAMOTIDINE 20 MG: 20 TABLET ORAL at 18:03

## 2021-01-01 RX ADMIN — METOPROLOL TARTRATE 12.5 MG: 25 TABLET, FILM COATED ORAL at 09:27

## 2021-01-01 RX ADMIN — METOPROLOL TARTRATE 12.5 MG: 25 TABLET, FILM COATED ORAL at 21:50

## 2021-01-01 RX ADMIN — FERROUS SULFATE TAB EC 324 MG (65 MG FE EQUIVALENT) 324 MG: 324 (65 FE) TABLET DELAYED RESPONSE at 09:27

## 2021-01-01 RX ADMIN — CYANOCOBALAMIN TAB 1000 MCG 500 MCG: 1000 TAB at 09:55

## 2021-01-01 RX ADMIN — CARBIDOPA AND LEVODOPA 1 TABLET: 25; 250 TABLET ORAL at 09:17

## 2021-01-01 RX ADMIN — FERROUS SULFATE TAB EC 324 MG (65 MG FE EQUIVALENT) 324 MG: 324 (65 FE) TABLET DELAYED RESPONSE at 08:12

## 2021-01-01 RX ADMIN — FUROSEMIDE 40 MG: 40 TABLET ORAL at 08:12

## 2021-01-01 RX ADMIN — POTASSIUM CHLORIDE 20 MEQ: 750 TABLET, FILM COATED, EXTENDED RELEASE ORAL at 10:08

## 2021-01-01 RX ADMIN — FAMOTIDINE 20 MG: 20 TABLET ORAL at 09:02

## 2021-01-01 RX ADMIN — FAMOTIDINE 20 MG: 20 TABLET ORAL at 10:08

## 2021-01-01 RX ADMIN — METOPROLOL TARTRATE 12.5 MG: 25 TABLET, FILM COATED ORAL at 21:44

## 2021-01-01 RX ADMIN — ENOXAPARIN SODIUM 40 MG: 40 INJECTION SUBCUTANEOUS at 10:32

## 2021-01-01 RX ADMIN — PANTOPRAZOLE SODIUM 40 MG: 40 INJECTION, POWDER, FOR SOLUTION INTRAVENOUS at 19:23

## 2021-01-01 RX ADMIN — FERROUS SULFATE TAB EC 324 MG (65 MG FE EQUIVALENT) 324 MG: 324 (65 FE) TABLET DELAYED RESPONSE at 08:24

## 2021-01-01 RX ADMIN — HYDRALAZINE HYDROCHLORIDE 20 MG: 20 INJECTION INTRAMUSCULAR; INTRAVENOUS at 02:04

## 2021-01-01 RX ADMIN — CARBIDOPA AND LEVODOPA 1 TABLET: 25; 250 TABLET ORAL at 09:22

## 2021-01-01 RX ADMIN — DOCUSATE SODIUM 100 MG: 100 CAPSULE, LIQUID FILLED ORAL at 09:45

## 2021-01-01 RX ADMIN — FAMOTIDINE 20 MG: 20 TABLET ORAL at 18:45

## 2021-01-01 RX ADMIN — CARBIDOPA AND LEVODOPA 1 TABLET: 25; 250 TABLET ORAL at 09:47

## 2021-01-01 RX ADMIN — ENOXAPARIN SODIUM 40 MG: 40 INJECTION SUBCUTANEOUS at 10:43

## 2021-01-01 RX ADMIN — FERROUS SULFATE TAB EC 324 MG (65 MG FE EQUIVALENT) 324 MG: 324 (65 FE) TABLET DELAYED RESPONSE at 09:55

## 2021-01-01 RX ADMIN — CYANOCOBALAMIN TAB 1000 MCG 1000 MCG: 1000 TAB at 09:05

## 2021-01-01 RX ADMIN — CARBIDOPA AND LEVODOPA 1 TABLET: 25; 250 TABLET ORAL at 18:02

## 2021-01-01 RX ADMIN — CARBIDOPA AND LEVODOPA 1 TABLET: 25; 250 TABLET ORAL at 14:54

## 2021-01-01 RX ADMIN — CYANOCOBALAMIN TAB 1000 MCG 500 MCG: 1000 TAB at 10:41

## 2021-01-01 RX ADMIN — GABAPENTIN 100 MG: 100 CAPSULE ORAL at 10:34

## 2021-01-01 RX ADMIN — CARBIDOPA AND LEVODOPA 1 TABLET: 25; 250 TABLET ORAL at 17:30

## 2021-01-01 RX ADMIN — POTASSIUM CHLORIDE 20 MEQ: 750 TABLET, FILM COATED, EXTENDED RELEASE ORAL at 09:18

## 2021-01-01 RX ADMIN — LIDOCAINE HYDROCHLORIDE 40 MG: 20 INJECTION, SOLUTION EPIDURAL; INFILTRATION; INTRACAUDAL; PERINEURAL at 12:48

## 2021-01-01 RX ADMIN — FAMOTIDINE 20 MG: 20 TABLET ORAL at 17:02

## 2021-01-01 RX ADMIN — CARBIDOPA AND LEVODOPA 1 TABLET: 25; 250 TABLET ORAL at 17:13

## 2021-01-01 RX ADMIN — FINASTERIDE 5 MG: 5 TABLET, FILM COATED ORAL at 10:58

## 2021-01-01 RX ADMIN — FUROSEMIDE 40 MG: 40 TABLET ORAL at 18:55

## 2021-01-01 RX ADMIN — ENOXAPARIN SODIUM 40 MG: 40 INJECTION SUBCUTANEOUS at 10:45

## 2021-01-01 RX ADMIN — FINASTERIDE 5 MG: 5 TABLET, FILM COATED ORAL at 10:31

## 2021-01-01 RX ADMIN — POTASSIUM CHLORIDE 20 MEQ: 750 TABLET, FILM COATED, EXTENDED RELEASE ORAL at 08:56

## 2021-01-01 RX ADMIN — TAMSULOSIN HYDROCHLORIDE 0.4 MG: 0.4 CAPSULE ORAL at 10:28

## 2021-01-01 RX ADMIN — FINASTERIDE 5 MG: 5 TABLET, FILM COATED ORAL at 08:54

## 2021-01-01 RX ADMIN — GABAPENTIN 100 MG: 100 CAPSULE ORAL at 09:51

## 2021-01-01 RX ADMIN — CARBIDOPA AND LEVODOPA 1 TABLET: 25; 250 TABLET ORAL at 12:30

## 2021-01-01 RX ADMIN — METOPROLOL TARTRATE 12.5 MG: 25 TABLET, FILM COATED ORAL at 21:40

## 2021-01-01 RX ADMIN — CARBIDOPA AND LEVODOPA 1 TABLET: 25; 250 TABLET ORAL at 21:39

## 2021-01-01 RX ADMIN — CARBIDOPA AND LEVODOPA 1 TABLET: 25; 250 TABLET ORAL at 09:50

## 2021-01-01 RX ADMIN — TAMSULOSIN HYDROCHLORIDE 0.4 MG: 0.4 CAPSULE ORAL at 22:46

## 2021-01-01 RX ADMIN — FERROUS SULFATE TAB EC 324 MG (65 MG FE EQUIVALENT) 324 MG: 324 (65 FE) TABLET DELAYED RESPONSE at 09:05

## 2021-01-01 RX ADMIN — GLYCOPYRROLATE 0.2 MG: 0.2 INJECTION, SOLUTION INTRAMUSCULAR; INTRAVENOUS at 12:38

## 2021-01-01 RX ADMIN — GABAPENTIN 100 MG: 100 CAPSULE ORAL at 09:45

## 2021-01-01 RX ADMIN — CARBIDOPA AND LEVODOPA 1 TABLET: 25; 250 TABLET ORAL at 22:13

## 2021-01-01 RX ADMIN — CARBIDOPA AND LEVODOPA 1 TABLET: 25; 250 TABLET ORAL at 11:28

## 2021-01-01 RX ADMIN — CARBIDOPA AND LEVODOPA 1 TABLET: 25; 250 TABLET ORAL at 22:51

## 2021-01-01 RX ADMIN — FINASTERIDE 5 MG: 5 TABLET, FILM COATED ORAL at 11:28

## 2021-01-01 RX ADMIN — METOPROLOL TARTRATE 12.5 MG: 25 TABLET, FILM COATED ORAL at 09:18

## 2021-01-01 RX ADMIN — ENOXAPARIN SODIUM 40 MG: 40 INJECTION SUBCUTANEOUS at 10:07

## 2021-01-01 RX ADMIN — FINASTERIDE 5 MG: 5 TABLET, FILM COATED ORAL at 09:11

## 2021-01-01 RX ADMIN — Medication 5 ML: at 06:51

## 2021-01-01 RX ADMIN — SODIUM CHLORIDE 125 ML/HR: 9 INJECTION, SOLUTION INTRAVENOUS at 18:27

## 2021-01-01 RX ADMIN — CARBIDOPA AND LEVODOPA 1 TABLET: 25; 250 TABLET ORAL at 17:39

## 2021-01-01 RX ADMIN — CARBIDOPA AND LEVODOPA 1 TABLET: 25; 250 TABLET ORAL at 14:32

## 2021-01-01 RX ADMIN — CYANOCOBALAMIN TAB 1000 MCG 1000 MCG: 1000 TAB at 11:15

## 2021-01-01 RX ADMIN — GABAPENTIN 100 MG: 100 CAPSULE ORAL at 10:28

## 2021-01-01 RX ADMIN — CYANOCOBALAMIN TAB 1000 MCG 500 MCG: 1000 TAB at 09:26

## 2021-01-01 RX ADMIN — Medication 10 ML: at 20:54

## 2021-01-01 RX ADMIN — CARBIDOPA AND LEVODOPA 1 TABLET: 25; 250 TABLET ORAL at 10:08

## 2021-01-01 RX ADMIN — FERROUS SULFATE TAB EC 324 MG (65 MG FE EQUIVALENT) 324 MG: 324 (65 FE) TABLET DELAYED RESPONSE at 09:07

## 2021-01-01 RX ADMIN — GABAPENTIN 100 MG: 100 CAPSULE ORAL at 17:55

## 2021-01-01 RX ADMIN — ENOXAPARIN SODIUM 40 MG: 40 INJECTION SUBCUTANEOUS at 09:46

## 2021-01-01 RX ADMIN — FLUDROCORTISONE ACETATE 0.1 MG: 0.1 TABLET ORAL at 09:02

## 2021-01-01 RX ADMIN — ENOXAPARIN SODIUM 40 MG: 40 INJECTION SUBCUTANEOUS at 08:57

## 2021-01-01 RX ADMIN — Medication 10 ML: at 06:31

## 2021-01-01 RX ADMIN — Medication 10 ML: at 15:31

## 2021-01-01 RX ADMIN — TAMSULOSIN HYDROCHLORIDE 0.4 MG: 0.4 CAPSULE ORAL at 21:16

## 2021-01-01 RX ADMIN — CARBIDOPA AND LEVODOPA 1 TABLET: 25; 250 TABLET ORAL at 09:06

## 2021-01-01 RX ADMIN — FAMOTIDINE 20 MG: 20 TABLET ORAL at 17:57

## 2021-01-01 RX ADMIN — TAMSULOSIN HYDROCHLORIDE 0.4 MG: 0.4 CAPSULE ORAL at 10:26

## 2021-01-01 RX ADMIN — SODIUM CHLORIDE 125 ML/HR: 9 INJECTION, SOLUTION INTRAVENOUS at 02:56

## 2021-01-01 RX ADMIN — PROPOFOL 20 MG: 10 INJECTION, EMULSION INTRAVENOUS at 12:53

## 2021-01-01 RX ADMIN — FAMOTIDINE 20 MG: 20 TABLET ORAL at 10:44

## 2021-01-01 RX ADMIN — TAMSULOSIN HYDROCHLORIDE 0.4 MG: 0.4 CAPSULE ORAL at 11:28

## 2021-01-01 RX ADMIN — Medication 10 ML: at 06:49

## 2021-01-01 RX ADMIN — TAMSULOSIN HYDROCHLORIDE 0.4 MG: 0.4 CAPSULE ORAL at 09:05

## 2021-01-01 RX ADMIN — METOPROLOL TARTRATE 12.5 MG: 25 TABLET, FILM COATED ORAL at 21:02

## 2021-01-01 RX ADMIN — FAMOTIDINE 20 MG: 20 TABLET ORAL at 10:52

## 2021-01-01 RX ADMIN — CARBIDOPA AND LEVODOPA 1 TABLET: 25; 250 TABLET ORAL at 18:45

## 2021-01-01 RX ADMIN — METOPROLOL TARTRATE 12.5 MG: 25 TABLET, FILM COATED ORAL at 08:21

## 2021-01-01 RX ADMIN — FINASTERIDE 5 MG: 5 TABLET, FILM COATED ORAL at 11:23

## 2021-01-01 RX ADMIN — FUROSEMIDE 40 MG: 40 TABLET ORAL at 17:59

## 2021-01-01 RX ADMIN — CYANOCOBALAMIN TAB 1000 MCG 1000 MCG: 1000 TAB at 09:22

## 2021-01-01 RX ADMIN — METOPROLOL TARTRATE 12.5 MG: 25 TABLET, FILM COATED ORAL at 22:01

## 2021-01-01 RX ADMIN — FLUDROCORTISONE ACETATE 0.1 MG: 0.1 TABLET ORAL at 10:31

## 2021-01-01 RX ADMIN — FERROUS SULFATE TAB EC 324 MG (65 MG FE EQUIVALENT) 324 MG: 324 (65 FE) TABLET DELAYED RESPONSE at 08:17

## 2021-01-01 RX ADMIN — FAMOTIDINE 20 MG: 20 TABLET ORAL at 08:12

## 2021-01-01 RX ADMIN — CYANOCOBALAMIN TAB 1000 MCG 500 MCG: 1000 TAB at 09:20

## 2021-01-01 RX ADMIN — GABAPENTIN 100 MG: 100 CAPSULE ORAL at 18:22

## 2021-01-01 RX ADMIN — FERROUS SULFATE TAB EC 324 MG (65 MG FE EQUIVALENT) 324 MG: 324 (65 FE) TABLET DELAYED RESPONSE at 10:34

## 2021-01-01 RX ADMIN — METOPROLOL TARTRATE 12.5 MG: 25 TABLET, FILM COATED ORAL at 20:16

## 2021-01-01 RX ADMIN — BUMETANIDE 1 MG: 0.25 INJECTION, SOLUTION INTRAMUSCULAR; INTRAVENOUS at 15:42

## 2021-01-01 RX ADMIN — Medication 5 ML: at 22:12

## 2021-01-01 RX ADMIN — CYANOCOBALAMIN TAB 1000 MCG 1000 MCG: 1000 TAB at 09:45

## 2021-01-01 RX ADMIN — CYANOCOBALAMIN TAB 1000 MCG 500 MCG: 1000 TAB at 08:12

## 2021-01-01 RX ADMIN — Medication 5 ML: at 14:00

## 2021-01-01 RX ADMIN — FLUDROCORTISONE ACETATE 0.1 MG: 0.1 TABLET ORAL at 10:41

## 2021-01-01 RX ADMIN — Medication 10 ML: at 17:31

## 2021-01-01 RX ADMIN — Medication 10 ML: at 13:20

## 2021-01-01 RX ADMIN — FERROUS SULFATE TAB EC 324 MG (65 MG FE EQUIVALENT) 324 MG: 324 (65 FE) TABLET DELAYED RESPONSE at 08:49

## 2021-01-01 RX ADMIN — FLUDROCORTISONE ACETATE 0.1 MG: 0.1 TABLET ORAL at 11:00

## 2021-01-01 RX ADMIN — CARBIDOPA AND LEVODOPA 1 TABLET: 25; 250 TABLET ORAL at 09:24

## 2021-01-01 RX ADMIN — FAMOTIDINE 20 MG: 20 TABLET ORAL at 17:59

## 2021-01-01 RX ADMIN — POTASSIUM CHLORIDE 20 MEQ: 750 TABLET, FILM COATED, EXTENDED RELEASE ORAL at 09:01

## 2021-01-01 RX ADMIN — CYANOCOBALAMIN TAB 1000 MCG 500 MCG: 1000 TAB at 10:31

## 2021-01-01 RX ADMIN — CARBIDOPA AND LEVODOPA 1 TABLET: 25; 250 TABLET ORAL at 18:01

## 2021-01-01 RX ADMIN — METOPROLOL TARTRATE 12.5 MG: 25 TABLET, FILM COATED ORAL at 08:55

## 2021-01-01 RX ADMIN — FAMOTIDINE 20 MG: 20 TABLET ORAL at 09:23

## 2021-01-01 RX ADMIN — Medication 10 ML: at 18:27

## 2021-01-01 RX ADMIN — FINASTERIDE 5 MG: 5 TABLET, FILM COATED ORAL at 10:26

## 2021-01-01 RX ADMIN — CARBIDOPA AND LEVODOPA 1 TABLET: 25; 250 TABLET ORAL at 17:59

## 2021-01-01 RX ADMIN — TAMSULOSIN HYDROCHLORIDE 0.4 MG: 0.4 CAPSULE ORAL at 21:57

## 2021-01-01 RX ADMIN — Medication 10 ML: at 16:50

## 2021-01-01 RX ADMIN — FAMOTIDINE 20 MG: 20 TABLET ORAL at 09:00

## 2021-01-01 RX ADMIN — ENOXAPARIN SODIUM 40 MG: 40 INJECTION SUBCUTANEOUS at 09:23

## 2021-01-01 RX ADMIN — FLUDROCORTISONE ACETATE 0.1 MG: 0.1 TABLET ORAL at 08:45

## 2021-01-01 RX ADMIN — FERROUS SULFATE TAB EC 324 MG (65 MG FE EQUIVALENT) 324 MG: 324 (65 FE) TABLET DELAYED RESPONSE at 09:02

## 2021-01-01 RX ADMIN — METOPROLOL TARTRATE 12.5 MG: 25 TABLET, FILM COATED ORAL at 09:44

## 2021-01-01 RX ADMIN — CARBIDOPA AND LEVODOPA 1 TABLET: 25; 250 TABLET ORAL at 17:27

## 2021-01-01 RX ADMIN — FLUDROCORTISONE ACETATE 0.1 MG: 0.1 TABLET ORAL at 09:18

## 2021-01-01 RX ADMIN — POTASSIUM CHLORIDE 40 MEQ: 750 TABLET, FILM COATED, EXTENDED RELEASE ORAL at 09:01

## 2021-01-01 RX ADMIN — TAMSULOSIN HYDROCHLORIDE 0.4 MG: 0.4 CAPSULE ORAL at 21:36

## 2021-01-01 RX ADMIN — CARBIDOPA AND LEVODOPA 1 TABLET: 25; 250 TABLET ORAL at 09:18

## 2021-01-01 RX ADMIN — METOPROLOL TARTRATE 12.5 MG: 25 TABLET, FILM COATED ORAL at 09:59

## 2021-01-01 RX ADMIN — DOCUSATE SODIUM 100 MG: 100 CAPSULE, LIQUID FILLED ORAL at 21:38

## 2021-01-01 RX ADMIN — FERROUS SULFATE TAB EC 324 MG (65 MG FE EQUIVALENT) 324 MG: 324 (65 FE) TABLET DELAYED RESPONSE at 10:27

## 2021-01-01 RX ADMIN — METOPROLOL TARTRATE 12.5 MG: 25 TABLET, FILM COATED ORAL at 10:08

## 2021-01-01 RX ADMIN — METOPROLOL TARTRATE 12.5 MG: 25 TABLET, FILM COATED ORAL at 09:56

## 2021-01-01 RX ADMIN — TAMSULOSIN HYDROCHLORIDE 0.4 MG: 0.4 CAPSULE ORAL at 21:10

## 2021-01-01 RX ADMIN — SODIUM CHLORIDE 125 ML/HR: 9 INJECTION, SOLUTION INTRAVENOUS at 01:38

## 2021-01-01 RX ADMIN — METOPROLOL TARTRATE 12.5 MG: 25 TABLET, FILM COATED ORAL at 22:13

## 2021-01-01 RX ADMIN — FUROSEMIDE 40 MG: 40 TABLET ORAL at 11:23

## 2021-01-01 RX ADMIN — ENOXAPARIN SODIUM 40 MG: 40 INJECTION SUBCUTANEOUS at 09:22

## 2021-01-01 RX ADMIN — CYANOCOBALAMIN TAB 1000 MCG 500 MCG: 1000 TAB at 10:50

## 2021-01-01 RX ADMIN — CARBIDOPA AND LEVODOPA 1 TABLET: 25; 250 TABLET ORAL at 12:20

## 2021-01-01 RX ADMIN — FLUDROCORTISONE ACETATE 0.1 MG: 0.1 TABLET ORAL at 09:22

## 2021-01-01 RX ADMIN — CYANOCOBALAMIN TAB 1000 MCG 500 MCG: 1000 TAB at 08:47

## 2021-01-01 RX ADMIN — ENOXAPARIN SODIUM 40 MG: 40 INJECTION SUBCUTANEOUS at 11:22

## 2021-01-01 RX ADMIN — FAMOTIDINE 20 MG: 20 TABLET ORAL at 18:26

## 2021-01-01 RX ADMIN — FAMOTIDINE 20 MG: 20 TABLET ORAL at 09:59

## 2021-01-01 RX ADMIN — GABAPENTIN 100 MG: 100 CAPSULE ORAL at 09:05

## 2021-01-01 RX ADMIN — FINASTERIDE 5 MG: 5 TABLET, FILM COATED ORAL at 09:14

## 2021-01-01 RX ADMIN — CARBIDOPA AND LEVODOPA 1 TABLET: 25; 250 TABLET ORAL at 09:27

## 2021-01-01 RX ADMIN — Medication 10 ML: at 05:03

## 2021-01-01 RX ADMIN — METOPROLOL TARTRATE 12.5 MG: 25 TABLET, FILM COATED ORAL at 21:39

## 2021-01-01 RX ADMIN — CARBIDOPA AND LEVODOPA 1 TABLET: 25; 250 TABLET ORAL at 18:33

## 2021-01-01 RX ADMIN — FINASTERIDE 5 MG: 5 TABLET, FILM COATED ORAL at 11:30

## 2021-01-01 RX ADMIN — CYANOCOBALAMIN TAB 1000 MCG 500 MCG: 1000 TAB at 09:23

## 2021-01-01 RX ADMIN — SODIUM CHLORIDE 125 ML/HR: 9 INJECTION, SOLUTION INTRAVENOUS at 14:20

## 2021-01-01 RX ADMIN — CYANOCOBALAMIN TAB 1000 MCG 500 MCG: 1000 TAB at 08:56

## 2021-01-01 RX ADMIN — METOPROLOL TARTRATE 12.5 MG: 25 TABLET, FILM COATED ORAL at 21:18

## 2021-01-01 RX ADMIN — GABAPENTIN 100 MG: 100 CAPSULE ORAL at 17:12

## 2021-01-01 RX ADMIN — METOPROLOL TARTRATE 12.5 MG: 25 TABLET, FILM COATED ORAL at 22:12

## 2021-01-01 RX ADMIN — FLUDROCORTISONE ACETATE 0.1 MG: 0.1 TABLET ORAL at 11:23

## 2021-01-01 RX ADMIN — METOPROLOL TARTRATE 12.5 MG: 25 TABLET, FILM COATED ORAL at 21:16

## 2021-01-01 RX ADMIN — FAMOTIDINE 20 MG: 20 TABLET ORAL at 18:30

## 2021-01-01 RX ADMIN — FUROSEMIDE 40 MG: 40 TABLET ORAL at 18:45

## 2021-01-01 RX ADMIN — CARBIDOPA AND LEVODOPA 1 TABLET: 25; 250 TABLET ORAL at 17:55

## 2021-01-01 RX ADMIN — CARBIDOPA AND LEVODOPA 1 TABLET: 25; 250 TABLET ORAL at 21:19

## 2021-01-01 RX ADMIN — ENOXAPARIN SODIUM 40 MG: 40 INJECTION SUBCUTANEOUS at 08:18

## 2021-01-01 RX ADMIN — FAMOTIDINE 20 MG: 20 TABLET ORAL at 08:49

## 2021-01-01 RX ADMIN — FAMOTIDINE 20 MG: 20 TABLET ORAL at 17:27

## 2021-01-01 RX ADMIN — TAMSULOSIN HYDROCHLORIDE 0.4 MG: 0.4 CAPSULE ORAL at 22:08

## 2021-01-01 RX ADMIN — Medication 10 ML: at 06:54

## 2021-01-01 RX ADMIN — ENOXAPARIN SODIUM 40 MG: 40 INJECTION SUBCUTANEOUS at 11:00

## 2021-01-01 RX ADMIN — TAMSULOSIN HYDROCHLORIDE 0.4 MG: 0.4 CAPSULE ORAL at 09:14

## 2021-01-01 RX ADMIN — CARBIDOPA AND LEVODOPA 1 TABLET: 25; 250 TABLET ORAL at 17:03

## 2021-01-01 RX ADMIN — CARBIDOPA AND LEVODOPA 1 TABLET: 25; 250 TABLET ORAL at 21:31

## 2021-01-01 RX ADMIN — TAMSULOSIN HYDROCHLORIDE 0.4 MG: 0.4 CAPSULE ORAL at 21:49

## 2021-01-01 RX ADMIN — CARBIDOPA AND LEVODOPA 1 TABLET: 25; 250 TABLET ORAL at 10:31

## 2021-01-01 RX ADMIN — CARBIDOPA AND LEVODOPA 1 TABLET: 25; 250 TABLET ORAL at 08:12

## 2021-01-01 RX ADMIN — Medication 10 ML: at 06:32

## 2021-01-01 RX ADMIN — POTASSIUM CHLORIDE 20 MEQ: 750 TABLET, FILM COATED, EXTENDED RELEASE ORAL at 10:31

## 2021-01-01 RX ADMIN — ENOXAPARIN SODIUM 40 MG: 40 INJECTION SUBCUTANEOUS at 09:03

## 2021-01-01 RX ADMIN — FAMOTIDINE 20 MG: 20 TABLET ORAL at 18:54

## 2021-01-01 RX ADMIN — TAMSULOSIN HYDROCHLORIDE 0.4 MG: 0.4 CAPSULE ORAL at 09:46

## 2021-01-01 RX ADMIN — SODIUM CHLORIDE 100 ML/HR: 9 INJECTION, SOLUTION INTRAVENOUS at 14:20

## 2021-01-01 RX ADMIN — CARBIDOPA AND LEVODOPA 1 TABLET: 25; 250 TABLET ORAL at 17:57

## 2021-01-01 RX ADMIN — FINASTERIDE 5 MG: 5 TABLET, FILM COATED ORAL at 08:45

## 2021-01-01 RX ADMIN — Medication 10 ML: at 21:20

## 2021-01-01 RX ADMIN — CYANOCOBALAMIN TAB 1000 MCG 500 MCG: 1000 TAB at 08:17

## 2021-01-01 RX ADMIN — FERROUS SULFATE TAB EC 324 MG (65 MG FE EQUIVALENT) 324 MG: 324 (65 FE) TABLET DELAYED RESPONSE at 09:18

## 2021-01-01 RX ADMIN — Medication 10 ML: at 17:32

## 2021-01-01 RX ADMIN — ENOXAPARIN SODIUM 40 MG: 40 INJECTION SUBCUTANEOUS at 10:00

## 2021-01-01 RX ADMIN — CARBIDOPA AND LEVODOPA 1 TABLET: 25; 250 TABLET ORAL at 12:39

## 2021-01-01 RX ADMIN — CARBIDOPA AND LEVODOPA 1 TABLET: 25; 250 TABLET ORAL at 21:43

## 2021-01-01 RX ADMIN — FINASTERIDE 5 MG: 5 TABLET, FILM COATED ORAL at 09:05

## 2021-01-01 RX ADMIN — FERROUS SULFATE TAB EC 324 MG (65 MG FE EQUIVALENT) 324 MG: 324 (65 FE) TABLET DELAYED RESPONSE at 10:08

## 2021-01-01 RX ADMIN — Medication 10 ML: at 17:58

## 2021-01-01 RX ADMIN — TAMSULOSIN HYDROCHLORIDE 0.4 MG: 0.4 CAPSULE ORAL at 08:24

## 2021-01-01 RX ADMIN — TAMSULOSIN HYDROCHLORIDE 0.4 MG: 0.4 CAPSULE ORAL at 09:45

## 2021-01-01 RX ADMIN — FAMOTIDINE 20 MG: 20 TABLET ORAL at 10:45

## 2021-01-01 RX ADMIN — GABAPENTIN 100 MG: 100 CAPSULE ORAL at 17:44

## 2021-01-01 RX ADMIN — METOPROLOL TARTRATE 12.5 MG: 25 TABLET, FILM COATED ORAL at 20:09

## 2021-01-01 RX ADMIN — CARBIDOPA AND LEVODOPA 1 TABLET: 25; 250 TABLET ORAL at 21:54

## 2021-01-01 RX ADMIN — FLUDROCORTISONE ACETATE 0.1 MG: 0.1 TABLET ORAL at 09:27

## 2021-01-01 RX ADMIN — METOPROLOL TARTRATE 12.5 MG: 25 TABLET, FILM COATED ORAL at 08:18

## 2021-01-01 RX ADMIN — POTASSIUM CHLORIDE 20 MEQ: 750 TABLET, FILM COATED, EXTENDED RELEASE ORAL at 09:59

## 2021-01-01 RX ADMIN — CARBIDOPA AND LEVODOPA 1 TABLET: 25; 250 TABLET ORAL at 18:21

## 2021-01-01 RX ADMIN — Medication 10 ML: at 18:22

## 2021-01-01 RX ADMIN — CARBIDOPA AND LEVODOPA 1 TABLET: 25; 250 TABLET ORAL at 21:00

## 2021-01-01 RX ADMIN — FLUDROCORTISONE ACETATE 0.1 MG: 0.1 TABLET ORAL at 08:18

## 2021-01-01 RX ADMIN — GABAPENTIN 100 MG: 100 CAPSULE ORAL at 18:30

## 2021-01-01 RX ADMIN — FAMOTIDINE 20 MG: 20 TABLET ORAL at 08:45

## 2021-01-01 RX ADMIN — CARBIDOPA AND LEVODOPA 1 TABLET: 25; 250 TABLET ORAL at 11:15

## 2021-01-01 RX ADMIN — POTASSIUM CHLORIDE 20 MEQ: 750 TABLET, FILM COATED, EXTENDED RELEASE ORAL at 09:11

## 2021-01-01 RX ADMIN — SODIUM CHLORIDE 125 ML/HR: 9 INJECTION, SOLUTION INTRAVENOUS at 21:28

## 2021-01-01 RX ADMIN — CARBIDOPA AND LEVODOPA 1 TABLET: 25; 250 TABLET ORAL at 14:09

## 2021-01-01 RX ADMIN — Medication 10 ML: at 08:18

## 2021-01-01 RX ADMIN — POTASSIUM CHLORIDE 20 MEQ: 750 TABLET, FILM COATED, EXTENDED RELEASE ORAL at 08:49

## 2021-01-01 RX ADMIN — FINASTERIDE 5 MG: 5 TABLET, FILM COATED ORAL at 10:27

## 2021-01-01 RX ADMIN — DOCUSATE SODIUM 100 MG: 100 CAPSULE, LIQUID FILLED ORAL at 09:05

## 2021-01-01 RX ADMIN — FLUDROCORTISONE ACETATE 0.1 MG: 0.1 TABLET ORAL at 10:08

## 2021-01-01 RX ADMIN — SODIUM CHLORIDE 1000 ML: 9 INJECTION, SOLUTION INTRAVENOUS at 01:15

## 2021-01-01 RX ADMIN — CARBIDOPA AND LEVODOPA 1 TABLET: 25; 250 TABLET ORAL at 10:36

## 2021-01-01 RX ADMIN — FINASTERIDE 5 MG: 5 TABLET, FILM COATED ORAL at 10:36

## 2021-01-01 RX ADMIN — CYANOCOBALAMIN TAB 1000 MCG 1000 MCG: 1000 TAB at 09:50

## 2021-01-01 RX ADMIN — FINASTERIDE 5 MG: 5 TABLET, FILM COATED ORAL at 11:15

## 2021-01-01 RX ADMIN — FAMOTIDINE 20 MG: 20 TABLET ORAL at 17:32

## 2021-01-01 RX ADMIN — CYANOCOBALAMIN TAB 1000 MCG 1000 MCG: 1000 TAB at 08:21

## 2021-01-01 RX ADMIN — CARBIDOPA AND LEVODOPA 1 TABLET: 25; 250 TABLET ORAL at 10:57

## 2021-01-01 RX ADMIN — CARBIDOPA AND LEVODOPA 1 TABLET: 25; 250 TABLET ORAL at 10:41

## 2021-01-01 RX ADMIN — TAMSULOSIN HYDROCHLORIDE 0.4 MG: 0.4 CAPSULE ORAL at 21:00

## 2021-01-01 RX ADMIN — CARBIDOPA AND LEVODOPA 1 TABLET: 25; 250 TABLET ORAL at 12:55

## 2021-01-01 RX ADMIN — CARBIDOPA AND LEVODOPA 1 TABLET: 25; 250 TABLET ORAL at 11:20

## 2021-01-01 RX ADMIN — METOPROLOL TARTRATE 12.5 MG: 25 TABLET, FILM COATED ORAL at 08:48

## 2021-01-01 RX ADMIN — POTASSIUM CHLORIDE 20 MEQ: 750 TABLET, FILM COATED, EXTENDED RELEASE ORAL at 09:26

## 2021-01-01 RX ADMIN — CARBIDOPA AND LEVODOPA 1 TABLET: 25; 250 TABLET ORAL at 13:07

## 2021-01-01 RX ADMIN — FERROUS SULFATE TAB EC 324 MG (65 MG FE EQUIVALENT) 324 MG: 324 (65 FE) TABLET DELAYED RESPONSE at 14:03

## 2021-01-01 RX ADMIN — ENOXAPARIN SODIUM 40 MG: 40 INJECTION SUBCUTANEOUS at 11:31

## 2021-01-01 RX ADMIN — CARBIDOPA AND LEVODOPA 1 TABLET: 25; 250 TABLET ORAL at 17:26

## 2021-01-01 RX ADMIN — METOPROLOL TARTRATE 12.5 MG: 25 TABLET, FILM COATED ORAL at 11:24

## 2021-01-01 RX ADMIN — POTASSIUM CHLORIDE 30 MEQ: 750 TABLET, FILM COATED, EXTENDED RELEASE ORAL at 10:47

## 2021-01-01 RX ADMIN — Medication 10 ML: at 10:45

## 2021-01-01 RX ADMIN — TAMSULOSIN HYDROCHLORIDE 0.4 MG: 0.4 CAPSULE ORAL at 22:23

## 2021-01-01 RX ADMIN — Medication 10 ML: at 06:45

## 2021-01-01 RX ADMIN — FAMOTIDINE 20 MG: 20 TABLET ORAL at 18:21

## 2021-01-01 RX ADMIN — CARBIDOPA AND LEVODOPA 1 TABLET: 25; 250 TABLET ORAL at 22:47

## 2021-01-01 RX ADMIN — FERROUS SULFATE TAB EC 324 MG (65 MG FE EQUIVALENT) 324 MG: 324 (65 FE) TABLET DELAYED RESPONSE at 12:55

## 2021-01-01 RX ADMIN — CYANOCOBALAMIN TAB 1000 MCG 500 MCG: 1000 TAB at 11:30

## 2021-01-01 RX ADMIN — FINASTERIDE 5 MG: 5 TABLET, FILM COATED ORAL at 10:40

## 2021-01-01 RX ADMIN — METOPROLOL TARTRATE 12.5 MG: 25 TABLET, FILM COATED ORAL at 22:08

## 2021-01-01 RX ADMIN — TAMSULOSIN HYDROCHLORIDE 0.4 MG: 0.4 CAPSULE ORAL at 21:19

## 2021-01-01 RX ADMIN — Medication 10 ML: at 21:49

## 2021-01-01 RX ADMIN — FAMOTIDINE 20 MG: 20 TABLET ORAL at 18:22

## 2021-01-01 RX ADMIN — FUROSEMIDE 40 MG: 40 TABLET ORAL at 17:30

## 2021-01-01 RX ADMIN — CYANOCOBALAMIN TAB 1000 MCG 500 MCG: 1000 TAB at 10:59

## 2021-01-01 RX ADMIN — CARBIDOPA AND LEVODOPA 1 TABLET: 25; 250 TABLET ORAL at 10:27

## 2021-01-01 RX ADMIN — POTASSIUM CHLORIDE 20 MEQ: 750 TABLET, FILM COATED, EXTENDED RELEASE ORAL at 10:59

## 2021-01-01 RX ADMIN — Medication 10 ML: at 09:47

## 2021-01-01 RX ADMIN — FAMOTIDINE 20 MG: 20 TABLET ORAL at 17:09

## 2021-01-01 RX ADMIN — CYANOCOBALAMIN TAB 1000 MCG 1000 MCG: 1000 TAB at 09:44

## 2021-01-01 RX ADMIN — FAMOTIDINE 20 MG: 20 TABLET ORAL at 18:36

## 2021-01-01 RX ADMIN — FLUDROCORTISONE ACETATE 0.1 MG: 0.1 TABLET ORAL at 09:56

## 2021-01-01 RX ADMIN — FUROSEMIDE 40 MG: 40 TABLET ORAL at 09:12

## 2021-01-01 RX ADMIN — Medication 20 ML: at 07:28

## 2021-01-01 RX ADMIN — FERROUS SULFATE TAB EC 324 MG (65 MG FE EQUIVALENT) 324 MG: 324 (65 FE) TABLET DELAYED RESPONSE at 11:23

## 2021-01-01 RX ADMIN — CARBIDOPA AND LEVODOPA 1 TABLET: 25; 250 TABLET ORAL at 14:41

## 2021-01-01 RX ADMIN — METOPROLOL TARTRATE 12.5 MG: 25 TABLET, FILM COATED ORAL at 09:13

## 2021-01-01 RX ADMIN — CARBIDOPA AND LEVODOPA 1 TABLET: 25; 250 TABLET ORAL at 13:05

## 2021-01-01 RX ADMIN — CARBIDOPA AND LEVODOPA 1 TABLET: 25; 250 TABLET ORAL at 21:55

## 2021-01-01 RX ADMIN — CARBIDOPA AND LEVODOPA 1 TABLET: 25; 250 TABLET ORAL at 15:00

## 2021-01-01 RX ADMIN — CYANOCOBALAMIN TAB 1000 MCG 1000 MCG: 1000 TAB at 10:34

## 2021-01-01 RX ADMIN — CYANOCOBALAMIN TAB 1000 MCG 1000 MCG: 1000 TAB at 09:46

## 2021-01-01 RX ADMIN — CARBIDOPA AND LEVODOPA 1 TABLET: 25; 250 TABLET ORAL at 08:24

## 2021-01-01 RX ADMIN — FLUDROCORTISONE ACETATE 0.1 MG: 0.1 TABLET ORAL at 08:12

## 2021-01-01 RX ADMIN — CARBIDOPA AND LEVODOPA 1 TABLET: 25; 250 TABLET ORAL at 21:18

## 2021-01-01 RX ADMIN — FERROUS SULFATE TAB EC 324 MG (65 MG FE EQUIVALENT) 324 MG: 324 (65 FE) TABLET DELAYED RESPONSE at 08:56

## 2021-01-01 RX ADMIN — PANTOPRAZOLE SODIUM 40 MG: 40 INJECTION, POWDER, FOR SOLUTION INTRAVENOUS at 10:27

## 2021-01-01 RX ADMIN — CYANOCOBALAMIN TAB 1000 MCG 1000 MCG: 1000 TAB at 10:28

## 2021-01-01 RX ADMIN — CARBIDOPA AND LEVODOPA 1 TABLET: 25; 250 TABLET ORAL at 21:40

## 2021-01-01 RX ADMIN — CARBIDOPA AND LEVODOPA 1 TABLET: 25; 250 TABLET ORAL at 16:02

## 2021-01-01 RX ADMIN — ENOXAPARIN SODIUM 40 MG: 40 INJECTION SUBCUTANEOUS at 09:18

## 2021-01-01 RX ADMIN — METOPROLOL TARTRATE 12.5 MG: 25 TABLET, FILM COATED ORAL at 10:58

## 2021-01-01 RX ADMIN — FERROUS SULFATE TAB EC 324 MG (65 MG FE EQUIVALENT) 324 MG: 324 (65 FE) TABLET DELAYED RESPONSE at 09:59

## 2021-01-01 RX ADMIN — TAMSULOSIN HYDROCHLORIDE 0.4 MG: 0.4 CAPSULE ORAL at 21:31

## 2021-01-01 RX ADMIN — GABAPENTIN 100 MG: 100 CAPSULE ORAL at 17:31

## 2021-01-01 RX ADMIN — FINASTERIDE 5 MG: 5 TABLET, FILM COATED ORAL at 09:00

## 2021-01-01 RX ADMIN — FLUDROCORTISONE ACETATE 0.1 MG: 0.1 TABLET ORAL at 09:19

## 2021-01-01 RX ADMIN — METOPROLOL TARTRATE 12.5 MG: 25 TABLET, FILM COATED ORAL at 20:12

## 2021-01-01 RX ADMIN — CARBIDOPA AND LEVODOPA 1 TABLET: 25; 250 TABLET ORAL at 13:17

## 2021-01-01 RX ADMIN — FAMOTIDINE 20 MG: 20 TABLET ORAL at 09:20

## 2021-01-01 RX ADMIN — TAMSULOSIN HYDROCHLORIDE 0.4 MG: 0.4 CAPSULE ORAL at 09:44

## 2021-01-01 RX ADMIN — CARBIDOPA AND LEVODOPA 1 TABLET: 25; 250 TABLET ORAL at 09:19

## 2021-01-01 RX ADMIN — FAMOTIDINE 20 MG: 20 TABLET ORAL at 10:40

## 2021-01-01 RX ADMIN — CARBIDOPA AND LEVODOPA 1 TABLET: 25; 250 TABLET ORAL at 21:56

## 2021-01-01 RX ADMIN — CARBIDOPA AND LEVODOPA 1 TABLET: 25; 250 TABLET ORAL at 21:02

## 2021-01-01 RX ADMIN — CARBIDOPA AND LEVODOPA 1 TABLET: 25; 250 TABLET ORAL at 21:24

## 2021-01-01 RX ADMIN — POTASSIUM CHLORIDE 20 MEQ: 750 TABLET, FILM COATED, EXTENDED RELEASE ORAL at 11:22

## 2021-01-01 RX ADMIN — CARBIDOPA AND LEVODOPA 1 TABLET: 25; 250 TABLET ORAL at 21:35

## 2021-01-01 RX ADMIN — FERROUS SULFATE TAB EC 324 MG (65 MG FE EQUIVALENT) 324 MG: 324 (65 FE) TABLET DELAYED RESPONSE at 10:42

## 2021-01-01 RX ADMIN — METOPROLOL TARTRATE 12.5 MG: 25 TABLET, FILM COATED ORAL at 09:10

## 2021-01-01 RX ADMIN — CARBIDOPA AND LEVODOPA 1 TABLET: 25; 250 TABLET ORAL at 10:26

## 2021-01-01 RX ADMIN — FINASTERIDE 5 MG: 5 TABLET, FILM COATED ORAL at 10:38

## 2021-01-01 RX ADMIN — METOPROLOL TARTRATE 12.5 MG: 25 TABLET, FILM COATED ORAL at 10:43

## 2021-01-01 RX ADMIN — CARBIDOPA AND LEVODOPA 1 TABLET: 25; 250 TABLET ORAL at 18:54

## 2021-01-01 RX ADMIN — FERROUS SULFATE TAB EC 324 MG (65 MG FE EQUIVALENT) 324 MG: 324 (65 FE) TABLET DELAYED RESPONSE at 10:38

## 2021-03-31 NOTE — TELEPHONE ENCOUNTER
----- Message from Scout Macdonald sent at 3/30/2021  4:08 PM EDT -----  Regarding: MD Vazquez/ telephone  Patient's first and last name:  Caryle Charity  : 1935    Caller's first and last name:    Henny Bello (Spouse)    Reason for call:  Medication    Callback required yes/no and why: yes     Best contact number(s):  [phone]    Details to clarify the request: Pt's spouse is requesting call to review medication ELIQUIS. Medication is too expensive each month over $100. Caller requesting call for change. Pt has [employer].

## 2021-04-01 NOTE — TELEPHONE ENCOUNTER
I spoke to patients wife and she [facility] Patients wife advised to get a short supply of eliquis from [facility] until they get the mail order.

## 2021-04-13 NOTE — PATIENT INSTRUCTIONS
Medicare Wellness Visit, Male The best way to live healthy is to have a lifestyle where you eat a well-balanced diet, exercise regularly, limit alcohol use, and quit all forms of tobacco/nicotine, if applicable. Regular preventive services are another way to keep healthy. Preventive services (vaccines, screening tests, monitoring & exams) can help personalize your care plan, which helps you manage your own care. Screening tests can find health problems at the earliest stages, when they are easiest to treat. Daniellejuan jose follows the current, evidence-based guidelines published by the [facility] S[facility] ([facility]) when recommending preventive services for our patients. Because we follow these guidelines, sometimes recommendations change over time as research supports it. (For example, a prostate screening blood test is no longer routinely recommended for men with no symptoms). Of course, you and your doctor may decide to screen more often for some diseases, based on your risk and co-morbidities (chronic disease you are already diagnosed with). Preventive services for you include: - Medicare offers their members a free annual wellness visit, which is time for you and your primary care provider to discuss and plan for your preventive service needs. Take advantage of this benefit every year! 
-All adults over age 72 should receive the recommended pneumonia vaccines. Current USPSTF guidelines recommend a series of two vaccines for the best pneumonia protection.  
-All adults should have a flu vaccine yearly and tetanus vaccine every 10 years. 
-All adults age 48 and older should receive the shingles vaccines (series of two vaccines).       
-All adults age 38-68 who are overweight should have a diabetes screening test once every three years.  
-Other screening tests & preventive services for persons with diabetes include: an eye exam to screen for diabetic retinopathy, a kidney function test, a foot exam, and stricter control over your cholesterol.  
-Cardiovascular screening for adults with routine risk involves an electrocardiogram (ECG) at intervals determined by the provider.  
-Colorectal cancer screening should be done for adults age 54-65 with no increased risk factors for colorectal cancer. There are a number of acceptable methods of screening for this type of cancer. Each test has its own benefits and drawbacks. Discuss with your provider what is most appropriate for you during your annual wellness visit. The different tests include: colonoscopy (considered the best screening method), a fecal occult blood test, a fecal DNA test, and sigmoidoscopy. 
-All adults born between [facility] should be screened once for Hepatitis C. 
-An Abdominal Aortic Aneurysm (AAA) Screening is recommended for men age 73-68 who has ever smoked in their lifetime. Here is a list of your current Health Maintenance items (your personalized list of preventive services) with a due date: 
Health Maintenance Due Topic Date Due  
 COVID-19 Vaccine (1) Never done  Shingles Vaccine (1 of 2) Never done

## 2021-04-13 NOTE — PROGRESS NOTES
Chief Complaint   Patient presents with    Medication Evaluation    Annual Wellness Visit     3 most recent PHQ Screens 4/13/2021   Little interest or pleasure in doing things Not at all   Feeling down, depressed, irritable, or hopeless Not at all   Total Score PHQ 2 0     Learning Assessment 4/13/2021   PRIMARY LEARNER Patient   Adela E Jair [address]   LEARNER PREFERENCE PRIMARY LISTENING     DEMONSTRATION   ANSWERED BY patient   RELATIONSHIP SELF     Fall Risk Assessment, last 12 mths 4/13/2021   Able to walk? Yes   Fall in past 12 months? 1   Do you feel unsteady? 0   Are you worried about falling 0   Is TUG test greater than 12 seconds? 0   Is the gait abnormal? 0   Number of falls in past 12 months 1   Fall with injury? 1     Abuse Screening Questionnaire 4/13/2021   Do you ever feel afraid of your partner? N   Are you in a relationship with someone who physically or mentally threatens you? N   Is it safe for you to go home? Y     ADL Assessment 4/13/2021   Feeding yourself No Help Needed   Getting from bed to chair No Help Needed   Getting dressed No Help Needed   Bathing or showering No Help Needed   Walk across the room (includes cane/walker) No Help Needed   Using the telphone No Help Needed   Taking your medications Help Needed   Preparing meals Help Needed   Managing money (expenses/bills) Help Needed   Moderately strenuous housework (laundry) Help Needed   Shopping for personal items (toiletries/medicines) Help Needed   Shopping for groceries Help Needed   Driving Help Needed   Climbing a flight of stairs Help Needed   Getting to places beyond walking distances Help Needed     1. Have you been to the ER, urgent care clinic since your last visit? Hospitalized since your last visit? No    2. Have you seen or consulted any other health care providers outside of the [address] [place] [address] since your last visit? Include any pap smears or colon screening. No      Chief Complaint   Patient presents with    Medication Evaluation    Annual Wellness Visit         Visit Vitals  /60 (BP 1 Location: Left arm, BP Patient Position: Sitting, BP Cuff Size: Adult long)   Pulse 63   Temp 97.3 °F (36.3 °C) (Temporal)   Resp 22   Ht 6' 1\" (1.854 m)   Wt 206 lb 6.4 oz (93.6 kg)   SpO2 99%   BMI 27.23 kg/m²       Pain Scale: 8/10  Pain Location: Back (and right shoulder)    Shaan Garcia is a 81 y.o. male presenting for/with:    Medication Evaluation and Annual Wellness Visit      Symptom review:    NO  Fever   NO  Shaking chills  NO  Cough  NO  Body aches  NO  Coughing up blood  NO  Chest congestion  NO  Chest pain  NO  Shortness of breath  NO  Profound Loss of smell/taste  NO  Nausea/Vomiting   NO  Loose stool/Diarrhea  NO  any skin issues    Patient Risk Factors Reviewed as follows:  NO  have you been in Close contact with confirmed COVID19 patient   NO  History of recent travel to affected geographical areas within the past 14 days  NO  COPD  NO  Active Cancer/Leukemia/Lymphoma/Chemotherapy  NO  Oral steroid use  NO  Pregnant  NO  Diabetes Mellitus  NO  Heart disease  NO  Asthma  NO Health care worker at home  NO Health care worker  NO Is there a Pregnant Woman in the home  NO Dialysis pt in the home   NO a large number of people living in the home      This is the Subsequent Medicare Annual Wellness Exam, performed 12 months or more after the Initial AWV or the last Subsequent AWV    I have reviewed the patient's medical history in detail and updated the computerized patient record. Assessment/Plan   Education and counseling provided:  Are appropriate based on today's review and evaluation    1. Paroxysmal atrial fibrillation (HCC)  -     LIPID PANEL; Future  -     METABOLIC PANEL, COMPREHENSIVE; Future  -     TSH 3RD GENERATION; Future  2. Essential hypertension  -     LIPID PANEL; Future  -     METABOLIC PANEL, COMPREHENSIVE;  Future  -     TSH 3RD GENERATION; Future  3. Parkinson disease ([facility] [address]       Depression Risk Factor Screening     3 most recent PHQ Screens 4/13/2021   Little interest or pleasure in doing things Not at all   Feeling down, depressed, irritable, or hopeless Not at all   Total Score PHQ 2 0       Alcohol Risk Screen    Do you average more than 1 drink per night or more than 7 drinks a week: No    In the past three months have you have had more than 4 drinks containing alcohol on one occasion: No        Functional Ability and Level of Safety    Hearing: Hearing is good. Activities of Daily Living: The home contains: uses cane  Patient does total self care      Ambulation: with mild difficulty     Fall Risk:  Fall Risk Assessment, last 12 mths 4/13/2021   Able to walk? Yes   Fall in past 12 months? 1   Do you feel unsteady? 0   Are you worried about falling 0   Is TUG test greater than 12 seconds? 0   Is the gait abnormal? 0   Number of falls in past 12 months 1   Fall with injury?  1      Abuse Screen:  Patient is not abused       Cognitive Screening    Has your family/caregiver stated any concerns about your memory: no         Health Maintenance Due     Health Maintenance Due   Topic Date Due    COVID-19 Vaccine (1) Never done    Shingrix Vaccine Age 50> (1 of 2) Never done       Patient Care Team   Patient Care Team:  Art Weinberg MD as PCP - General (Internal Medicine)  Art Weinberg MD as PCP - [facility]  Galileo Bolden MD (Cardiology)  Karla Matos MD (Cardiology)    History     Patient Active Problem List   Diagnosis Code    Gouty arthropathy M10.9    Asthma J45.909    Chronic kidney disease N18.9    Hyperlipidemia E78.5    Hypertension I10    Weight loss R63.4    Constipation K59.00    Multiple lung nodules R91.8    History of malignant neoplasm of large intestine Z85.038    EKG abnormality R94.31    Obesity, Class II, BMI 35-39.9, with comorbidity [id number]    BPH associated with nocturia N40.1, R35.1    MCI (mild cognitive impairment) with memory loss G31.84    Vitamin B12 deficiency E53.8    Tremor R25.1    Essential hypertension I10    Gross hematuria R31.0    Encounter for rehabilitation Z51.89    Parkinson disease ([facility] [address] G20    Bilateral leg edema R60.0    Atrial fibrillation (HCC) I48.91    A-fib (HCC) I48.91     Past Medical History:   Diagnosis Date    Asthma     Chronic kidney disease     Dermatophytosis of groin and perianal area 2010    Edema 2008    Encounter for long-term (current) use of other medications 2010    Gout, unspecified 2010    Gouty arthropathy, unspecified 2010    Hypertension     Hypertrophy of prostate without urinary obstruction and other lower urinary tract symptoms (LUTS) 2008    Impotence of organic origin 2008    Memory loss 2009    Obesity, unspecified 2010    Orthostatic hypotension 2008    Other and unspecified hyperlipidemia 2008    Other atopic dermatitis and related conditions 2012    Palpitations 2010    Peripheral angiopathy in diseases classified elsewhere [facility]) 2010    Personal history of malignant neoplasm of rectum, rectosigmoid junction, and anus 2011    Tinea nigra 2011    Unspecified pruritic disorder 2011    Vitamin B12 deficiency 7/23/2017      Past Surgical History:   Procedure Laterality Date    ENDOSCOPY, COLON, DIAGNOSTIC  06/2011 05/2007, 01/2004,  12/2000    HX HERNIA REPAIR  1992    INCISIONAL    HX TOTAL COLECTOMY  1991    COLON CANCER S/P RESECTION     Current Outpatient Medications   Medication Sig Dispense Refill    apixaban (ELIQUIS) 2.5 mg tablet Take 1 Tab by mouth two (2) times a day. 180 Tab 3    chlorthalidone (HYGROTON) 25 mg tablet TAKE 1 TABLET DAILY 90 Tab 3    dilTIAZem ER (CARDIZEM CD) 120 mg capsule Take 1 Cap by mouth daily. 30 Cap 11    aspirin 81 mg chewable tablet Take 1 Tab by mouth daily.  30 Tab 0    carbidopa-levodopa (SINEMET)  mg per tablet TAKE 1 TABLET FOUR TIMES A DAY FOR PARKINSONS DISEASE 360 Tab 3    cyanocobalamin (VITAMIN B12) 500 mcg tablet TAKE 2 TABLETS DAILY 180 Tab 3    ferrous sulfate (IRON) 325 mg (65 mg iron) tablet Take 28 mg by mouth. Indications: 1 TAB ON MONDAY,WEDNESDAY, AND FRIDAY      finasteride (PROSCAR) 5 mg tablet Take 5 mg by mouth daily. take at bedtime      tamsulosin (FLOMAX) 0.4 mg capsule Take 1 Cap by mouth daily. 30 Cap 0    ammonium lactate (LAC-HYDRIN) 12 % lotion APPLY THIN LAYER TWICE A DAY FOR 60 DAYS FOR DRY SCALY SKIN BOTH FEET  10     Allergies   Allergen Reactions    Cat Hair Std Allergenic Ext Unknown (comments)    Codeine Unknown (comments)    Ragweed Unknown (comments)     Mixed ragweed/pollen extract,tree extract       Family History   Problem Relation Age of Onset    Other Mother         PULMONARY EDEMA    Other Father         PAD    Heart Attack Father     Coronary Artery Disease Father      Social History     Tobacco Use    Smoking status: Former Smoker     Packs/day: 1.00     Years: 29.00     Pack years: 29.00     Types: Cigarettes     Start date: 1949     Quit date: 1978     Years since quittin.3    Smokeless tobacco: Never Used   Substance Use Topics    Alcohol use: No     Frequency: Never     Binge frequency: Never         Allayne Railing   This is the Subsequent Medicare Annual Wellness Exam, performed 12 months or more after the Initial AWV or the last Subsequent AWV    I have reviewed the patient's medical history in detail and updated the computerized patient record.           Past Medical History:   Diagnosis Date    Asthma     Chronic kidney disease     Dermatophytosis of groin and perianal area 2010    Edema 2008    Encounter for long-term (current) use of other medications 2010    Gout, unspecified 2010    Gouty arthropathy, unspecified 2010    Hypertension     Hypertrophy of prostate without urinary obstruction and other lower urinary tract symptoms (LUTS) 2008    Impotence of organic origin 2008    Memory loss 2009    Obesity, unspecified 2010    Orthostatic hypotension 2008    Other and unspecified hyperlipidemia 2008    Other atopic dermatitis and related conditions 2012    Palpitations 2010    Peripheral angiopathy in diseases classified elsewhere [facility]) 2010    Personal history of malignant neoplasm of rectum, rectosigmoid junction, and anus 2011    Tinea nigra 2011    Unspecified pruritic disorder 2011    Vitamin B12 deficiency 7/23/2017      Past Surgical History:   Procedure Laterality Date    ENDOSCOPY, COLON, DIAGNOSTIC  06/2011 05/2007, 01/2004,  12/2000    HX HERNIA REPAIR  1992    INCISIONAL    HX TOTAL COLECTOMY  1991    COLON CANCER S/P RESECTION     Current Outpatient Medications   Medication Sig Dispense Refill    apixaban (ELIQUIS) 2.5 mg tablet Take 1 Tab by mouth two (2) times a day. 180 Tab 3    chlorthalidone (HYGROTON) 25 mg tablet TAKE 1 TABLET DAILY 90 Tab 3    dilTIAZem ER (CARDIZEM CD) 120 mg capsule Take 1 Cap by mouth daily. 30 Cap 11    aspirin 81 mg chewable tablet Take 1 Tab by mouth daily. 30 Tab 0    carbidopa-levodopa (SINEMET)  mg per tablet TAKE 1 TABLET FOUR TIMES A DAY FOR PARKINSONS DISEASE 360 Tab 3    cyanocobalamin (VITAMIN B12) 500 mcg tablet TAKE 2 TABLETS DAILY 180 Tab 3    ferrous sulfate (IRON) 325 mg (65 mg iron) tablet Take 28 mg by mouth. Indications: 1 TAB ON MONDAY,WEDNESDAY, AND FRIDAY      finasteride (PROSCAR) 5 mg tablet Take 5 mg by mouth daily. take at bedtime      tamsulosin (FLOMAX) 0.4 mg capsule Take 1 Cap by mouth daily.  30 Cap 0    ammonium lactate (LAC-HYDRIN) 12 % lotion APPLY THIN LAYER TWICE A DAY FOR 60 DAYS FOR DRY SCALY SKIN BOTH FEET  10     Allergies   Allergen Reactions    Cat Hair Std Allergenic Ext Unknown (comments)    Codeine Unknown (comments)    Ragweed Unknown (comments)     Mixed ragweed/pollen extract,tree extract       Family History   Problem Relation Age of Onset    Other Mother         PULMONARY EDEMA    Other Father         PAD    Heart Attack Father     Coronary Artery Disease Father      Social History     Tobacco Use    Smoking status: Former Smoker     Packs/day: 1.00     Years: 29.00     Pack years: 29.00     Types: Cigarettes     Start date: 1949     Quit date: 1978     Years since quittin.3    Smokeless tobacco: Never Used   Substance Use Topics    Alcohol use: No     Frequency: Never     Binge frequency: Never       _____________          Chief Complaint   Patient presents with    Medication Evaluation    Annual Wellness Visit         HPI:      Blanca Sibley is a 81 y.o. male. HPI  Longstanding PD. Issues with AF and RVR in 2020. Currently on Eliquis, diltiazem and Chlorthalidone. No bleeding. Following with Dr. Noel Jha. New Issues: His wife reports that his leg swelling has been worse since his electric chair has been broken. She is working with the company to try to get a replacement as soon as possible. He has been compliant with his chlorthalidone. Allergies   Allergen Reactions    Cat Hair Std Allergenic Ext Unknown (comments)    Codeine Unknown (comments)    Ragweed Unknown (comments)     Mixed ragweed/pollen extract,tree extract       Current Outpatient Medications   Medication Sig    apixaban (ELIQUIS) 2.5 mg tablet Take 1 Tab by mouth two (2) times a day.  chlorthalidone (HYGROTON) 25 mg tablet TAKE 1 TABLET DAILY    dilTIAZem ER (CARDIZEM CD) 120 mg capsule Take 1 Cap by mouth daily.  aspirin 81 mg chewable tablet Take 1 Tab by mouth daily.  carbidopa-levodopa (SINEMET)  mg per tablet TAKE 1 TABLET FOUR TIMES A DAY FOR PARKINSONS DISEASE    cyanocobalamin (VITAMIN B12) 500 mcg tablet TAKE 2 TABLETS DAILY    ferrous sulfate (IRON) 325 mg (65 mg iron) tablet Take 28 mg by mouth. Indications: 1 TAB ON MONDAY,WEDNESDAY, AND FRIDAY    finasteride (PROSCAR) 5 mg tablet Take 5 mg by mouth daily.  take at bedtime    tamsulosin (FLOMAX) 0.4 mg capsule Take 1 Cap by mouth daily.  ammonium lactate (LAC-HYDRIN) 12 % lotion APPLY THIN LAYER TWICE A DAY FOR 60 DAYS FOR DRY SCALY SKIN BOTH FEET     No current facility-administered medications for this visit.         Past Medical History:   Diagnosis Date    Asthma     Chronic kidney disease     Dermatophytosis of groin and perianal area 2010    Edema 2008    Encounter for long-term (current) use of other medications 2010    Gout, unspecified 2010    Gouty arthropathy, unspecified 2010    Hypertension     Hypertrophy of prostate without urinary obstruction and other lower urinary tract symptoms (LUTS) 2008    Impotence of organic origin 2008    Memory loss 2009    Obesity, unspecified 2010    Orthostatic hypotension 2008    Other and unspecified hyperlipidemia 2008    Other atopic dermatitis and related conditions 2012    Palpitations 2010    Peripheral angiopathy in diseases classified elsewhere [facility]) 2010    Personal history of malignant neoplasm of rectum, rectosigmoid junction, and anus 2011    Tinea nigra 2011    Unspecified pruritic disorder 2011    Vitamin B12 deficiency 7/23/2017       Past Surgical History:   Procedure Laterality Date    ENDOSCOPY, COLON, DIAGNOSTIC  06/2011 05/2007, 01/2004,  12/2000    HX HERNIA REPAIR  1992    INCISIONAL    HX TOTAL COLECTOMY  1991    COLON CANCER S/P RESECTION       Social History     Socioeconomic History    Marital status:      Spouse name: Not on file    Number of children: Not on file    Years of education: Not on file    Highest education level: Not on file   Social Needs    Financial resource strain: Not very hard    Food insecurity     Worry: Never true     Inability: Never true    Transportation needs     Medical: No     Non-medical: No   Tobacco Use    Smoking status: Former Smoker     Packs/day: 1.00     Years: 29.00     Pack years: 29.00     Types: Cigarettes     Start date: 1/1/1949 Quit date: 1978     Years since quittin.3    Smokeless tobacco: Never Used   Substance and Sexual Activity    Alcohol use: No     Frequency: Never     Binge frequency: Never    Drug use: No    Sexual activity: Not Currently   Lifestyle    Physical activity     Days per week: 0 days     Minutes per session: 0 min    Stress: Only a little   Relationships    Social connections     Talks on phone: Never     Gets together: Never     Attends Confucianism service: Never     Active member of club or organization: No     Attends meetings of clubs or organizations: Not on file     Relationship status:    Other Topics Concern     Service No    Blood Transfusions No    Caffeine Concern No    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 ROS:  Denies fever, chills, cough, chest pain, SOB,  nausea, vomiting, or diarrhea. Denies wt loss, wt gain, hemoptysis, hematochezia or melena. Physical Examination:    /60 (BP 1 Location: Left arm, BP Patient Position: Sitting, BP Cuff Size: Adult long)   Pulse 63   Temp 97.3 °F (36.3 °C) (Temporal)   Resp 22   Ht 6' 1\" (1.854 m)   Wt 206 lb 6.4 oz (93.6 kg)   SpO2 99%   BMI 27.23 kg/m²     General: Alert and Ox 2, flat affect, slow to answer  HEENT:  PERRLA, EOM intact, TMs, turbinates, pharynx normal.  No thyromegaly. No cervical adenopathy.   Neck:  Supple, no adenopathy, JVD, mass or bruit  Chest:  Clear to Ausculation, without wheezes, rales, rubs or ronchi  Cardiac: Regular rate and rhythm  Abdomen:  +BS, soft, nontender without palpable HSM  Extremities:  No cyanosis or clubbing. Bilateral lower extremities with nonpitting edema. Neurologic:  Ambulatory without assist, CN 2-12 grossly intact. Moves all extremities. Skin: no rash  Lymphadenopathy: no cervical or supraclavicular nodes    ASSESSMENT AND PLAN:     His Medicare annual wellness was completed today: Working on getting patient a Covid vaccine    1. Paroxysmal atrial fibrillation (HCC)  Tolerating diltiazem and Eliquis well  Following with cardiology every 6 months  Checking labs today prior to the appointment with Dr. Noel Jha in June  - LIPID PANEL; Future  - METABOLIC PANEL, COMPREHENSIVE; Future  - TSH 3RD GENERATION; Future  - TSH 3RD GENERATION  - METABOLIC PANEL, COMPREHENSIVE  - LIPID PANEL    2. Essential hypertension  Good control  Continue current regimen   Wife is working on getting his electric chair back so he can better elevate his legs  - LIPID PANEL; Future  - METABOLIC PANEL, COMPREHENSIVE; Future  - TSH 3RD GENERATION; Future  - TSH 3RD GENERATION  - METABOLIC PANEL, COMPREHENSIVE  - LIPID PANEL    3. Parkinson disease ([address]  Tolerating medication well  Per wife, his mentation continues to decline. He is no longer driving.     Continue to work with neurology    RTC in 3 months    Halle Rey NP

## 2021-04-15 NOTE — PROGRESS NOTES
Thyroid level is good. Liver, kidneys and electrolytes are the same. Cholesterol improved slightly since las check.

## 2021-05-11 NOTE — ED PROVIDER NOTES
EMERGENCY DEPARTMENT HISTORY AND PHYSICAL EXAM      Date: 5/11/2021  Patient Name: Simone Swenson    Please note that this dictation was completed with [employer], the computer voice recognition software. Quite often unanticipated grammatical, syntax, homophones, and other interpretive errors are inadvertently transcribed by the computer software. Please disregard these errors. Please excuse any errors that have escaped final proofreading. History of Presenting Illness     Chief Complaint   Patient presents with    Fall       History Provided By: Patient     HPI: Simone Swenson, 81 y.o. male, with a past medical history significant for Parkinson's, atrial fibrillation on Eliquis presenting the emergency department with a fall. Patient reports ports he has multiple frequent falls. He supposed to be using a walker, but was not using a walker today. He fell and landed on his side and complains of pain in his left hip. Thinks that he may have hit his head, no loss of consciousness. Denies any headache or neck pain at this time. Denies any fever, chills, cough. No nausea vomiting or diarrhea. No other exacerbating relieving factors or associated symptoms    PCP: Kerline Ray MD    No current facility-administered medications on file prior to encounter. Current Outpatient Medications on File Prior to Encounter   Medication Sig Dispense Refill    apixaban (ELIQUIS) 2.5 mg tablet Take 1 Tab by mouth two (2) times a day. 180 Tab 3    chlorthalidone (HYGROTON) 25 mg tablet TAKE 1 TABLET DAILY 90 Tab 3    dilTIAZem ER (CARDIZEM CD) 120 mg capsule Take 1 Cap by mouth daily. 30 Cap 11    aspirin 81 mg chewable tablet Take 1 Tab by mouth daily.  30 Tab 0    carbidopa-levodopa (SINEMET)  mg per tablet TAKE 1 TABLET FOUR TIMES A DAY FOR PARKINSONS DISEASE 360 Tab 3    cyanocobalamin (VITAMIN B12) 500 mcg tablet TAKE 2 TABLETS DAILY 180 Tab 3    finasteride (PROSCAR) 5 mg tablet Take 5 mg by mouth daily. take at bedtime      tamsulosin (FLOMAX) 0.4 mg capsule Take 1 Cap by mouth daily. 30 Cap 0    ferrous sulfate (IRON) 325 mg (65 mg iron) tablet Take 28 mg by mouth.  Indications: 1 TAB ON MONDAY,WEDNESDAY, AND FRIDAY      ammonium lactate (LAC-HYDRIN) 12 % lotion APPLY THIN LAYER TWICE A DAY FOR 60 DAYS FOR DRY SCALY SKIN BOTH FEET  10       Past History     Past Medical History:  Past Medical History:   Diagnosis Date    Asthma     Chronic kidney disease     Dermatophytosis of groin and perianal area 2010    Edema 2008    Encounter for long-term (current) use of other medications 2010    Gout, unspecified 2010    Gouty arthropathy, unspecified 2010    Hypertension     Hypertrophy of prostate without urinary obstruction and other lower urinary tract symptoms (LUTS) 2008    Impotence of organic origin 2008    Memory loss 2009    Obesity, unspecified 2010    Orthostatic hypotension 2008    Other and unspecified hyperlipidemia 2008    Other atopic dermatitis and related conditions 2012    Palpitations 2010    Peripheral angiopathy in diseases classified elsewhere [facility]) 2010    Personal history of malignant neoplasm of rectum, rectosigmoid junction, and anus 2011    Tinea nigra 2011    Unspecified pruritic disorder 2011    Vitamin B12 deficiency 7/23/2017       Past Surgical History:  Past Surgical History:   Procedure Laterality Date    ENDOSCOPY, COLON, DIAGNOSTIC  06/2011 05/2007, 01/2004,  12/2000    HX HERNIA REPAIR  1992    INCISIONAL    HX TOTAL COLECTOMY  1991    COLON CANCER S/P RESECTION       Family History:  Family History   Problem Relation Age of Onset    Other Mother         PULMONARY EDEMA    Other Father         PAD    Heart Attack Father     Coronary Artery Disease Father        Social History:  Social History     Tobacco Use    Smoking status: Former Smoker     Packs/day: 1.00     Years: 29.00     Pack years: 29.00     Types: Cigarettes     Start date: 1949     Quit date: 1978     Years since quittin.3    Smokeless tobacco: Never Used   Substance Use Topics    Alcohol use: No     Frequency: Never     Binge frequency: Never    Drug use: No       Allergies: Allergies   Allergen Reactions    Cat Hair Std Allergenic Ext Unknown (comments)    Codeine Unknown (comments)    Ragweed Unknown (comments)     Mixed ragweed/pollen extract,tree extract         Review of Systems   Review of Systems   Constitutional: Negative for chills and fever. HENT: Negative for congestion and sore throat. Eyes: Negative for visual disturbance. Respiratory: Negative for cough and shortness of breath. Cardiovascular: Negative for chest pain and leg swelling. Gastrointestinal: Negative for abdominal pain, blood in stool, diarrhea and nausea. Endocrine: Negative for polyuria. Genitourinary: Negative for dysuria and testicular pain. Musculoskeletal: Positive for arthralgias and myalgias. Negative for joint swelling. Skin: Negative for rash. Allergic/Immunologic: Negative for immunocompromised state. Neurological: Negative for weakness and headaches. Hematological: Does not bruise/bleed easily. Psychiatric/Behavioral: Negative for confusion. Physical Exam   Physical Exam  Vitals signs and nursing note reviewed. Constitutional:       Appearance: He is well-developed. HENT:      Head: Normocephalic and atraumatic. Eyes:      General:         Right eye: No discharge. Left eye: No discharge. Conjunctiva/sclera: Conjunctivae normal.      Pupils: Pupils are equal, round, and reactive to light. Neck:      Musculoskeletal: Normal range of motion and neck supple. Trachea: No tracheal deviation. Cardiovascular:      Rate and Rhythm: Normal rate and regular rhythm. Heart sounds: Normal heart sounds. No murmur. Pulmonary:      Effort: Pulmonary effort is normal. No respiratory distress.       Breath sounds: Normal breath sounds. No wheezing or rales. Abdominal:      General: Bowel sounds are normal.      Palpations: Abdomen is soft. Tenderness: There is no abdominal tenderness. There is no guarding or rebound. Genitourinary:     Comments: Digital rectal exam reveals melanic stool  Musculoskeletal: Normal range of motion. General: No deformity. Comments: No deformity, no pain with logroll. No tenderness palpation of the greater trochanters bilaterally   Skin:     General: Skin is warm and dry. Findings: No erythema or rash. Neurological:      Mental Status: He is alert and oriented to person, place, and time. Comments: No facial droop, speech is slowed, but likely chronic with his Parkinson's. He has equal strength in the upper and lower extremities.    Psychiatric:         Behavior: Behavior normal.         Diagnostic Study Results     Labs -     Recent Results (from the past 12 hour(s))   EKG, 12 LEAD, INITIAL    Collection Time: 05/12/21 10:22 PM   Result Value Ref Range    Ventricular Rate 84 BPM    Atrial Rate 84 BPM    P-R Interval 136 ms    QRS Duration 86 ms    Q-T Interval 406 ms    QTC Calculation (Bezet) 479 ms    Calculated P Axis 36 degrees    Calculated R Axis 8 degrees    Calculated T Axis 79 degrees    Diagnosis       Normal sinus rhythm  Minimal voltage criteria for LVH, may be normal variant  Septal infarct , age undetermined  Abnormal ECG  When compared with ECG of 23-NOV-2020 10:11,  Septal infarct is now present  Nonspecific T wave abnormality, improved in Anterolateral leads     CBC WITH AUTOMATED DIFF    Collection Time: 05/13/21  5:41 AM   Result Value Ref Range    WBC 6.4 4.1 - 11.1 K/uL    RBC 3.21 (L) 4.10 - 5.70 M/uL    HGB 8.8 (L) 12.1 - 17.0 g/dL    HCT 28.0 (L) 36.6 - 50.3 %    MCV 87.2 80.0 - 99.0 FL    MCH 27.4 26.0 - 34.0 PG    MCHC 31.4 30.0 - 36.5 g/dL    RDW 14.2 11.5 - 14.5 %    PLATELET 584 177 - 091 K/uL    MPV 9.5 8.9 - 12.9 FL    NRBC 0.0 0  WBC ABSOLUTE NRBC 0.00 0.00 - 0.01 K/uL    NEUTROPHILS 69 32 - 75 %    LYMPHOCYTES 19 12 - 49 %    MONOCYTES 10 5 - 13 %    EOSINOPHILS 0 0 - 7 %    BASOPHILS 1 0 - 1 %    IMMATURE GRANULOCYTES 1 (H) 0.0 - 0.5 %    ABS. NEUTROPHILS 4.5 1.8 - 8.0 K/UL    ABS. LYMPHOCYTES 1.2 0.8 - 3.5 K/UL    ABS. MONOCYTES 0.7 0.0 - 1.0 K/UL    ABS. EOSINOPHILS 0.0 0.0 - 0.4 K/UL    ABS. BASOPHILS 0.0 0.0 - 0.1 K/UL    ABS. IMM. GRANS. 0.0 0.00 - 0.04 K/UL    DF AUTOMATED     METABOLIC PANEL, COMPREHENSIVE    Collection Time: 05/13/21  5:41 AM   Result Value Ref Range    Sodium 142 136 - 145 mmol/L    Potassium 4.0 3.5 - 5.1 mmol/L    Chloride 108 97 - 108 mmol/L    CO2 29 21 - 32 mmol/L    Anion gap 5 5 - 15 mmol/L    Glucose 90 65 - 100 mg/dL    BUN 18 6 - 20 MG/DL    Creatinine 1.09 0.70 - 1.30 MG/DL    BUN/Creatinine ratio 17 12 - 20      GFR est AA >60 >60 ml/min/1.73m2    GFR est non-AA >60 >60 ml/min/1.73m2    Calcium 8.6 8.5 - 10.1 MG/DL    Bilirubin, total 1.2 (H) 0.2 - 1.0 MG/DL    ALT (SGPT) 6 (L) 12 - 78 U/L    AST (SGOT) 16 15 - 37 U/L    Alk. phosphatase 47 45 - 117 U/L    Protein, total 6.4 6.4 - 8.2 g/dL    Albumin 2.5 (L) 3.5 - 5.0 g/dL    Globulin 3.9 2.0 - 4.0 g/dL    A-G Ratio 0.6 (L) 1.1 - 2.2     MAGNESIUM    Collection Time: 05/13/21  5:41 AM   Result Value Ref Range    Magnesium 1.7 1.6 - 2.4 mg/dL   PHOSPHORUS    Collection Time: 05/13/21  5:41 AM   Result Value Ref Range    Phosphorus 2.5 (L) 2.6 - 4.7 MG/DL       Radiologic Studies -   XR HIP LT W OR WO PELV 2-3 VWS   Final Result   Evidence of degenerative change involving the left hip. CT HEAD WO CONT   Final Result   No acute process or change compared to the prior exam.            CT SPINE CERV WO CONT   Final Result   Spondylitic changes without acute abnormality.         CT Results  (Last 48 hours)               05/11/21 1641  CT HEAD WO CONT Final result    Impression:  No acute process or change compared to the prior exam.               Narrative: EXAM: CT HEAD WO CONT       INDICATION: fall, head injury       COMPARISON: 5/5/2018. CONTRAST: None. TECHNIQUE: Unenhanced CT of the head was performed using 5 mm images. Brain and   bone windows were generated. Coronal and sagittal reformats. CT dose reduction   was achieved through use of a standardized protocol tailored for this   examination and automatic exposure control for dose modulation. FINDINGS:   The ventricles and sulci are normal in size, shape and configuration. . There is   no significant white matter disease. There is no intracranial hemorrhage,   extra-axial collection, or mass effect. The basilar cisterns are open. No CT   evidence of acute infarct. The bone windows demonstrate no abnormalities. The visualized portions of the   paranasal sinuses and mastoid air cells are clear. 05/11/21 1641  CT SPINE CERV WO CONT Final result    Impression:  Spondylitic changes without acute abnormality. Narrative:  EXAM:  CT CERVICAL SPINE WITHOUT CONTRAST       INDICATION: fall, pain. COMPARISON: None. CONTRAST:  None. TECHNIQUE: Multislice helical CT of the cervical spine was performed without   intravenous contrast administration. Sagittal and coronal reformats were   generated. CT dose reduction was achieved through use of a standardized   protocol tailored for this examination and automatic exposure control for dose   modulation. FINDINGS:       The alignment is within normal limits. There is no fracture or compression   deformity. The odontoid process is intact. The craniocervical junction is within   normal limits. The incidentally imaged soft tissues are within normal limits. C2-C3: There is no spinal canal or neural foraminal stenosis. C3-C4: There is no spinal canal or neural foraminal stenosis. C4-C5: There is no spinal canal or neural foraminal stenosis.        C5-C6: Spondylosis is noted at this level with posterior osteophyte/disc bulge   complex causing moderate spinal stenosis. Bilateral neural foraminal narrowing   is noted secondary to uncovertebral osteophyte formation. C6-C7: Spondylosis is noted at this level with posterior osteophyte/disc bulge   complex causing moderate spinal stenosis. Bilateral neural foraminal narrowing   is noted secondary to uncovertebral osteophyte formation. C7-T1: There is no spinal canal or neural foraminal stenosis. CXR Results  (Last 48 hours)    None            Medical Decision Making   I am the first provider for this patient. I reviewed the vital signs, available nursing notes, past medical history, past surgical history, family history and social history. Vital Signs-Reviewed the patient's vital signs. Patient Vitals for the past 12 hrs:   Temp Pulse Resp BP SpO2   05/13/21 0401 98.4 °F (36.9 °C) 81 20 (!) 184/77 100 %         Records Reviewed:   Nursing notes, Prior visits     Provider Notes (Medical Decision Making):   Patient here with ground-level fall. Will obtain CTA head and neck to look for evidence of an acute fracture or intracranial injury. Will obtain x-ray of the pelvis and left hip. Disposition pending imaging    ED Course:   Initial assessment performed. The patients presenting problems have been discussed, and they are in agreement with the care plan formulated and outlined with them. I have encouraged them to ask questions as they arise throughout their visit. ED Course as of May 13 0719   Tue May 11, 2021   1907 Discussed with Dr. Oj Riggs , general surgery. Would be comfortable doing colonoscopy and endoscopy for diagnostic purposes if needed, less therapeutic interventions, but does feel comfortable watching the patient here and holding Eliquis and given PPIs.   Blood if needed.    [AR]      ED Course User Index  [AR] Earle Space, DO       Patient having melanic stool, concern for the possibility of GI bleeding, he is hemodynamically stable, discussed with Dr. Dana Jaramillo at signout, she will be consulting the hospitalist about hospitalization. Disposition:    Admit to Medical Floor    DISCHARGE NOTE  Patients results have been reviewed with them. Patient and/or family have verbally conveyed their understanding and agreement of the patient's signs, symptoms, diagnosis, treatment and prognosis and additionally agree to follow up as recommended or return to the Emergency Room should their condition change or have any new concerns prior to their follow-up appointment. Patient verbally agrees with the care-plan and verbally conveys that all of their questions have been answered. Discharge instructions have also been provided to the patient with some educational information regarding their diagnosis as well a list of reasons why they would want to return to the ER prior to their follow-up appointment should their condition change. PLAN:  1. Current Discharge Medication List        2. Follow-up Information    None         Return to ED if worse     Diagnosis     Clinical Impression:   1. Anemia, unspecified type    2. Frequent falls        Attestations:   This note was completed by German Carmona DO

## 2021-05-11 NOTE — ED TRIAGE NOTES
Pt arrived by EMS after a GLF, pt reports he was in the bathroom and fell, pt reports he was on the floor for an hour before his wife found him. Pt denies injury. Pt has Parkinson  and uses a walker at baseline.   Pt awake and alert  Pt educated on ER flos

## 2021-05-12 NOTE — PROGRESS NOTES
Patient arrived to floor via stretcher. Patient required 2 staff member assist to bed from stretcher. Report not received on patient prior to arrival and attempted to call for report three times not able to obtain report. Obtained information on patient via reading patients chart.

## 2021-05-12 NOTE — H&P
Hospitalist Admission Note    NAME: Madelyn Guzmán   :  1935   MRN:  [id number]     Date/Time:  2021 2:47 PM    Patient PCP: Eron Rowe MD  ______________________________________________________________________  Given the patient's current clinical presentation, I have a high level of concern for decompensation if discharged from the emergency department. Complex decision making was performed, which includes reviewing the patient's available past medical records, laboratory results, and x-ray films. My assessment of this patient's clinical condition and my plan of care is as follows. Assessment / Plan:  Suspected UGIB  Anemia  Hold eliquis  No IVF as BP and renal function ok  protonix 40mg iv q12h  Continue po iron  Surgeon consulted; EGD tmrw, NPO midnight    Parkinson's Disease  Continue sinnemet    HTN  Hold cardizem    BPH  Continue flomax and finasteride    AF, paroxysmal?  Hold eliquis    Asthma  Former Smoker, Remote  Does not appear to be on mao breathing tx  Monitor for wheezing/hypoxia    CKD  Stable  Renally dose Rx  Avoid nephrotoxins        Code Status: DNR  Surrogate Decision Maker: not identified    DVT Prophylaxis: SCDs (ruling out GIB)  GI Prophylaxis: not indicated    Baseline: ambulatory      Subjective:   CHIEF COMPLAINT: recurrent falls    HISTORY OF PRESENT ILLNESS:     Brando Brunson is a 81 y.o.  male who presents with recurrent falls. PMH as noted below. Patient states he lives alone. He states he has been falling more frequently recently and felt that he should come to the hospital to figure out why. Denies BRBPR but Hb in ED found to be much lower than prior. Stool heme+ in ED. Hemodynamically stable. Agreeable to be admitted for endoscopy. We were asked to admit for work up and evaluation of the above problems.      Past Medical History:   Diagnosis Date    Asthma     Chronic kidney disease     Dermatophytosis of groin and perianal area     Edema 2008    Encounter for long-term (current) use of other medications 2010    Gout, unspecified 2010    Gouty arthropathy, unspecified 2010    Hypertension     Hypertrophy of prostate without urinary obstruction and other lower urinary tract symptoms (LUTS) 2008    Impotence of organic origin 2008    Memory loss 2009    Obesity, unspecified 2010    Orthostatic hypotension 2008    Other and unspecified hyperlipidemia 2008    Other atopic dermatitis and related conditions 2012    Palpitations 2010    Peripheral angiopathy in diseases classified elsewhere [facility]) 2010    Personal history of malignant neoplasm of rectum, rectosigmoid junction, and anus 2011    Tinea nigra 2011    Unspecified pruritic disorder 2011    Vitamin B12 deficiency 2017        Past Surgical History:   Procedure Laterality Date    ENDOSCOPY, COLON, DIAGNOSTIC  2011, 2004,  2000    HX HERNIA REPAIR  1992    INCISIONAL    HX TOTAL COLECTOMY      COLON CANCER S/P RESECTION       Social History     Tobacco Use    Smoking status: Former Smoker     Packs/day: 1.00     Years: 29.00     Pack years: 29.00     Types: Cigarettes     Start date: 1949     Quit date: 1978     Years since quittin.3    Smokeless tobacco: Never Used   Substance Use Topics    Alcohol use: No     Frequency: Never     Binge frequency: Never        Family History   Problem Relation Age of Onset    Other Mother         PULMONARY EDEMA    Other Father         PAD    Heart Attack Father     Coronary Artery Disease Father      Allergies   Allergen Reactions    Cat Hair Std Allergenic Ext Unknown (comments)    Codeine Unknown (comments)    Ragweed Unknown (comments)     Mixed ragweed/pollen extract,tree extract        Prior to Admission medications    Medication Sig Start Date End Date Taking? Authorizing Provider   apixaban (ELIQUIS) 2.5 mg tablet Take 1 Tab by mouth two (2) times a day.  21  Yes Ap Leary NP chlorthalidone (HYGROTON) 25 mg tablet TAKE 1 TABLET DAILY 11/29/20  Yes Vik Castillo MD   dilTIAZem ER (CARDIZEM CD) 120 mg capsule Take 1 Cap by mouth daily. 11/27/20  Yes Vik Castillo MD   aspirin 81 mg chewable tablet Take 1 Tab by mouth daily. 11/25/20  Yes Rciki Orellana MD   carbidopa-levodopa (SINEMET)  mg per tablet TAKE 1 TABLET FOUR TIMES A DAY FOR PARKINSONS DISEASE 10/5/20  Yes Vik Castillo MD   cyanocobalamin (VITAMIN B12) 500 mcg tablet TAKE 2 TABLETS DAILY 8/24/20  Yes Vik Castillo MD   finasteride (PROSCAR) 5 mg tablet Take 5 mg by mouth daily. take at bedtime 5/30/18  Yes Pansy Heimlich, MD   tamsulosin [facility]) 0.4 mg capsule Take 1 Cap by mouth daily. 5/11/18  Yes Duong Mo NP   ferrous sulfate (IRON) 325 mg (65 mg iron) tablet Take 28 mg by mouth. Indications: 1 TAB ON MONDAY,WEDNESDAY, AND FRIDAY    Provider, Historical   ammonium lactate (LAC-HYDRIN) 12 % lotion APPLY THIN LAYER TWICE A DAY FOR 60 DAYS FOR DRY SCALY SKIN BOTH FEET 5/10/17   Provider, Historical       REVIEW OF SYSTEMS:     I am not able to complete the review of systems because:    The patient is intubated and sedated    The patient has altered mental status due to his acute medical problems    The patient has baseline aphasia from prior stroke(s)    The patient has baseline dementia and is not reliable historian    The patient is in acute medical distress and unable to provide information           Total of 12 systems reviewed as follows:       POSITIVE= underlined text  Negative = text not underlined  General:  fever, chills, sweats, generalized weakness, weight loss/gain,      loss of appetite   Eyes:    blurred vision, eye pain, loss of vision, double vision  ENT:    rhinorrhea, pharyngitis   Respiratory:   cough, sputum production, SOB, SARABIA, wheezing, pleuritic pain   Cardiology:   chest pain, palpitations, orthopnea, PND, edema, syncope   Gastrointestinal:  abdominal pain , N/V, diarrhea, dysphagia, constipation, bleeding   Genitourinary:  frequency, urgency, dysuria, hematuria, incontinence   Muskuloskeletal :  arthralgia, myalgia, back pain  Hematology:  easy bruising, nose or gum bleeding, lymphadenopathy   Dermatological: rash, ulceration, pruritis, color change / jaundice  Endocrine:   hot flashes or polydipsia   Neurological:  headache, dizziness, confusion, focal weakness, paresthesia,     Speech difficulties, memory loss, gait difficulty  Psychological: Feelings of anxiety, depression, agitation    Objective:   VITALS:    Visit Vitals  /70 (BP 1 Location: Left upper arm, BP Patient Position: At rest)   Pulse 80   Temp 97.2 °F (36.2 °C)   Resp 20   Ht 6' 3.98\" (1.93 m)   Wt 96.2 kg (212 lb 1.3 oz)   SpO2 98%   BMI 25.83 kg/m²       PHYSICAL EXAM:    General:    Alert, cooperative, no distress, appears stated age. HEENT: Atraumatic, anicteric sclerae, pink conjunctivae     No oral ulcers, mucosa moist, throat clear, dentition fair  Neck:  Supple, symmetrical,  thyroid: non tender  Lungs:   Clear to auscultation bilaterally. No Wheezing or Rhonchi. No rales. Chest wall:  No tenderness  No Accessory muscle use. Heart:   Regular  rhythm,  No  murmur   No edema  Abdomen:   Soft, non-tender. Not distended. Bowel sounds normal  Extremities: No cyanosis. No clubbing,      Skin turgor normal, Capillary refill normal, Radial dial pulse 2+  Skin:     Not pale. Not Jaundiced  No rashes   Psych:  Good insight. Not depressed. Not anxious or agitated. Neurologic: EOMs intact. No facial asymmetry. No aphasia or slurred speech. Symmetrical strength, Sensation grossly intact.  Alert and oriented X 4.     _______________________________________________________________________  Care Plan discussed with:    Comments   Patient x    Family      RN x    Care Manager                    Consultant:  jose _______________________________________________________________________  Expected  Disposition:   Home with Family x   HH/PT/OT/RN    SNF/LTC    LACHELLE    ________________________________________________________________________  TOTAL TIME:  48 Minutes    Critical Care Provided     Minutes non procedure based      Comments    x Reviewed previous records   >50% of visit spent in counseling and coordination of care x Discussion with patient and/or family and questions answered       ________________________________________________________________________  Signed: Mayur Ashford,     Procedures: see electronic medical records for all procedures/Xrays and details which were not copied into this note but were reviewed prior to creation of Plan. LAB DATA REVIEWED:    Recent Results (from the past 24 hour(s))   CBC WITH AUTOMATED DIFF    Collection Time: 05/11/21  6:27 PM   Result Value Ref Range    WBC 6.9 4.1 - 11.1 K/uL    RBC 3.18 (L) 4.10 - 5.70 M/uL    HGB 8.9 (L) 12.1 - 17.0 g/dL    HCT 27.8 (L) 36.6 - 50.3 %    MCV 87.4 80.0 - 99.0 FL    MCH 28.0 26.0 - 34.0 PG    MCHC 32.0 30.0 - 36.5 g/dL    RDW 14.4 11.5 - 14.5 %    PLATELET 768 540 - 584 K/uL    MPV 9.2 8.9 - 12.9 FL    NRBC 0.0 0  WBC    ABSOLUTE NRBC 0.00 0.00 - 0.01 K/uL    NEUTROPHILS 69 32 - 75 %    LYMPHOCYTES 17 12 - 49 %    MONOCYTES 14 (H) 5 - 13 %    EOSINOPHILS 0 0 - 7 %    BASOPHILS 0 0 - 1 %    IMMATURE GRANULOCYTES 0 0.0 - 0.5 %    ABS. NEUTROPHILS 4.8 1.8 - 8.0 K/UL    ABS. LYMPHOCYTES 1.1 0.8 - 3.5 K/UL    ABS. MONOCYTES 1.0 0.0 - 1.0 K/UL    ABS. EOSINOPHILS 0.0 0.0 - 0.4 K/UL    ABS. BASOPHILS 0.0 0.0 - 0.1 K/UL    ABS. IMM.  GRANS. 0.0 0.00 - 0.04 K/UL    DF AUTOMATED     METABOLIC PANEL, COMPREHENSIVE    Collection Time: 05/11/21  6:27 PM   Result Value Ref Range    Sodium 142 136 - 145 mmol/L    Potassium 3.3 (L) 3.5 - 5.1 mmol/L    Chloride 103 97 - 108 mmol/L    CO2 29 21 - 32 mmol/L    Anion gap 10 5 - 15 mmol/L    Glucose 95 65 - 100 mg/dL    BUN 38 (H) 6 - 20 MG/DL    Creatinine 1.54 (H) 0.70 - 1.30 MG/DL    BUN/Creatinine ratio 25 (H) 12 - 20      GFR est AA 52 (L) >60 ml/min/1.73m2    GFR est non-AA 43 (L) >60 ml/min/1.73m2    Calcium 8.5 8.5 - 10.1 MG/DL    Bilirubin, total 0.5 0.2 - 1.0 MG/DL    ALT (SGPT) 8 (L) 12 - 78 U/L    AST (SGOT) 11 (L) 15 - 37 U/L    Alk. phosphatase 48 45 - 117 U/L    Protein, total 6.3 (L) 6.4 - 8.2 g/dL    Albumin 2.6 (L) 3.5 - 5.0 g/dL    Globulin 3.7 2.0 - 4.0 g/dL    A-G Ratio 0.7 (L) 1.1 - 2.2     URINALYSIS W/ REFLEX CULTURE    Collection Time: 05/11/21  6:42 PM    Specimen: Urine   Result Value Ref Range    Color YELLOW/STRAW      Appearance CLEAR CLEAR      Specific gravity 1.015 1.003 - 1.030      pH (UA) 6.5 5.0 - 8.0      Protein Negative NEG mg/dL    Glucose Negative NEG mg/dL    Ketone Negative NEG mg/dL    Bilirubin Negative NEG      Blood Negative NEG      Urobilinogen 4.0 (H) 0.2 - 1.0 EU/dL    Nitrites Negative NEG      Leukocyte Esterase Negative NEG      WBC 0-4 0 - 4 /hpf    RBC 0-5 0 - 5 /hpf    Epithelial cells FEW FEW /lpf    Bacteria Negative NEG /hpf    UA:UC IF INDICATED CULTURE NOT INDICATED BY UA RESULT CNI     OCCULT BLOOD, STOOL    Collection Time: 05/11/21  7:00 PM   Result Value Ref Range    Occult blood, stool Positive (A) NEG     CBC WITH AUTOMATED DIFF    Collection Time: 05/12/21  5:29 AM   Result Value Ref Range    WBC 6.4 4.1 - 11.1 K/uL    RBC 3.07 (L) 4.10 - 5.70 M/uL    HGB 8.7 (L) 12.1 - 17.0 g/dL    HCT 26.3 (L) 36.6 - 50.3 %    MCV 85.7 80.0 - 99.0 FL    MCH 28.3 26.0 - 34.0 PG    MCHC 33.1 30.0 - 36.5 g/dL    RDW 14.3 11.5 - 14.5 %    PLATELET 835 027 - 680 K/uL    MPV 9.5 8.9 - 12.9 FL    NRBC 0.0 0  WBC    ABSOLUTE NRBC 0.00 0.00 - 0.01 K/uL    NEUTROPHILS 70 32 - 75 %    LYMPHOCYTES 17 12 - 49 %    MONOCYTES 11 5 - 13 %    EOSINOPHILS 0 0 - 7 %    BASOPHILS 1 0 - 1 %    IMMATURE GRANULOCYTES 1 (H) 0.0 - 0.5 %    ABS.  NEUTROPHILS 4.5 1.8 - 8.0 K/UL ABS. LYMPHOCYTES 1.1 0.8 - 3.5 K/UL    ABS. MONOCYTES 0.7 0.0 - 1.0 K/UL    ABS. EOSINOPHILS 0.0 0.0 - 0.4 K/UL    ABS. BASOPHILS 0.0 0.0 - 0.1 K/UL    ABS. IMM. GRANS. 0.0 0.00 - 0.04 K/UL    DF AUTOMATED     METABOLIC PANEL, BASIC    Collection Time: 05/12/21  5:29 AM   Result Value Ref Range    Sodium 143 136 - 145 mmol/L    Potassium 3.4 (L) 3.5 - 5.1 mmol/L    Chloride 105 97 - 108 mmol/L    CO2 30 21 - 32 mmol/L    Anion gap 8 5 - 15 mmol/L    Glucose 100 65 - 100 mg/dL    BUN 28 (H) 6 - 20 MG/DL    Creatinine 1.31 (H) 0.70 - 1.30 MG/DL    BUN/Creatinine ratio 21 (H) 12 - 20      GFR est AA >60 >60 ml/min/1.73m2    GFR est non-AA 52 (L) >60 ml/min/1.73m2    Calcium 8.0 (L) 8.5 - 10.1 MG/DL   SAMPLES BEING HELD    Collection Time: 05/12/21  5:29 AM   Result Value Ref Range    SAMPLES BEING HELD 1SST     COMMENT        Add-on orders for these samples will be processed based on acceptable specimen integrity and analyte stability, which may vary by analyte.    MAGNESIUM    Collection Time: 05/12/21  5:29 AM   Result Value Ref Range    Magnesium 1.8 1.6 - 2.4 mg/dL

## 2021-05-12 NOTE — PROGRESS NOTES
Physical Therapy Screening:    An [facility] screening referral was triggered for physical therapy based on results obtained during the nursing admission assessment. The patients chart was reviewed and the patient is appropriate for a skilled therapy evaluation if there is a decline in functional mobility from baseline. Please order a consult for physical therapy if you are in agreement and would like an evaluation to be completed. Thank you.     Varun Fair, PTA

## 2021-05-12 NOTE — CONSULTS
Surgery Consult    Subjective:      Samuella Holstein is a 81 y.o. male who presented to the emergency department secondary to falls. He is somewhat of a poor historian and has a history of Parkinson's disease and some dementia. Reviewing the ED note it appears that he had a fall at home. The patient states that he had loss of balance and a fall but does not believe that he had loss of consciousness. He states that he has had at least 3 falls in the last week. He has a history of A. fib and is on Eliquis. On questioning him why he is on Eliquis he states he is not aware of having any problems. He believes he has had his medicines changed recently but I do not see documentation of this in the chart. In any rate while in the emergency department he had evaluation and was found to be anemic with a hemoglobin of 8.9 with an MCV of 87.4. In November hemoglobin was 12.7 with an MCV of 87.4. He denies any nausea or vomiting or abdominal pain. He is not aware of any melanotic stools however in the emergency department they identified him to be heme positive on rectal exam and had a report that he had at least 2 melanotic stools. He has a remote history of some type of colon cancer for which he has had a colon resection back in 1971. He is not sure when he has had his last colonoscopy. He does believe he has had an upper endoscopy but it is been in the remote past he is not sure why he has had that done. He takes no medicines for his stomach.     Past Medical History:   Diagnosis Date    Asthma     Chronic kidney disease     Dermatophytosis of groin and perianal area 2010    Edema 2008    Encounter for long-term (current) use of other medications 2010    Gout, unspecified 2010    Gouty arthropathy, unspecified 2010    Hypertension     Hypertrophy of prostate without urinary obstruction and other lower urinary tract symptoms (LUTS) 2008    Impotence of organic origin 2008    Memory loss 2009    Obesity, unspecified 2010    Orthostatic hypotension 2008    Other and unspecified hyperlipidemia 2008    Other atopic dermatitis and related conditions 2012    Palpitations 2010    Peripheral angiopathy in diseases classified elsewhere [facility]) 2010    Personal history of malignant neoplasm of rectum, rectosigmoid junction, and anus 2011    Tinea nigra 2011    Unspecified pruritic disorder 2011    Vitamin B12 deficiency 2017     Past Surgical History:   Procedure Laterality Date    ENDOSCOPY, COLON, DIAGNOSTIC  2011, 2004,  2000    HX HERNIA REPAIR      INCISIONAL    HX TOTAL COLECTOMY      COLON CANCER S/P RESECTION      Family History   Problem Relation Age of Onset    Other Mother         PULMONARY EDEMA    Other Father         PAD    Heart Attack Father     Coronary Artery Disease Father      Social History     Socioeconomic History    Marital status:      Spouse name: Not on file    Number of children: Not on file    Years of education: Not on file    Highest education level: Not on file   Social Needs    Financial resource strain: Not very hard    Food insecurity     Worry: Never true     Inability: Never true    Transportation needs     Medical: No     Non-medical: No   Tobacco Use    Smoking status: Former Smoker     Packs/day: 1.00     Years: 29.00     Pack years: 29.00     Types: Cigarettes     Start date: 1949     Quit date: 1978     Years since quittin.3    Smokeless tobacco: Never Used   Substance and Sexual Activity    Alcohol use: No     Frequency: Never     Binge frequency: Never    Drug use: No    Sexual activity: Not Currently   Lifestyle    Physical activity     Days per week: 0 days     Minutes per session: 0 min    Stress:  Only a little   Relationships    Social connections     Talks on phone: Never     Gets together: Never     Attends Zoroastrianism service: Never     Active member of club or organization: No Attends meetings of clubs or organizations: Not on file     Relationship status:    Other Topics Concern     Service No    Blood Transfusions No    Caffeine Concern No    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    Hobby Hazards No    Stress Concern No    Weight Concern Yes     Comment: loss of weight    Special Diet No    Back Care Yes     Comment: pain from kidney cyst    Exercise No    Bike Helmet No     Comment: n/a    Seat Belt Yes      Current Facility-Administered Medications   Medication Dose Route Frequency Provider Last Rate Last Admin    potassium chloride SR (KLOR-CON 10) tablet 30 mEq  30 mEq Oral BID Seipp, James, DO        0.9% sodium chloride infusion  125 mL/hr IntraVENous CONTINUOUS Claire Blizzard,  mL/hr at 21 4185 125 mL/hr at 21 3409        Allergies   Allergen Reactions    Cat Hair Std Allergenic Ext Unknown (comments)    Codeine Unknown (comments)    Ragweed Unknown (comments)     Mixed ragweed/pollen extract,tree extract       Review of Systems:  Pertinent items are noted in the History of Present Illness. Objective:        Patient Vitals for the past 8 hrs:   BP Temp Pulse Resp SpO2   21 0758 (!) 155/66 98.5 °F (36.9 °C) 76 20 100 %       Temp (24hrs), Av.3 °F (36.8 °C), Min:98 °F (36.7 °C), Max:98.5 °F (36.9 °C)      Physical Exam:  GENERAL: alert, cooperative, no distress, appears stated age, confused, LUNG: clear to auscultation bilaterally,   HEART: irregularly irregular rhythm, systolic murmur   ABDOMEN: soft, non-tender. Bowel sounds normal. No masses,  no organomegaly, midline incision well-healed. Assessment:   Anemia with heme positive stools. He has been on Eliquis. Certainly the possibility exists of an upper GI bleed. Plan:     Discussed the possibility of performing an EGD. He is somewhat hesitant on making a decision.   He would like to discuss this with his wife as well as the hospitalist prior to proceeding. Risks of the procedure were shared including the risks of aspiration or esophageal injury although the likelihood of that is low. Both the patient and the hospitalist will make a decision today and if they plan on proceeding would plan on performing EGD tomorrow. Meanwhile would recommend twice daily PPI.   Discussed with the hospitalist.

## 2021-05-12 NOTE — PROGRESS NOTES
Problem: Falls - Risk of  Goal: *Absence of Falls  Description: Document Elio Ko Fall Risk and appropriate interventions in the flowsheet. Outcome: Progressing Towards Goal  Note: Fall Risk Interventions:  Mobility Interventions: Bed/chair exit alarm, OT consult for ADLs, Patient to call before getting OOB, PT Consult for mobility concerns, PT Consult for assist device competence, Strengthening exercises (ROM-active/passive), Utilize walker, cane, or other assistive device         Medication Interventions: Bed/chair exit alarm, Patient to call before getting OOB    Elimination Interventions: Bed/chair exit alarm, Call light in reach, Patient to call for help with toileting needs, Urinal in reach    History of Falls Interventions: Bed/chair exit alarm, Door open when patient unattended, Room close to nurse's station         Problem: Patient Education: Go to Patient Education Activity  Goal: Patient/Family Education  Outcome: Progressing Towards Goal     Problem: Pressure Injury - Risk of  Goal: *Prevention of pressure injury  Description: Document Jonah Scale and appropriate interventions in the flowsheet.   Outcome: Progressing Towards Goal  Note: Pressure Injury Interventions:  Sensory Interventions: Assess changes in LOC    Moisture Interventions: Absorbent underpads    Activity Interventions: PT/OT evaluation    Mobility Interventions: PT/OT evaluation    Nutrition Interventions: Document food/fluid/supplement intake, Offer support with meals,snacks and hydration    Friction and Shear Interventions: Apply protective barrier, creams and emollients, Lift sheet                Problem: Patient Education: Go to Patient Education Activity  Goal: Patient/Family Education  Outcome: Progressing Towards Goal     Problem: Upper and Lower GI Bleed: Day 1  Goal: Off Pathway (Use only if patient is Off Pathway)  Outcome: Progressing Towards Goal  Goal: Activity/Safety  Outcome: Progressing Towards Goal  Goal: Consults, if ordered  Outcome: Progressing Towards Goal  Goal: Diagnostic Test/Procedures  Outcome: Progressing Towards Goal  Goal: Nutrition/Diet  Outcome: Progressing Towards Goal  Goal: Discharge Planning  Outcome: Progressing Towards Goal  Goal: Medications  Outcome: Progressing Towards Goal  Goal: Respiratory  Outcome: Progressing Towards Goal  Goal: Treatments/Interventions/Procedures  Outcome: Progressing Towards Goal  Goal: Psychosocial  Outcome: Progressing Towards Goal  Goal: *Optimal pain control at patient's stated goal  Outcome: Progressing Towards Goal  Goal: *Hemodynamically stable  Outcome: Progressing Towards Goal  Goal: *Demonstrates progressive activity  Outcome: Progressing Towards Goal     Problem: Upper and Lower GI Bleed: Day 2  Goal: Off Pathway (Use only if patient is Off Pathway)  Outcome: Progressing Towards Goal  Goal: Activity/Safety  Outcome: Progressing Towards Goal  Goal: Consults, if ordered  Outcome: Progressing Towards Goal  Goal: Diagnostic Test/Procedures  Outcome: Progressing Towards Goal  Goal: Nutrition/Diet  Outcome: Progressing Towards Goal  Goal: Discharge Planning  Outcome: Progressing Towards Goal  Goal: Medications  Outcome: Progressing Towards Goal  Goal: Respiratory  Outcome: Progressing Towards Goal  Goal: Treatments/Interventions/Procedures  Outcome: Progressing Towards Goal  Goal: Psychosocial  Outcome: Progressing Towards Goal  Goal: *Optimal pain control at patient's stated goal  Outcome: Progressing Towards Goal  Goal: *Hemodynamically stable  Outcome: Progressing Towards Goal  Goal: *Tolerating diet  Outcome: Progressing Towards Goal  Goal: *Demonstrates progressive activity  Outcome: Progressing Towards Goal

## 2021-05-12 NOTE — PROGRESS NOTES
Problem: Falls - Risk of  Goal: *Absence of Falls  Description: Document Madison Irwin Fall Risk and appropriate interventions in the flowsheet. Outcome: Progressing Towards Goal  Note: Fall Risk Interventions:  Mobility Interventions: Bed/chair exit alarm, Patient to call before getting OOB, PT Consult for assist device competence, PT Consult for mobility concerns         Medication Interventions: Bed/chair exit alarm, Patient to call before getting OOB, Teach patient to arise slowly    Elimination Interventions: Bed/chair exit alarm, Call light in reach, Patient to call for help with toileting needs, Stay With Me (per policy)    History of Falls Interventions: Bed/chair exit alarm, Door open when patient unattended, Room close to nurse's station         Problem: Patient Education: Go to Patient Education Activity  Goal: Patient/Family Education  Outcome: Progressing Towards Goal     Problem: Pressure Injury - Risk of  Goal: *Prevention of pressure injury  Description: Document Jonah Scale and appropriate interventions in the flowsheet.   Outcome: Progressing Towards Goal  Note: Pressure Injury Interventions:  Sensory Interventions: Assess changes in LOC, Float heels, Keep linens dry and wrinkle-free, Minimize linen layers    Moisture Interventions: Absorbent underpads, Limit adult briefs    Activity Interventions: PT/OT evaluation    Mobility Interventions: HOB 30 degrees or less    Nutrition Interventions: Document food/fluid/supplement intake    Friction and Shear Interventions: Apply protective barrier, creams and emollients, Lift sheet, Minimize layers                Problem: Patient Education: Go to Patient Education Activity  Goal: Patient/Family Education  Outcome: Resolved/Met     Problem: Patient Education: Go to Patient Education Activity  Goal: Patient/Family Education  Outcome: Progressing Towards Goal     Problem: Upper and Lower GI Bleed: Day 1  Goal: Off Pathway (Use only if patient is Off Pathway)  Outcome: Progressing Towards Goal  Goal: Activity/Safety  Outcome: Progressing Towards Goal  Goal: Consults, if ordered  Outcome: Progressing Towards Goal  Goal: Diagnostic Test/Procedures  Outcome: Progressing Towards Goal  Goal: Nutrition/Diet  Outcome: Progressing Towards Goal  Goal: Discharge Planning  Outcome: Progressing Towards Goal  Goal: Medications  Outcome: Progressing Towards Goal  Goal: Respiratory  Outcome: Progressing Towards Goal  Goal: Treatments/Interventions/Procedures  Outcome: Progressing Towards Goal  Goal: Psychosocial  Outcome: Progressing Towards Goal  Goal: *Optimal pain control at patient's stated goal  Outcome: Progressing Towards Goal  Goal: *Hemodynamically stable  Outcome: Progressing Towards Goal  Goal: *Demonstrates progressive activity  Outcome: Progressing Towards Goal

## 2021-05-12 NOTE — PROGRESS NOTES
Care Management Interventions  PCP Verified by CM: Yes  Last Visit to PCP: 04/13/21  Palliative Care Criteria Met (RRAT>21 & CHF Dx)?: No(No MD order)  Mode of Transport at Discharge: Other (see comment)(Wife POV)  Transition of Care Consult (CM Consult): Discharge Planning  Physical Therapy Consult: No  Occupational Therapy Consult: No  Speech Therapy Consult: No  Current Support Network: Lives with Spouse  Confirm Follow Up Transport: Family  The Plan for Transition of Care is Related to the Following Treatment Goals : Treat s/s fall/weakness  Discharge Location  Discharge Placement: Home    Patient lives at home with his wife. He uses a walker at home and has a shower chair. Patient states he does have an ACP (Living Will) document that he is drawing up by his  but it isn't ready yet. States his wife is his primary healthcare decision maker. He says she will be in soon to see him. Patient is in observation status. BAINS explained to patient. He stated understanding and signed it. Signed letter placed in chart and carbon copy along with care management information given to patient. Instructed to call if he has any questions or concerns.        Reason for Admission:  Fall/weakness                      RUR Score:          N/a PT IS IN OBS STATUS           RRAT: 18 MODERATE            Plan for utilizing home health:      TBD likely     PCP: First and Last name:  Sj Longoria MD     Name of Practice: [facility]    Are you a current patient: Yes/No: Yes   Approximate date of last visit: 4/13/21   Can you participate in a virtual visit with your PCP: Yes                    Current Advanced Directive/Advance Care Plan: DNR      Healthcare Decision Maker:   Click here to complete [facility] including selection of the [facility] Relationship (ie \"Primary\")             [facility] - [phone]    Secondary Decision Maker: Catarina Han - Brother - [phone]                  Transition of Care Plan:      Home when medically stable

## 2021-05-13 PROBLEM — D62 ANEMIA DUE TO ACUTE BLOOD LOSS: Status: ACTIVE | Noted: 2021-01-01

## 2021-05-13 PROBLEM — R53.1 WEAKNESS GENERALIZED: Status: ACTIVE | Noted: 2021-01-01
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
## 2021-05-13 PROBLEM — Z79.01 CHRONIC ANTICOAGULATION: Chronic | Status: ACTIVE | Noted: 2021-01-01

## 2021-05-13 NOTE — ROUTINE PROCESS
Bedside and Verbal shift change report given to Ulises Bernal RN (oncoming nurse) by Diana Orozco LPN (offgoing nurse). Report included the following information SBAR, Kardex and MAR.

## 2021-05-13 NOTE — PERIOP NOTES
TRANSFER - IN REPORT:    Verbal report received from [facility] on [employer]  being received from TELE(unit) for ordered procedure      Report consisted of patients Situation, Background, Assessment and   Recommendations(SBAR). Information from the following report(s) SBAR was reviewed with the receiving nurse. Opportunity for questions and clarification was provided. Assessment completed upon patients arrival to unit and care assumed.

## 2021-05-13 NOTE — ANESTHESIA POSTPROCEDURE EVALUATION
Procedure(s):  ESOPHAGOGASTRODUODENOSCOPY (EGD) with Biopsy   .     MAC    Anesthesia Post Evaluation      Multimodal analgesia: multimodal analgesia not used between 6 hours prior to anesthesia start to PACU discharge  Patient location during evaluation: bedside  Patient participation: complete - patient participated  Level of consciousness: awake and alert  Pain score: 0  Pain management: adequate  Airway patency: patent  Anesthetic complications: no  Cardiovascular status: acceptable  Respiratory status: acceptable  Hydration status: acceptable  Post anesthesia nausea and vomiting:  none  Final Post Anesthesia Temperature Assessment:  Normothermia (36.0-37.5 degrees C)      INITIAL Post-op Vital signs:   Vitals Value Taken Time   /73 05/13/21 1330   Temp 36.4 °C (97.6 °F) 05/13/21 1311   Pulse 75 05/13/21 1330   Resp 13 05/13/21 1330   SpO2 100 % 05/13/21 1330

## 2021-05-13 NOTE — OP NOTES
[facility]  OPERATIVE REPORT    Name:  Mindy Whatley  MR#:  [id number]  :  1935  ACCOUNT #:  [de-identified]  DATE OF SERVICE:  2021    PREOPERATIVE DIAGNOSIS:  Gastrointestinal bleed. POSTOPERATIVE DIAGNOSIS:  Gastrointestinal bleed. PROCEDURES PERFORMED:  EGD with biopsy, hot snare polypectomy. SURGEON:  Amalia Peña MD    ASSISTANT:  None    ANESTHESIA:  MAC.    COMPLICATIONS:  None. SPECIMENS REMOVED:  1. Antral biopsy. 2.  Gastric polyp of body of stomach. IMPLANTS:  None    ESTIMATED BLOOD LOSS:  Minimal.    FINDINGS:  1. Hiatal hernia. 2.  Small gastric polyp of body of stomach. 3.  No evidence of bleeding seen. DISPOSITION:  Stable. PROCEDURE:  The patient was brought to the operative theater. The posterior pharynx was sprayed with Cetacaine spray and a bite block was inserted in his mouth. Monitoring devices and nasal cannula O2 were placed per Anesthesia, and the patient was placed in the left side down decubitus position. IV sedation was administered, and a time-out was performed. A well-lubricated Olympus gastroscope was introduced through the bite block down the posterior pharynx through the EG junction and into the stomach. The pylorus was identified and intubated. The second portion of the duodenum and the duodenal bulb appeared to be normal.  There was no evidence of any old or new blood. There was no evidence of any ulcers or lesions. The scope was pulled back in the antral region which appeared normal.  Representative biopsy was obtained. In the body of the stomach, a small polyp was identified. It was first biopsied and then removed in its entirety with hot snare technique. The scope was then retroflexed. No evidence of active bleeding or old bleeding was seen and no significant lesions were identified. The scope was pulled back through the EG junction which was distinct at 42 cm. The patient did have a hiatal hernia.   The scope was then reinserted in the stomach, and the stomach was desufflated, and the scope was pulled back through the length of the esophagus without any additional abnormalities identified. The patient tolerated the procedure well and was transferred to PACU in stable condition.       Tyshawn Montoya MD      [id number]  D:  05/13/2021 13:16  T:  05/13/2021 19:46  JOB #:  [id number]

## 2021-05-13 NOTE — ROUTINE PROCESS
TRANSFER - OUT REPORT: 
 
Verbal report given to Richa Enriquez(name) on Jerson Spencer  being transferred to [facility](unit) for routine post - op Report consisted of patients Situation, Background, Assessment and  
Recommendations(SBAR). Information from the following report(s) OR Summary was reviewed with the receiving nurse. Opportunity for questions and clarification was provided. Patient transported with: 
 Registered Nurse

## 2021-05-13 NOTE — PROGRESS NOTES
Problem: Falls - Risk of  Goal: *Absence of Falls  Description: Document Millie Gallagher Fall Risk and appropriate interventions in the flowsheet. Outcome: Progressing Towards Goal  Note: Fall Risk Interventions:  Mobility Interventions: Bed/chair exit alarm, Patient to call before getting OOB         Medication Interventions: Bed/chair exit alarm, Patient to call before getting OOB, Teach patient to arise slowly    Elimination Interventions: Bed/chair exit alarm, Call light in reach, Patient to call for help with toileting needs    History of Falls Interventions: Bed/chair exit alarm, Door open when patient unattended, Room close to nurse's station         Problem: Patient Education: Go to Patient Education Activity  Goal: Patient/Family Education  Outcome: Progressing Towards Goal     Problem: Pressure Injury - Risk of  Goal: *Prevention of pressure injury  Description: Document Jonah Scale and appropriate interventions in the flowsheet.   Outcome: Progressing Towards Goal  Note: Pressure Injury Interventions:  Sensory Interventions: Assess changes in LOC    Moisture Interventions: Absorbent underpads, Apply protective barrier, creams and emollients, Minimize layers    Activity Interventions: PT/OT evaluation    Mobility Interventions: HOB 30 degrees or less    Nutrition Interventions: Document food/fluid/supplement intake    Friction and Shear Interventions: Apply protective barrier, creams and emollients, HOB 30 degrees or less                Problem: Patient Education: Go to Patient Education Activity  Goal: Patient/Family Education  Outcome: Progressing Towards Goal

## 2021-05-13 NOTE — ANESTHESIA PREPROCEDURE EVALUATION
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-39.9, with comorbidity       Anesthetic History   No history of anesthetic complications            Review of Systems / Medical History  Patient summary reviewed, nursing notes reviewed and pertinent labs reviewed    Pulmonary          Smoker (former; 29 pk yrs)  Asthma        Neuro/Psych         Dementia    Comments: Parkinson's Cardiovascular    Hypertension        Dysrhythmias : atrial fibrillation  PAD and hyperlipidemia         GI/Hepatic/Renal         Renal disease: CRI      Comments: BPH Endo/Other        Obesity and cancer (Colorectal)     Other Findings   Comments: gout           Physical Exam    Airway  Mallampati: II  TM Distance: > 6 cm  Neck ROM: decreased range of motion, short neck   Mouth opening: Diminished (comment)     Cardiovascular    Rhythm: regular  Rate: normal         Dental    Dentition: Poor dentition     Pulmonary    Rhonchi:bibasilar             Abdominal  GI exam deferred       Other Findings            Anesthetic Plan    ASA: 3  Anesthesia type: MAC          Induction: Intravenous  Anesthetic plan and risks discussed with: Patient      Consent obtained from wife

## 2021-05-13 NOTE — PROGRESS NOTES
[facility]  Hospitalist Progress Note    NAME: Norma Posey   :  1935   MRN:  [id number]     Total duration of encounter: 2 days      Interim Hospital Summary: 81 y.o. male who presented on 2021 with Fall from ground level. He has a past medical history of Asthma, Chronic kidney disease, Dermatophytosis of groin and perianal area (), Edema (), Encounter for long-term (current) use of other medications (), Gout, unspecified (), Gouty arthropathy, unspecified (), Hypertension, Hypertrophy of prostate without urinary obstruction and other lower urinary tract symptoms (LUTS) (), Impotence of organic origin (), Memory loss (), Obesity, unspecified (), Orthostatic hypotension (), Other and unspecified hyperlipidemia (), Other atopic dermatitis and related conditions (), Palpitations (), Peripheral angiopathy in diseases classified elsewhere ([facility] [address] (), Personal history of malignant neoplasm of rectum, rectosigmoid junction, and anus (), Tinea nigra (), Unspecified pruritic disorder (), and Vitamin B12 deficiency (2017). He also has no past medical history of Other dyspnea and respiratory abnormality, Personal history of malignant neoplasm of large intestine, Polyphagia(783.6), Psychosexual dysfunction, unspecified, Unspecified hearing loss, or Unspecified visual loss. .     Pt admitted  with c/o weakness and falls. W/u noted for anemia and heme pos stool. Pt c/o L hip pain. He has h/o Parkinson's and relates he has not been using his walker. Xray noted only for arthritis L hip w/o fracture. He has noted some melanotic stools and ED FOB was positive. Labs noted drop in Hg from 13 in 2020 to 8.9 on admission. Subjective:     Chief Complaint / Reason for Physician Visit  \"feel better\".   Discussed with RN   No marked pain c/o    Review of Systems:  Symptom Y/N Comments  Symptom Y/N Comments Fever/Chills n   Chest Pain n    Poor Appetite n   Edema n    Cough n   Abdominal Pain n    Sputum n   Joint Pain y    SOB/SARABIA n   Pruritis/Rash n    Nausea/vomit n   Tolerating PT/OT     Diarrhea n   Tolerating Diet y    Constipation n   Other         Current Facility-Administered Medications:     ferrous sulfate tablet 324 mg, 1 Tab, Oral, DAILY WITH BREAKFAST, Seipp, James, DO, Stopped at 05/13/21 0800    finasteride (PROSCAR) tablet 5 mg, 5 mg, Oral, DAILY, Seipp, James, DO, 5 mg at 05/13/21 1026    tamsulosin (FLOMAX) capsule 0.4 mg, 0.4 mg, Oral, DAILY, Seipp, James, DO, 0.4 mg at 05/13/21 1026    carbidopa-levodopa (SINEMET)  mg per tablet 1 Tab, 1 Tab, Oral, TID, Seipp, James, DO, 1 Tab at 05/13/21 1026    pantoprazole (PROTONIX) 40 mg in 0.9% sodium chloride 10 mL injection, 40 mg, IntraVENous, Q12H, Seipp, James, DO, 40 mg at 05/13/21 1027    Objective:     VITALS:   Patient Vitals for the past 12 hrs:   Temp Pulse Resp BP SpO2   05/13/21 1443 96.9 °F (36.1 °C)       05/13/21 1440 96.9 °F (36.1 °C) 64 16 (!) 154/78 100 %   05/13/21 1425 96.9 °F (36.1 °C) 66 16 (!) 155/74 100 %   05/13/21 1410 97.6 °F (36.4 °C) 69 16 (!) 151/72 100 %   05/13/21 1355 97.6 °F (36.4 °C) 71 16 (!) 152/70 100 %   05/13/21 1340 97.6 °F (36.4 °C) 70 16 (!) 154/68 100 %   05/13/21 1330  75 13 125/73 100 %   05/13/21 1325  72 13 119/72 100 %   05/13/21 1320  73 13 128/66 100 %   05/13/21 1315  72 14 126/65 100 %   05/13/21 1311 97.6 °F (36.4 °C) 72 16 136/78 100 %   05/13/21 1100 97.8 °F (36.6 °C) 82 20 (!) 140/74 96 %   05/13/21 0801 97.8 °F (36.6 °C) 88 20 (!) 158/78 98 %   05/13/21 0401 98.4 °F (36.9 °C) 81 20 (!) 184/77 100 %       Intake/Output Summary (Last 24 hours) at 5/13/2021 1451  Last data filed at 5/13/2021 1310  Gross per 24 hour   Intake 1010 ml   Output 100 ml   Net 910 ml        PHYSICAL EXAM:  General: WD, WN. Alert, cooperative, no acute distress    EENT:  EOMI. Anicteric sclerae. MMM  Resp:  CTA bilaterally, no wheezing or rales. No accessory muscle use  CV:  Regular  rhythm,  Tr edema  GI:  Soft, obese. Non distended, minimally tender. +Bowel sounds  Neurologic:  Alert, slow responding, no tremor, nonfocal  Psych:   Good insight. Not anxious / agitated  Skin:  No rashes. No jaundice    LABS:  I reviewed today's most current labs and imaging studies. Pertinent labs include:  Recent Labs     05/13/21  0541 05/12/21  0529 05/11/21  1827   WBC 6.4 6.4 6.9   HGB 8.8* 8.7* 8.9*   HCT 28.0* 26.3* 27.8*    207 191     Recent Labs     05/13/21  0541 05/12/21  0529 05/11/21  1827    143 142   K 4.0 3.4* 3.3*    105 103   CO2 29 30 29   GLU 90 100 95   BUN 18 28* 38*   CREA 1.09 1.31* 1.54*   CA 8.6 8.0* 8.5   MG 1.7 1.8  --    PHOS 2.5*  --   --    ALB 2.5*  --  2.6*   TBILI 1.2*  --  0.5   ALT 6*  --  8*     Results for Cristy Quintana (MRN [id number]) as of 5/13/2021 14:43   4/13/2021 13:53   TSH 1.37     Results for Cristy Quintana (MRN [id number]) as of 5/13/2021 14:43   7/20/2017 00:00 10/30/2017 09:38 10/13/2020 11:59   Vitamin B12 56 (L) 555 778       RADIOLOGY:  CT HEAD 5/11:  The ventricles and sulci are normal in size, shape and configuration. . There is  no significant white matter disease. There is no intracranial hemorrhage,  extra-axial collection, or mass effect. The basilar cisterns are open. No CT  evidence of acute infarct.   The bone windows demonstrate no abnormalities. The visualized portions of the  paranasal sinuses and mastoid air cells are clear.   IMPRESSION: No acute process or change compared to the prior exam.    CT C-SPINE 5/11:  The alignment is within normal limits. There is no fracture or compression  deformity. The odontoid process is intact. The craniocervical junction is within normal limits. IMPRESSION:  Spondylitic changes without acute abnormality. L HIP 5/11:  AP and frog-leg lateral views of the left hip were obtained.  There is evidence  of degenerative change. Some osteophyte formation is associated with the lateral  aspect of the hip joint. The contour of the femoral head is normal.   Of incidental note is the presence of a large amount of gas and fecal material  within the colon (particularly the rectum). Postsurgical change is noted in the lower pelvic region.   IMPRESSION:  Evidence of degenerative change involving the left hip. EKG 5/12:  Normal sinus rhythm 84 bpm  Minimal voltage criteria for LVH, may be normal variant   Septal infarct , age undetermined   Abnormal ECG     Procedures: see electronic medical records for all procedures/Xrays and details which were not copied into this note but were reviewed prior to creation of Plan.         Assessment / Plan:    Principal Problem:    Fall from ground level (5/13/2021)  Active Problems:    Weakness generalized (5/13/2021)  Hg stable since admission, no indication for acute transfusion  F/u with PT/OT      History of malignant neoplasm of large intestine (1/15/2015)  DIstant history  Will consider for f/u colonoscopy need  Check CEA      Vitamin B12 deficiency (7/23/2017)    Anemia due to acute blood loss (5/13/2021)  Maintain oral B12 1,000mcg daily  F/u Level  Rx FeSO4 325mg daily  Check Fe, TIBC, Ferritin      MCI (mild cognitive impairment) with memory loss (1/13/2016)    Parkinson disease ([address] (6/4/2018)  Cont Sinemet 25/250 tid  PT /OT evaluation  Consider resuming ASA 81mg daily      Atrial fibrillation ([address] (11/23/2020)    Chronic anticoagulation (5/13/2021)  D/c Eliquis for now  GI Bleed and h/o Falls  Cont tx Cardizem CD 1210mg with HCTZ 25mg      Essential hypertension (10/30/2017)  Cont tx Cardizem CD 1210mg with HCTZ 25mg      BPH associated with nocturia (1/13/2016)  Cont home tx Proscar / Flomax  No c/o        ______________________________________________________________________  SAFETY:   Code Status:DNR  DVT prophylaxis:ANDRE  Stress Ulcer prophylaxis: Pepcid  Bladder catheter:no  Family Contact Info:  Primary Emergency Contact: Donaldo Mcmanus Phone: [phone]  Bedded: [facility] Room 202/01  Disposition: TBD, likely home when stable  Admission status:  Observation    Reviewed most current lab test results and cultures  YES  Reviewed most current radiology test results   YES  Review and summation of old records today    NO  Reviewed patient's current orders and MAR    YES  PMH/SH reviewed - no change compared to H&P    Care Plan discussed with:                                   Comments  Patient x     Family  x Wife Chante Smith) [phone]   RN x     Care Manager  x     Consultant   x Dr. Kimo Alaniz team rounds were held today with , nursing, pharmacist and clinical coordinator. Patient's plan of care was discussed; medications were reviewed and discharge planning was addressed.         ____________________________________________    Total NON Critical Care TIME:  40   Minutes        Comments   >50% of visit spent in counseling and coordination of care   x      Signed: Janneth Olson MD  [facility] Hospitalist  [phone]

## 2021-05-14 PROBLEM — I95.1 ORTHOSTATIC HYPOTENSION: Chronic | Status: ACTIVE | Noted: 2021-01-01

## 2021-05-14 NOTE — PROGRESS NOTES
Problem: Falls - Risk of  Goal: *Absence of Falls  Description: Document [place] Fall Risk and appropriate interventions in the flowsheet.   Outcome: Progressing Towards Goal  Note: Fall Risk Interventions:  Mobility Interventions: Bed/chair exit alarm, Patient to call before getting OOB, Utilize walker, cane, or other assistive device, Strengthening exercises (ROM-active/passive)    Mentation Interventions: Bed/chair exit alarm, Door open when patient unattended, Increase mobility    Medication Interventions: Bed/chair exit alarm, Patient to call before getting OOB, Teach patient to arise slowly    Elimination Interventions: Bed/chair exit alarm, Call light in reach, Patient to call for help with toileting needs, Urinal in reach    History of Falls Interventions: Bed/chair exit alarm, Door open when patient unattended

## 2021-05-14 NOTE — PROGRESS NOTES
Spiritual Care Assessment/Progress Note  Arsh James      NAME: Navarro Corrigan      MRN: [id number]  AGE: 81 y.o. SEX: male  Islam Affiliation: Yazidism   Language: English     5/14/2021     Total Time (in minutes): 10     Spiritual Assessment begun in [facility] through conversation with:         [x]Patient        [] Family    [] Friend(s)              Spiritual beliefs: (Please include comment if needed)     [x] Identifies with a katerin tradition:         [] Supported by a katerin community:            [] Claims no spiritual orientation:           [] Seeking spiritual identity:                [] Adheres to an individual form of spirituality:           [] Not able to assess:                           Identified resources for coping:      [] Prayer                               [] Music                  [] Guided Imagery     [x] Family/friends                 [] Pet visits     [] Devotional reading                         [] Unknown     [] Other:                                               Interventions offered during this visit: (See comments for more details)    Patient Interventions: Affirmation of emotions/emotional suffering, Affirmation of katerin, Iconic (affirming the presence of God/Higher Power), Initial visit, Prayer (assurance of)           Plan of Care:     [x] Support spiritual and/or cultural needs    [] Support AMD and/or advance care planning process      [] Support grieving process   [] Coordinate Rites and/or Rituals    [] Coordination with community clergy   [] No spiritual needs identified at this time   [] Detailed Plan of Care below (See Comments)  [] Make referral to Music Therapy  [] Make referral to Pet Therapy     [] Make referral to Addiction services  [] Make referral to [facility]  [] Make referral to Spiritual Care Partner  [] No future visits requested        [] Follow up upon further referrals     Comments:Initial spiritual assessment in  202. Patient/family shared about his medical issues. Provided empathic listening and spiritual support. Advised of  Availability.   [address] [place] [address]

## 2021-05-14 NOTE — PROGRESS NOTES
Problem: Falls - Risk of  Goal: *Absence of Falls  Description: Document Elio Ko Fall Risk and appropriate interventions in the flowsheet.   Outcome: Progressing Towards Goal  Note: Fall Risk Interventions:  Mobility Interventions: Utilize walker, cane, or other assistive device, Bed/chair exit alarm    Mentation Interventions: Bed/chair exit alarm, Door open when patient unattended, Reorient patient, Update white board    Medication Interventions: Bed/chair exit alarm, Patient to call before getting OOB, Teach patient to arise slowly    Elimination Interventions: Bed/chair exit alarm, Call light in reach, Urinal in reach    History of Falls Interventions: Bed/chair exit alarm, Door open when patient unattended, Room close to nurse's station

## 2021-05-14 NOTE — PROGRESS NOTES
Bedside and Verbal shift change report given to IMELDA Mendiola RN (oncoming nurse) by Sade Ziegler RN   (offgoing nurse). Report included the following information SBAR, Kardex, Procedure Summary, Intake/Output, MAR, Accordion, Recent Results, Med Rec Status, Cardiac Rhythm SR and Alarm Parameters . Foster score 4  Bed/chair alarm Yes in use. If in use it is set at the highest volume. Intravenous fluids were administered, none   Patient Vitals for the past 12 hrs:   Temp Pulse Resp BP SpO2   05/13/21 2004 97.4 °F (36.3 °C) 62 18 134/63 100 %   05/13/21 1710 97.6 °F (36.4 °C) 67 16 (!) 153/72 100 %   05/13/21 1640 97.6 °F (36.4 °C) 67 16 (!) 151/65 100 %   05/13/21 1610 97.8 °F (36.6 °C) 69 18 (!) 153/68 100 %   05/13/21 1540 97 °F (36.1 °C) 67 18 (!) 151/65 100 %   05/13/21 1510 97.5 °F (36.4 °C) 63 18 (!) 158/71 100 %   05/13/21 1443 96.9 °F (36.1 °C)       05/13/21 1440 96.9 °F (36.1 °C) 64 16 (!) 154/78 100 %   05/13/21 1425 96.9 °F (36.1 °C) 66 16 (!) 155/74 100 %   05/13/21 1410 97.6 °F (36.4 °C) 69 16 (!) 151/72 100 %   05/13/21 1355 97.6 °F (36.4 °C) 71 16 (!) 152/70 100 %   05/13/21 1340 97.6 °F (36.4 °C) 70 16 (!) 154/68 100 %   05/13/21 1330  75 13 125/73 100 %   05/13/21 1325  72 13 119/72 100 %   05/13/21 1320  73 13 128/66 100 %   05/13/21 1315  72 14 126/65 100 %   05/13/21 1311 97.6 °F (36.4 °C) 72 16 136/78 100 %   05/13/21 1100 97.8 °F (36.6 °C) 82 20 (!) 140/74 96 %     No flowsheet data found. Lab results reviewed. For significant abnormal values and values requiring intervention, see assessment and plan.

## 2021-05-14 NOTE — PROGRESS NOTES
[facility]  Hospitalist Progress Note    NAME: Kuldeep Mcwilliams   :  1935   MRN:  [id number]     Total duration of encounter: 3 days      Interim Hospital Summary: 81 y.o. male who presented on 2021 with Fall from ground level. He has a past medical history of Asthma, Chronic kidney disease, Dermatophytosis of groin and perianal area (), Edema (), Encounter for long-term (current) use of other medications (), Gout, unspecified (), Gouty arthropathy, unspecified (), Hypertension, Hypertrophy of prostate without urinary obstruction and other lower urinary tract symptoms (LUTS) (), Impotence of organic origin (), Memory loss (), Obesity, unspecified (), Orthostatic hypotension (), Other and unspecified hyperlipidemia (), Other atopic dermatitis and related conditions (), Palpitations (), Peripheral angiopathy in diseases classified elsewhere ([facility] [address] (), Personal history of malignant neoplasm of rectum, rectosigmoid junction, and anus (), Tinea nigra (), Unspecified pruritic disorder (), and Vitamin B12 deficiency (2017). He also has no past medical history of Other dyspnea and respiratory abnormality, Personal history of malignant neoplasm of large intestine, Polyphagia(783.6), Psychosexual dysfunction, unspecified, Unspecified hearing loss, or Unspecified visual loss. .     Pt admitted  with c/o weakness and falls. W/u noted for anemia and heme pos stool. Pt c/o L hip pain. He has h/o Parkinson's and relates he has not been using his walker. Xray noted only for arthritis L hip w/o fracture. He has noted some melanotic stools and ED FOB was positive. Labs noted drop in Hg from 13 in 2020 to 8.9 on admission. Subjective:     Chief Complaint / Reason for Physician Visit  \"weak\".   Discussed with RN   No marked pain c/o  No active bleeding  Denies abd pain    Review of Systems:  Symptom Y/N Comments Symptom Y/N Comments   Fever/Chills n   Chest Pain n    Poor Appetite n   Edema n    Cough n   Abdominal Pain n    Sputum n   Joint Pain y    SOB/SARABIA n   Pruritis/Rash n    Nausea/vomit n   Tolerating PT/OT     Diarrhea n   Tolerating Diet y    Constipation n   Other         Current Facility-Administered Medications:     famotidine (PEPCID) tablet 20 mg, 20 mg, Oral, QPM, Analia Vinson MD, 20 mg at 05/13/21 1836    cyanocobalamin tablet 1,000 mcg, 1,000 mcg, Oral, DAILY, Analia Vinson MD, 1,000 mcg at 05/14/21 8610    ferrous sulfate tablet 324 mg, 1 Tab, Oral, DAILY WITH BREAKFAST, Seipp, James, DO, 324 mg at 05/14/21 6443    finasteride (PROSCAR) tablet 5 mg, 5 mg, Oral, DAILY, Seipp, James, DO, 5 mg at 05/14/21 0064    tamsulosin (FLOMAX) capsule 0.4 mg, 0.4 mg, Oral, DAILY, Seipp, James, DO, 0.4 mg at 05/14/21 0636    carbidopa-levodopa (SINEMET)  mg per tablet 1 Tab, 1 Tab, Oral, TID, Seipp, James, DO, 1 Tab at 05/14/21 4596    Objective:     VITALS:   Patient Vitals for the past 12 hrs:   Temp Pulse Resp BP SpO2   05/14/21 0727 98.2 °F (36.8 °C) 88 20 118/72 98 %   05/14/21 0040 98.2 °F (36.8 °C) 75 18 (!) 163/66 100 %       Intake/Output Summary (Last 24 hours) at 5/14/2021 1216  Last data filed at 5/13/2021 1710  Gross per 24 hour   Intake 1130 ml   Output 200 ml   Net 930 ml        PHYSICAL EXAM:  General: WD, WN. Alert, cooperative, no acute distress    EENT:  EOMI. Anicteric sclerae. MMM  Resp:  CTA bilaterally, no wheezing or rales. No accessory muscle use  CV:  Regular  rhythm,  Tr edema (big ankles)  GI:  Soft, obese. Non distended, minimally tender. +Bowel sounds  Neurologic:  Alert, responding more promptly, no tremor, nonfocal  Psych:   Not anxious / agitated  Skin:  No rashes. No jaundice    LABS:  I reviewed today's most current labs and imaging studies.   Pertinent labs include:  Recent Labs     05/14/21  0531 05/13/21  0541 05/12/21  0529   WBC 5.2 6.4 6.4   HGB 8.8* 8.8* 8.7* HCT 27.3* 28.0* 26.3*    215 207     Recent Labs     05/14/21  0531 05/13/21  0541 05/12/21  0529 05/11/21  1827    142 143 142   K 3.8 4.0 3.4* 3.3*    108 105 103   CO2 28 29 30 29   GLU 94 90 100 95   BUN 15 18 28* 38*   CREA 1.09 1.09 1.31* 1.54*   CA 8.4* 8.6 8.0* 8.5   MG 1.8 1.7 1.8  --    PHOS 3.2 2.5*  --   --    ALB 2.4* 2.5*  --  2.6*   TBILI 1.1* 1.2*  --  0.5   ALT 10* 6*  --  8*     Results for Joe Martinez (MRN [id number]) as of 5/13/2021 14:43   4/13/2021 13:53   TSH 1.37     Results for Joe Martinez (MRN [id number]) as of 5/13/2021 14:43   7/20/2017 00:00 10/30/2017 09:38 10/13/2020 11:59   Vitamin B12 56 (L) 555 778     Results for Joe Martinez (MRN [id number]) as of 5/14/2021 12:21   5/14/2021 05:31   Iron 53 (L)   TIBC 288   Iron % saturation 18 (L)   Ferritin 50     RADIOLOGY:  CT HEAD 5/11:  The ventricles and sulci are normal in size, shape and configuration. . There is  no significant white matter disease. There is no intracranial hemorrhage,  extra-axial collection, or mass effect. The basilar cisterns are open. No CT  evidence of acute infarct.   The bone windows demonstrate no abnormalities. The visualized portions of the  paranasal sinuses and mastoid air cells are clear.   IMPRESSION: No acute process or change compared to the prior exam.    CT C-SPINE 5/11:  The alignment is within normal limits. There is no fracture or compression  deformity. The odontoid process is intact. The craniocervical junction is within normal limits. IMPRESSION:  Spondylitic changes without acute abnormality. L HIP 5/11:  AP and frog-leg lateral views of the left hip were obtained. There is evidence  of degenerative change. Some osteophyte formation is associated with the lateral  aspect of the hip joint. The contour of the femoral head is normal.   Of incidental note is the presence of a large amount of gas and fecal material  within the colon (particularly the rectum). Postsurgical change is noted in the lower pelvic region.   IMPRESSION:  Evidence of degenerative change involving the left hip. EKG 5/12:  Normal sinus rhythm 84 bpm  Minimal voltage criteria for LVH, may be normal variant   Septal infarct , age undetermined   Abnormal ECG     Procedures: see electronic medical records for all procedures/Xrays and details which were not copied into this note but were reviewed prior to creation of Plan.         Assessment / Plan:    Principal Problem:    Fall from ground level (5/13/2021)  Active Problems:    Weakness generalized (5/13/2021)  Hg stable since admission, no indication for acute transfusion  PT/OT noted instability (falling backwards) with documentation for orthostatic drop in BP  Keep hydrated  Flomax could be involved as well      History of malignant neoplasm of large intestine (1/15/2015)  DIstant history  Will consider for f/u colonoscopy need  Check CEA / CRP - pending      Vitamin B12 deficiency (7/23/2017)    Anemia due to acute blood loss (5/13/2021)  Maintain oral B12 1,000mcg daily  F/u Level - has been OK with supplementation orally  Rx FeSO4 325mg daily  Fe, TIBC, Ferritin - c/w mild Fe deficiency       MCI (mild cognitive impairment) with memory loss (1/13/2016)    Parkinson disease ([address] (6/4/2018)    Orthostatic hypotension  Cont Sinemet 25/250 tid  PT /OT evaluation - inpatient therapy recommended  DCP getting approval for therapy stay  Orthostatic hypotension a/w blood loss, Parkinson's, and Flomax tx  Consider resuming ASA 81mg daily when Hg stable  Need to use walker routinely       Atrial fibrillation ([facility] [address] (11/23/2020)    Chronic anticoagulation (5/13/2021)  D/c Eliquis for now  GI Bleed and h/o Falls seems to contra-indicate using for now  Cont tx Cardizem CD 1210mg with HCTZ 25mg  Consider holding mild diuretic (has significant venous stasis however)      Essential hypertension (10/30/2017)  Cont tx Cardizem CD 1210mg with HCTZ 25mg      BPH associated with nocturia (1/13/2016)  Cont home tx Proscar / Flomax  Consider holding Flomax        ______________________________________________________________________  SAFETY:   Code Status:DNR  DVT prophylaxis:ANDRE  Stress Ulcer prophylaxis: Pepcid  Bladder catheter:no  Family Contact Info:  Primary Emergency Contact: Etienne Toribio, Home Phone: [phone]  Bedded: [facility] Room 202/01  Disposition: DCP attempting approval for inpatient rehab  Admission status:  Observation    Reviewed most current lab test results and cultures  YES  Reviewed most current radiology test results   YES  Review and summation of old records today    NO  Reviewed patient's current orders and MAR    YES  PMH/SH reviewed - no change compared to H&P    Care Plan discussed with:                                   Comments  Patient x     Family  x Wife Julia Nichole) [phone]   RN x     Care Manager  x     Consultant                          Multidiciplinary team rounds were held today with , nursing, pharmacist and clinical coordinator. Patient's plan of care was discussed; medications were reviewed and discharge planning was addressed.         ____________________________________________    Total NON Critical Care TIME:  40   Minutes        Comments   >50% of visit spent in counseling and coordination of care   x      Signed: Imelda Bonilla MD  [facility] Hospitalist  [phone]

## 2021-05-14 NOTE — PROGRESS NOTES
Problem: Mobility Impaired (Adult and Pediatric)  Goal: *Acute Goals and Plan of Care (Insert Text)  Description: FUNCTIONAL STATUS PRIOR TO ADMISSION: Patient was modified independent using a rolling walker for functional mobility. HOME SUPPORT PRIOR TO ADMISSION: The patient lived alone with SPOUSE to provide assistance. Physical Therapy Goals  Initiated 5/14/2021  1. Patient will move from supine to sit and sit to supine  in bed with independence within 7 day(s). 2.  Patient will transfer from bed to chair and chair to bed with independence using the least restrictive device within 7 day(s). 3.  Patient will perform sit to stand with independence within 7 day(s). 4.  Patient will ambulate with independence for 150 feet with the least restrictive device within 7 day(s). 5.  Patient will ascend/descend 3 STEPS/ stairs with BILAT handrail(s) with independence within 7 day(s). Outcome: Progressing Towards Goal  PHYSICAL THERAPY EVALUATION  Patient: Carmenza Yen (67 y.o. male)  Date: 5/14/2021  Primary Diagnosis: 'light-for-dates' infant with signs of fetal malnutrition [P05.00]  Procedure(s) (LRB):  ESOPHAGOGASTRODUODENOSCOPY (EGD) with Biopsy    (N/A) 1 Day Post-Op   Precautions: FALL RISK, ORTHOSTATIC HYPOTENSION HX  Fall(HX OF ORTHOSTATIC HYPOTENSION)    ASSESSMENT  Based on the objective data described below, the patient presents SEATED UPRIGHT IN RECLINER. .    Current Level of Function Impacting Discharge (mobility/balance): PATIENT PERFORMS MOBILITY AND GAIT WITH RW, ON LEVEL SURFACE WITH MIN TO MOD ASSIST X 2. PATIENT DISPLAYED EVIDENCE OF ORTHOSTATIC HYPOTENSION, DURING GAIT, AFTER APPROX 15 FEET WITH RW (PATIENT REPORTS FEELING FAINT AND PT AND OT NOTED PATIENTS LES GIVING WAY TO A POSSIBLE FALL). PATIENT WAS IMMEDIATELY SAT DOWN AND RESTED IN AN UPRIGHT RECLINER CHAIR. AT THAT TIME PATIENTS BP WAS FOUND TO /57, HR 87 BPM, SPO2 99%>    Functional Outcome Measure:   The patient scored 4/20 on the . [address] outcome measure which is indicative of PATIENT REQUIRES MAX ASSIST FOR SAFETY DURING ADLS, MOBILITY, AND GAIT. PATIENT IS AT RISK FOR FALLS. Other factors to consider for discharge: PATIENT REQUIRES 24/7 CARE AND ASSISTANCE. PATIENT WAS LIVING AT HOME WITH HIS SPOUSE. Patient will benefit from skilled therapy intervention to address the above noted impairments. ELDERLY   PLAN :  Recommendations and Planned Interventions: bed mobility training, transfer training, gait training, therapeutic exercises, patient and family training/education, and therapeutic activities      Frequency/Duration: Patient will be followed by physical therapy:  4 TO 6 DAYS/WK, 1 TO 2 TIMES/DAY to address goals. Recommendation for discharge: (in order for the patient to meet his/her long term goals)  Therapy up to 5 days/week in SNF setting TO 6 DAYS PER WEEK, 1 TO 2 TIMES PER DAY. This discharge recommendation:  Has been made in collaboration with the attending provider and/or case management    IF patient discharges home will need the following DME: to be determined (TBD)         SUBJECTIVE:   Patient stated THAT HE HAS NO C/O PAIN.     OBJECTIVE DATA SUMMARY:   HISTORY:    Past Medical History:   Diagnosis Date    Asthma     Chronic kidney disease     Dermatophytosis of groin and perianal area 2010    Edema 2008    Encounter for long-term (current) use of other medications 2010    Gout, unspecified 2010    Gouty arthropathy, unspecified 2010    Hypertension     Hypertrophy of prostate without urinary obstruction and other lower urinary tract symptoms (LUTS) 2008    Impotence of organic origin 2008    Memory loss 2009    Obesity, unspecified 2010    Orthostatic hypotension 2008    Other and unspecified hyperlipidemia 2008    Other atopic dermatitis and related conditions 2012    Palpitations 2010    Peripheral angiopathy in diseases classified elsewhere ([facility] [address] 2010    Personal history of malignant neoplasm of rectum, rectosigmoid junction, and anus 2011    Tinea nigra 2011    Unspecified pruritic disorder 2011    Vitamin B12 deficiency 7/23/2017     Past Surgical History:   Procedure Laterality Date    ENDOSCOPY, COLON, DIAGNOSTIC  06/2011 05/2007, 01/2004,  12/2000    HX HERNIA REPAIR  1992    INCISIONAL    HX TOTAL COLECTOMY  1991    COLON CANCER S/P RESECTION       Personal factors and/or comorbidities impacting plan of care: PATIENT RECENTLY EXPERIENCED A FALL AND PRESENTS WITH BRUISING ON HIS L POSTERIOR SHOULDER AND BUTTOCK. PATIENT IS AT RISK TO FALLING AGAIN. Home Situation  Home Environment: Private residence  [place] to Enter: Yes  Hand Rails : Bilateral  One/Two Story Residence: Two story  Living Alone: No  Support Systems: Child(alberto), Family member(s), Friends \ neighbors  Patient Expects to be Discharged to[de-identified] Private residence  Current DME Used/Available at Home: Walker, rolling  Tub or Shower Type: Tub/Shower combination    EXAMINATION/PRESENTATION/DECISION MAKING:   Critical Behavior:  Neurologic State: Drowsy  Orientation Level: Disoriented X4(SLOW PROCESSING)  Cognition: Decreased attention/concentration, Follows commands  Safety/Judgement: Decreased awareness of environment  Hearing: Auditory  Auditory Impairment: None  Skin:  BRUISING/OTHER DISCOLORATIONS. Edema: LES  Range Of Motion:  AROM: Within functional limits                       Strength:    Strength: Generally decreased, functional                    Tone & Sensation:   Tone: Normal                              Coordination:  Coordination: Within functional limits(TREMOR)  Vision:      Functional Mobility:  Bed Mobility:              Transfers:  Sit to Stand: Moderate assistance;Assist x2  Stand to Sit: Minimum assistance; Moderate assistance;Assist x2        Bed to Chair: Moderate assistance              Balance:   Sitting: Intact  Standing: Impaired; Without support  Standing - Static: Fair  Standing - Dynamic : Poor  Ambulation/Gait Training:  Distance (ft): 15 Feet (ft)(X 1)  Assistive Device: Gait belt;Walker, rolling  Ambulation - Level of Assistance: Moderate assistance     Gait Description (WDL): Exceptions to WDL  Gait Abnormalities: Decreased step clearance  Right Side Weight Bearing: Full  Left Side Weight Bearing: Full        Speed/Kim: Slow  Step Length: Left shortened;Right shortened                     Stairs: Therapeutic Exercises:   GAIT WITH RW    Functional Measure:  4/20 ON ELDERLY MOBILITY SCALE. Physical Therapy Evaluation Charge Determination   History Examination Presentation Decision-Making   LOW Complexity : Zero comorbidities / personal factors that will impact the outcome / POC LOW Complexity : 1-2 Standardized tests and measures addressing body structure, function, activity limitation and / or participation in recreation  LOW Complexity : Stable, uncomplicated  LOW Complexity : FOTO score of       Based on the above components, the patient evaluation is determined to be of the following complexity level: LOW     Pain Ratin/10: ONLY SORENESS WITH PALPATION OF BRUISED AREAS AS STATED ABOVE. Activity Tolerance:   Poor: AFTER 15 FEET OF GAIT WITH RW, PATIENT DISPLAYED SUDDEN LOB AND BEGANLEANING BACKWARD, HEAVILY, AND REQUIRED MOD TO MAX ASSIST X 1 TO MAINTAIN UPRIGHT POSITION. UPON HAVING PATIENT RETURN TO SITTING UPRIGHT IN RECLINER, VITAL SIGNS READINGS WERE AS FOLLOWS: BP L /57, HR 87, SPO2 99%. PATIENT AFFIRMS THAT HE FELT SOME DIZZINESS. PATIENT WAS GIVEN WATER TO DRINK AND WAS ENCOURAGED TO CONTINUE TO HYDRATE PROPERLY. After treatment patient left in no apparent distress:   Sitting in chair, Call bell within reach, and Bed / chair alarm activated    COMMUNICATION/EDUCATION:   The patients plan of care was discussed with: Occupational therapist, Registered nurse, Physician, Case management, and PATIENT .      Fall prevention education was provided and the patient/caregiver indicated understanding., Patient/family have participated as able in goal setting and plan of care. , and Patient/family agree to work toward stated goals and plan of care.     Thank you for this referral.  Marbin Pandya   Time Calculation: 30 mins

## 2021-05-14 NOTE — PROGRESS NOTES
Problem: Self Care Deficits Care Plan (Adult)  Goal: *Acute Goals and Plan of Care (Insert Text)  Description:   FUNCTIONAL STATUS PRIOR TO ADMISSION: Patient lived with his wife who provided assistance for dressing and bathing and for basic and instrumental ADLs. HOME SUPPORT: The patient lived with his spouse. Occupational Therapy Goals  Initiated 5/14/2021  1. Patient will perform supine to sit transfers with minimal assistance/contact guard assist within 7 days. 2.  Patient will perform sit to stand transfer with supervision/set-up within 7 days. 3.  Patient will perform all aspects of toileting with supervision/set-up within 7 days. 4.  Patient will participate in upper extremity therapeutic exercise/activities with minimal assistance/contact guard assist for 15 to 20 minutes within 7 days. OCCUPATIONAL THERAPY EVALUATION  Patient: Jaquelin Monroy (18 y.o. male)  Date: 5/14/2021  Primary Diagnosis: 'light-for-dates' infant with signs of fetal malnutrition [P05.00]  Procedure(s) (LRB):  ESOPHAGOGASTRODUODENOSCOPY (EGD) with Biopsy    (N/A) 1 Day Post-Op   Precautions:   Fall(HX OF ORTHOSTATIC HYPOTENSION)    ASSESSMENT  Based on the objective data described below, the patient presents with muscle weakness, low activity tolerance, need for assistance with transfers, history of need for assistance with dressing and bathing. Current Level of Function Impacting Discharge (ADLs/self-care):  Greater need for assistance with tranfers, need for assistance with IADLs    Functional Outcome Measure: The patient scored 20 on the Modified Barthel Index outcome measure which is indicative of Total Assistance Level of Care. Other factors to consider for discharge:  Fall Risk     Patient will benefit from skilled therapy intervention to address the above noted impairments.        PLAN :  Recommendations and Planned Interventions: self care training, functional mobility training, therapeutic exercise, balance training, therapeutic activities, endurance activities, and patient education    Frequency/Duration: Patient will be followed by occupational therapy 3 to 5 x per week x 1 week to address goals. Recommendation for discharge: (in order for the patient to meet his/her long term goals)  Therapy up to 5 days/week in SNF setting or an intensive home health therapy program    This discharge recommendation:  A follow-up discussion with the attending provider and/or case management is planned    IF patient discharges home will need the following DME: hospital bed and wheelchair       SUBJECTIVE:   Patient stated  My wife helps me get dressing with my socks and shoes.     OBJECTIVE DATA SUMMARY:   HISTORY:   Past Medical History:   Diagnosis Date    Asthma     Chronic kidney disease     Dermatophytosis of groin and perianal area 2010    Edema 2008    Encounter for long-term (current) use of other medications 2010    Gout, unspecified 2010    Gouty arthropathy, unspecified 2010    Hypertension     Hypertrophy of prostate without urinary obstruction and other lower urinary tract symptoms (LUTS) 2008    Impotence of organic origin 2008    Memory loss 2009    Obesity, unspecified 2010    Orthostatic hypotension 2008    Other and unspecified hyperlipidemia 2008    Other atopic dermatitis and related conditions 2012    Palpitations 2010    Peripheral angiopathy in diseases classified elsewhere [facility]) 2010    Personal history of malignant neoplasm of rectum, rectosigmoid junction, and anus 2011    Tinea nigra 2011    Unspecified pruritic disorder 2011    Vitamin B12 deficiency 7/23/2017     Past Surgical History:   Procedure Laterality Date    ENDOSCOPY, COLON, DIAGNOSTIC  06/2011 05/2007, 01/2004,  12/2000    HX HERNIA REPAIR  1992    INCISIONAL    HX TOTAL COLECTOMY  1991    COLON CANCER S/P RESECTION       Expanded or extensive additional review of patient history:     Home Situation  Home Environment: Private residence  # Steps to Enter: 4  Rails to Enter: Yes  Hand Rails : Bilateral  Wheelchair Ramp: No  One/Two Story Residence: Two story  Lift Chair Available: No  Living Alone: No  Support Systems: Spouse/Significant Other/Partner  Patient Expects to be Discharged to[de-identified] Private residence  Current DME Used/Available at Home: Commode, bedside, Shower chair, Walker, rolling(reacher)  Tub or Shower Type: Tub/Shower combination    Hand dominance: Right    EXAMINATION OF PERFORMANCE DEFICITS:  Cognitive/Behavioral Status:  Neurologic State: Alert;Drowsy  Orientation Level: Oriented X4(Needed extra time to report on day, month and year)  Cognition: Follows commands  Perception: Appears intact  Perseveration: No perseveration noted  Safety/Judgement: Decreased insight into deficits    Skin: See Nursing report    Edema: See Nursing report    Hearing: Auditory  Auditory Impairment: None    Vision/Perceptual:     Appears to be intact          Range of Motion:  AROM: Within functional limits         Strength:  Strength: Generally decreased, functional      Coordination:  Coordination: Within functional limits  Fine Motor Skills-Upper: Left Intact; Right Intact    Gross Motor Skills-Upper: Left Intact; Right Intact    Tone & Sensation:  Tone: Normal   Sensation is intact        Balance:  Sitting: Intact  Standing: With support  Standing - Static: Fair;Constant support  Standing - Dynamic : Fair;Constant support    Functional Mobility and Transfers for ADLs:  Bed Mobility: To be assessed in future session    Transfers:  Sit to Stand: Minimum assistance;Assist x2  Stand to Sit: Minimum assistance;Assist x2  Bed to Chair: Moderate assistance    ADL Assessment:  Feeding: Minimum assistance    Oral Facial Hygiene/Grooming: Minimum assistance    Bathing:  Moderate assistance;Maximum assistance       Cognitive Retraining  Safety/Judgement: Decreased insight into deficits      Occupational Therapy Evaluation Charge Determination History Examination Decision-Making   LOW Complexity : Brief history review  LOW Complexity : 1-3 performance deficits relating to physical, cognitive , or psychosocial skils that result in activity limitations and / or participation restrictions  LOW Complexity : No comorbidities that affect functional and no verbal or physical assistance needed to complete eval tasks       Based on the above components, the patient evaluation is determined to be of the following complexity level: LOW   Pain Ratin    Activity Tolerance:   Fair    After treatment patient left in no apparent distress:    Sitting in chair    COMMUNICATION/EDUCATION:   The patients plan of care was discussed with: Physical therapist and Rehabilitation technician. Patient/family have participated as able in goal setting and plan of care. and Patient/family agree to work toward stated goals and plan of care. This patients plan of care is appropriate for delegation to [facility].     Thank you for this referral.  Abraham Adkins OT  Time Calculation: 20 mins

## 2021-05-14 NOTE — PROGRESS NOTES
Informed by the physical therapist,  Agustina Thompson that the patient would benefit from skilled care for physical therapy, occupational therapy. Speech evaluation will be obtained on Monday, 5/17/21. Have initiated pre authorization with the patient's insurance company with [employer]: [phone]. 1.  Preauthorization initiated for skilled care  2. [facility] REVIEW DEPT:    [phone]  3. DATE OF SERVICE TO START: 5/14/21  4. REF. #: [id number]    Medical information/clinicals faxed to Medical Review Department. Spoke to the patient regarding skilled care needs. Informed him that based on his current condition, the physical therapist and doctor has recommended skilled care services that can be delivered in a nursing home. Provided the patient with a list of Medicare -certified skilled nursing facilities in the area which includes [address] [facility] [address] [facility] that is affiliated with [facility] and non [facility]. Patient was informed that he had the option to choose any provider on the list, but before starting, we have to get authorization from his insurance company. Informed the wife of the plan for skilled care services and that we had to obtain approval from the insurance company. Patient chose to remain here and utilize the [facility] [address] [facility]. Skilled care is needed for strengthening, endurance, mobility and gait training. Explained that if approved by the insurance company, the will assign a certain number of days and  we will contact his insurance company to obtain continued length of stay per recommendation of physical therapy.

## 2021-05-15 NOTE — PROGRESS NOTES
Bedside shift change report given to Soco Anderson.  (oncoming nurse) by Maria M Jolley (offgoing nurse). Report included the following information SBAR, Kardex, ED Summary and MAR.

## 2021-05-15 NOTE — PROGRESS NOTES
Problem: Falls - Risk of  Goal: *Absence of Falls  Description: Document Remy Tian Fall Risk and appropriate interventions in the flowsheet. Outcome: Progressing Towards Goal  Note: Fall Risk Interventions:  Mobility Interventions: Bed/chair exit alarm    Mentation Interventions: Adequate sleep, hydration, pain control, Bed/chair exit alarm, Door open when patient unattended    Medication Interventions: Bed/chair exit alarm    Elimination Interventions: Bed/chair exit alarm, Call light in reach, Toileting schedule/hourly rounds    History of Falls Interventions: Bed/chair exit alarm, Door open when patient unattended         Problem: Pressure Injury - Risk of  Goal: *Prevention of pressure injury  Description: Document Jonah Scale and appropriate interventions in the flowsheet.   Outcome: Progressing Towards Goal  Note: Pressure Injury Interventions:  Sensory Interventions: Keep linens dry and wrinkle-free    Moisture Interventions: Absorbent underpads    Activity Interventions: Increase time out of bed    Mobility Interventions: HOB 30 degrees or less    Nutrition Interventions: Document food/fluid/supplement intake    Friction and Shear Interventions: HOB 30 degrees or less                Problem: Patient Education: Go to Patient Education Activity  Goal: Patient/Family Education  Outcome: Progressing Towards Goal     Problem: Upper and Lower GI Bleed: Day 1  Goal: Activity/Safety  Outcome: Progressing Towards Goal  Goal: Consults, if ordered  Outcome: Progressing Towards Goal

## 2021-05-15 NOTE — PROGRESS NOTES
Problem: Mobility Impaired (Adult and Pediatric)  Goal: *Acute Goals and Plan of Care (Insert Text)  Description: FUNCTIONAL STATUS PRIOR TO ADMISSION: Patient was modified independent using a rolling walker for functional mobility. HOME SUPPORT PRIOR TO ADMISSION: The patient lived alone with SPOUSE to provide assistance. Physical Therapy Goals  Initiated 5/14/2021  1. Patient will move from supine to sit and sit to supine  in bed with independence within 7 day(s). 2.  Patient will transfer from bed to chair and chair to bed with independence using the least restrictive device within 7 day(s). 3.  Patient will perform sit to stand with independence within 7 day(s). 4.  Patient will ambulate with independence for 150 feet with the least restrictive device within 7 day(s). 5.  Patient will ascend/descend 3 STEPS/ stairs with BILAT handrail(s) with independence within 7 day(s). Outcome: Progressing Towards Goal     Patient asleep but easily awakened. No reports of pain. Performed trunk and LE exercises in all planes for mobility and strengthening. Patient reported pain when moving right LE for hip flexion and abduction. Not rated but no grimacing during movements. Call bell in place after session. No pain reported at end of session only with right hip movement.

## 2021-05-15 NOTE — PROGRESS NOTES
[facility]  Hospitalist Progress Note    NAME: Reba Hernadnez   :  1935   MRN:  [id number]     Total duration of encounter: 4 days      Interim Hospital Summary: 81 y.o. male who presented on 2021 with Fall from ground level. He has a past medical history of Asthma, Chronic kidney disease, Dermatophytosis of groin and perianal area (), Edema (), Encounter for long-term (current) use of other medications (), Gout, unspecified (), Gouty arthropathy, unspecified (), Hypertension, Hypertrophy of prostate without urinary obstruction and other lower urinary tract symptoms (LUTS) (), Impotence of organic origin (), Memory loss (), Obesity, unspecified (), Orthostatic hypotension (), Other and unspecified hyperlipidemia (), Other atopic dermatitis and related conditions (), Palpitations (), Peripheral angiopathy in diseases classified elsewhere ([facility] [address] (), Personal history of malignant neoplasm of rectum, rectosigmoid junction, and anus (), Tinea nigra (), Unspecified pruritic disorder (), and Vitamin B12 deficiency (2017). He also has no past medical history of Other dyspnea and respiratory abnormality, Personal history of malignant neoplasm of large intestine, Polyphagia(783.6), Psychosexual dysfunction, unspecified, Unspecified hearing loss, or Unspecified visual loss. .     Pt admitted  with c/o weakness and falls. W/u noted for anemia and heme pos stool. Pt c/o L hip pain. He has h/o Parkinson's and relates he has not been using his walker. Xray noted only for arthritis L hip w/o fracture. He has noted some melanotic stools and ED FOB was positive. Labs noted drop in Hg from 13 in 2020 to 8.9 on admission. Subjective:     Chief Complaint / Reason for Physician Visit  \"better\".   Discussed with RN   No marked pain c/o  No active bleeding  Denies abd pain    Review of Systems:  Symptom Y/N Comments Symptom Y/N Comments   Fever/Chills n   Chest Pain n    Poor Appetite n   Edema n    Cough n   Abdominal Pain n    Sputum n   Joint Pain y    SOB/SARABIA n   Pruritis/Rash n    Nausea/vomit n   Tolerating PT/OT     Diarrhea n   Tolerating Diet y    Constipation n   Other         Current Facility-Administered Medications:     famotidine (PEPCID) tablet 20 mg, 20 mg, Oral, QPM, Maki Sanchez MD, 20 mg at 05/14/21 1730    cyanocobalamin tablet 1,000 mcg, 1,000 mcg, Oral, DAILY, Maki Sanchez MD, 1,000 mcg at 05/14/21 5109    ferrous sulfate tablet 324 mg, 1 Tab, Oral, DAILY WITH BREAKFAST, Seipp, James, DO, 324 mg at 05/14/21 8346    finasteride (PROSCAR) tablet 5 mg, 5 mg, Oral, DAILY, Seipp, James, DO, 5 mg at 05/14/21 2949    tamsulosin (FLOMAX) capsule 0.4 mg, 0.4 mg, Oral, DAILY, Seipp, James, DO, 0.4 mg at 05/14/21 0990    carbidopa-levodopa (SINEMET)  mg per tablet 1 Tab, 1 Tab, Oral, TID, Seipp, James, DO, 1 Tab at 05/14/21 2124    Objective:     VITALS:   Patient Vitals for the past 12 hrs:   Temp Pulse Resp BP SpO2   05/15/21 0806 98.2 °F (36.8 °C) 80 20 (!) 156/74 99 %   05/15/21 0400 98.5 °F (36.9 °C) 86 20 (!) 151/75 95 %   05/15/21 0004 98.1 °F (36.7 °C) 86 18 (!) 155/70 100 %       Intake/Output Summary (Last 24 hours) at 5/15/2021 1016  Last data filed at 5/14/2021 1200  Gross per 24 hour   Intake 240 ml   Output    Net 240 ml        PHYSICAL EXAM:  General: WD, WN. Alert, cooperative, no acute distress    EENT:  EOMI. Anicteric sclerae. MMM  Resp:  CTA bilaterally, no wheezing or rales. No accessory muscle use  CV:  Regular  rhythm,  Tr edema (big ankles)  GI:  Soft, obese. Non distended, minimally tender. +Bowel sounds  Neurologic:  Alert, responding more promptly, no tremor, nonfocal  Psych:   Not anxious / agitated  Skin:  No rashes. No jaundice    LABS:  I reviewed today's most current labs and imaging studies.   Pertinent labs include:  Recent Labs     05/15/21  0800 05/14/21  0531 05/13/21  0541   WBC 6.2 5.2 6.4   HGB 9.6* 8.8* 8.8*   HCT 29.6* 27.3* 28.0*    235 215     Recent Labs     05/15/21  0800 05/14/21  0531 05/13/21  0541    140 142   K 3.7 3.8 4.0    107 108   CO2 26 28 29   GLU 94 94 90   BUN 17 15 18   CREA 1.27 1.09 1.09   CA 8.9 8.4* 8.6   MG 1.9 1.8 1.7   PHOS 2.7 3.2 2.5*   ALB 2.9* 2.4* 2.5*   TBILI 1.1* 1.1* 1.2*   ALT 10* 10* 6*     Results for [facility] (MRN [id number]) as of 5/13/2021 14:43   4/13/2021 13:53   TSH 1.37     Results for [facility] (MRN [id number]) as of 5/13/2021 14:43   7/20/2017 00:00 10/30/2017 09:38 10/13/2020 11:59   Vitamin B12 56 (L) 555 778     Results for [facility] (MRN [id number]) as of 5/14/2021 12:21   5/14/2021 05:31   Iron 53 (L)   TIBC 288   Iron % saturation 18 (L)   Ferritin 50     Results for [facility] (MRN [id number]) as of 5/15/2021 10:16   5/14/2021 05:31   Vitamin B12 583   Folate 4.1 (L)       RADIOLOGY:  CT HEAD 5/11:  The ventricles and sulci are normal in size, shape and configuration. . There is  no significant white matter disease. There is no intracranial hemorrhage,  extra-axial collection, or mass effect. The basilar cisterns are open. No CT  evidence of acute infarct.   The bone windows demonstrate no abnormalities. The visualized portions of the  paranasal sinuses and mastoid air cells are clear.   IMPRESSION: No acute process or change compared to the prior exam.    CT C-SPINE 5/11:  The alignment is within normal limits. There is no fracture or compression  deformity. The odontoid process is intact. The craniocervical junction is within normal limits. IMPRESSION:  Spondylitic changes without acute abnormality. L HIP 5/11:  AP and frog-leg lateral views of the left hip were obtained. There is evidence  of degenerative change. Some osteophyte formation is associated with the lateral  aspect of the hip joint.  The contour of the femoral head is normal.   Of incidental note is the presence of a large amount of gas and fecal material  within the colon (particularly the rectum). Postsurgical change is noted in the lower pelvic region.   IMPRESSION:  Evidence of degenerative change involving the left hip. EKG 5/12:  Normal sinus rhythm 84 bpm  Minimal voltage criteria for LVH, may be normal variant   Septal infarct , age undetermined   Abnormal ECG     Procedures: see electronic medical records for all procedures/Xrays and details which were not copied into this note but were reviewed prior to creation of Plan.         Assessment / Plan:    Principal Problem:    Fall from ground level (5/13/2021)  Active Problems:    Weakness generalized (5/13/2021)  Hg stable since admission, no indication of need for acute transfusion  Hg has improved to 9.6 today off IVF  PT/OT noted instability (falling backwards) with documentation for orthostatic drop in BP  Keep hydrated  Flomax could be involved as well -continue for now  Check orthostatics      History of malignant neoplasm of large intestine (1/15/2015)  DIstant history  Will consider for f/u colonoscopy need  Check CEA / CRP - not consistent with cancer  CEA 1.7 - normal  CRP 1.68 - minimal elevation      Vitamin B12 deficiency (7/23/2017)    Anemia due to acute blood loss (5/13/2021)  Maintain oral B12 1,000mcg daily  F/u Level - has been OK with supplementation orally  Rx FeSO4 325mg daily  Fe, TIBC, Ferritin - c/w mild Fe deficiency       MCI (mild cognitive impairment) with memory loss (1/13/2016)    Parkinson disease ([address] (6/4/2018)    Orthostatic hypotension  Cont Sinemet 25/250 tid  PT /OT evaluation - inpatient therapy recommended  DCP getting approval for therapy stay  Orthostatic hypotension a/w blood loss, Parkinson's, and Flomax tx  Consider resuming ASA 81mg daily when Hg stable  Need to use walker routinely       Atrial fibrillation ([address] (11/23/2020)    Chronic anticoagulation (5/13/2021)  D/c Eliquis for now  GI Bleed and h/o Falls seems to contra-indicate using for now  Cont tx Cardizem CD 1210mg with HCTZ 25mg  Consider holding mild diuretic (has significant venous stasis however)      Essential hypertension (10/30/2017)  Cont tx Cardizem CD 1210mg with HCTZ 25mg      BPH associated with nocturia (1/13/2016)  Cont home tx Proscar / Flomax  Consider holding Flomax        ______________________________________________________________________  SAFETY:   Code Status:DNR  DVT prophylaxis:ANDRE  Stress Ulcer prophylaxis: Pepcid  Bladder catheter:no  Family Contact Info:  Primary Emergency Contact: Donaldo Johnson Phone: [phone]  Bedded: [facility] Room 202/01  Disposition: DCP - awaiting Insurance approval for inpatient rehab  Admission status:  Observation    Reviewed most current lab test results and cultures  YES  Reviewed most current radiology test results   YES  Review and summation of old records today    NO  Reviewed patient's current orders and MAR    YES  PMH/SH reviewed - no change compared to H&P    Care Plan discussed with:                                   Comments  Patient x     Family  x Wife Cosme [facility]) [phone]   RN x     Care Manager  x     Consultant                          Multidiciplinary team rounds were held today with , nursing, pharmacist and clinical coordinator. Patient's plan of care was discussed; medications were reviewed and discharge planning was addressed.         ____________________________________________    Total NON Critical Care TIME:  35   Minutes        Comments   >50% of visit spent in counseling and coordination of care   x      Signed: Amalia Rosenberg MD  [facility] Hospitalist  [phone]

## 2021-05-15 NOTE — PROGRESS NOTES
Bedside and Verbal shift change report given to CONOR Akers RN (oncoming nurse) by Nakul Patrick LPN (offgoing nurse). Report included the following information SBAR and Kardex.        Bed alarm activated

## 2021-05-16 PROBLEM — K92.2 GI BLEED: Status: ACTIVE | Noted: 2021-01-01

## 2021-05-16 NOTE — PROGRESS NOTES
Problem: Falls - Risk of  Goal: *Absence of Falls  Description: Document Kurt Beckwithn Fall Risk and appropriate interventions in the flowsheet. Outcome: Progressing Towards Goal  Note: Fall Risk Interventions:  Mobility Interventions: Bed/chair exit alarm    Mentation Interventions: Adequate sleep, hydration, pain control, Door open when patient unattended    Medication Interventions: Bed/chair exit alarm    Elimination Interventions: Bed/chair exit alarm, Call light in reach    History of Falls Interventions: Bed/chair exit alarm, Door open when patient unattended         Problem: Pressure Injury - Risk of  Goal: *Prevention of pressure injury  Description: Document Jonah Scale and appropriate interventions in the flowsheet.   Outcome: Progressing Towards Goal  Note: Pressure Injury Interventions:  Sensory Interventions: Assess changes in LOC    Moisture Interventions: Absorbent underpads    Activity Interventions: Increase time out of bed    Mobility Interventions: HOB 30 degrees or less    Nutrition Interventions: Document food/fluid/supplement intake    Friction and Shear Interventions: HOB 30 degrees or less                Problem: Upper and Lower GI Bleed: Day 1  Goal: Activity/Safety  Outcome: Progressing Towards Goal     Problem: Upper and Lower GI Bleed: Day 2  Goal: Activity/Safety  Outcome: Progressing Towards Goal  Goal: *Optimal pain control at patient's stated goal  Outcome: Progressing Towards Goal  Goal: *Tolerating diet  Outcome: Progressing Towards Goal     Problem: Upper and Lower GI Bleed:  Discharge Outcomes  Goal: *Tolerating diet  Outcome: Progressing Towards Goal  Goal: *Anxiety reduced or absent  Outcome: Progressing Towards Goal     Problem: Patient Education: Go to Patient Education Activity  Goal: Patient/Family Education  Outcome: Progressing Towards Goal

## 2021-05-16 NOTE — PROGRESS NOTES
Bedside and Verbal shift change report given to Marcia Helton RN (oncoming nurse) by Naida Reyna LPN (offgoing nurse). Report included the following information SBAR and Kardex.  ,EMANUEL 4

## 2021-05-16 NOTE — PROGRESS NOTES
Bedside shift change report given to Ana Rosa Wood (oncoming nurse) by Josee Knapp (offgoing nurse). Report included the following information SBAR, Kardex and MAR.

## 2021-05-16 NOTE — PROGRESS NOTES
Bedside and Verbal shift change report given to SHWAN Queen RN (oncoming nurse) by Bethany Godinez LPN (offgoing nurse). Report included the following information SBAR, Kardex and Recent Results,Carlos 5.

## 2021-05-16 NOTE — PROGRESS NOTES
[phone] - spoke w/Dr. Jim Lui @ [phone] to clarify admission request that was incomplete from the utilization review (Dr. Fransisco Espino). Dr. Jim Lui clarified that he spoke w/utilization review and changing Mr. Norma Hope to inpatient, DX: fall, chronic anemia, No cardiac monitor needed, medical admit only.

## 2021-05-16 NOTE — PROGRESS NOTES
[facility]  Hospitalist Progress Note    NAME: Violet Livingston   :  1935   MRN:  [id number]     Total duration of encounter: 5 days      Interim Hospital Summary: 81 y.o. male who presented on 2021 with Fall from ground level. He has a past medical history of Asthma, Chronic kidney disease, Dermatophytosis of groin and perianal area (), Edema (), Encounter for long-term (current) use of other medications (), Gout, unspecified (), Gouty arthropathy, unspecified (), Hypertension, Hypertrophy of prostate without urinary obstruction and other lower urinary tract symptoms (LUTS) (), Impotence of organic origin (), Memory loss (), Obesity, unspecified (), Orthostatic hypotension (), Other and unspecified hyperlipidemia (), Other atopic dermatitis and related conditions (), Palpitations (), Peripheral angiopathy in diseases classified elsewhere ([facility] [address] (), Personal history of malignant neoplasm of rectum, rectosigmoid junction, and anus (), Tinea nigra (), Unspecified pruritic disorder (), and Vitamin B12 deficiency (2017). He also has no past medical history of Other dyspnea and respiratory abnormality, Personal history of malignant neoplasm of large intestine, Polyphagia(783.6), Psychosexual dysfunction, unspecified, Unspecified hearing loss, or Unspecified visual loss. .     Pt admitted  with c/o weakness and falls. W/u noted for anemia and heme pos stool. Pt c/o L hip pain. He has h/o Parkinson's and relates he has not been using his walker. Xray noted only for arthritis L hip w/o fracture. He has noted some melanotic stools and ED FOB was positive. Labs noted drop in Hg from 13 in 2020 to 8.9 on admission. Subjective:     Chief Complaint / Reason for Physician Visit  \"no c/o\".   Discussed with RN   No marked pain c/o  No active bleeding  Denies abd pain  Ate well    Review of Systems:  Symptom Y/N Comments  Symptom Y/N Comments   Fever/Chills n   Chest Pain n    Poor Appetite n   Edema n    Cough n   Abdominal Pain n    Sputum n   Joint Pain y    SOB/SARABIA n   Pruritis/Rash n    Nausea/vomit n   Tolerating PT/OT     Diarrhea n   Tolerating Diet y    Constipation n   Other         Current Facility-Administered Medications:     metoprolol tartrate (LOPRESSOR) tablet 12.5 mg, 12.5 mg, Oral, Q12H, Meryle Fuchs, MD, 12.5 mg at 05/16/21 1038    famotidine (PEPCID) tablet 20 mg, 20 mg, Oral, QPM, Meryle Fuchs, MD, 20 mg at 05/15/21 1803    cyanocobalamin tablet 1,000 mcg, 1,000 mcg, Oral, DAILY, Meryle Fuchs, MD, 1,000 mcg at 05/16/21 1039    ferrous sulfate tablet 324 mg, 1 Tab, Oral, DAILY WITH BREAKFAST, Seipp, James, DO, 324 mg at 05/16/21 1038    finasteride (PROSCAR) tablet 5 mg, 5 mg, Oral, DAILY, Seipp, James, DO, 5 mg at 05/16/21 1038    tamsulosin (FLOMAX) capsule 0.4 mg, 0.4 mg, Oral, DAILY, Seipp, James, DO, 0.4 mg at 05/16/21 1039    carbidopa-levodopa (SINEMET)  mg per tablet 1 Tab, 1 Tab, Oral, TID, Seipp, James, DO, 1 Tab at 05/16/21 1038    Objective:     VITALS:   Patient Vitals for the past 12 hrs:   Temp Pulse Resp BP SpO2   05/16/21 1129 99.1 °F (37.3 °C) 83 18 (!) 141/65 99 %   05/16/21 0500 97.9 °F (36.6 °C) 83 16 (!) 154/71 100 %       Intake/Output Summary (Last 24 hours) at 5/16/2021 1234  Last data filed at 5/16/2021 1059  Gross per 24 hour   Intake 600 ml   Output 100 ml   Net 500 ml        PHYSICAL EXAM:  General: WD, WN. Alert, cooperative, no acute distress    EENT:  EOMI. Anicteric sclerae. MMM  Resp:  CTA bilaterally, no wheezing or rales. No accessory muscle use  CV:  Regular  rhythm,  No edema (big ankles)  GI:  Soft, obese. Non distended, minimally tender. +Bowel sounds  Neurologic:  Alert, responding more promptly, no tremor, nonfocal  Psych:   Not anxious / agitated  Skin:  No rashes.   No jaundice    LABS:  I reviewed today's most current labs and imaging studies. Pertinent labs include:  Recent Labs     05/15/21  0800 05/14/21  0531   WBC 6.2 5.2   HGB 9.6* 8.8*   HCT 29.6* 27.3*    235     Recent Labs     05/15/21  0800 05/14/21  0531    140   K 3.7 3.8    107   CO2 26 28   GLU 94 94   BUN 17 15   CREA 1.27 1.09   CA 8.9 8.4*   MG 1.9 1.8   PHOS 2.7 3.2   ALB 2.9* 2.4*   TBILI 1.1* 1.1*   ALT 10* 10*     Results for Warden Toro (MRN [id number]) as of 5/13/2021 14:43   4/13/2021 13:53   TSH 1.37     Results for Warden Toro (MRN [id number]) as of 5/13/2021 14:43   7/20/2017 00:00 10/30/2017 09:38 10/13/2020 11:59   Vitamin B12 56 (L) 555 778     Results for Warden Toro (MRN [id number]) as of 5/14/2021 12:21   5/14/2021 05:31   Iron 53 (L)   TIBC 288   Iron % saturation 18 (L)   Ferritin 50     Results for Warden Toro (MRN [id number]) as of 5/15/2021 10:16   5/14/2021 05:31   Vitamin B12 583   Folate 4.1 (L)       RADIOLOGY:  CT HEAD 5/11:  The ventricles and sulci are normal in size, shape and configuration. . There is  no significant white matter disease. There is no intracranial hemorrhage,  extra-axial collection, or mass effect. The basilar cisterns are open. No CT  evidence of acute infarct.   The bone windows demonstrate no abnormalities. The visualized portions of the  paranasal sinuses and mastoid air cells are clear.   IMPRESSION: No acute process or change compared to the prior exam.    CT C-SPINE 5/11:  The alignment is within normal limits. There is no fracture or compression  deformity. The odontoid process is intact. The craniocervical junction is within normal limits. IMPRESSION:  Spondylitic changes without acute abnormality. L HIP 5/11:  AP and frog-leg lateral views of the left hip were obtained. There is evidence  of degenerative change. Some osteophyte formation is associated with the lateral  aspect of the hip joint.  The contour of the femoral head is normal.   Of incidental note is the presence of a large amount of gas and fecal material  within the colon (particularly the rectum). Postsurgical change is noted in the lower pelvic region.   IMPRESSION:  Evidence of degenerative change involving the left hip. EKG 5/12:  Normal sinus rhythm 84 bpm  Minimal voltage criteria for LVH, may be normal variant   Septal infarct , age undetermined   Abnormal ECG     Procedures: see electronic medical records for all procedures/Xrays and details which were not copied into this note but were reviewed prior to creation of Plan.         Assessment / Plan:    Principal Problem:    Fall from ground level (5/13/2021)  Active Problems:    Weakness generalized (5/13/2021)  Hg stable since admission, no indication of need for acute transfusion  Hg has improved to 9.6 5/15 off IVF  PT/OT noted instability (falling backwards) with documentation for orthostatic drop in BP  Keep hydrated  Flomax could be involved as well -continue for now  Check orthostatics  Low dose Metoprolol      History of malignant neoplasm of large intestine (1/15/2015)  DIstant history  Will consider for f/u colonoscopy need  CEA / CRP - not consistent with cancer  CEA 1.7 - normal  CRP 1.68 - minimal elevation      Vitamin B12 deficiency (7/23/2017)    Anemia due to acute blood loss (5/13/2021)  Maintain oral B12 1,000mcg daily  F/u Level 583 - has been OK with supplementation orally  Rx FeSO4 325mg daily  Fe, TIBC, Ferritin - c/w mild Fe deficiency       MCI (mild cognitive impairment) with memory loss (1/13/2016)    Parkinson disease ([address] (6/4/2018)    Orthostatic hypotension  Cont Sinemet 25/250 tid  PT /OT evaluation - inpatient therapy recommended  DCP getting approval for therapy stay - awaiting [employer] response  Orthostatic hypotension a/w blood loss, Parkinson's, and Flomax tx, a/w/a prior history  Consider resuming ASA 81mg daily when Hg stable  Need to use walker routinely       Atrial fibrillation ([address] (11/23/2020)    Chronic anticoagulation (5/13/2021)  D/c Eliquis for now  GI Bleed and h/o Falls seems to contra-indicate using for now  Holding tx Cardizem CD 120mg with HCTZ 25mg w/o significant BP elevation or edema  Add Metoprolol 12.5mg bid 5/16, check orthosatics      Essential hypertension (10/30/2017)  Off PTA tx Cardizem  mg / HCTZ 25mg - w/o much resting BP elevation  Check orthostatics  Begin Metoprolol 12.5mg bid      BPH associated with nocturia (1/13/2016)  Cont home tx Proscar / Flomax  Consider holding Flomax if orthostatic drop with symptoms      Neuropathy  Pt c/o burning pain in plantar aspect of both feet  This is not on Problem List however  Has taken Ultram in the past  Will try low dose Neurontin 100mg bid      ______________________________________________________________________  SAFETY:   Code Status:DNR  DVT prophylaxis:ANDRE  Stress Ulcer prophylaxis: Pepcid  Bladder catheter:no  Family Contact Info:  Primary Emergency Contact: Kellen Brown, Home Phone: [phone]  Bedded: [facility] Room 202/01  Disposition: DCP - awaiting Insurance approval for inpatient rehab  Admission status:  Observation    Reviewed most current lab test results and cultures  YES  Reviewed most current radiology test results   YES  Review and summation of old records today    NO  Reviewed patient's current orders and MAR    YES  PMH/ reviewed - no change compared to H&P    Care Plan discussed with:                                   Comments  Patient x     Family  x Wife Delvis Reece) [phone]   RN x     Care Manager       Consultant                          Multidiciplinary team rounds were held today with , nursing, pharmacist and clinical coordinator. Patient's plan of care was discussed; medications were reviewed and discharge planning was addressed.         ____________________________________________    Total NON Critical Care TIME:  35   Minutes        Comments   >50% of visit spent in counseling and coordination of care   x Signed: Rock Ney MD  [facility] Hospitalist  [phone]

## 2021-05-17 NOTE — SWING BED INTERDISCIPLINARY CARE PLAN DISCHARGE PLANNING NOTE
Discharge Planning: 
 
Psychosocial concerns/family concerns, status of previous week; discharge plan (place, DME, services): Patient lives at home with his wife. May need more help in the home. OT did report that he sat on the bed today and walked around nurses station however. Discharge Plan Update: Home w/spouse Further anticipated LOS: 1 week or less DME to order: Undetermined Anticipated D/C services: Home Health Need for patient/family conference: Melissa Marcos If yes, planned for when: n/a Upon review of the patients current care plan, the following issues, problems, or needs remain: Patient needs to work on strength, mobility and endurance to succeed to his baseline status. Yeny Zamorano

## 2021-05-17 NOTE — PROGRESS NOTES
Bedside shift change report given to [facility] (oncoming nurse) by Philip Lockett  (offgoing nurse). Report included the following information SBAR, Kardex, ED Summary and Recent Results.

## 2021-05-17 NOTE — PROGRESS NOTES
Problem: Self Care Deficits Care Plan (Adult)  Goal: *Acute Goals and Plan of Care (Insert Text)  Description:   FUNCTIONAL STATUS PRIOR TO ADMISSION: Patient lived with his wife who provided assistance for dressing and bathing and for basic and instrumental ADLs. HOME SUPPORT: The patient lived with his spouse. Occupational Therapy Goals  Initiated 5/14/2021  1. Patient will perform supine to sit transfers with minimal assistance/contact guard assist within 7 days. 2.  Patient will perform sit to stand transfer with supervision/set-up within 7 days. 3.  Patient will perform all aspects of toileting with supervision/set-up within 7 days. 4.  Patient will participate in upper extremity therapeutic exercise/activities with minimal assistance/contact guard assist for 15 to 20 minutes within 7 days. Outcome: Progressing Towards Goal     OCCUPATIONAL THERAPY TREATMENT  Patient: Katina Brown (32 y.o. male)  Date: 5/17/2021  Diagnosis: 'light-for-dates' infant with signs of fetal malnutrition [P05.00]  GI bleed [K92.2] Fall from ground level  Procedure(s) (LRB):  ESOPHAGOGASTRODUODENOSCOPY (EGD) with Biopsy    (N/A) 4 Days Post-Op  Precautions: Fall(HX OF ORTHOSTATIC HYPOTENSION)  Chart, occupational therapy assessment, plan of care, and goals were reviewed. ASSESSMENT  Patient continues with skilled OT services and is progressing towards goals. Current Level of Function Impacting Discharge (ADLs):   Need for assistance with IADLs    Other factors to consider for discharge:  Fall Risk         PLAN :  Patient continues to benefit from skilled intervention to address the above impairments. Continue treatment per established plan of care to address goals.     Recommend with staff:  Set pt up to perform his own self-care tasks    Recommend next OT session:  Continued with strengthening, transfers and mobility    Recommendation for discharge: (in order for the patient to meet his/her long term goals)  Therapy up to 5 days/week in SNF setting or an intensive home health therapy program    This discharge recommendation:  A follow-up discussion with the attending provider and/or case management is planned    IF patient discharges home will need the following DME: To be determined closer to discharge       SUBJECTIVE:   Patient agreed to getting up and walking. OBJECTIVE DATA SUMMARY:   Cognitive/Behavioral Status:  Neurologic State: Alert  Orientation Level: Oriented to person;Oriented to place;Oriented to situation  Cognition: Follows commands       Functional Mobility and Transfers for ADLs:  Transfers:  Sit to Stand: Minimum assistance      Balance:  Sitting: Intact  Standing - Static: Fair  Standing - Dynamic : Fair;Constant support    Therapeutic Exercise:   Pt was set up with a yellow loop band and performed 4 upper extremity light resistive exercises x 8 reps each. Pt performed:  B wrist co contraction, elbow flexion, elbow extension and B shld flexion. Pt was able to perform each exercise as set up. At session end pt remained in the recliner with all needs with in reach. Therapeutic Activity:   Pt performed community ambulation with an RW from the recliner around the nursing station and back to the chair with no stops - apporx 120'. OT noted that pt was filling out his menu and was circling only the headings. OT read the menu items to him and completed the lists for tomorrow's meals. Pain:  0 at rest.   Pt appeared to have no pain with any of today's activities. Activity Tolerance:   Good   In last session, pt walked to the door and back in the room and was exhausted. Today he traveled 120 feet with no s/s of fatigue. After treatment patient left in no apparent distress:   Sitting in chair    COMMUNICATION/COLLABORATION:   The patients plan of care was discussed with: Rehabilitation technician.      Gene Flores OT  Time Calculation: 30 mins

## 2021-05-17 NOTE — PROGRESS NOTES
Received a phone call from Dar Rodarte, the [employer], stating that the patient has been approved for 7 days: 5/17,[id number] with Medical update due on 5/24. Will call the patient's wife to confirm what was discussed last week regarding the need for skilled care. Will inform the patient about the decision made by the insurance company. .     REF. #: [id number]:  7 Days approved. Patient's wife visiting with the patient. They both have been informed of the plan of care regarding admitting the patient to the [facility]ing [facility] Program for skilled care services for physical therapy, occupational therapy and speech therapy for strengthening, endurance, mobility, gait training and cognition/appropriate diet. They both agree with the plan. Made aware of the tentative discharge set for Monday, 5/24/21.

## 2021-05-17 NOTE — PROGRESS NOTES
Problem: Pressure Injury - Risk of  Goal: *Prevention of pressure injury  Description: Document Jonah Scale and appropriate interventions in the flowsheet. Outcome: Progressing Towards Goal  Note: Pressure Injury Interventions:  Sensory Interventions: Check visual cues for pain, Keep linens dry and wrinkle-free    Moisture Interventions: Absorbent underpads, Maintain skin hydration (lotion/cream), Moisture barrier    Activity Interventions: PT/OT evaluation, Increase time out of bed    Mobility Interventions: HOB 30 degrees or less    Nutrition Interventions: Document food/fluid/supplement intake    Friction and Shear Interventions: HOB 30 degrees or less                Problem: Patient Education: Go to Patient Education Activity  Goal: Patient/Family Education  Outcome: Progressing Towards Goal     Problem: Falls - Risk of  Goal: *Absence of Falls  Description: Document Carlos Fall Risk and appropriate interventions in the flowsheet.   Outcome: Progressing Towards Goal  Note: Fall Risk Interventions:  Mobility Interventions: Bed/chair exit alarm, OT consult for ADLs, PT Consult for mobility concerns, PT Consult for assist device competence, Strengthening exercises (ROM-active/passive), Utilize walker, cane, or other assistive device    Mentation Interventions: Adequate sleep, hydration, pain control, Bed/chair exit alarm, Door open when patient unattended, Eyeglasses and hearing aids    Medication Interventions: Patient to call before getting OOB    Elimination Interventions: Call light in reach, Bed/chair exit alarm, Patient to call for help with toileting needs, Urinal in reach    History of Falls Interventions: Bed/chair exit alarm, Door open when patient unattended         Problem: Patient Education: Go to Patient Education Activity  Goal: Patient/Family Education  Outcome: Progressing Towards Goal     Problem: Hypertension  Goal: *Blood pressure within specified parameters  Outcome: Progressing Towards Goal  Goal: *Fluid volume balance  Outcome: Progressing Towards Goal  Goal: *Labs within defined limits  Outcome: Progressing Towards Goal     Problem: Patient Education: Go to Patient Education Activity  Goal: Patient/Family Education  Outcome: Progressing Towards Goal

## 2021-05-17 NOTE — PROGRESS NOTES
Care Management Interventions  PCP Verified by CM: Gianluca Johnson MD )  Last Visit to PCP: 04/13/21  Palliative Care Criteria Met (RRAT>21 & CHF Dx)?: No(No MD order)  Mode of Transport at Discharge: Other (see comment)(Wife POV)  Transition of Care Consult (CM Consult): Discharge Planning  Physical Therapy Consult: Yes  Occupational Therapy Consult: Yes  Speech Therapy Consult: No  Current Support Network: Lives with Spouse  Confirm Follow Up Transport: Family  The Plan for Transition of Care is Related to the Following Treatment Goals : Skilled nursing--PT/OT   Discharge Location  Discharge Placement: Home    Patient is admitted to [facility] swing bed. Patient's goal is to improve his strength, mobility and endurance in hopes of returning to baseline. He is participating well in therapy. Patient made aware of transition to swing today he states understanding but has episodes of confusion. Went over swing bed packet/consent to swing bed with patient's wife. She stated understanding. Instructed to call if she has any questions or concerns.

## 2021-05-17 NOTE — ADT AUTH CERT NOTES
5/17 Clinical by Albania Stevens RN 
 
  
Review Status Review Entered In Primary 5/17/2021 15:57  
  
Criteria Review CM Note: 
Patient is being discharged from his acute care stay and being admitted to his skilled care stay at [facility] . Patient has been approved by his insurance company for 7 days (Monday 5/24). Patient will work to gain strength, mobility and endurance. Patient's goal is to return to his prior level of functioning. Care management spoke with his wife Trey Siddiqi (wife) and she is aware and in agreement with plan to admit to swing bed. Instructed her to call care management if she has any questions or concerns.  
  
Received a phone call from [employer], the [employer], stating that the patient has been approved for 7 days: 5/17,[id number] with Medical update due on 5/24. Will call the patient's wife to confirm what was discussed last week regarding the need for skilled care. Will inform the patient about the decision made by the insurance company. .  
  
Musculoskeletal Disease [facility] - Care Day  (5/16/2021) by Albania Stevens RN 
 
  
Review Status Review Entered Completed 5/17/2021 15:56  
  
Criteria Review Care Day: 6 Care Date: 5/16/2021 Level of Care: Inpatient Floor Guideline Day 3 Level Of Care   
(X) * Activity level acceptable ( ) * Complete discharge planning Clinical Status   
(X) * Temperature status acceptable   
(X) * No infection, or status acceptable   
(X) * C-reactive protein stable, declining, or not indicated   
(X) * Respiratory status acceptable   
(X) * Neurologic status acceptable   
(X) * Vascular, soft tissue, and wound status acceptable   
(X) * Pain and nausea absent or adequately managed   
(X) * Fracture or injury absent or status acceptable   
(X) * No bone and joint infection, or status acceptable   
(X) * Rheumatologic and vasculitic status acceptable ( ) * General Discharge Criteria met Interventions   
(X) * Intake acceptable ( ) * No inpatient interventions needed * Milestone Additional Notes 98.7, 158/74, HR 83, RR 18, O2 sat 99, RA Meds: Tylenol po, Sinemet pot id, Pepcid po 20mg, proscar po 5mg, Neurontin po 100mg bid, Lopressor po 12.5mg, Flomax po,   
  
 Fall from ground level (5/13/2021) Active Problems:  
  Weakness generalized (5/13/2021) Hg stable since admission, no indication of need for acute transfusion Hg has improved to 9.6 5/15 off IVF  
PT/OT noted instability (falling backwards) with documentation for orthostatic drop in BP Keep hydrated Flomax could be involved as well -continue for now Check orthostatics Low dose Metoprolol  
   
  History of malignant neoplasm of large intestine (1/15/2015) DIstant history Will consider for f/u colonoscopy need CEA / CRP - not consistent with cancer CEA 1.7 - normal  
CRP 1.68 - minimal elevation  
   
  Vitamin B12 deficiency (7/23/2017)  
  Anemia due to acute blood loss (5/13/2021) Maintain oral B12 1,000mcg daily F/u Level 583 - has been OK with supplementation orally Rx FeSO4 325mg daily Fe, TIBC, Ferritin - c/w mild Fe deficiency   
   
  MCI (mild cognitive impairment) with memory loss (1/13/2016)  
  Parkinson disease ([facility] [address] (6/4/2018)  
  Orthostatic hypotension Cont Sinemet 25/250 tid PT /OT evaluation - inpatient therapy recommended DCP getting approval for therapy stay - awaiting [employer] response Orthostatic hypotension a/w blood loss, Parkinson's, and Flomax tx, a/w/a prior history Consider resuming ASA 81mg daily when Hg stable Need to use walker routinely   
   
  Atrial fibrillation ([facility] [address] (11/23/2020)  
  Chronic anticoagulation (5/13/2021) D/c Eliquis for now GI Bleed and h/o Falls seems to contra-indicate using for now Holding tx Cardizem CD 120mg with HCTZ 25mg w/o significant BP elevation or edema Add Metoprolol 12.5mg bid 5/16, check orthosatics    
  Essential hypertension (10/30/2017) Off PTA tx Cardizem  mg / HCTZ 25mg - w/o much resting BP elevation Check orthostatics Begin Metoprolol 12.5mg bid  
   
  BPH associated with nocturia (2016) Cont home tx Proscar / Flomax Consider holding Flomax if orthostatic drop with symptoms  
   
  Neuropathy Pt c/o burning pain in plantar aspect of both feet This is not on Problem List however Has taken Ultram in the past  
Will try low dose Neurontin 100mg bid  
  
  
Letter of recommendation for Inpatient by Raina Spivey RN 
 
  
Review Status Review Entered In Primary 2021 16:30  
  
Criteria Review We recommend that the following pt's hospitalization under OBSERVATION status  be changed to INPATIENT status. 
  
Name   Madelyn Guzmán     1935 [facility]#   [id number] Insurance        [employer] 
  
  
Clinical summary        80 y/o gentleman with multiple medical problems including Parkinson's disease, Hypertension, Dyslipidemia, anemia and BPH admitted with fall, dehydration, hypokalemia and acute on chronic anemia necessitating management with falls precautions, neurovascular checks, hydration, input/output assessments, F/U kidney function, electrolyte correction with F/U levels, anemia evaluation, serial H/hs with as needed transfusion of PRBCs, optimization of BP control and mobility assessments. Vitals                           /71  HR 69-83/min. Labs and Imaging       Potassium: 3.4,  BUN/Cr: 28/1.31,  CRP: 1.68,  H/H: 8.7/2 UM criteria applies      Yes Intensity of service      Patient's presentation, findings and management meet INPATIENT status criteria. 
  
  
This chart was reviewed at 3:43 PM 2021 
  
Donell Soriano MD FACP Physician Advisor [facility] [employer]: [phone]  
  
Musculoskeletal Disease [facility] - Care Day (2021) by Albania Stevens RN 
 
  
Review Status Review Entered Completed 2021 15:49  
  
Criteria Review Care Day: 2 Care Date: 5/12/2021 Level of Care: Inpatient Floor Guideline Day 2 Level Of Care (X) Floor Clinical Status   
(X) * No ICU or intermediate care needs Interventions (X) Inpatient interventions continue (X) Transition to oral routes * Milestone Additional Notes 97.2, 155/66, HR 76, RR 20, O2 sat 98, RA Meds:  ml.hr, potassium po 30meq x2, Labs: RBC 3.07, HGB 8.7, HCT 26.3, potassium 3.4, Bun 28, Creat 1.31, calcium 8.0,   
  
Surg: Anemia with heme positive stools.  He has been on Eliquis.  Certainly the possibility exists of an upper GI bleed.  
   
   
Plan:  
   
Discussed the possibility of performing an EGD.  He is somewhat hesitant on making a decision. Eugene Samano would like to discuss this with his wife as well as the hospitalist prior to proceeding.  Risks of the procedure were shared including the risks of aspiration or esophageal injury although the likelihood of that is low.  Both the patient and the hospitalist will make a decision today and if they plan on proceeding would plan on performing EGD tomorrow. Stefani Felton would recommend twice daily PPI.  Discussed with the hospitalist.  
  
  
  
Musculoskeletal Disease [address] Day  (5/11/2021) by Erick Mtz RN 
 
  
Review Status Review Entered Completed 5/12/2021 09:29  
  
Criteria Review Care Day: 1 Care Date: 5/11/2021 Level of Care: Inpatient Floor Guideline Day 1 Level Of Care (X) Discharge planning. See General Discharge Planning Tool. Clinical Status ( ) * Clinical Indications met [B] Interventions (X) Inpatient interventions as needed * Milestone Additional Notes 98.2, 161/68, HR 76, RR 20, O2 sat 93, Ra Meds:  ml.hr, protonix iv 40mg, Labs: RBC 3.18, HGb 8.9, HCT 27.8, Mono 14, UA urobilin 4.0, potassium 3.3, BUN 38, Creat 1.54,  protein 6.3, Albumin 2.6, ALT 8, AST 11, occult blood positive CT HEAD:  
No acute process or change compared to the prior exam.  
   
  
CT SPINE:  
Spondylitic changes without acute abnormality XR HIP:  
Evidence of degenerative change involving the left hip Simone Swenson, 81 y.o. male, with a past medical history significant for Parkinson's, atrial fibrillation on Eliquis presenting the emergency department with a fall.  Patient reports ports he has multiple frequent falls.  He supposed to be using a walker, but was not using a walker today. [facility] fell and landed on his side and complains of pain in his left hip.  Thinks that he may have hit his head, no loss of consciousness.  Denies any headache or neck pain at this time.  Denies any fever, chills, cough.  No nausea vomiting or diarrhea.  No other exacerbating relieving factors or associated symptoms Discussed with Dr. Sergei Crowder, general surgery.  Would be comfortable doing colonoscopy and endoscopy for diagnostic purposes if needed, less therapeutic interventions, but does feel comfortable watching the patient here and holding Eliquis and given PPIs.  Blood if needed.  
   
Constitutional:    
   Appearance: He is well-developed. HENT:   
   Head: Normocephalic and atraumatic. Eyes:   
   General:      
   Right eye: No discharge.      
   Left eye: No discharge.   
   Conjunctiva/sclera: Conjunctivae normal.   
   Pupils: Pupils are equal, round, and reactive to light. Neck:   
   Musculoskeletal: Normal range of motion and neck supple.   
   Trachea: No tracheal deviation. Cardiovascular:   
   Rate and Rhythm: Normal rate and regular rhythm.   
   Heart sounds: Normal heart sounds. No murmur. Pulmonary:   
   Effort: Pulmonary effort is normal. No respiratory distress.   
   Breath sounds: Normal breath sounds. No wheezing or rales. Abdominal:   
   General: Bowel sounds are normal.   
   Palpations: Abdomen is soft.   
   Tenderness: There is no abdominal tenderness. There is no guarding or rebound.    
Genitourinary:  
   Comments: Digital rectal exam reveals melanic stool Musculoskeletal: Normal range of motion.      
   General: No deformity.   
   Comments: No deformity, no pain with logroll.  No tenderness palpation of the greater trochanters bilaterally Skin:  
   General: Skin is warm and dry.   
   Findings: No erythema or rash. Neurological:   
   Mental Status: He is alert and oriented to person, place, and time.   
   Comments: No facial droop, speech is slowed, but likely chronic with his Parkinson's.  He has equal strength in the upper and lower extremities. Psychiatric:      
   Behavior: Behavior normal.  
  
  
Musculoskeletal Disease [facility] - Clinical Indications for Admission to Inpatient Care by Marilyn Mcgregor RN 
 
  
Review Status Review Entered Completed 5/12/2021 09:29  
  
Criteria Review Clinical Indications for Admission to Inpatient Care Most Recent : Erica Beth Most Recent Date: 5/12/2021 09:29:03 EDT

## 2021-05-17 NOTE — SWING BED INTERDISCIPLINARY CARE PLAN NURSE NOTE
Nursing: 
 
Week # 1: Date 5/17/2021 Medical status (changes from previous week):Progressing Treatment changes this shift: no changes Cognition: Present status: oriented Is cueing needed?: Yes Frequency of cueing needs: occasional  
        Type of cueing used: verbal 
ADL (general): Independent Activity type: Social leisure skills Participation: Daily Level of participation: Improving Pain status: No c/o pain Patient/Family Education needs: safety Upon review of the patients current care plan, the following issues, problems, or needs remain: safety [place], [place]

## 2021-05-17 NOTE — PROGRESS NOTES
Comprehensive Nutrition Assessment    Type and Reason for Visit: Initial    Nutrition Recommendations/Plan: regular diet, encourage po intake     Nutrition Assessment:    Pt admitted to rehab with anemia, suspected ugib, parkinson's dz, htn, bph, asthma, ckd. Pt has a good appetite most of the time. Intake mostly fair 50-75%. He has a low Hgb-8.5 and low Ca-8.3. His weight is stable but he does have LE edema 3+ bilateral. Encourage po intake. Estimated Daily Nutrient Needs:  Energy (kcal): 2198; Weight Used for Energy Requirements: Current  Protein (g): 92; Weight Used for Protein Requirements: Current  Fluid (ml/day): 2198; Method Used for Fluid Requirements: 1 ml/kcal      Nutrition Related Findings:     Po tvuwnl-%     Wounds:    None       Current Nutrition Therapies:  No diet orders on file    Anthropometric Measures:  · Height:  6' 3\" (190.5 cm)  · Current Body Wt:  92.1 kg (203 lb)   · Admission Body Wt:  212 lb    · Usual Body Wt:  95.3 kg (210 lb)     · Ideal Body Wt:  196 lbs:  103.6 %   · Adjusted Body Weight:   ; Weight Adjustment for:     · Adjusted BMI:       · BMI Category: Overweight (BMI 25.0-29. 9)       Weight Loss Metrics 5/17/2021 5/11/2021 4/13/2021 1/7/2021 12/15/2020 11/27/2020 11/24/2020   Today's Wt 203 lb 12.8 oz 212 lb 1.3 oz 206 lb 6.4 oz 212 lb 9.6 oz 210 lb 209 lb 205 lb   BMI 25.47 kg/m2 25.83 kg/m2 27.23 kg/m2 28.05 kg/m2 27.71 kg/m2 27.57 kg/m2 27.05 kg/m2       Nutrition Diagnosis:   · Altered nutrition-related lab values related to other (specify) as evidenced by localized or generalized fluid accumulation      Nutrition Interventions:   Food and/or Nutrient Delivery: Continue current diet  Nutrition Education and Counseling: No recommendations at this time  Coordination of Nutrition Care: Continue to monitor while inpatient, Interdisciplinary rounds    Goals:  po intake at least 50-75% of most meals x 5-7 days       Nutrition Monitoring and Evaluation: Behavioral-Environmental Outcomes: None identified  Food/Nutrient Intake Outcomes: Food and nutrient intake  Physical Signs/Symptoms Outcomes: Weight, Fluid status or edema, Meal time behavior    Discharge Planning:    Continue current diet     Electronically signed by Carlota Duarte RD on 5/17/2021 at 5:06 PM

## 2021-05-17 NOTE — PROGRESS NOTES
Problem: Falls - Risk of  Goal: *Absence of Falls  Description: Document Moise Howell Fall Risk and appropriate interventions in the flowsheet.   Outcome: Progressing Towards Goal  Note: Fall Risk Interventions:  Mobility Interventions: Bed/chair exit alarm, Patient to call before getting OOB, PT Consult for mobility concerns, Utilize walker, cane, or other assistive device    Mentation Interventions: Bed/chair exit alarm, Door open when patient unattended, Eyeglasses and hearing aids, Increase mobility    Medication Interventions: Patient to call before getting OOB, Teach patient to arise slowly, Bed/chair exit alarm    Elimination Interventions: Call light in reach    History of Falls Interventions: Bed/chair exit alarm, Door open when patient unattended

## 2021-05-17 NOTE — ROUTINE PROCESS
Bedside and Verbal shift change report given to Bernice DE LEON (oncoming nurse) by Otoniel Winkler LPN (offgoing nurse). Report included the following information SBAR and Kardex Bed alarm activated.

## 2021-05-17 NOTE — PROGRESS NOTES
Discussed filing and completing a Medicaid application with the patient's wife. She agreed. Email forwarded to Kendell Flores MedAssist Representative requesting the initiation of a Medicaid Application.

## 2021-05-17 NOTE — DISCHARGE SUMMARY
[facility]  Hospitalist Discharge Summary    Patient ID:  Lawanda Escalante  [id number]  81 y.o.  1935    PCP on record: Vanessa Seymour MD    Admit date: 5/11/2021  Discharge date and time: 5/17/2021     DISCHARGE DIAGNOSIS:    Principal Problem:    Fall from ground level (5/13/2021)    Active Problems:    Weakness generalized (5/13/2021)    History of malignant neoplasm of large intestine (1/15/2015)    Vitamin B12 deficiency (7/23/2017)    Anemia due to acute blood loss (5/13/2021)    MCI (mild cognitive impairment) with memory loss (1/13/2016)    Parkinson disease ([facility] [address] (6/4/2018)    Orthostatic hypotension (5/14/2021)    Atrial fibrillation ([facility] [address] (11/23/2020)    Chronic anticoagulation (5/13/2021)    Essential hypertension (10/30/2017)    BPH associated with nocturia (1/13/2016)    GI bleed (5/16/2021)    CONSULTATIONS:  None    Excerpted HPI from H&P of DO Marty Arrieta Octaviano is a 81 y.o.  male who presents with recurrent falls. PMH as noted below. Patient states he lives alone. He states he has been falling more frequently recently and felt that he should come to the hospital to figure out why. Denies BRBPR but Hb in ED found to be much lower than prior. Stool heme+ in ED. Hemodynamically stable. Agreeable to be admitted for endoscopy. We were asked to admit for work up and evaluation of the above problems. ______________________________________________________________________  DISCHARGE SUMMARY/HOSPITAL COURSE:  for full details see H&P, daily progress notes, labs, consult notes. Pt admitted 5/11 with c/o weakness and falls. W/u noted for anemia and heme pos stool. Pt c/o L hip pain. He has h/o Parkinson's and relates he has not been using his walker. Xray noted only for arthritis L hip w/o fracture. He has noted some melanotic stools and ED FOB was positive. Labs noted drop in Hg from 13 in Nov 2020 to 8.9 on admission.  Hg remained stable w/o evidence for active bleeding. Pt being transferred to [facility] as recommended  Will need to consider GI w/u in f/u.       Principal Problem:     Fall from ground level (5/13/2021)  Active Problems:      Weakness generalized (5/13/2021)  Admitted acute care 5/12 - 5/17 for anemia, GIB, weakness, Parkinson's, and h/o fall  Hg stable during admission, no indication of need for acute transfusion  Hg has improved to 9.6 5/15 off IVF  PT/OT noted instability (falling backwards) with documentation for orthostatic drop in BP  Keep hydrated  Flomax could be involved as well -continue for now  Follow orthostatics  Low dose Metoprolol  Patient transferred to TCU for ongoing Rehab with PT/OT  Grandual improvement is being documented       History of malignant neoplasm of large intestine (1/15/2015)  DIstant history  Will consider for f/u colonoscopy need  CEA / CRP - not consistent with cancer  CEA 1.7 - normal  CRP 1.68 - minimal elevation       Vitamin B12 deficiency (7/23/2017)    Anemia due to acute blood loss (5/13/2021)  Maintain oral B12 1,000mcg daily  F/u WNOZV 315 - has been OK with supplementation orally  Rx FeSO4 325mg daily  Fe, TIBC, Ferritin - c/w mild Fe deficiency        MCI (mild cognitive impairment) with memory loss (1/13/2016)    Parkinson disease ([address] (6/4/2018)    Orthostatic hypotension  Cont Sinemet 25/250 tid  PT /OT evaluation - inpatient therapy recommended, transfer to TCU  DCP getting approval for therapy stay - awaited [employer]a response  Orthostatic hypotension a/w blood loss, Parkinson's, and Flomax tx, a/w/a prior history  Consider resuming ASA 81mg daily when Hg stable  Need to use walker routinely        Atrial fibrillation ([address] (11/23/2020)    Chronic anticoagulation (5/13/2021)  D/c Eliquis for now  GI Bleed and h/o Falls seems to contra-indicate using for now  Holding tx Cardizem CD 120mg with HCTZ 25mg w/o significant BP elevation or edema  Add Metoprolol 12.5mg bid 5/16, check orthosatics       Essential hypertension (10/30/2017)  Off PTA tx Cardizem  mg / HCTZ 25mg - w/o much resting BP elevation  Checking  orthostatics  Begin Metoprolol 12.5mg bid - well tolerated       BPH associated with nocturia (1/13/2016)  Cont home tx Proscar / Flomax  Consider holding Flomax if orthostatic drop with symptoms       Neuropathy  Pt c/o burning pain in plantar aspect of both feet  This is not on Problem List however  Has taken Ultram in the past  Will try low dose Neurontin 100mg   _______________________________________________________________________  Patient seen and examined by me on discharge day. Pertinent Findings:  Visit Vitals  /64 (BP 1 Location: Right upper arm, BP Patient Position: At rest)   Pulse 70   Temp 98.6 °F (37 °C)   Resp 20   Ht 6' 3.98\" (1.93 m)   Wt 96.2 kg (212 lb 1.3 oz)   SpO2 98%   BMI 25.83 kg/m²     Gen:    Not in distress  Chest: Nonlabored respiration, Clear lungs  CVS:   Regular rhythm.   No edema  Abd:  Soft, not distended, not tender  Neuro:  Alert, nonfocal, weak  _______________________________________________________________________  DISCHARGE MEDICATIONS:   Current Discharge Medication List        Current Facility-Administered Medications:     metoprolol tartrate (LOPRESSOR) tablet 12.5 mg, 12.5 mg, Oral, Q12H, Desiree BEATTY MD, 12.5 mg at 05/17/21 1026    gabapentin (NEURONTIN) capsule 100 mg, 100 mg, Oral, BID, Maki Sanchez MD, 100 mg at 05/17/21 1028    acetaminophen (TYLENOL) tablet 500 mg, 500 mg, Oral, Q6H PRN, Maki Sanchez MD, 500 mg at 05/16/21 1836    famotidine (PEPCID) tablet 20 mg, 20 mg, Oral, QPM, Maki Sanchez MD, 20 mg at 05/16/21 1830    cyanocobalamin tablet 1,000 mcg, 1,000 mcg, Oral, DAILY, Maki Sanchez MD, 1,000 mcg at 05/17/21 1028    ferrous sulfate tablet 324 mg, 1 Tab, Oral, DAILY WITH BREAKFAST, Seipp, James, DO, 324 mg at 05/17/21 1027    finasteride (PROSCAR) tablet 5 mg, 5 mg, Oral, DAILY, Seipp, James, DO, 5 mg at 05/17/21 1027    tamsulosin (FLOMAX) capsule 0.4 mg, 0.4 mg, Oral, DAILY, Seipp, James, DO, 0.4 mg at 05/17/21 1028    carbidopa-levodopa (SINEMET)  mg per tablet 1 Tab, 1 Tab, Oral, TID, Seipp, James, DO, 1 Tab at 05/17/21 1027    My Recommended  Diet: Cardiac  Activity: Up only with assistance  Wound Care: none  Follow-up labs: routine    ______________________________________________________________________  DISPOSITION:    Home with Family:    Home with HH/PT/OT/RN:    SNF/LTC: [facility] TCU Rehab   LACHELLE:    OTHER:        Condition at Discharge:  Stable  _____________________________________________________________________  Follow up with:   PCP : Graciela Gibbs MD  Plan f/u with PCP following Rehab    Total time in minutes spent coordinating this discharge (includes going over instructions, follow-up, prescriptions, and preparing report for sign off to her PCP) :35 minutes    Signed:  Francisco Silva MD  [facility] Hospitalist  [phone]

## 2021-05-17 NOTE — PROGRESS NOTES
Care Management Interventions  PCP Verified by CM: Yes  Last Visit to PCP: 04/13/21  Palliative Care Criteria Met (RRAT>21 & CHF Dx)?: No(No MD order)  Mode of Transport at Discharge: Other (see comment)(Wife RA)  Transition of Care Consult (CM Consult): Discharge Planning  Physical Therapy Consult: No  Occupational Therapy Consult: No  Speech Therapy Consult: No  Current Support Network: Lives with Spouse  Confirm Follow Up Transport: Family  The Plan for Transition of Care is Related to the Following Treatment Goals : Treat s/s fall/weakness  Discharge Location  Discharge Placement: Skilled nursing facility([facility])    Patient is being discharged from his acute care stay and being admitted to his skilled care stay at [facility] . Patient has been approved by his insurance company for 7 days (Monday 5/24). Patient will work to gain strength, mobility and endurance. Patient's goal is to return to his prior level of functioning. Care management spoke with his wife Abdulkadir Dacosta (wife) and she is aware and in agreement with plan to admit to swing bed. Instructed her to call care management if she has any questions or concerns. RUR: 14% LOW       Transition of Care (SHEILA) Plan:  SNF at [address] [place] [address]:  N/a     Follow-up appointment and transportation: Hospitalist will follow at least once a week--more if clinically indicated. Communication plan (with patient/family): Abdulkadir Dacosta (wife) aware and in agreement with plan to admit to swing bed today at [facility]       Who is being called? Patient or Next of Kin? Responsible party? Abdulkadir Dacosta (wife)       What number(s) is to be used? [phone]      What service provider is calling for [employer] services? [facility]       When are they calling?  Unknown       Click here to complete [employer] including selection of the Healthcare Decision Maker Relationship (ie \"Primary\")  [email or web address]        Transition of Care Plan to SNF/Rehab    SNF/Rehab Transition:  Patient has been accepted to [facility] and meets criteria for admission. Communication to Patient/Family:  Met with patient and Valencia Davidson (wife) (identified care giver) and they are agreeable to the transition plan. Communication to SNF/Rehab:  Bedside nurse , SAMMY Rosales LPN, has been notified to update the transition plan to the facility. Nursing Please include all hard scripts for controlled substances, med rec and dc summary, and AVS in packet. Reviewed and confirmed with facility,[facility], can manage the patient care needs for the following:     Hiral Lank with (X) only those applicable:    Medication:  [x]  Medications will be available at the facility  []  IV Antibiotics   [x]  Controlled Substance - hard copy to be sent with patient   []  Weekly Labs   Documents:  [] Hard RX  [x] MAR  [x] Kardex  [] AVS  []Transfer Summary  []Discharge   Equipment:  []  CPAP/BiPAP  []  Wound Vacuum  []  Stone or Urinary Device  []  PICC/Central Line  []  Nebulizer  []  Ventilator   Treatment:  []Isolation (for MRSA, VRE, etc.)  []Surgical Drain Management  []Tracheostomy Care  []Dressing Changes  []Dialysis with transportation and chair time   []PEG Care  []Oxygen  []Daily Weights for Heart Failure   Dietary:  []Any diet limitations  []Tube Feedings   []Total Parenteral Management (TPN)   Eligible for Medicaid Long Term Services and Supports  Yes:  [] Eligible for medical assistance or will become eligible within 180 days and UAI completed. [] Provider/Patient and/or support system has requested screening. [] UAI copy provided to patient or responsible party,   [] UAI unavailable at discharge will send once processed to SNF provider. [] UAI unavailable at discharged mailed to patient  No:   [x] Private pay and is not financially eligible for Medicaid within the next 180 days. [] Reside out-of-state.   [] A residents of a state owned/operated facility that is licensed  by Department of Behavioral Health and Developmental Services or [facility]  [] Enrollment in [facility] services  [] Non [place] citizen  [] Patient /Family declines to have screening completed or provide financial information for screening     Financial Resources:  Medicaid    [] Initiated and application pending   [] Full coverage     Advanced Care Plan:  []Surrogate Decision Maker of Care  []POA  []Communicated Code Status DNR    Other

## 2021-05-17 NOTE — PROGRESS NOTES
Follow up visit with pt in Rm 202  Provided empathic listening and spiritual support  Advised of  Availability   [address] [place] [address]

## 2021-05-17 NOTE — PROGRESS NOTES
Problem: Mobility Impaired (Adult and Pediatric)  Goal: *Acute Goals and Plan of Care (Insert Text)  Description: FUNCTIONAL STATUS PRIOR TO ADMISSION: Patient was modified independent using a rolling walker for functional mobility. HOME SUPPORT PRIOR TO ADMISSION: The patient lived alone with SPOUSE to provide assistance. Physical Therapy Goals  Initiated 5/14/2021  1. Patient will move from supine to sit and sit to supine  in bed with independence within 7 day(s). 2.  Patient will transfer from bed to chair and chair to bed with independence using the least restrictive device within 7 day(s). 3.  Patient will perform sit to stand with independence within 7 day(s). 4.  Patient will ambulate with independence for 150 feet with the least restrictive device within 7 day(s). 5.  Patient will ascend/descend 3 STEPS/ stairs with BILAT handrail(s) with independence within 7 day(s). Outcome: Progressing Towards Goal   PHYSICAL THERAPY TREATMENT  Patient: Will Kim (6 y.o. male)  Date: 5/17/2021  Diagnosis: 'light-for-dates' infant with signs of fetal malnutrition [P05.00]  GI bleed [K92.2] Fall from ground level  Procedure(s) (LRB):  ESOPHAGOGASTRODUODENOSCOPY (EGD) with Biopsy    (N/A) 4 Days Post-Op  Precautions: Fall(HX OF ORTHOSTATIC HYPOTENSION)  Chart, physical therapy assessment, plan of care and goals were reviewed. ASSESSMENT  Patient continues with skilled PT services and is progressing towards goals. Patient received supine in bed napping. He agreed to perform exercises in bed, however, did not want to get out of bed. Had ambulated around the nursing station with OT this am and now was napping after lunch. He performed LE strengthening exercises, c/o knee pain, however unable to rate.      Current Level of Function Impacting Discharge (mobility/balance): min to mod A    Other factors to consider for discharge: lives with spouse         PLAN :  Patient continues to benefit from skilled intervention to address the above impairments. Continue treatment per established plan of care. to address goals. Recommendation for discharge: (in order for the patient to meet his/her long term goals)  Therapy up to 5 days/week in SNF setting or intensive home health therapy program    This discharge recommendation:  Has been made in collaboration with the attending provider and/or case management    IF patient discharges home will need the following DME: to be determined (TBD)       SUBJECTIVE:   Patient stated I am tired, I walked this morning.     OBJECTIVE DATA SUMMARY:   Critical Behavior:  Neurologic State: Alert  Orientation Level: Oriented to person, Oriented to place, Oriented to situation  Cognition: Follows commands  Safety/Judgement: Decreased insight into deficits  Functional Mobility Training:       Transfers:  Sit to Stand: Minimum assistance  Stand to Sit: Stand-by assistance                             Balance:  Sitting: Intact  Standing - Static: Fair  Standing - Dynamic : Fair;Constant support      Therapeutic Exercises: Ankle pumps, heel slides, SLR, glut sets  Pain Rating:  Knee pain, unable to rate    Activity Tolerance:   Fair    After treatment patient left in no apparent distress:   Supine in bed, Heels elevated for pressure relief, Call bell within reach, and Bed / chair alarm activated    COMMUNICATION/COLLABORATION:   The patients plan of care was discussed with: Physical therapy assistant and Rehabilitation technician.      Wu Jiang PT   Time Calculation: 10 mins

## 2021-05-17 NOTE — PROGRESS NOTES
After a second level review, patient has been deemed appropriate for Inpatient Status. Medicare pt has received, reviewed, and signed 1st IM letter informing him  of his  right to appeal the discharge. Signed copied has been placed on pt bedside chart. Copied provided for the patient. Patient verbalized understanding.

## 2021-05-17 NOTE — PROGRESS NOTES
[facility]    Psychosocial Assessment Swing Bed Resident    1. APPEARANCE  Resident is well groomed    2. SPEECH & COMMUNICATION SKILLS  Resident is able to converse and make needs known    3. SOCIAL STATUS  Resident desires interaction with others and seeks it out    4. EMOTIONAL STATUS  Which does the resident's normal mood convey? Contentment    5. MENTAL/COGNITIVE STATUS  Does resident understand what is happening to them? YES  Does resident comprehend what is being said to them? YES  Is there a particular time of day that the resident's comprehension level is better than other times? No  Is resident's short term memory accurate? NO  Does resident make decisions using objective information? NO  Is resident irrational in decision making? NO    6. CURRENT RELATIONSHIPS  Resident maintains supportive relationship with (state specific individuals & frequency & type of contact): Adelaida Mallory (wife)     7. PLACEMENT & DISCHARGE  Does resident understand placement? YES  Does the resident understand diagnosis & intended length of placement? YES  Does the resident express concerns regarding previous residence and/or possessions? NO  Is there support (financial & emotional) when possible discharge occurs? YES    8. ADJUSTMENT DIFFICULTIES  Is resident experiencing any difficulty related to depression, loss, lack of control, uneasy feelings, grieving?  NO  If yes, explain: n/a       Mila Back Date:5/17/2021

## 2021-05-18 NOTE — PROGRESS NOTES
Problem: Falls - Risk of  Goal: *Absence of Falls  Description: Document Alfonso Marie Fall Risk and appropriate interventions in the flowsheet.   Outcome: Progressing Towards Goal  Note: Fall Risk Interventions:  Mobility Interventions: Utilize walker, cane, or other assistive device, Bed/chair exit alarm, Patient to call before getting OOB    Mentation Interventions: Bed/chair exit alarm, Door open when patient unattended    Medication Interventions: Bed/chair exit alarm, Patient to call before getting OOB    Elimination Interventions: Bed/chair exit alarm, Call light in reach, Patient to call for help with toileting needs, Stay With Me (per policy)    History of Falls Interventions: Bed/chair exit alarm

## 2021-05-18 NOTE — SWING BED INTERDISCIPLINARY CARE PLAN NURSE NOTE
Nursing: 
 
Week #1: Date 5/17/2021 Medical status (changes from previous week):Progressing Treatment changes this shift: none Cognition: Present status: oriented Is cueing needed?: Yes Frequency of cueing needs: frequent Type of cueing used: verbal 
ADL (general): Maximum assistance Activity type: Social leisure skills Participation: Daily Level of participation: Improving Pain status: No c/o pain Patient/Family Education needs: fall precaution Upon review of the patients current care plan, the following issues, problems, or needs remain: fall precautions Nicolle Romero RN

## 2021-05-18 NOTE — PROGRESS NOTES
Problem: Pressure Injury - Risk of  Goal: *Prevention of pressure injury  Description: Document Jonah Scale and appropriate interventions in the flowsheet.   Outcome: Progressing Towards Goal  Note: Pressure Injury Interventions:  Sensory Interventions: Check visual cues for pain, Keep linens dry and wrinkle-free    Moisture Interventions: Absorbent underpads, Limit adult briefs, Apply protective barrier, creams and emollients    Activity Interventions: PT/OT evaluation, Increase time out of bed    Mobility Interventions: HOB 30 degrees or less    Nutrition Interventions: Document food/fluid/supplement intake    Friction and Shear Interventions: HOB 30 degrees or less

## 2021-05-18 NOTE — H&P
[facility]   Admission History & Physical        5/18/2021 2:19 PM  Patient: Jeppie Castleman 1935  PCP: Hortencia Marion MD    HISTORY  Chief Complaint: No chief complaint on file. HPI: 81 y.o. male presenting for admission to [facility] for further evaluation and treatment for Encounter for rehabilitation. He  has a past medical history of Asthma, Chronic kidney disease, Dermatophytosis of groin and perianal area (2010), Edema (2008), Encounter for long-term (current) use of other medications (2010), Gout, unspecified (2010), Gouty arthropathy, unspecified (2010), Hypertension, Hypertrophy of prostate without urinary obstruction and other lower urinary tract symptoms (LUTS) (2008), Impotence of organic origin (2008), Memory loss (2009), Obesity, unspecified (2010), Orthostatic hypotension (2008), Other and unspecified hyperlipidemia (2008), Other atopic dermatitis and related conditions (2012), Palpitations (2010), Peripheral angiopathy in diseases classified elsewhere ([facility] [address] (2010), Personal history of malignant neoplasm of rectum, rectosigmoid junction, and anus (2011), Tinea nigra (2011), Unspecified pruritic disorder (2011), and Vitamin B12 deficiency (7/23/2017). He also has no past medical history of Other dyspnea and respiratory abnormality, Personal history of malignant neoplasm of large intestine, Polyphagia(783.6), Psychosexual dysfunction, unspecified, Unspecified hearing loss, or Unspecified visual loss. Pt admitted 5/11 with c/o weakness and falls. W/u noted for anemia and heme pos stool. Pt c/o L hip pain. He has h/o Parkinson's and relates he has not been using his walker. Xray noted only for arthritis L hip w/o fracture. He has noted some melanotic stools and ED FOB was positive. Labs noted drop in Hg from 13 in Nov 2020 to 8.9 on admission. EGD w/o source of bleeding. Eliquis stopped. May need Colonoscopy at a later date.   No acute blood loss, Hg was stable in 8-9 range. W/u noted need to continue tx with oral Vit B12 and FeSO4. Will need f/u with Neurology which is planned. Also has f/u with Cardiology Dr. Casey Speak planned    Past Medical History:  Past Medical History:   Diagnosis Date    Asthma     Chronic kidney disease     Dermatophytosis of groin and perianal area 2010    Edema 2008    Encounter for long-term (current) use of other medications 2010    Gout, unspecified 2010    Gouty arthropathy, unspecified 2010    Hypertension     Hypertrophy of prostate without urinary obstruction and other lower urinary tract symptoms (LUTS) 2008    Impotence of organic origin 2008    Memory loss 2009    Obesity, unspecified 2010    Orthostatic hypotension 2008    Other and unspecified hyperlipidemia 2008    Other atopic dermatitis and related conditions 2012    Palpitations 2010    Peripheral angiopathy in diseases classified elsewhere [facility]) 2010    Personal history of malignant neoplasm of rectum, rectosigmoid junction, and anus 2011    Tinea nigra 2011    Unspecified pruritic disorder 2011    Vitamin B12 deficiency 7/23/2017       Past Surgical History:  Past Surgical History:   Procedure Laterality Date    ENDOSCOPY, COLON, DIAGNOSTIC  06/2011 05/2007, 01/2004,  12/2000    HX HERNIA REPAIR  1992    INCISIONAL    HX TOTAL COLECTOMY  1991    COLON CANCER S/P RESECTION       Medication:  Prior to Admission medications    Medication Sig Start Date End Date Taking? Authorizing Provider   chlorthalidone (HYGROTON) 25 mg tablet TAKE 1 TABLET DAILY 11/29/20   Bessler, Iris Ganser, MD   dilTIAZem ER (CARDIZEM CD) 120 mg capsule Take 1 Cap by mouth daily. 11/27/20   Bienvenido Dempesy MD   aspirin 81 mg chewable tablet Take 1 Tab by mouth daily.  11/25/20   Juan Sood MD   carbidopa-levodopa (SINEMET)  mg per tablet TAKE 1 TABLET FOUR TIMES A DAY FOR PARKINSONS DISEASE 10/5/20   Bienvenido Dempsey MD   cyanocobalamin (VITAMIN B12) 500 mcg tablet TAKE 2 TABLETS DAILY 20   Lauren Castañeda MD   ferrous sulfate (IRON) 325 mg (65 mg iron) tablet Take 28 mg by mouth. Indications: 1 TAB ON MONDAY,WEDNESDAY, AND FRIDAY    Provider, Historical   finasteride (PROSCAR) 5 mg tablet Take 5 mg by mouth daily. take at bedtime 18   Ricki Field MD   tamsulosin [facility]) 0.4 mg capsule Take 1 Cap by mouth daily. 18   Macy Dotson NP   ammonium lactate (LAC-HYDRIN) 12 % lotion APPLY THIN LAYER TWICE A DAY FOR 60 DAYS FOR DRY SCALY SKIN BOTH FEET 5/10/17   Provider, Historical       Allergies:   Allergies   Allergen Reactions    Cat Hair Std Allergenic Ext Unknown (comments)    Codeine Unknown (comments)    Ragweed Unknown (comments)     Mixed ragweed/pollen extract,tree extract       Social History:  Social History     Tobacco Use    Smoking status: Former Smoker     Packs/day: 1.00     Years: 29.00     Pack years: 29.00     Types: Cigarettes     Start date: 1949     Quit date: 1978     Years since quittin.4    Smokeless tobacco: Never Used   Substance Use Topics    Alcohol use: No     Frequency: Never     Binge frequency: Never    Drug use: No       Family History:  Family History   Problem Relation Age of Onset    Other Mother         PULMONARY EDEMA    Other Father         PAD    Heart Attack Father     Coronary Artery Disease Father        ROS:  Total of 12 systems reviewed as follows:  POSITIVE= bolded text  Negative = text not bolded       General:  fever, chills, sweats, generalized weakness, weight loss/gain, loss of appetite   Eyes:    blurred vision, eye pain, loss of vision, double vision  ENT:    rhinorrhea, pharyngitis   Respiratory:  cough, sputum production, SOB, SARABIA, wheezing, pleuritic pain   Cardiology:   chest pain, palpitations, orthopnea, PND, edema, syncope   Gastrointestinal:  abdominal pain , N/V, diarrhea, dysphagia, constipation, bleeding   Genitourinary:  frequency, urgency, dysuria, hematuria, incontinence, prostatism   Muskuloskeletal: arthralgia, myalgia, back pain  Hematology:   easy bruising, nose or gum bleeding, lymphadenopathy   Dermatological: rash, ulceration, pruritis, color change / jaundice  Endocrine:   hot flashes or polydipsia   Neurological:  headache, dizziness, confusion, focal weakness, paresthesia, speech difficulties, memory loss, gait difficulty  Psychological: feelings of anxiety, depression, agitation      PHYSICAL EXAM:  Patient Vitals for the past 24 hrs:   Temp Pulse Resp BP SpO2   05/18/21 1133 98.7 °F (37.1 °C) 77 20 (!) 96/49 90 %   05/18/21 0809 98.6 °F (37 °C) 69 20 (!) 164/85 100 %   05/18/21 0729 98.6 °F (37 °C) 67 18 (!) 162/75 99 %   05/17/21 2000 98.7 °F (37.1 °C) 76 16 (!) 149/86 95 %   05/17/21 1621 98.4 °F (36.9 °C) 74 18 (!) 174/75 95 %       General:    Alert, cooperative, no distress, appears stated age. HEENT: Atraumatic, anicteric sclerae, pink conjunctivae     No oral ulcers, mucosa moist, throat clear, dentition fair  Neck:  Supple, symmetrical;   thyroid non tender  Lungs:   Clear to auscultation bilaterally. No wheezing or rhonchi. No rales. Chest wall:  No tenderness. No accessory muscle use. Heart:   Regular rhythm. No  murmur. Tr edema  Abdomen:   Soft, non-tender. Not distended. Bowel sounds normal  Extremities: No cyanosis. No clubbing      Capillary refill normal,  Radial pulse 2+,  DP 1+  Skin:     Not pale. Not jaundiced. No rashes   Psych:  Not depressed. Not anxious or agitated. Neurologic: EOMs intact. No facial asymmetry. No aphasia or slurred speech. Slow to respond. Symmetrical strength, Sensation grossly intact. Alert and oriented X 4.       Recent Labs     05/15/21  0800 05/14/21  0531   WBC 6.2 5.2   HGB 9.6* 8.8*   HCT 29.6* 27.3*    235           Recent Labs     05/15/21  0800 05/14/21  0531    140   K 3.7 3.8    107   CO2 26 28   GLU 94 94   BUN 17 15   CREA 1.27 1.09   CA 8.9 8.4*   MG 1.9 1.8   PHOS 2.7 3.2   ALB 2.9* 2.4*   TBILI 1.1* 1.1*   ALT 10* 10*      Results for Tara Grewal (MRN [id number]) as of 5/13/2021 14:43    4/13/2021 13:53   TSH 1.37      Results for Tara Grewal (MRN [id number]) as of 5/13/2021 14:43    7/20/2017 00:00 10/30/2017 09:38 10/13/2020 11:59   Vitamin B12 56 (L) 555 778      Results for Tara Grewal (MRN [id number]) as of 5/14/2021 12:21    5/14/2021 05:31   Iron 53 (L)   TIBC 288   Iron % saturation 18 (L)   Ferritin 50      Results for Tara Grewal (MRN [id number]) as of 5/15/2021 10:16    5/14/2021 05:31   Vitamin B12 583   Folate 4.1 (L)         RADIOLOGY:  CT HEAD 5/11:  The ventricles and sulci are normal in size, shape and configuration. . There is  no significant white matter disease. There is no intracranial hemorrhage,  extra-axial collection, or mass effect. The basilar cisterns are open. No CT  evidence of acute infarct.   The bone windows demonstrate no abnormalities. The visualized portions of the  paranasal sinuses and mastoid air cells are clear.   IMPRESSION: No acute process or change compared to the prior exam.     CT C-SPINE 5/11:  The alignment is within normal limits. There is no fracture or compression  deformity. The odontoid process is intact. The craniocervical junction is within normal limits. IMPRESSION:  Spondylitic changes without acute abnormality.     L HIP 5/11:  AP and frog-leg lateral views of the left hip were obtained. There is evidence  of degenerative change. Some osteophyte formation is associated with the lateral  aspect of the hip joint. The contour of the femoral head is normal.   Of incidental note is the presence of a large amount of gas and fecal material  within the colon (particularly the rectum).  Postsurgical change is noted in the lower pelvic region.   IMPRESSION:  Evidence of degenerative change involving the left hip.     EKG 5/12:  Normal sinus rhythm 84 bpm  Minimal voltage criteria for LVH, may be normal variant   Septal infarct , age undetermined   Abnormal ECG             Care Plan discussed with:   Patient x    Family     RN x         Consultant      Expected  Disposition:   Home with Family x   HH/PT/OT/RN    SNF/LTC    LACHELLE      TOTAL TIME:  60 Minutes      Comments    x Reviewed previous records   >50% of visit spent in counseling and coordination of care x Discussion with patient and/or family and questions answered       _______________________________________________________  Given the patient's current clinical presentation, I have a high level of concern for decompensation if discharged from the emergency department. Complex decision making was performed, which includes reviewing the patient's available past medical records, laboratory results, and x-ray films. My assessment of this patient's clinical condition and my plan of care is as follows.     ASSESSMENT / PLAN    Principal Problem:  Encounter for rehabilitation (5/11/2018)  Active Problems:    Fall from ground level (5/13/2021)    Weakness generalized (5/13/2021)  Admitted acute care 5/12 - 5/17 for anemia, GIB, weakness, Parkinson's, and h/o fall  Hg stable during admission, no indication of need for acute transfusion  Hg has improved to 9.6 5/15 off IVF  PT/OT noted instability (falling backwards) with documentation for orthostatic drop in BP  Keep hydrated  Flomax could be involved as well -continue for now  Check orthostatics  Low dose Metoprolol  Patient transferred to TCU for ongoing Rehab with PT/OT  Grandual improvement is being documented       History of malignant neoplasm of large intestine (1/15/2015)  DIstant history  Will consider for f/u colonoscopy need  CEA / CRP - not consistent with cancer  CEA 1.7 - normal  CRP 1.68 - minimal elevation       Vitamin B12 deficiency (7/23/2017)    Anemia due to acute blood loss (5/13/2021)  Maintain oral B12 1,000mcg daily  F/u Level 583 - has been OK with supplementation orally  Rx FeSO4 325mg daily  Fe, TIBC, Ferritin - c/w mild Fe deficiency        MCI (mild cognitive impairment) with memory loss (1/13/2016)    Parkinson disease ([facility] [address] (6/4/2018)    Orthostatic hypotension  Cont Sinemet 25/250 tid  PT /OT evaluation - inpatient therapy recommended, transfer to TCU  DCP getting approval for therapy stay - awaited [employer] response  Orthostatic hypotension a/w blood loss, Parkinson's, and Flomax tx, a/w/a prior history  Consider resuming ASA 81mg daily when Hg stable  Need to use walker routinely        Atrial fibrillation ([facility] [address] (11/23/2020)    Chronic anticoagulation (5/13/2021)  D/c Eliquis for now  GI Bleed and h/o Falls seems to contra-indicate using for now  Holding tx Cardizem CD 120mg with HCTZ 25mg w/o significant BP elevation or edema  Add Metoprolol 12.5mg bid 5/16, check orthosatics       Essential hypertension (10/30/2017)  Off PTA tx Cardizem  mg / HCTZ 25mg - w/o much resting BP elevation  Check orthostatics  Begin Metoprolol 12.5mg bid       BPH associated with nocturia (1/13/2016)  Cont home tx Proscar / Flomax  Consider holding Flomax if orthostatic drop with symptoms       Neuropathy  Pt c/o burning pain in plantar aspect of both feet  This is not on Problem List however  Has taken Ultram in the past  Will try low dose Neurontin 100mg bid      SAFETY:   Code Status:DNR  DVT prophylaxis:ANDRE  Stress Ulcer prophylaxis: Pepcid  Bladder catheter:no  Family Contact Info:  Primary Emergency Contact: [address], Home Phone: [phone]  Bedded: [facility] Room 202/01  Disposition: TBD, likely home when stable  Admission status:  TCU Rehab    -Tentative plan of care discussed with patient / family, who demonstrated understanding and is in agreement to the above    Facundo Christopher MD  [facility] Hospitalist  [phone]

## 2021-05-18 NOTE — PROGRESS NOTES
Lys[facility] Carrel PHYSICAL THERAPY EVALUATION  Patient: Troy Ness (47 y.o. male)  Date: 5/14/2021  Primary Diagnosis: 'light-for-dates' infant with signs of fetal malnutrition [P05.00]  Procedure(s) (LRB):  ESOPHAGOGASTRODUODENOSCOPY (EGD) with Biopsy    (N/A) 1 Day Post-Op   Precautions: FALL RISK, ORTHOSTATIC HYPOTENSION HX  Fall(HX OF ORTHOSTATIC HYPOTENSION)     ASSESSMENT  Based on the objective data described below, the patient presents SEATED UPRIGHT IN RECLINER. .     Current Level of Function Impacting Discharge (mobility/balance): Patient is sitting in recliner with exessive drooling - able to dab at mouth. He is able to stand but does so slowly and rocks forward and back to get momentum. Once he was assisted to front of chair, pt stood with min A. Pt needs skilled PT to improve gait, standing balance, safe transfers and ease of transfers, bed mobility, and cues to improve motor planning and endurance . Hypotension was a problem but seems to be better.     Functional Outcome Measure: The patient scored 4/20 on the . [facility] 97 outcome measure which is indicative of PATIENT REQUIRES MOD ASSIST FOR SAFETY DURING ADLS, MOBILITY, AND GAIT.       Other factors to consider for discharge: PATIENT REQUIRES 24/7 CARE AND ASSISTANCE. PATIENT WAS LIVING AT HOME WITH HIS SPOUSE. Patient will benefit from skilled therapy intervention to address the above noted impairments.        PLAN :  Recommendations and Planned Interventions: bed mobility training, transfer training, gait training, therapeutic exercises, patient and family training/education, and therapeutic activities       Frequency/Duration: Patient will be followed by physical therapy:  4 TO 6 DAYS/WK, 1 TO 2 TIMES/DAY to address goals.     Recommendation for discharge: (in order for the patient to meet his/her long term goals)  Therapy up to 5 days/week in SNF setting TO 6 DAYS PER WEEK, 1 TO 2 TIMES PER DAY.     This discharge recommendation:  Has been made in collaboration with the attending provider and/or case management     IF patient discharges home will need the following DME: to be determined (TBD)            SUBJECTIVE:   Patient stated THAT HE HAS NO C/O PAIN.     OBJECTIVE DATA SUMMARY:   HISTORY:         Past Medical History:   Diagnosis Date    Asthma      Chronic kidney disease      Dermatophytosis of groin and perianal area 2010    Edema 2008    Encounter for long-term (current) use of other medications 2010    Gout, unspecified 2010    Gouty arthropathy, unspecified 2010    Hypertension      Hypertrophy of prostate without urinary obstruction and other lower urinary tract symptoms (LUTS) 2008    Impotence of organic origin 2008    Memory loss 2009    Obesity, unspecified 2010    Orthostatic hypotension 2008    Other and unspecified hyperlipidemia 2008    Other atopic dermatitis and related conditions 2012    Palpitations 2010    Peripheral angiopathy in diseases classified elsewhere [facility]) 2010    Personal history of malignant neoplasm of rectum, rectosigmoid junction, and anus 2011    Tinea nigra 2011    Unspecified pruritic disorder 2011    Vitamin B12 deficiency 7/23/2017            Past Surgical History:   Procedure Laterality Date    ENDOSCOPY, COLON, DIAGNOSTIC   06/2011 05/2007, 01/2004,  12/2000    HX HERNIA REPAIR   1992     INCISIONAL    HX TOTAL COLECTOMY   1991     COLON CANCER S/P RESECTION         Personal factors and/or comorbidities impacting function: FALLS     Home Situation  Home Environment: Private residence  [place] to Enter: Yes  Hand Rails : Bilateral  One/Two Story Residence: Two story  Living Alone: No  Support Systems: Child(alberto), Family member(s), Friends \ neighbors  Patient Expects to be Discharged to[de-identified] Private residence  Current DME Used/Available at Home: Walker, rolling  Tub or Shower Type: Tub/Shower combination     EXAMINATION/PRESENTATION/DECISION MAKING: Critical Behavior:  Neurologic State: Drowsy  Orientation Level: Disoriented X4(SLOW PROCESSING)  Cognition: Decreased attention/concentration, Follows commands  Safety/Judgement: Decreased awareness of environment  Hearing: Auditory  Auditory Impairment: None  Skin:  BRUISING/OTHER DISCOLORATIONS. Edema: LES  Range Of Motion:  AROM: Within functional limits  Strength:    Strength: Generally decreased, functional  Tone & Sensation:   Tone: Normal      Coordination:  Coordination: Within functional limits(TREMOR)  Vision:   Functional Mobility:  Bed Mobility:  Transfers:  Sit to Stand: Moderate assistance  Stand to Sit: Minimum assistance  Bed to Chair: Moderate assistance        Balance:   Sitting: Intact  Standing: Impaired; Without support  Standing - Static: Fair  Standing - Dynamic : Poor  Ambulation/Gait Training:  Distance (ft): 120 Feet x 2  Assistive Device: Gait belt;Walker, rolling  Ambulation - Level of Assistance: [facility]   Gait Description (WDL): Exceptions to WDL  Gait Abnormalities: Decreased step clearance, drags feet, slippers \"fall off\" of heel  Right Side Weight Bearing: Full  Left Side Weight Bearing: Full  Speed/Kim: Slow  Step Length: Left shortened;Right shortened                      Stairs:                 Therapeutic Exercises:      Functional Measure:  Elder Mobility Scale    4/20         Scores under 10  generally these patients are dependent in mobility maneuvers; require help with  basic ADL, such as transfers, toileting and dressing. Scores between 10  13  generally these patients are borderline in terms of safe mobility and  independence in ADL i.e. they require some help with some mobility maneuvers. Scores over 14  Generally these patients are able to perform mobility maneuvers alone and safely  and are independent in basic ADL.                   Physical Therapy Evaluation Charge Determination   History Examination Presentation Decision-Making   LOW Complexity : Zero comorbidities / personal factors that will impact the outcome / POC LOW Complexity : 1-2 Standardized tests and measures addressing body structure, function, activity limitation and / or participation in recreation  LOW Complexity : Stable, uncomplicated  LOW Complexity : FOTO score of       Based on the above components, the patient evaluation is determined to be of the following complexity level: LOW      Pain Ratin/10: ONLY SORENESS WITH PALPATION OF BRUISED AREAS AS STATED ABOVE.     Activity Tolerance:   sats 100% /47 after long walk, pulse 79bpm  Good  Once pt gets up and going he does fairly well. Pt has problems with transitions/transfers and plops into chair. Needs cueing.   After treatment patient left in no apparent distress:   Sitting in chair, Call bell within reach, and Bed / chair alarm activated     COMMUNICATION/EDUCATION:   The patients plan of care was discussed with: Occupational therapist, Registered nurse, CM     Fall prevention education was provided and the patient/caregiver indicated understanding., Patient/family have participated as able in goal setting and plan of care. , and Patient/family agree to work toward stated goals and plan of care.     Thank you for this referral.  Paulina Barone   Time Calculation: 30 mins               Revision History

## 2021-05-18 NOTE — PROGRESS NOTES
Problem: Motor Speech Impaired (Adult)  Goal: *Acute Goals and Plan of Care (Insert Text)  Description: 1. The pt will complete facial, lingual, and labial exercises with modA in attempt to increase ROM for increased function of speech and saliva management. Outcome: Not Met     Problem: Patient Education: Go to Patient Education Activity  Goal: Patient/Family Education  Description: 1. The pt will complete processing tasks with modA to increase pt ability to complete ADL with increased accuracy. Outcome: Not Met     SPEECH LANGUAGE PATHOLOGY EVALUATION  Patient: Chely Bryant (50 y.o. male)  Date: 5/18/2021  Primary Diagnosis: Encounter for rehabilitation [Z51.89]        Precautions: dysarthria, slow processing       ASSESSMENT :  Based on the objective data described below, the patient presents with reduced processing and dysarthria. Clinician completed an oral motor exam. Though pt labial strength is good, range of motion (ROM) is severely reduced, and coordination is slow. Pt lingual movement is also severely reduced and presents with mild tremors. Anterior strength is good, however moderate bilateral weakness. Pt denies any dysphagia. Clinician requested pt utilized increased lip movement for increased articulation due to clinician requesting pt repeat statement at least 4x for increased listener comprehension. Pt tends to sit with lips slightly parted at rest which is likely contributing to loss of saliva. Pt at times sits with lips closed, however slow loss is seen when sitting upright due to lack of sensation to trigger swallow. Clinician educated pt when he goes to grab a kleenex due to mild anterior saliva loss, he actually needs to swallow. Pt completed the [address] Status Exam (SLUMS). Total score = 5/30. WFL = 27+/30. Pt displayed the greatest difficulty with recall (including immediate), orientation, and clock drawing. Pt strongest area was following directions.  See below or pt soft chart for specifics. Patient will benefit from skilled intervention to address the above impairments. Patients rehabilitation potential is considered to be Fair     PLAN :  Recommendations and Planned Interventions: Work with speech pathology on processing and oral, lingual, and facial movement in attempt to increase ROM and increased accuracy with ADL. Frequency/Duration: Patient will be followed by speech-language pathology 5 times a week to address goals. Discharge Recommendations: To Be Determined     SUBJECTIVE:   Patient stated my mouth is dry and then the saliva comes.     OBJECTIVE:     Past Medical History:   Diagnosis Date    Asthma     Chronic kidney disease     Dermatophytosis of groin and perianal area 2010    Edema 2008    Encounter for long-term (current) use of other medications 2010    Gout, unspecified 2010    Gouty arthropathy, unspecified 2010    Hypertension     Hypertrophy of prostate without urinary obstruction and other lower urinary tract symptoms (LUTS) 2008    Impotence of organic origin 2008    Memory loss 2009    Obesity, unspecified 2010    Orthostatic hypotension 2008    Other and unspecified hyperlipidemia 2008    Other atopic dermatitis and related conditions 2012    Palpitations 2010    Peripheral angiopathy in diseases classified elsewhere [facility]) 2010    Personal history of malignant neoplasm of rectum, rectosigmoid junction, and anus 2011    Tinea nigra 2011    Unspecified pruritic disorder 2011    Vitamin B12 deficiency 7/23/2017     Past Surgical History:   Procedure Laterality Date    ENDOSCOPY, COLON, DIAGNOSTIC  06/2011 05/2007, 01/2004,  12/2000    HX HERNIA REPAIR  1992    INCISIONAL    HX TOTAL COLECTOMY  1991    COLON CANCER S/P RESECTION     Prior Level of Function/Home Situation: Wife cooks, pt reports she also gets medication out of bottles for pt to take, they both manage finances  Home Situation  Home Environment: Private residence  # Steps to Enter: 4  Rails to Enter: Yes  Hand Rails : Bilateral  Wheelchair Ramp: No  One/Two Story Residence: Two story  Lift Chair Available: No  Living Alone: No  Support Systems: Spouse/Significant Other/Partner, Family member(s)  Patient Expects to be Discharged to[de-identified] Private residence  Current DME Used/Available at Home: Commode, bedside, Shower chair, Walker, rolling(Reacher)  Tub or Shower Type: Tub/Shower combination    Mental Status:  Neurologic State: Alert, Lethargic  Orientation Level: Oriented to person  Cognition: Impaired decision making, Decreased attention/concentration, Memory loss  Perception: Appears intact  Perseveration: Perseverates during ADLS  Safety/Judgement: Decreased insight into deficits    Motor Speech:   Dysarthria- slurred speech, reduced ROM    Language Comprehension and Expression:     Verbal Expression  Primary Mode of Expression: Verbal  Initiation: (slow)  Conversation: Dysarthric        Neuro-Linguistics:      SLUMS:  Total score= 5/30. WNL= 27+/30. Orientation: 1/3  Immediate recall (not part of scoring): 1/5  Calculation: 1/3  Word generation: 1/3  Delayed recall: 0/5  Mental manipulation: 0/2  Clock drawin/4  Follow direction & analyze: 2/2  Immediate recall of cohesive information: 0/8            Pragmatics:   cooperative     Voice:      Mildly quiet            Pain:   None reported          After treatment:   Patient left in no apparent distress in bed, Call bell within reach, and Nursing notified    COMMUNICATION/EDUCATION:   Patient was educated regarding his deficit(s) of reduced processing and motor movement as this relates to his diagnosis of Parkinson's. He demonstrated Fair understanding as evidenced by head nod, but poor recall on evaluation. The patient's plan of care including recommendations, planned interventions, and recommended diet changes were discussed with: Registered nurse. Patient/family agree to work toward stated goals and plan of care.     Thank you for this referral.  Mary Soliman, SLP  Time Calculation: 65 mins

## 2021-05-18 NOTE — SWING BED INTERDISCIPLINARY CARE PLAN NURSE NOTE
Nursing: 
 
Week # 1*: Date 5/18/2021 Medical status (changes from previous week):Progressing Treatment changes this shift: no change Cognition: Present status: oriented Is cueing needed?: Yes Frequency of cueing needs: occasional  
        Type of cueing used: verbal and gestures ADL (general): Independent Activity type: Social leisure skills Participation: Daily Level of participation: No change Pain status: No c/o pain Patient/Family Education needs: safety Upon review of the patients current care plan, the following issues, problems, or needs remain: safety [facility]

## 2021-05-18 NOTE — PROGRESS NOTES
Follow up visit with patient in Rm 202  Provided empathic listening and spiritual support  Advised of  Availability   [address] Paging [phone]

## 2021-05-18 NOTE — PROGRESS NOTES
Problem: Self Care Deficits Care Plan (Adult)  Goal: *Acute Goals and Plan of Care (Insert Text)  Description:   FUNCTIONAL STATUS PRIOR TO ADMISSION: Patient ambulated with a RW and required minimal to moderate assistance for basic and instrumental ADLs. HOME SUPPORT: The patient lived with his spouse who provided his assistance. Occupational Therapy Goals  Initiated 5/18/2021  1. Patient will perform upper body dressing with modified independence within 14 days. 2.  Patient will perform lower body dressing with supervision/set-up within 14 days. 3.  Patient will perform all aspects of toileting with supervision/set-up within 14 days. 4.  Patient will participate in upper extremity therapeutic exercise/activities with supervision/set-up for 15 to 20 minutes within 14 days. OCCUPATIONAL THERAPY EVALUATION  Patient: Rima Hairston (45 y.o. male)  Date: 5/18/2021  Primary Diagnosis: Encounter for rehabilitation [Z51.89]        Precautions: Fall RIsk       ASSESSMENT  Based on the objective data described below, the patient presents with reduced activity tolerance, need for assistance with dressing/bathing and need for ST assessment for oral function. Current Level of Function Impacting Discharge (ADLs/self-care):  Need for assistance with IADLs    Functional Outcome Measure: The patient scored 45 on the Modified Barthel Index outcome measure which is indicative of Maximal Assistance Level of Care. Other factors to consider for discharge:  Fall Risk     Patient will benefit from skilled therapy intervention to address the above noted impairments. PLAN :  Recommendations and Planned Interventions: self care training, functional mobility training, therapeutic exercise, balance training, therapeutic activities, endurance activities, and patient education    Frequency/Duration: Patient will be followed by occupational therapy 3 to 5 x per week x 2 weeks to address goals.     Recommendation for discharge: (in order for the patient to meet his/her long term goals)  Therapy up to 5 days/week in SNF setting or an intensive home health therapy program    This discharge recommendation:  A follow-up discussion with the attending provider and/or case management is planned    IF patient discharges home will need the following DME: hospital bed       SUBJECTIVE:   Patient stated Lam Mote are my clothes.     OBJECTIVE DATA SUMMARY:   HISTORY:   Past Medical History:   Diagnosis Date    Asthma     Chronic kidney disease     Dermatophytosis of groin and perianal area 2010    Edema 2008    Encounter for long-term (current) use of other medications 2010    Gout, unspecified 2010    Gouty arthropathy, unspecified 2010    Hypertension     Hypertrophy of prostate without urinary obstruction and other lower urinary tract symptoms (LUTS) 2008    Impotence of organic origin 2008    Memory loss 2009    Obesity, unspecified 2010    Orthostatic hypotension 2008    Other and unspecified hyperlipidemia 2008    Other atopic dermatitis and related conditions 2012    Palpitations 2010    Peripheral angiopathy in diseases classified elsewhere [facility]) 2010    Personal history of malignant neoplasm of rectum, rectosigmoid junction, and anus 2011    Tinea nigra 2011    Unspecified pruritic disorder 2011    Vitamin B12 deficiency 7/23/2017     Past Surgical History:   Procedure Laterality Date    ENDOSCOPY, COLON, DIAGNOSTIC  06/2011 05/2007, 01/2004,  12/2000    HX HERNIA REPAIR  1992    INCISIONAL    HX TOTAL COLECTOMY  1991    COLON CANCER S/P RESECTION       Expanded or extensive additional review of patient history:     Home Situation  Home Environment: Private residence  # Steps to Enter: 4  Rails to Enter: Yes  Hand Rails : Bilateral  Wheelchair Ramp: No  One/Two Story Residence: Two story  Lift Chair Available: No  Living Alone: No  Support Systems: Spouse/Significant Other/Partner, Family member(s)  Patient Expects to be Discharged to[de-identified] Private residence  Current DME Used/Available at Home: Commode, bedside, Shower chair, Walker, rolling(Reacher)  Tub or Shower Type: Tub/Shower combination    Hand dominance: Right    EXAMINATION OF PERFORMANCE DEFICITS:  Cognitive/Behavioral Status:  Neurologic State: Alert  Orientation Level: Oriented X4;Oriented to person;Oriented to place;Oriented to situation  Cognition: Follows commands  Perception: Appears intact  Perseveration: No perseveration noted  Safety/Judgement: Decreased insight into deficits    Skin: See Nursing reports    Edema: See Nursing reports    Hearing: Auditory  Auditory Impairment: None    Vision/Perceptual:     Appears to be normal         Range of Motion:  AROM: Within functional limits        Strength:  Strength: Within functional limits        Coordination:  Coordination: Within functional limits  Fine Motor Skills-Upper: Left Intact; Right Intact    Gross Motor Skills-Upper: Left Intact; Right Intact    Tone & Sensation:  Tone: Normal      Sensation is intact        Balance:  Sitting: Intact  Standing: With support  Standing - Static: Fair  Standing - Dynamic : Constant support    Functional Mobility and Transfers for ADLs:  Bed Mobility:  Rolling: Independent  Supine to Sit: Independent  Sit to Supine: Independent  Scooting: Independent    Transfers:  Sit to Stand: Minimum assistance  Stand to Sit: Minimum assistance  Bed to Chair: Minimum assistance    ADL Assessment:  Feeding: Setup    Oral Facial Hygiene/Grooming: Setup       ADL Intervention and task modifications:  Pt was set up with his short pants and pullover shirts at his request.   Pt made the transfer from supine to sit with the [facility] all the way in elevation with SBA. Pt was set up with his clothes and proceeded to get dressed. Pt needed mod A to get his feet into the pants. Pt then stood and pulled up the pants. Pt was given the shirt and donned it while standing.    Pt received min A to get the pullover shirt over his shlds and down in back. Pt ambulated to the recliner and sat with min A for reaching back to the arm rests before sitting. Pt was set up with all needs, alarm on, to await the arrival of PT. Feeding  Feeding Assistance: Set-up    Grooming  Grooming Assistance: Set-up       Upper Body Dressing Assistance  Dressing Assistance: Set-up  Pullover Shirt: Set-up; Minimum assistance    Lower Body Dressing Assistance  Dressing Assistance: Set-up  Pants With Elastic Waist: Moderate assistance(to get over toes and up in back)  Socks: Set-up       Cognitive Retraining  Safety/Judgement: Decreased insight into deficits    Occupational Therapy Evaluation Charge Determination   History Examination Decision-Making   LOW Complexity : Brief history review  LOW Complexity : 1-3 performance deficits relating to physical, cognitive , or psychosocial skils that result in activity limitations and / or participation restrictions  LOW Complexity : No comorbidities that affect functional and no verbal or physical assistance needed to complete eval tasks       Based on the above components, the patient evaluation is determined to be of the following complexity level: LOW   Pain Ratin/10 in L hip     Activity Tolerance:   Good    After treatment patient left in no apparent distress:    Sitting in chair    COMMUNICATION/EDUCATION:   The patients plan of care was discussed with: Physical therapist, Speech therapist, and Case management. Patient/family have participated as able in goal setting and plan of care. and Patient/family agree to work toward stated goals and plan of care. This patients plan of care is appropriate for delegation to [facility].     Thank you for this referral.  Vinicio Marquez OT  Time Calculation: 28 mins

## 2021-05-19 NOTE — PROGRESS NOTES
IDR Team; MD, Nursing, Care Manager, Physical therapy, and Dietician, met to review patient's plan of care. Discussed goals, interventions, barriers and progress. Patient being seen by PT/OT/Speech therapy. According to PT, patient ambulates good, but slow. According to Speech, patient Mental Status Exam scoring was 5/30. Strongest area was following directions. Team will continue to monitor progress and report any concerns to the physician and care management as indicated. Transition of Care Plan:  Home with home health and maybe Personal care if Medicaid is approved.

## 2021-05-19 NOTE — PROGRESS NOTES
Problem: Pressure Injury - Risk of  Goal: *Prevention of pressure injury  Description: Document Jonah Scale and appropriate interventions in the flowsheet. Outcome: Progressing Towards Goal  Note: Pressure Injury Interventions:  Sensory Interventions: Keep linens dry and wrinkle-free    Moisture Interventions: Absorbent underpads    Activity Interventions: Increase time out of bed    Mobility Interventions: HOB 30 degrees or less    Nutrition Interventions: Document food/fluid/supplement intake    Friction and Shear Interventions: HOB 30 degrees or less                Problem: Falls - Risk of  Goal: *Absence of Falls  Description: Document Carlos Fall Risk and appropriate interventions in the flowsheet.   Outcome: Progressing Towards Goal  Note: Fall Risk Interventions:  Mobility Interventions: Bed/chair exit alarm    Mentation Interventions: Adequate sleep, hydration, pain control, Bed/chair exit alarm, Door open when patient unattended    Medication Interventions: Bed/chair exit alarm    Elimination Interventions: Bed/chair exit alarm, Call light in reach    History of Falls Interventions: Bed/chair exit alarm, Door open when patient unattended         Problem: Hypertension  Goal: *Blood pressure within specified parameters  Outcome: Progressing Towards Goal  Goal: *Labs within defined limits  Outcome: Progressing Towards Goal

## 2021-05-19 NOTE — PROGRESS NOTES
Bedside and Verbal shift change report given to Sonali Hayes RN (oncoming nurse) by Gee Ahn LPN (offgoing nurse). Report included the following information SBAR and Carlos Masterson 5.

## 2021-05-19 NOTE — PROGRESS NOTES
Follow up visit with patient in Rm 202  Provided empathic listening and spiritual support  Advised of  Availability   [address] [place] [address]

## 2021-05-19 NOTE — SWING BED INTERDISCIPLINARY CARE PLAN DIETARY NOTE
Dietary: 
 
Diet:  Regular        Admit wt.: 203# Current weight/date: 203# 5/17 Type/Amount of Supplement taken: none Significant wt. Loss: No 
Significant wt. Gain: No 
Intake: 50-75% Fair Upon review of the patients current care plan, the following issues, problems, or needs remain: pt has increased needs r/t parkinson's he does eat fairly well most of the time, would add supplements if wt drops or intake b/c <50% all of the time, BMI is normal 25.47 Patient Vitals for the past 168 hrs: 
 % Diet Eaten 05/19/21 0800 26 - 50% 05/18/21 1800 1 - 25% 05/17/21 1801 76 - 100% Kailee Morgan RD

## 2021-05-19 NOTE — SWING BED INTERDISCIPLINARY CARE PLAN NURSE NOTE
Nursing: 
 
Week #11: Date 5/19/2021 Medical status (changes from previous week):Progressing Treatment changes this shift: none Cognition: Present status: dementia Is cueing needed?: Yes Frequency of cueing needs: frequent Type of cueing used: verbal 
ADL (general): Moderate assistance Activity type: Reality awareness Participation: Daily Level of participation: Improving Pain status: No c/o pain Patient/Family Education needs: safety Upon review of the patients current care plan, the following issues, problems, or needs remain: strengthening/safety Naida Reyna LPN

## 2021-05-19 NOTE — PROGRESS NOTES
The documentation for this period is being entered following the guidelines as defined in the [address] downtime from 3574-3150 policy by Julia Villatoro RN.

## 2021-05-19 NOTE — ROUTINE PROCESS
Bedside and Verbal shift change report given to CONOR Akers RN (oncoming nurse) by Sonia Lemos LPN (offgoing nurse). Report included the following information SBAR and Kardex Bed alarm activated.

## 2021-05-19 NOTE — PROGRESS NOTES
Problem: Self Care Deficits Care Plan (Adult)  Goal: *Acute Goals and Plan of Care (Insert Text)  Description:   FUNCTIONAL STATUS PRIOR TO ADMISSION: Patient ambulated with a RW and required minimal to moderate assistance for basic and instrumental ADLs. HOME SUPPORT: The patient lived with his spouse who provided his assistance. Occupational Therapy Goals  Initiated 5/18/2021  1. Patient will perform upper body dressing with modified independence within 14 days. 2.  Patient will perform lower body dressing with supervision/set-up within 14 days. 3.  Patient will perform all aspects of toileting with supervision/set-up within 14 days. 4.  Patient will participate in upper extremity therapeutic exercise/activities with supervision/set-up for 15 to 20 minutes within 14 days. Outcome: Progressing Towards Goal     OCCUPATIONAL THERAPY TREATMENT  Patient: Jair Santiago (82 y.o. male)  Date: 5/19/2021  Diagnosis: Encounter for rehabilitation [Z51.89] Encounter for rehabilitation       Precautions:    Chart, occupational therapy assessment, plan of care, and goals were reviewed. ASSESSMENT  Patient continues with skilled OT services and is progressing towards goals. Current Level of Function Impacting Discharge (ADLs):  Need for assistance with IADLs     Other factors to consider for discharge:  Fall Risk         PLAN :  Patient continues to benefit from skilled intervention to address the above impairments. Continue treatment per established plan of care to address goals. Recommend with staff:  Set pt up with items needed to perform his own self-care tasks    Recommend next OT session:  Assess for self-care abilities.     Recommendation for discharge: (in order for the patient to meet his/her long term goals)  Therapy up to 5 days/week in SNF setting or an intensive home health therapy program    This discharge recommendation:  A follow-up discussion with the attending provider and/or case management is planned    IF patient discharges home will need the following DME: To be determined closer to discharge       SUBJECTIVE:   Patient stated I would like to walk.     OBJECTIVE DATA SUMMARY:   Cognitive/Behavioral Status:  Neurologic State: Alert (Slow to respond but responds appropriately)     Cognition: Follows commands    Functional Mobility and Transfers for ADLs:  Bed Mobility:  Supine to Sit: Supervision; Additional time  Sit to Supine: Supervision  Scooting: Independent    Transfers:  Sit to Stand: Supervision  Functional Transfers  Toilet Transfer : Supervision  Bed to Chair: Supervision    Balance:  Sitting: Intact  Standing: Impaired  Standing - Static: Fair  Standing - Dynamic : Good;Constant support    ADL Intervention: This AM at contact pt was on the toilet finishing a BM. Pt stood from the toilet with supervision to an RW and ambulated into the room where he remained standing with his CNA providing perianal cleaning. Pt asked to walk and started with community ambulation into the hallway and to the nursing station. At that point his PT took over and continued his ambulation therapy. Therapeutic Activity:    Pt was seen in the PM with PT for community ambulation. Pt was still in the recliner and was able to stand from the chair with SBA to an RW. Pt ambulated around the nursing station twice with no slowing down or rest breaks. Pt returned to the room and initially asked to sit in the chair. Once in the chair pt reported he was not comfortable and asked to move to the bed. Pt stood again w/o A and took short steps with hand hold A from PT to move to the bed. Pt made the transfer sit to supine without A and min A to lie straight in the bed. During the session prior to walking,  OT handed pt his mask and asked him to put in on. Pt released the RW with both hands and donned the mask. Pain:  0 at each session start.   Pt reported pain in his legs after the afternoon exercise. Pt could not give an exact location of the pain site. Activity Tolerance:   Good    After treatment patient left in no apparent distress: Working with PT    COMMUNICATION/COLLABORATION:   The patients plan of care was discussed with: Physical therapist, Registered nurse, and Certified nursing assistant/patient care technician.      Severo Land, OT  Time Calculation: 17 mins

## 2021-05-19 NOTE — PROGRESS NOTES
Physical Therapy  Day 2 SNF  Patient was seen today for PT today in AM and PM.  Patient does very well with his ambulation once he gets up and gets started walking. Getting out of chair is difficult for him but he is able to do it with momentum and strong use of UEs. Pt continues to drool excessively and has difficulty processing and remembering to swallow. S:  I want to get back into bed. It hurts here (L iliac crest)  O: Pt ambulated in AM and  ft with RW and cga to min A with no LOB. Pt aware that brief was falling down. Adjusted brief. Pt was able to get self into the bed and adjust self in bed with appropriate scooting but did need VC. A:  Patient doing well - progressing with distance and safe ambulating with supervision. Slow moving at first.  P: Continue 1-2x/day 4-6 x/week. Patient will be able to go home if wife can handle him. She will need help.

## 2021-05-19 NOTE — PROGRESS NOTES
Bedside shift change report given to Dino Couch (oncoming nurse) by Woodward Dakin  (offgoing nurse). Report included the following information SBAR, Kardex, Intake/Output and MAR.

## 2021-05-20 NOTE — PROGRESS NOTES
Follow up visit with patient in Rm 202  Provided empathic listening and spiritual support  Advised of  Availability   [address] [place] [address]

## 2021-05-20 NOTE — PROGRESS NOTES
Problem: Motor Speech Impaired (Adult)  Goal: *Acute Goals and Plan of Care (Insert Text)  Description: 1. The pt will complete facial, lingual, and labial exercises with modA in attempt to increase ROM for increased function of speech and saliva management. Outcome: Progressing Towards Goal     Problem: Patient Education: Go to Patient Education Activity  Goal: Patient/Family Education  Outcome: Progressing Towards Goal     Problem: Cognitive Deficits  Goal: *STG: Follow through to completion of simple tasks  Outcome: Not Met     SPEECH LANGUAGE PATHOLOGY TREATMENT  Patient: Chacha Clarke (21 y.o. male)  Date: 5/20/2021  Diagnosis: Encounter for rehabilitation [Z51.89] Encounter for rehabilitation         ASSESSMENT:  Pt continues to struggle with processing of information, and overall motor movement. Pt may benefit from adjustment to Parkinson's medication. Today clinician worked on oral motor exercises in an attempt to increase labial and lingual motor function for increased clarity of speech. Pt participated in therapy, but completing a single exercise movement was very laborous for the pt, and required manual manipulation from the clinician. Pt had informed clinician he recalled needing to close the oral cavity to reduce saliva production. When pt had saliva loss, however he grabbed a kleenex and struggled to swallow on command. The pt however was seen at the end of his meal and displayed no overt signs of aspiration with ice cream pt was finishing. PLAN:  Patient continues to benefit from skilled intervention to address the above impairments. Continue treatment per established plan of care. Discharge Recommendations:  Inpatient Rehab     SUBJECTIVE:   Patient stated i'm not doing great. I want to go home in time for Samra. Clinician educated the pt that it is May, so that is not a concern at this time.     OBJECTIVE:   Mental Status:  Neurologic State: Alert  Orientation Level: Disoriented to place, Disoriented to situation, Disoriented to time, Oriented to person  Cognition: Follows commands  Perception: Appears intact  Perseveration: Perseverates during ADLS  Safety/Judgement: Decreased insight into deficits    Treatment & Interventions: Motor Speech:      Slurred. Clinician provided intermittent verbal cuing for pt to utilize over-articulation for increased clarity of speech. Pt attempted to complete labial extension and retraction exercises. He required maxA for any more than minimal movement. Lingual lateralization was stronger today with pt being able to get the tongue further to the left, however again this was laborious for the pt. Right sided movement was easier for the pt, and with significant wait time pt was able to obtain a fairly large range of motion to the right. He required some tactile assistance to move further to the left, however it was still more than during evaluation. Language Comprehension and Expression:     Verbal Expression     Neuro-Linguistics:      Insufficient time to work on processing aside from following directions for oral motor exercises. Pt required mirror, model, and manual assistance for increased accuracy. Voice:   WFL     Response & Tolerance to Activities:   Reduced    Pain:   None reported          After treatment:   Patient left in no apparent distress in bed, Call bell within reach, and Bed / chair alarm activated    COMMUNICATION/EDUCATION:   Patient was educated regarding his deficit(s) of reduced oral motor and processing as this relates to his diagnosis of Parkinson's. He demonstrated Fair understanding as evidenced by verbal agreement but struggle to complete activitites. The patient's plan of care including recommendations, planned interventions, and recommended diet changes were discussed with: Physical therapist and Registered nurse.      Ashleigh Bejarano, SLP  Time Calculation: 20 mins

## 2021-05-20 NOTE — SWING BED INTERDISCIPLINARY CARE PLAN NURSE NOTE
Nursing: 
 
Week #1: Date 5/20/2021 Medical status (changes from previous week):Progressing Treatment changes this shift: none Cognition: Present status: dementia Is cueing needed?: Yes Frequency of cueing needs: frequent Type of cueing used: verbal 
ADL (general): Minimum assistance Activity type: Reality awareness Participation: Daily Level of participation: Improving Pain status: No c/o pain Patient/Family Education needs: safety Upon review of the patients current care plan, the following issues, problems, or needs remain: strengthening/safety Grabiel Dean LPN

## 2021-05-20 NOTE — PROGRESS NOTES
IDR Team; MD, Nursing, Care Manager, Physical therapy, Speech therapy and Occupational therapy, met to review patient's plan of care. Discussed goals, interventions, barriers and progress. RUR: 11%  LOW    Team will continue to monitor progress and report any concerns to the physician and care management as indicated. Transition of Care Plan:   Patient continues to work with PT/OT. Speech is working with PT. Patient continues to benefit from therapies. Plan is to discharge home on Monday if PT gives the go ahead, if not will request continued LOS with the patient's insurance company.

## 2021-05-20 NOTE — PROGRESS NOTES
Problem: Motor Speech Impaired (Adult)  Goal: *Acute Goals and Plan of Care (Insert Text)  Description: 1. The pt will complete facial, lingual, and labial exercises with modA in attempt to increase ROM for increased function of speech and saliva management. 5/20/2021 1544 by Eren Ruiz, SLP  Outcome: Progressing Towards Goal  5/20/2021 1543 by Eren Ruiz, SLP  Outcome: Progressing Towards Goal  5/20/2021 0911 by Eren Ruiz, SLP  Outcome: Progressing Towards Goal     Problem: Patient Education: Go to Patient Education Activity  Goal: Patient/Family Education  Description: 1. The pt will complete processing tasks with modA to increase pt ability to complete ADL with increased accuracy. Outcome: Progressing Towards Goal     Problem: Cognitive Deficits  Goal: *STG: Follow through to completion of simple tasks  5/20/2021 1544 by Eren Ruiz, SLP  Outcome: Progressing Towards Goal  5/20/2021 0911 by Eren Ruiz, SLP  Outcome: Not Met     SPEECH LANGUAGE PATHOLOGY TREATMENT  Patient: Julia Son (30 y.o. male)  Date: 5/20/2021  Diagnosis: Encounter for rehabilitation [Z51.89] Encounter for rehabilitation         ASSESSMENT:  The pt participated in speech therapy today. The pt was provided an attention/scanning task and asked to answer questions provided. The pt performed fairly well with 3/9 I'ly, 2/9 with SBA, 2/9 with Donta, and 2/9 with maxA. The pt attempted oral motor exercises to work on increase of ROM. Pt displays adequate upper facial motor movement with ability to wink, however pt displays severe deficit with labial and lingual ROM. Pt requires tactile assistance for increased movement, as he can barely produce labial extension or retraction, or lingual lateralization. PLAN:  Patient continues to benefit from skilled intervention to address the above impairments. Continue treatment per established plan of care.   Discharge Recommendations:  [facility] SUBJECTIVE:   Patient stated i'm doing okay. I drove down from [place] this morning. OBJECTIVE:   Mental Status:  Neurologic State: Alert  Orientation Level: Disoriented to situation, Disoriented to time, Oriented to person  Cognition: Follows commands  Perception: Appears intact  Perseveration: Perseverates during ADLS  Safety/Judgement: Decreased insight into deficits    Treatment & Interventions: Motor Speech:      Clinician worked with the pt on labial extension and retraction, and lingual lateralization. Each movement is extremely labored for the pt, with minimal movement presenting. The pt requires verbal, visual, and tactile cues for any real ROM. Movement is extremely slow. Speech Characteristics:  (dysarthric)           Language Comprehension and Expression:     Verbal Expression  Verbal Expression  Primary Mode of Expression: Verbal  Speech Characteristics:  (dysarthric)  Overall Impairment: Moderate-severe  Cueing type: Visual;Verbal;Tactile  Neuro-Linguistics:         Information for cognitive processing task of listener comprehension and scanning for information activity as noted above. Pt did fairly well in comparison to lower half of pt's facial motor movement. Voice:   quiet     Response & Tolerance to Activities:   Low tolerance. Pain:  Pain Scale 1: Visual  Pain Intensity 1: 0       After treatment:   Patient left in no apparent distress in bed, Call bell within reach, and Bed / chair alarm activated    COMMUNICATION/EDUCATION:   Patient was educated regarding his deficit(s) of dysarthria, reduced cognitive processing as this relates to his diagnosis of Parkinson's. He demonstrated Fair understanding as evidenced by verbal agreement and attempting tasks, however not recalling he has been at the hospital more than just today, and is not aware of Parkinson's diagnosis today even though he reported the diagnosis during evaluation.     The patient's plan of care including recommendations, planned interventions, and recommended diet changes were discussed with: Registered nurse.      Maryam Orlando, SLP  Time Calculation: 41 mins

## 2021-05-20 NOTE — PROGRESS NOTES
S: PATIENT HAS NO REPORT OF PAIN. O: PATIENT PRESENTS SEATED IN RECLINER REQUIRES CGA-MIN ASSIST TO PERFORM SIT/STAND WITH RW. GAIT  WITH RW REQUIRES SUP TO CGA X 1, ON LEVEL SURFACE, 120 FEET X1: PATIENT REQUIRES VC S FOR ERECTING POSTURE AND INCREASING OLIVIA. BED MOB SIT TO SUPINE REQUIRED CGA X 1. PATIENT ALSO REQUIRED VC S AND TC S FOR FALL PREVENTION DURING TRANSFERS. PATIENT WAS ABLE TO PERFORM THERE EXS LES, SEATED: TO HIPS, KNEES, ANKLES- EACH MOTION X 10 REPS X 2. PATIENT HAD NO SOB AND NO LOB. A: PATIENT SHOULD CONT TO BENEFIT FROM BEING SEEN BY P.T. FOR SAFETY WITH MOBILITY AND GAIT WITH RW. PATIENT IS PROGRESSING TOWARD GOALS. P: CONT SERVICES 5/21/21.

## 2021-05-20 NOTE — PROGRESS NOTES
Bedside shift change report given to Eliana Lopez (oncoming nurse) by Arias Pryor  (offgoing nurse). Report included the following information SBAR, Kardex, ED Summary, Intake/Output and MAR.

## 2021-05-20 NOTE — PROGRESS NOTES
Bedside and Verbal shift change report given to Michelle Gentile RN (oncoming nurse) by Vaishali Whitney LPN (offgoing nurse). Report included the following information SBAR and Kardex.

## 2021-05-20 NOTE — PROGRESS NOTES
Bedside and Verbal shift change report given to Cornell Mortensen RN (oncoming nurse) by Grabiel Dean LPN (offgoing nurse). Report included the following information SBAR and Kardex,Carlos 5.

## 2021-05-21 NOTE — SWING BED INTERDISCIPLINARY CARE PLAN PT NOTE
Physical Therapy: 
 
Progress: Progressing Wt. Bearing: No WB restrictions Subjective: PATIENT VERBALIZES INTEREST IN PERFORMING  HEP. Assistive Device: RW Bed Mobility: Supervision Supine to sit: Supervision Ambulation: # of feet 220  Supervision Balance: Sitting static:INTACT Sitting dynamic:GOOD Standing static: GOOD WITH RW. 
W/C Mobility: Not tested Sit to stand: Limited assistance Transfers: Limited assistance Surface: Even Safety status: FAIR 
ADL Support- record highest level in each ADL activity over last 7 days Bed mobility: SUP Transfer: CGA X 1 Eating: SET UP AND CLEAN UP Toilet use: MIN TO MOD Upon review of the patients current care plan, the following issues, problems, or needs remain: PATIENT CONTINUES TO BE AT [address] PATIENT REQUIRES 24/7 ASSISTANCE AND CARE. PATIENT, UPON DC TO HOME WITH SPOUSE, SHOULD BENEFIT FROM HHPT SERVICES. Amira Frey

## 2021-05-21 NOTE — PROGRESS NOTES
Problem: Falls - Risk of  Goal: *Absence of Falls  Description: Document Sweetie Guo Fall Risk and appropriate interventions in the flowsheet.   Outcome: Progressing Towards Goal  Note: Fall Risk Interventions:  Mobility Interventions: Bed/chair exit alarm    Mentation Interventions: Adequate sleep, hydration, pain control    Medication Interventions: Bed/chair exit alarm    Elimination Interventions: Bed/chair exit alarm    History of Falls Interventions: Bed/chair exit alarm

## 2021-05-21 NOTE — PROGRESS NOTES
Bedside shift change report given to [address] [place] [address] (oncoming nurse) by Shikha Machado (offgoing nurse). Report included the following information SBAR, Kardex and TAO Salazar RN

## 2021-05-21 NOTE — SWING BED INTERDISCIPLINARY CARE PLAN SLP NOTE
Interdisciplinary Care Plan Speech Language Pathology: 
 
Progress: Regressing- as of today. Pt displayed stronger skills yesterday, however labial and lingual movement is extremely limited on command. Swallow precautions: NO  Food consistency: Regular and thin (surprisingly) Communication/Cognition: Status: low level (5/30 on SLUMS). Cognition: Severely impaired Communication/Comprehension issues: YES:processing is poor, and pt is not always responding to questions. Yes/No reliability: 90%   Memory: Poor. About 50% immediate and 10% delayed Oral Motor: largest deficit with speech. Minimal labial and lingual movement on command. Pt requires maxA. Safety status: Dependent for cognitive function. Pt may benefit from adjustment to Parkinson's medication. Upon review of the patients current care plan, the following issues, problems, or needs remain: labial and lingual movement, slurred speech/dysarthria, processing Keny Montague, SLP

## 2021-05-21 NOTE — PROGRESS NOTES
Problem: Pressure Injury - Risk of  Goal: *Prevention of pressure injury  Description: Document Jonah Scale and appropriate interventions in the flowsheet. Outcome: Progressing Towards Goal  Note: Pressure Injury Interventions:  Sensory Interventions: Float heels    Moisture Interventions: Absorbent underpads    Activity Interventions: Increase time out of bed    Mobility Interventions: HOB 30 degrees or less    Nutrition Interventions: Document food/fluid/supplement intake    Friction and Shear Interventions: HOB 30 degrees or less                Problem: Falls - Risk of  Goal: *Absence of Falls  Description: Document Carlos Fall Risk and appropriate interventions in the flowsheet.   Outcome: Progressing Towards Goal  Note: Fall Risk Interventions:  Mobility Interventions: Bed/chair exit alarm    Mentation Interventions: Adequate sleep, hydration, pain control    Medication Interventions: Bed/chair exit alarm    Elimination Interventions: Bed/chair exit alarm    History of Falls Interventions: Bed/chair exit alarm         Problem: Patient Education: Go to Patient Education Activity  Goal: Patient/Family Education  Outcome: Progressing Towards Goal

## 2021-05-21 NOTE — SWING BED INTERDISCIPLINARY CARE PLAN OT NOTE
Occupational Therapy: 
 
Progress: Regressing just today Eating: Limited assistance Grooming (Gen): Limited assistance Grooming (Standing at sink): Not tested Bathing (Gen): Extensive assistance Bathing (Upper body): Extensive assistance Bathing (Lower body): Total assistance Toileting: Extensive assistance Upper body dressing: Limited assistance Lower body dressing: Limited assistance Transfers (BSC/Toilet): Limited assistance Transfers Shower: Not tested Homemaking: Not tested Balance: All sitting balances are good. Static stand balance remains Good. For dynamic stand balance pt needs support. Although this AM OT entered the room and pt was walking from the recliner to the closet w/o a device. Pt stooped into the closet to get his pants out to get dressed. Upper extremity function:  WFL B UE AROM and function Activity tolerated:  Mixed. Activity tolerance was excellent until this day as pt fatigued twice in first session and needed to sit down then later with PT he again ambulated around the nursing station twice. Safety status:  Poor as pt has diminished safety awareness. Upon review of the patients current care plan, the following issues, problems, or needs remain:   Needed more assistance for transfers and mobility today where earlier this week he performed all sit to stands independently. Continues to need assistance with dressing, bathing and hygiene.     
 
Dianne Duverney, OT

## 2021-05-21 NOTE — PROGRESS NOTES
Problem: Mobility Impaired (Adult and Pediatric)  Goal: *Acute Goals and Plan of Care (Insert Text)  5/21/2021 1159 by Elsi Brantley  Note: FUNCTIONAL STATUS PRIOR TO ADMISSION: ind with gait with RW. HOME SUPPORT PRIOR TO ADMISSION: The patient lived alone with spouse to provide assistance. Physical Therapy Goals  Initiated 5/21/2021  1. Patient will move from supine to sit and sit to supine  in bed with independence within 7 day(s). 2.  Patient will transfer from bed to chair and chair to bed with independence using the least restrictive device within 7 day(s). 3.  Patient will perform sit to stand with independence within 7 day(s). 4.  Patient will ambulate with independence for 300 feet with the least restrictive device within 7 day(s). 5.  Patient will ascend/descend 3 STEPS/ stairs with BILAT handrail(s) with independence within 7 day(s).   5/21/2021 1157 by Elsi Brantley  Outcome: Progressing Towards Goal   PHYSICAL THERAPY TREATMENT  Patient: Kuldeep Mcwilliams (11 y.o. male)  Date: 5/21/2021  Diagnosis: Encounter for rehabilitation [Z51.89] Encounter for rehabilitation       Precautions:    Chart, physical therapy assessment, plan of care and goals were reviewed. ASSESSMENT  Patient continues with skilled PT services and is progressing towards goals. YES. .     Current Level of Function Impacting Discharge (mobility/balance): PATIENT IS ABLE TO PERFORM BED MOBILITY WITH MINIMAL ASSIST X 1, TRANSFERS WITH MIN ASSIST X 1 TO 2, GAIT WITH RW, 220 FEET X 1, ON LEVEL SURFACES WITH ROLLING CHAIR FOLLOWING. PATIENT REQUIRES VC'S FOR INCREASING OLIVIA, ERECTING POSTURE, AND FOR KEEPING BODY CLOSE TO [address]. DURING TRANSFERS PATIENT REQUIRES VC S AND TACTILE CUES FOR SEQUENCING. PATIENT PERFORMED GAIT W/O LOB AND W.O SOB. PATIENT CONTINUES TO DISPLAY SIGNIFICANT DROOLING AND PATIENT NEEDS TO BE REMINDED TO SWALLOW.      Other factors to consider for discharge:PATIENT CONTINUES TO BE AT RISK FOR FALLS. PATIENT CONTINUES TO REQUIRE 24/7 ASSISTANCE AND CARE. PLAN :  Patient continues to benefit from skilled intervention to address the above impairments. Continue treatment per established plan of care. to address goals. Recommendation for discharge: (in order for the patient to meet his/her long term goals)  Physical therapy at least 2 days/week in the home     This discharge recommendation:  Has been made in collaboration with the attending provider and/or case management    IF patient discharges home will need the following DME: to be determined (TBD). SUBJECTIVE:   Patient stated THAT HE DOES NOT HAVE PAIN. OBJECTIVE DATA SUMMARY:   Critical Behavior:  Neurologic State: Alert  Orientation Level: Oriented to person  Cognition: Follows commands, Impulsive  Safety/Judgement: Decreased insight into deficits  Functional Mobility Training:  Bed Mobility:                    Transfers:  Sit to Stand: Minimum assistance  Stand to Sit: Minimum assistance        Bed to Chair: Minimum assistance                    Balance:  Sitting: Intact  Standing: With support  Standing - Static: Fair  Standing - Dynamic : Fair;Constant support  Ambulation/Gait Training:                                                        Stairs: Therapeutic Exercises:   GAIT WITH RW  Pain Ratin/10    Activity Tolerance:   Fair    After treatment patient left in no apparent distress:   Sitting in chair and Call bell within reach    COMMUNICATION/COLLABORATION:   The patients plan of care was discussed with: Occupational therapist, Speech therapist, Physician, Case management and PATIENT.      Miracle Elizondo

## 2021-05-21 NOTE — PROGRESS NOTES
Problem: Pressure Injury - Risk of  Goal: *Prevention of pressure injury  Description: Document Jonah Scale and appropriate interventions in the flowsheet. 5/21/2021 0049 by Lori Escobar RN  Outcome: Progressing Towards Goal  Note: Pressure Injury Interventions:  Sensory Interventions: Float heels    Moisture Interventions: Absorbent underpads    Activity Interventions: Increase time out of bed    Mobility Interventions: HOB 30 degrees or less    Nutrition Interventions: Document food/fluid/supplement intake    Friction and Shear Interventions: HOB 30 degrees or less             5/21/2021 0046 by Lori Escobar RN  Outcome: Progressing Towards Goal  Note: Pressure Injury Interventions:  Sensory Interventions: Float heels    Moisture Interventions: Absorbent underpads    Activity Interventions: Increase time out of bed    Mobility Interventions: HOB 30 degrees or less    Nutrition Interventions: Document food/fluid/supplement intake    Friction and Shear Interventions: HOB 30 degrees or less                Problem: Falls - Risk of  Goal: *Absence of Falls  Description: Document Carlos Fall Risk and appropriate interventions in the flowsheet.   5/21/2021 0049 by Lori Escobar RN  Outcome: Progressing Towards Goal  Note: Fall Risk Interventions:  Mobility Interventions: Bed/chair exit alarm    Mentation Interventions: Adequate sleep, hydration, pain control    Medication Interventions: Bed/chair exit alarm    Elimination Interventions: Bed/chair exit alarm    History of Falls Interventions: Bed/chair exit alarm      5/21/2021 0046 by oLri Escobar RN  Outcome: Progressing Towards Goal  Note: Fall Risk Interventions:  Mobility Interventions: Bed/chair exit alarm    Mentation Interventions: Adequate sleep, hydration, pain control    Medication Interventions: Bed/chair exit alarm    Elimination Interventions: Bed/chair exit alarm    History of Falls Interventions: Bed/chair exit alarm         Problem: Hypertension  Goal: *Blood pressure within specified parameters  Outcome: Progressing Towards Goal     Problem: Patient Education: Go to Patient Education Activity  Goal: Patient/Family Education  Outcome: Progressing Towards Goal

## 2021-05-21 NOTE — PROGRESS NOTES
Problem: Self Care Deficits Care Plan (Adult)  Goal: *Acute Goals and Plan of Care (Insert Text)  Description:   FUNCTIONAL STATUS PRIOR TO ADMISSION: Patient ambulated with a RW and required minimal to moderate assistance for basic and instrumental ADLs. HOME SUPPORT: The patient lived with his spouse who provided his assistance. Occupational Therapy Goals  Initiated 5/18/2021  1. Patient will perform upper body dressing with modified independence within 14 days. 2.  Patient will perform lower body dressing with supervision/set-up within 14 days. 3.  Patient will perform all aspects of toileting with supervision/set-up within 14 days. 4.  Patient will participate in upper extremity therapeutic exercise/activities with supervision/set-up for 15 to 20 minutes within 14 days. Outcome: Progressing Towards Goal     OCCUPATIONAL THERAPY TREATMENT  Patient: Jaquelin Monroy (49 y.o. male)  Date: 5/21/2021  Diagnosis: Encounter for rehabilitation [Z51.89] Encounter for rehabilitation       Precautions:    Chart, occupational therapy assessment, plan of care, and goals were reviewed. ASSESSMENT  Patient continues with skilled OT services and is progressing towards goals. Current Level of Function Impacting Discharge (ADLs):  Need for assistance with IADLs and with assistance for basic dressing and bathing. Other factors to consider for discharge:  Fall Risk         PLAN :  Patient continues to benefit from skilled intervention to address the above impairments. Continue treatment per established plan of care to address goals.     Recommend with staff:  Set pt up with items needed for self-care tasks    Recommend next OT session:  Continue working on self-care and mobility     Recommendation for discharge: (in order for the patient to meet his/her long term goals)  Therapy up to 5 days/week in SNF setting or an intensive home health therapy program    This discharge recommendation:  Has been made in collaboration with the attending provider and/or case management    IF patient discharges home will need the following DME: To be determined closer to discharge       SUBJECTIVE:   Patient stated  did it snow today? Layla Morgan    OBJECTIVE DATA SUMMARY:   Cognitive/Behavioral Status:  Neurologic State: Alert  Orientation Level: Oriented to person  Cognition: Follows commands; Impulsive      Functional Mobility and Transfers for ADLs:  Transfers:  Sit to Stand: Minimum assistance  Bed to Chair: Minimum assistance    Balance:  Sitting: Intact  Standing: With support  Standing - Static: Fair  Standing - Dynamic : Fair;Constant support    ADL Intervention: At contact this AM, pt had stood from the chair and was walking to the closet. He reported he was looking for his clothes. OT allowed him to search the closet and he pulled out his short pants. Pt started to put them on while standing and OT suggested he do this while seated. Pt agreed and responded that if he did not sit down he might fall. Pt sat in the chair, received mod A to get the pants over both feet and pulled them to his knees w/o further assistance. Pt was set up with a pullover shirt and donned it overhead then asked OT to pull it down in back. The shirt was still on his shoulders when he made this request.   Pt stood from the chair with mod A and started to ambulate to the valentine. When he was close to the bathroom door, pt stopped to karie his mask and when he started walking again his knees began to buckle. OT was able to get pt to turn around and return to the bed to sit and rest.   After resting pt again stood to the RW with mod A and got to the door. When there he stopped and again his knees began to buckle. OT was able to assist pt to cross the valentine so his recliner could be brought in behind him by his nurse. Pt sat and was placed in front of the nursing station for his safety.     Upper Body Dressing Assistance  Dressing Assistance: Set-up; Minimum assistance  Pullover Shirt: Minimum assistance (Once pt got the shirt on he asked OT to pull it down in back)    Lower Body Dressing Assistance  Dressing Assistance: Moderate assistance  Pants With Elastic Waist: Moderate assistance (Needed A to get the shorts over both feet/toes.)     Therapeutic Activity:   Pt stood from the recliner while in the front valentine and ambulated with OT & PT around the nursing station x 2 trips. During the walk, as PT spoke with the , pt continued to stand still until they stopped talking and then continued the walk. Pt was set up in the recliner at the end of the walk and remained in front of the nursing station for his safety. Pain:  0    Activity Tolerance:   Activity tolerance while in the room was poor. Activity tolerance for the second part of his treatment was much better as pt had no weak spells while walking around the nursing station x 2 rounds. After treatment patient left in no apparent distress:   Sitting in chair    COMMUNICATION/COLLABORATION:   The patients plan of care was discussed with: Physical therapist, Case management, and Rehabilitation technician.      Jem Gillilands, OT  Time Calculation: 30 mins

## 2021-05-21 NOTE — PROGRESS NOTES
Follow up visit with patient in Rm 202  Provided empathic listening and spiritual support  Advised of  Availability   [address] [place] [address]

## 2021-05-21 NOTE — INTERDISCIPLINARY ROUNDS
IDR Team; MD, Nursing, Care Manager, Physical therapy,  and OT met to review patient's plan of care. Discussed goals, interventions, barriers and progress. Team will continue to monitor progress and report any concerns to the physician and care management as indicated. Transition of Care Plan: Patient is eating well. Walking well. Plan is to discharge on Monday.

## 2021-05-21 NOTE — PROGRESS NOTES
Problem: Motor Speech Impaired (Adult)  Goal: *Acute Goals and Plan of Care (Insert Text)  Description: 1. The pt will complete facial, lingual, and labial exercises with modA in attempt to increase ROM for increased function of speech and saliva management. Outcome: Not Progressing Towards Goal     Problem: Cognitive Deficits  Goal: *STG: Follow through to completion of simple tasks  Outcome: Not Progressing Towards Goal     SPEECH LANGUAGE PATHOLOGY TREATMENT  Patient: Chely Bryant (89 y.o. male)  Date: 5/21/2021  Diagnosis: Encounter for rehabilitation [Z51.89] Encounter for rehabilitation         ASSESSMENT:  Pt function is poor today. Yesterday pt was able to process through questions and a scanning task. Today pt is extremely slow to respond if he responds at all. He required maxA for a problem solving task today, and when clinician provided answer and asked pt to Beaver the answered, he struggled to get his hand up and was unable to independently Beaver the answer. Pt did not appear as though he could process what I was asking for labial and lingual exercises for increased ROM. Pt didn't ask any questions, he just seemed distracted today and as though he did not know what to do when provided verbal, visual, and tactile cue. Therapy was not overly beneficial today. PLAN:  Patient continues to benefit from skilled intervention to address the above impairments. Continue treatment per established plan of care. Discharge Recommendations:  Skilled Nursing Facility     SUBJECTIVE:   Patient stated i'm fine. OBJECTIVE:   Mental Status:  Neurologic State: Alert  Orientation Level: Oriented to person  Cognition: Follows commands, Impulsive  Perception: Appears intact  Perseveration: Perseverates during ADLS  Safety/Judgement: Decreased insight into deficits    Treatment & Interventions: Motor Speech:      Pt unable to functionally participate today.  Pt required maxA for any labial or lingual movement (labial extension and retraction and lingual lateralization). Pt seemed to struggle to close his lips upon request today, when asked to in order to reduce saliva loss. Speech was quiet and slurred today. Clinician had to request pt repeat pretty much every statement utilizing over-articulation. Language Comprehension and Expression:     Verbal Expression      Neuro-Linguistics:     Verbal Reasoning Tasks: Impaired              Memory: Impaired  Attention : Impaired           Voice:   Quiet     Response & Tolerance to Activities:   Poor    Pain:  Pain Scale 1: Numeric (0 - 10)  Pain Intensity 1: 0       After treatment:   Patient left in no apparent distress sitting up in chair, Call bell within reach, and Bed / chair alarm activated    COMMUNICATION/EDUCATION:   Patient was educated regarding his deficit(s) of dysarthria and reduced processing as this relates to his diagnosis of Parkinson's. He demonstrated Guarded understanding as evidenced by lack of participation, not responding to all questions. The patient's plan of care including recommendations, planned interventions, and recommended diet changes were discussed with: Registered nurse.      Cynthia Enciso SLP  Time Calculation: 29 mins

## 2021-05-21 NOTE — PROGRESS NOTES
Bedside and Verbal shift change report given to MOISES Asencio (oncoming nurse) by Laura Hdz RN (offgoing nurse). Report included the following information SBAR, Kardex and Intake/Output.

## 2021-05-22 NOTE — ROUTINE PROCESS
Bedside and Verbal shift change report given to Antonio Alcantara RN (oncoming nurse) by Stephan Whitney RN 
 (offgoing nurse). Report included the following information SBAR and Kardex.

## 2021-05-22 NOTE — PROGRESS NOTES
Problem: Cognitive Deficits  Goal: *STG: Follow through to completion of simple tasks  Outcome: Not Progressing Towards Goal     Problem: Patient Education: Go to Patient Education Activity  Goal: Patient/Family Education  Description: 1. The pt will complete processing tasks with modA to increase pt ability to complete ADL with increased accuracy. Outcome: Progressing Towards Goal     SPEECH LANGUAGE PATHOLOGY TREATMENT  Patient: Lawanda Escalante (41 y.o. male)  Date: 5/22/2021  Diagnosis: Encounter for rehabilitation [Z51.89] Encounter for rehabilitation         ASSESSMENT:  Pt was up in chair when clinician entered pt's room. Pt was not verbally responding to clinician much and closed eyes quite often, therefore clinician left to chart previous pt and let pt rest for a few minutes. Clinician returned and pt still only responding to clinician questions about 30% of opportunities. Pt eyes looking around room, and pt not following through with clinician request to try labial exercises. Pt reported he understood why clinician was present to work with him, however clinician again re-educated him. MaxA for labial and lingual movement. Lower lip continues to be flaccid and jaw and lower lip in open relaxed position most of the time. When pt produces a lot of effort he is able to get more motor movement. Pt did follow a couple other directions such as to wipe his face with the wash cloth provided. Pt movements are extremely slow. PLAN:  Patient continues to benefit from skilled intervention to address the above impairments. Continue treatment per established plan of care. Discharge Recommendations:  Outpatient or Skilled Nursing Facility     SUBJECTIVE:   Patient stated i'm okay.     OBJECTIVE:   Mental Status:  Neurologic State: Alert  Orientation Level: Oriented to person  Cognition: Follows commands  Perception: Appears intact  Perseveration: Perseverates during ADLS  Safety/Judgement: Decreased insight into deficits    Treatment & Interventions: Motor Speech:   Lower facial movement is extremely poor. Pt required maxA for any labial movement. Pt attempted anterior lingual movement, however I'ly, was only able to get tongue just past teeth and not to flaccid lip. Pt extremely slow to to move or respond. 90% of pt's speech slurred to the point of being unintelligible. When pt puts in a lot of effort he is able to obtain more labial movement then on command. Language Comprehension and Expression:     Verbal Expression  Verbal Expression  Primary Mode of Expression: Verbal  Speech is slurred  Neuro-Linguistics:         Processing is extremely poor. Pt only responding to SLP about 30% of opportunities despite reporting he understands why clinician is present for session. Pt distracted easily. Voice:   Quiet. Clinician educated pt on large breaths for increased loudness. Response & Tolerance to Activities:  Exercise Tolerance/Response: Poor motivation;Poor endurance;Easily distracted;Poor participation    Pain:   None reported          After treatment:   Patient left in no apparent distress sitting up in chair, Call bell within reach, and Bed / chair alarm activated    COMMUNICATION/EDUCATION:   Patient was educated regarding his deficit(s) of reduced labial and lingual motor movement and processing of information as this relates to his diagnosis of Parkinson's. He demonstrated Guarded understanding as evidenced by only responding to clinician questions about 30% of session.     The patient's plan of care including recommendations, planned interventions, and recommended diet changes were discussed with: Physical therapist.     NIKOS Hsieh  Time Calculation: 27 mins

## 2021-05-22 NOTE — SWING BED INTERDISCIPLINARY CARE PLAN NURSE NOTE
Nursing: 
 
Week #2: Date 5/22/2021 Medical status (changes from previous week): No change Treatment changes this shift: none Cognition: Present status: confused Is cueing needed?: Yes Frequency of cueing needs: occasional  
        Type of cueing used: verbal 
ADL (general): Independent and Minimum assistance Activity type: Reality awareness Participation: Daily Level of participation: Improving Pain status: No c/o pain Patient/Family Education needs: home safety Laurie Moe RN

## 2021-05-22 NOTE — PROGRESS NOTES
Bedside shift change report given to SAMMY Mendiola LPN (oncoming nurse) by Alex Giraldo RN   (offgoing nurse). Report included the following information SBAR, Kardex, Intake/Output, MAR and Recent Results. Foster score 5  Bed/chair alarm is in use. If in use it is set at the highest volume. Intravenous fluids were administered, none 0 ml/hr  Patient Vitals for the past 12 hrs:   Temp Pulse Resp BP SpO2   05/22/21 0756 98.2 °F (36.8 °C) 78 20 (!) 148/72 96 %     No flowsheet data found. Lab results reviewed. For significant abnormal values and values requiring intervention, see assessment and plan.

## 2021-05-22 NOTE — SWING BED INTERDISCIPLINARY CARE PLAN NURSE NOTE
Nursing: 
 
Week # 1: Date 5/22/2021 Medical status (changes from previous week):Progressing Treatment changes this shift: None Cognition: Present status: dementia Is cueing needed?: Yes Frequency of cueing needs: occasional  
        Type of cueing used: verbal 
ADL (general): Minimum assistance Activity type: Reality awareness Participation: Daily Level of participation: Improving Pain status: Yes (avg. intensity-use scale): 10/10 left foot pain tylenol given & reassessed Patient/Family Education needs: Safety Upon review of the patients current care plan, the following issues, problems, or needs remain: Continue with strengthening exercises & safety Magui Henson LPN

## 2021-05-22 NOTE — ROUTINE PROCESS
Bedside and Verbal shift change report given to SOHAM Ewing RN (oncoming nurse) by Flex Townsend LPN(offgoing nurse). Report included the following information SBAR, Kardex and MAR.

## 2021-05-22 NOTE — PROGRESS NOTES
Problem: Mobility Impaired (Adult and Pediatric)  Goal: *Acute Goals and Plan of Care (Insert Text)  5/22/2021 1231 by Javi Read  Outcome: Progressing Towards Goal  5/22/2021 1229 by Javi Read  Outcome: Progressing Towards Goal  5/22/2021 1226 by Javi Read  Outcome: Progressing Towards Goal  5/22/2021 1225 by Javi Read  Outcome: Progressing Towards Goal     Problem: Mobility Impaired (Adult and Pediatric)  Goal: *Acute Goals and Plan of Care (Insert Text)  5/22/2021 1231 by Javi Read  Note: FUNCTIONAL STATUS PRIOR TO ADMISSION: IND WITH MOBILITY AND GAIT WITH RW.    HOME SUPPORT PRIOR TO ADMISSION: The patient lived with SPOUSE AND REQUIRED ASSISTANCE WITH SOME ADLS, MOBILITY AND GAIT WITH RW,. Physical Therapy Goals  Initiated 5/22/2021  1. Patient will move from supine to sit and sit to supine  in bed with independence within 7 day(s). 2.  Patient will transfer from bed to chair and chair to bed with independence using the least restrictive device within 7 day(s). 3.  Patient will perform sit to stand with independence within 7 day(s). 4.  Patient will ambulate with independence for 300 feet with the least restrictive device within 7 day(s). 5.  Patient will ascend/descend 3 stairs with BILAT handrail(s) with independence within 7 day(s).   5/22/2021 1231 by Javi Read  Outcome: Progressing Towards Goal   PHYSICAL THERAPY TREATMENT  Patient: Shaan Garcia (66 y.o. male)  Date: 5/22/2021  Diagnosis: Encounter for rehabilitation [Z51.89] Encounter for rehabilitation       Precautions:    Chart, physical therapy assessment, plan of care and goals were reviewed. ASSESSMENT  Patient continues with skilled PT services and is progressing towards goals. YES. .     Current Level of Function Impacting Discharge (mobility/balance): PATIENT PERFORMS BED MOBILITY WITH MOD IND, TRANSFERS WITH RW WITH MIN TO CGA X 1, GAIT WITH RW, ON LEVEL SURFACES, 220 FEET WITH CGA TO SUP. PATIENT REQUIRES VC S AND TC S FOR ERECTING POSTURE, INCREASING OLIVIA, AND DIRECTION. WITH TRANSFERS PATIENT REQUIRES CUES FOR SEQUENCING/FALL PREVENTION. Other factors to consider for discharge: PATIENT CONTINUES TO REQUIRE 24/7 CARE AND ASSISTANCE. PATIENT CONTINUES TO BE AT RISK FOR FALLS. PLAN :  Patient continues to benefit from skilled intervention to address the above impairments. Continue treatment per established plan of care. to address goals. Recommendation for discharge: (in order for the patient to meet his/her long term goals)  Physical therapy at least 2 days/week in the home     This discharge recommendation:  Has been made in collaboration with the attending provider and/or case management    IF patient discharges home will need the following DME: to be determined (TBD)       SUBJECTIVE:   Patient stated THAT HE HAS NO PAIN AT THIS TIME, BUT HIS FEET HURT AT [address] BLOOD CIRCULATES. OBJECTIVE DATA SUMMARY:   Critical Behavior:  Neurologic State: Alert  Orientation Level: Disoriented to person  Cognition: Follows commands  Safety/Judgement: Decreased insight into deficits  Functional Mobility Training:  Bed Mobility:  Rolling: Modified independent  Supine to Sit: Modified independent  Sit to Supine: Modified independent  Scooting: Supervision        Transfers:  Sit to Stand: Contact guard assistance;Minimum assistance  Stand to Sit: Supervision                             Balance:  Sitting: Intact  Standing: Intact; With support  Standing - Static: Good  Standing - Dynamic : Fair  Ambulation/Gait Training:  Distance (ft): 220 Feet (ft)  Assistive Device: Gait belt;Walker, rolling  Ambulation - Level of Assistance: Supervision;Contact guard assistance;Assist x1        Gait Abnormalities: Decreased step clearance        Base of Support: Narrowed     Speed/Kim:  (INCREASED KIM TODAY) Stairs: Therapeutic Exercises:   GAIT WITH RW  Pain Rating:  NO C/O PAIN DURING THIS VISIT. Activity Tolerance:   Fair    After treatment patient left in no apparent distress:   Sitting in chair WITH CALL BELL IN REACH AND CHAIR ALARM ACTIVATED.     COMMUNICATION/COLLABORATION:   The patients plan of care was discussed with: Case management    Leila Caban   Time Calculation: 30 mins

## 2021-05-23 NOTE — PROGRESS NOTES
Bedside shift change report given to AKANKSHA Freedman RN (oncoming nurse) by Isabella Storm. RYAN Mendiola (offgoing nurse). Report included the following information SBAR and Kardex.

## 2021-05-23 NOTE — PROGRESS NOTES
Bedside shift change report given to SAMMY Mendiola LPN (oncoming nurse) by Wenceslao Yu RN   (offgoing nurse). Report included the following information SBAR, Kardex, Intake/Output, MAR and Recent Results. Foster score 5  Bed/chair alarm is in use. If in use it is set at the highest volume. Intravenous fluids were administered, none 0 ml/hr  Patient Vitals for the past 12 hrs:   Temp Pulse Resp BP SpO2   05/23/21 0814 98.3 °F (36.8 °C) 74 16 (!) 162/71 99 %     No flowsheet data found. Lab results reviewed. For significant abnormal values and values requiring intervention, see assessment and plan.

## 2021-05-23 NOTE — SWING BED INTERDISCIPLINARY CARE PLAN NURSE NOTE
Nursing: 
 
Week #2 
: Date 5/23/2021 Medical status (changes from previous week):Progressing Treatment changes this shift: none Cognition: Present status: dementia Is cueing needed?: Yes Frequency of cueing needs: occasional  
        Type of cueing used: verbal 
ADL (general): Moderate assistance Activity type: Reality awareness Participation: Daily and Individual 
Level of participation: No change Pain status: No c/o pain Patient/Family Education needs: increase strength and mobility Upon review of the patients current care plan, the following issues, problems, or needs remain: increase his activity.  
 
Toro Abdi RN

## 2021-05-23 NOTE — PROGRESS NOTES
Problem: Pressure Injury - Risk of  Goal: *Prevention of pressure injury  Description: Document Jonah Scale and appropriate interventions in the flowsheet. Outcome: Progressing Towards Goal  Note: Pressure Injury Interventions:  Sensory Interventions: Assess changes in LOC, Check visual cues for pain, Keep linens dry and wrinkle-free, Minimize linen layers    Moisture Interventions: Apply protective barrier, creams and emollients, Check for incontinence Q2 hours and as needed, Minimize layers    Activity Interventions: Increase time out of bed    Mobility Interventions: HOB 30 degrees or less    Nutrition Interventions: Offer support with meals,snacks and hydration, Document food/fluid/supplement intake    Friction and Shear Interventions: Minimize layers, Apply protective barrier, creams and emollients    Problem: Falls - Risk of  Goal: *Absence of Falls  Description: Document Carlos Fall Risk and appropriate interventions in the flowsheet.   Outcome: Progressing Towards Goal  Note: Fall Risk Interventions:  Mobility Interventions: Bed/chair exit alarm, Patient to call before getting OOB, Utilize walker, cane, or other assistive device    Mentation Interventions: Bed/chair exit alarm, Adequate sleep, hydration, pain control, Door open when patient unattended, Room close to nurse's station    Medication Interventions: Bed/chair exit alarm, Patient to call before getting OOB    Elimination Interventions: Bed/chair exit alarm, Call light in reach, Patient to call for help with toileting needs, Stay With Me (per policy)    History of Falls Interventions: Bed/chair exit alarm, Door open when patient unattended, Room close to nurse's station  Problem: Hypertension  Goal: *Blood pressure within specified parameters  Outcome: Progressing Towards Goal     Problem: Patient Education: Go to Patient Education Activity  Goal: Patient/Family Education  Outcome: Progressing Towards Goal  Plan to encourage pt. to increase his mobility

## 2021-05-24 NOTE — PROGRESS NOTES
IDR rounds: Plan is to discharge the patient on Monday. He has an appointment to have his second COVID shot given. He is doing really well with physical therapy. Will refer the patient to home health agency. He will be neededing skilled nursing, PT/OT and Speech therapy. Patient and wife is in agreement with the plan. [address] notification of discharge explained to the patient that Swing Bed/ Skilled Services will end on 5/23/21 and that the date of financial liability will begin on 5/24/21 if not discharged. Provided a copy of the notification to the patient and copy placed in the patients chart. Patient and his wife verbalized understanding.

## 2021-05-24 NOTE — PROGRESS NOTES
Problem: Pressure Injury - Risk of  Goal: *Prevention of pressure injury  Description: Document Jonah Scale and appropriate interventions in the flowsheet.   Outcome: Progressing Towards Goal  Note: Pressure Injury Interventions:  Sensory Interventions: Assess changes in LOC, Check visual cues for pain    Moisture Interventions: Absorbent underpads, Check for incontinence Q2 hours and as needed    Activity Interventions: Increase time out of bed    Mobility Interventions: HOB 30 degrees or less    Nutrition Interventions: Offer support with meals,snacks and hydration    Friction and Shear Interventions: HOB 30 degrees or less, Apply protective barrier, creams and emollients

## 2021-05-24 NOTE — DISCHARGE SUMMARY
Physician Discharge Summary     Patient ID:    Jerson Spencer  [id number]  30 y.o.  1935    Admit date: 5/17/2021    Discharge date : 5/24/2021    Chronic Diagnoses:    Problem List as of 5/24/2021 Date Reviewed: 5/13/2021        Codes Class Noted - Resolved    GI bleed ICD-10-CM: K92.2  ICD-9-CM: 578.9  5/16/2021 - Present        Orthostatic hypotension (Chronic) ICD-10-CM: I95.1  ICD-9-CM: 458.0  5/14/2021 - Present        Fall from ground level ICD-10-CM: Karenann Furlong  ICD-9-CM: E888.9  5/13/2021 - Present        Weakness generalized ICD-10-CM: R53.1  ICD-9-CM: 780.79  5/13/2021 - Present        Anemia due to acute blood loss ICD-10-CM: D62  ICD-9-CM: 285.1  5/13/2021 - Present        Chronic anticoagulation (Chronic) ICD-10-CM: Z79.01  ICD-9-CM: V58.61  5/13/2021 - Present        'light-for-dates' infant with signs of fetal malnutrition ICD-10-CM: P05.00  ICD-9-CM: 764.10  5/11/2021 - Present        Atrial fibrillation ([facility] ICD-10-CM: I48.91  ICD-9-CM: 427.31  11/23/2020 - Present        A-fib ([facility] ICD-10-CM: I48.91  ICD-9-CM: 427.31  11/23/2020 - Present        Parkinson disease ([facility] ICD-10-CM: G20  ICD-9-CM: 332.0  6/4/2018 - Present        Bilateral leg edema ICD-10-CM: R60.0  ICD-9-CM: 782.3  6/4/2018 - Present        * (Principal) Encounter for rehabilitation ICD-10-CM: Z51.89  ICD-9-CM: V57.9  5/11/2018 - Present        Gross hematuria ICD-10-CM: R31.0  ICD-9-CM: 599.71  5/8/2018 - Present        Tremor ICD-10-CM: R25.1  ICD-9-CM: 781.0  10/30/2017 - Present        Essential hypertension ICD-10-CM: I10  ICD-9-CM: 401.9  10/30/2017 - Present        Vitamin B12 deficiency ICD-10-CM: E53.8  ICD-9-CM: 266.2  7/23/2017 - Present        BPH associated with nocturia ICD-10-CM: N40.1, R35.1  ICD-9-CM: 600.01, 788.43  1/13/2016 - Present        MCI (mild cognitive impairment) with memory loss ICD-10-CM: G31.84  ICD-9-CM: 331.83  1/13/2016 - Present        History of malignant neoplasm of large intestine ICD-10-CM: Z85.038  ICD-9-CM: V10.05  1/15/2015 - Present        EKG abnormality ICD-10-CM: R94.31  ICD-9-CM: 794.31  1/15/2015 - Present    Overview Signed 1/15/2015  1:18 PM by Aparna Mariano     LVH with strain noted on previous tracings              Obesity, Class II, 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 9  Unknown - Present        Hyperlipidemia ICD-10-CM: E78.5  ICD-9-CM: 272.4  Unknown - Present        Hypertension ICD-10-CM: I10  ICD-9-CM: 401.9  Unknown - Present          22    Final Diagnoses:   Encounter for rehabilitation [Z51.89]  Generalized weakness  Chronic kidney disease  Hypertension  Hyperlipidemia  Recurrent falls  Recent suspected upper GI bleed  Acute blood loss anemia  Essential tremor  Vitamin B12 deficiency    Reason for Hospitalization:  Patient is an 64-year-old male who initially was admitted to acute care from 5/12 through 5/17 with recurrent falls and suspected upper GI bleed with melena and a drop in hemoglobin down to 8.9. He was hemodynamically stable. He did not show any further drop in hemoglobin and the plan was for outpatient GI work-up. He was felt appropriate for inpatient rehabilitation and was admitted to a swing bed on 5/17      Hospital Course:   Patient was admitted to the rehab unit and received daily PT and OT    He overall made good progress with his therapy and able to ambulate 220 feet.     There was no evidence for GI bleeding during his rehab stay    Patient was felt stable for discharge home on 5/24. Home health is ordered    Patient will need referral to GI for consideration of outpatient endoscopy/colonoscopy          Discharge Medications:   Current Discharge Medication List      START taking these medications    Details   gabapentin (NEURONTIN) 100 mg capsule Take 1 Capsule by mouth two (2) times a day. Max Daily Amount: 200 mg. Qty: 60 Capsule, Refills: 0  Start date: 5/24/2021    Associated Diagnoses: Gouty arthropathy      famotidine (PEPCID) 20 mg tablet Take 1 Tablet by mouth every evening. Qty: 30 Tablet, Refills: 0  Start date: 5/24/2021      metoprolol tartrate (LOPRESSOR) 25 mg tablet Take 0.5 Tablets by mouth every twelve (12) hours. Qty: 60 Tablet, Refills: 0  Start date: 5/24/2021         CONTINUE these medications which have NOT CHANGED    Details   carbidopa-levodopa (SINEMET)  mg per tablet TAKE 1 TABLET FOUR TIMES A DAY FOR PARKINSONS DISEASE  Qty: 360 Tab, Refills: 3      cyanocobalamin (VITAMIN B12) 500 mcg tablet TAKE 2 TABLETS DAILY  Qty: 180 Tab, Refills: 3      ferrous sulfate (IRON) 325 mg (65 mg iron) tablet Take 28 mg by mouth. Indications: 1 TAB ON MONDAY,WEDNESDAY, AND FRIDAY      finasteride (PROSCAR) 5 mg tablet Take 5 mg by mouth daily. take at bedtime      tamsulosin (FLOMAX) 0.4 mg capsule Take 1 Cap by mouth daily. Qty: 30 Cap, Refills: 0         STOP taking these medications       chlorthalidone (HYGROTON) 25 mg tablet Comments:   Reason for Stopping:         dilTIAZem ER (CARDIZEM CD) 120 mg capsule Comments:   Reason for Stopping:         aspirin 81 mg chewable tablet Comments:   Reason for Stopping:         ammonium lactate (LAC-HYDRIN) 12 % lotion Comments:   Reason for Stopping: Follow up Care:    1. Padmaja Marquez MD in 1-2 weeks. Please call to set up an appointment shortly after discharge. Diet:  Regular Diet    Disposition:  Home.     Advanced Directive:   FULL    DNR x Discharge Exam:  General:  Alert, cooperative, no distress, appears stated age. Lungs:   Clear to auscultation bilaterally. Chest wall:  No tenderness or deformity. Heart:  Regular rate and rhythm, S1, S2 normal, no murmur, click, rub or gallop. Abdomen:   Soft, non-tender. Bowel sounds normal. No masses,  No organomegaly. Extremities: Extremities normal, atraumatic, no cyanosis or edema. Pulses: 2+ and symmetric all extremities. Skin: Skin color, texture, turgor normal. No rashes or lesions   Neurologic: CNII-XII intact. No gross sensory or motor deficits        CONSULTATIONS: None    Significant Diagnostic Studies:   5/17/2021: BUN 16 MG/DL (Ref range: 6 - 20 MG/DL); Calcium 8.3 MG/DL* (Ref range: 8.5 - 10.1 MG/DL); CO2 27 mmol/L (Ref range: 21 - 32 mmol/L); Creatinine 1.23 MG/DL (Ref range: 0.70 - 1.30 MG/DL); Glucose 89 mg/dL (Ref range: 65 - 100 mg/dL); HGB 8.5 g/dL* (Ref range: 12.1 - 17.0 g/dL); Potassium 3.6 mmol/L (Ref range: 3.5 - 5.1 mmol/L); Sodium 140 mmol/L (Ref range: 136 - 145 mmol/L)  No results for input(s): WBC, HGB, HCT, PLT, HGBEXT, HCTEXT, PLTEXT, HGBEXT, HCTEXT, PLTEXT in the last 72 hours. No results for input(s): NA, K, CL, CO2, BUN, CREA, GLU, CA, MG, PHOS, URICA in the last 72 hours. No results for input(s): ALT, AP, TBIL, TBILI, TP, ALB, GLOB, GGT, AML, LPSE in the last 72 hours. No lab exists for component: SGOT, GPT, AMYP, HLPSE  No results for input(s): INR, PTP, APTT, INREXT, INREXT in the last 72 hours. No results for input(s): FE, TIBC, PSAT, FERR in the last 72 hours. No results for input(s): PH, PCO2, PO2 in the last 72 hours. No results for input(s): CPK, CKMB in the last 72 hours.     No lab exists for component: TROPONINI  Lab Results   Component Value Date/Time    Glucose (POC) 86 11/23/2020 08:59 AM       Discharge time spent 35 minutes    Signed:  Joshua Patel MD  5/24/2021  9:24 AM

## 2021-05-24 NOTE — PROGRESS NOTES
Bedside shift change report given to AKANKSHA Spencer (oncoming nurse) by [employer]. RYAN Mendiola (offgoing nurse). Report included the following information SBAR, Kardex and Intake/Output.

## 2021-05-24 NOTE — INTERDISCIPLINARY ROUNDS
Interdisciplinary rounds completed with MD, PT, OT, Speech, Case management and this nurse. Plan to discharge today.

## 2021-05-24 NOTE — PROGRESS NOTES
Care Management Interventions  PCP Verified by CM: Yes Chaparro Lui MD )  Last Visit to PCP: 04/13/21  Palliative Care Criteria Met (RRAT>21 & CHF Dx)?: No (No MD order)  Mode of Transport at Discharge: Other (see comment) (Wife POV)  Transition of Care Consult (CM Consult): Discharge Planning  Physical Therapy Consult: Yes  Occupational Therapy Consult: Yes  Speech Therapy Consult: No  Current Support Network: Lives with Spouse  Confirm Follow Up Transport: Family  The Plan for Transition of Care is Related to the Following Treatment Goals : Skilled nursing--PT/OT   Discharge Location  Discharge Placement: Home with home health    Patient is being discharged today. He will be returning home with his wife and home health through [facility]. Information faxed to [facility]. He will receive PT, OT, nursing services and ST. Patient's wife aware of discharge for today and is going to bring him in clothes. He has his COVID 19 shot today 5/24/21 around 2:30. He will be leaving before then. Patient was explained [place] of Choice Form he stated understanding. Patient signed and letter placed in chart. Also explained letter to patient's wife Nella Rigo who stated understanding. Copy of letter placed in patient's folder. Patient and family aware and in agreement with discharge plan. Instructed patient and wife to call CM if they have any questions or concerns.        Reason for Admission:  SNF/swing bed                      RUR Score:         11% LOW             Plan for utilizing home health:          PCP: First and Last name:  Art Weinberg MD     Name of Practice: [facility]    Are you a current patient: Yes/No: Yes   Approximate date of last visit: 4/13/21   Can you participate in a virtual visit with your PCP: No                     Current Advanced Directive/Advance Care Plan: DNR      Healthcare Decision Maker:   Click here to complete [address] including selection of the Healthcare Decision Maker Relationship (ie \"Primary\")             Primary Decision MakerAlex Holloway - [phone]    Secondary Decision Maker: Veronica Dykes - [phone]                  Transition of Care Plan:                  Home w/home health

## 2021-05-24 NOTE — PROGRESS NOTES
Problem: Mobility Impaired (Adult and Pediatric)  Goal: *Acute Goals and Plan of Care (Insert Text)  Outcome: Progressing Towards Goal     PHYSICAL THERAPY TREATMENT  Patient: Chacha Clarke (64 y.o. male)  Date: 5/24/2021  Diagnosis: Encounter for rehabilitation [Z51.89] Encounter for rehabilitation       Precautions:    Chart, physical therapy assessment, plan of care and goals were reviewed. ASSESSMENT  Patient continues with skilled PT services and is progressing towards goals. Pt received supine in bed. Pt transferred with min A to EOB, requiring assistance with negotiation of LE towards EOB. Pt required cueing throughout transfer for motivation. Pt transferred sit to stand with CGA to RW. Pt exhibited posterior LOB x 3 when standing with CGA in RW. Pt required cueing to remain forward in the RW and to keep eyes forward to help with balance control. Pt ambulated 350ft with minimal trendelenberg to L side. Pt ambulated with RW and CGA. Pt required cueing for RW positioning and direction of ambulation. Pt exhibited decreased step clearance, but improved with cueing. Pt returned to [facility] chair in room with chair alarm intact and all needs met. Current Level of Function Impacting Discharge (mobility/balance): decreased standing balance without support, CGA for ambulation, min A for bed transfers    Other factors to consider for discharge: Pt has speech deficits which can make communication difficult at times, he is currently getting therapy for speech and plans to have it at home as well         PLAN :  Patient continues to benefit from skilled intervention to address the above impairments. Continue treatment per established plan of care. to address goals.     Recommendation for discharge: (in order for the patient to meet his/her long term goals)  Physical therapy at least 2 days/week in the home AND ensure assist and/or supervision for safety with ADLs & functional mobility    This discharge recommendation:  Has been made in collaboration with the attending provider and/or case management    IF patient discharges home will need the following DME: rolling walker       SUBJECTIVE:   Patient stated Alem Fowler is this place.     OBJECTIVE DATA SUMMARY:   Critical Behavior:  Neurologic State: Alert, Confused (talking about having to go buy a battery)  Orientation Level: Oriented to person, Disoriented to time, Disoriented to situation, Disoriented to place  Cognition: Memory loss, Poor safety awareness, Follows commands (drools frequently)  Safety/Judgement: Decreased insight into deficits  Functional Mobility Training:  Bed Mobility:     Supine to Sit: Minimum assistance     Scooting: Contact guard assistance        Transfers:  Sit to Stand: Contact guard assistance  Stand to Sit: Stand-by assistance                             Balance:  Sitting: Intact  Standing: Impaired; With support  Standing - Static: Constant support; Fair  Standing - Dynamic : Constant support; Fair  Ambulation/Gait Training:  Distance (ft): 350 Feet (ft)  Assistive Device: Gait belt;Walker, rolling  Ambulation - Level of Assistance: Contact guard assistance        Gait Abnormalities: Decreased step clearance;Trendelenburg        Base of Support: Narrowed     Speed/Kim: Slow  Step Length: Left shortened;Right shortened          Pain Ratin/10    Activity Tolerance:   Good and SpO2 stable on RA    After treatment patient left in no apparent distress:   Sitting in chair, Heels elevated for pressure relief, Call bell within reach, and Bed / chair alarm activated    COMMUNICATION/COLLABORATION:   The patients plan of care was discussed with: Speech therapist, Registered nurse, Case management, and Rehabilitation technician.      Ervin Julian PTA   Time Calculation: 23 mins

## 2021-05-24 NOTE — PROGRESS NOTES
Discharge instructions reviewed with pt.'s wife Femi Wright and his daughter. Shared with family that his temp was elavated slightly this AM and to make sure he drinks plenty of fluids. Personal belongings returned: clothing, slippers  Home meds returned: n/a  To front entrance via wheelchair  Discharged home with  Wife and daughter @ 12.

## 2021-05-24 NOTE — DISCHARGE INSTRUCTIONS
DISCHARGE SUMMARY from Nurse    PATIENT INSTRUCTIONS:    After general anesthesia or intravenous sedation, for 24 hours or while taking prescription Narcotics:  · Limit your activities  · Do not drive and operate hazardous machinery  · Do not make important personal or business decisions  · Do  not drink alcoholic beverages  · If you have not urinated within 8 hours after discharge, please contact your surgeon on call. Report the following to your surgeon:  · Excessive pain, swelling, redness or odor of or around the surgical area  · Temperature over 100.5  · Nausea and vomiting lasting longer than 4 hours or if unable to take medications  · Any signs of decreased circulation or nerve impairment to extremity: change in color, persistent  numbness, tingling, coldness or increase pain  · Any questions    What to do at Home:  Recommended activity: Activity as tolerated,     If you experience any recurrent symptoms , please follow up with PCP or return to the ED. *  Please give a list of your current medications to your Primary Care Provider. *  Please update this list whenever your medications are discontinued, doses are      changed, or new medications (including over-the-counter products) are added. *  Please carry medication information at all times in case of emergency situations. These are general instructions for a healthy lifestyle:    No smoking/ No tobacco products/ Avoid exposure to second hand smoke  Surgeon General's Warning:  Quitting smoking now greatly reduces serious risk to your health.     Obesity, smoking, and sedentary lifestyle greatly increases your risk for illness    A healthy diet, regular physical exercise & weight monitoring are important for maintaining a healthy lifestyle    You may be retaining fluid if you have a history of heart failure or if you experience any of the following symptoms:  Weight gain of 3 pounds or more overnight or 5 pounds in a week, increased swelling in our hands or feet or shortness of breath while lying flat in bed. Please call your doctor as soon as you notice any of these symptoms; do not wait until your next office visit. The discharge information has been reviewed with the patient and caregiver. The patient and caregiver verbalized understanding. Discharge medications reviewed with the patient and caregiver and appropriate educational materials and side effects teaching were provided.   ___________________________________________________________________________________________________________________________________

## 2021-05-25 NOTE — TELEPHONE ENCOUNTER
Jerson Spencer has been contacted regarding recent hospital admission. Current medications were reviewed with the patient/caregiver by telephone. Date of Admission:  5/17/21     Date of Discharge: 5/24/21     Facility patient was discharged from: [facility]      Reason for Admission: GI bleed, Orthostatic hypotension   Any medication changes? Yes - gabapentin 100mg BID, famotidine 20mg QPM, metoprolol 12.5mg BID- All meds reviewed with S/O     How is the patient feeling? Very fatigued and confused at times      Does the patient have any questions or problems? Yes- medications were called into mail order pharmacy. Needs quick fill to [facility].    Does the patient need transportation? Yes, S/O will be brniging him   Follow-up Appointment date: Friday 5/28 with Vivien- Has HH ordered        I advised the patient to bring his medications in the original bottles and to contact office, or go to either urgent care or emergency care if symptoms return before scheduled appointment.     Tonia Esparza 5/25/2021 8:57 AM

## 2021-05-25 NOTE — PROGRESS NOTES
1. Have you been to the ER, urgent care clinic since your last visit? Hospitalized since your last visit? Yes Reason for visit: Hematuria    2. Have you seen or consulted any other health care providers outside of the [address] [place] [address] since your last visit? Include any pap smears or colon screening.  No light

## 2021-05-26 NOTE — TELEPHONE ENCOUNTER
Advised the nurse for Mr. Dina Guo that I sent refill request to Dr. Anish Whitehead and they will be sent tomorrow.

## 2021-05-26 NOTE — TELEPHONE ENCOUNTER
----- Message from Brandon Philippe sent at 5/25/2021  2:58 PM EDT -----  Regarding: Dr. Brian Robert first and last name: Shukri Enriquez with [facility]    Reason for call: R/x pharmacy change    Callback required yes/no and why: Yes, to verify r/x change    Best contact number(s): [phone]    Details to clarify the request: Pt was prescribed 3 new medications: \"metopalol titrate\" 25mgs, \"gabapentin' 100mgs twice daily, and \"pepcid\" 20mgs once daily, but his regular pharmacy does not have them in stock. Caller would like to know if r/x's can be called into the [facility] in [place].

## 2021-05-28 NOTE — PROGRESS NOTES
Mr. Jay Chavarria is a 81 y.o. male who is here for evaluation of   Chief Complaint   [facility]   [facility] - 5/17 - 524 - DX: GI bleed, Orthostatic hypotension    Medication Evaluation     Has not started medicaitons (famotidine, gabapentin or metoprolol) since coming home    Fall     (L) buttocks/lower back pain from fall     Ethelda Blow Dr Tere Carrera on 5/26 for heel issues. Was given script for Nuzyra 150mg - has not started    . ASSESSMENT AND PLAN:    1. Gastrointestinal hemorrhage associated with peptic ulcer  Currently stable with no evidence of hypotension or tachycardia. Patient urged to begin Pepcid 20 mg daily. Will remain off of apixaban. Patient instructed to anticipate the passage of dark or black stools while taking iron. Continue follow-up. 2. Cellulitis of heel, left  Currently followed by podiatry and on Nuzyra 150 mg. Orders Placed This Encounter    Nuzyra 150 mg tab     Sig: TAKE 3 TABLETS BY MOUTH ON DAY 1 AND 2. THEN TAKE 2 TABLETS BY MOUTH FOR THE NEXT 8 DAYS. HPI 81-year-old gentleman with Parkinson's disease who presented to hospital with GI bleeding after treatment had been initiated for atrial fibrillation with apixaban in November 2020. He had presented with melena 2 weeks ago to [facility].  Hemoglobin stabilized at 8.9. Transition to a swing bed for physical therapy. Did not demonstrate any further GI bleeding during admission. Noted to have cellulitis in the left heel and seen by Dr. Danielle Torres who prescribed Nuzyra at 150 mg. ROS:  Denies fever, chills, cough, chest pain, SOB,  nausea, vomiting, or diarrhea. Denies wt loss, wt gain, hemoptysis, hematochezia or melena.     Physical Examination:    Visit Vitals  BP (!) 118/56 (BP 1 Location: Right upper arm, BP Patient Position: Sitting, BP Cuff Size: Adult long)   Pulse 73   Temp 97.7 °F (36.5 °C) (Temporal)   Resp 18   SpO2 100%      General: Alert, cooperative, no distress. Fairly profound bradykinesia. Speech is very slow. Head:  Normocephalic, without obvious abnormality, atraumatic. Eyes:  Conjunctivae/corneas clear. Pupils equal, round, reactive to light. Extraocular movements intact. Lungs:   Clear to auscultation bilaterally. Chest wall:  No tenderness or deformity. Cardiac:  IRRR   Abdomen:   Soft, non-tender. Bowel sounds normal. No masses. No organomegaly. Extremities:  2+ bilateral lower extremity edema   Pulses: 2+ and symmetric all extremities. Skin: Skin color, texture, turgor normal. No rashes or lesions. Lymph nodes: Cervical, supraclavicular, and axillary nodes normal.   Neurologic: CNII-XII intact. Normal strength, sensation, and reflexes throughout. On this date 05/28/2021 I have spent 30 minutes reviewing previous notes, test results and face to face with the patient discussing the diagnosis and importance of compliance with the treatment plan as well as documenting on the day of the visit.     Zaira Gallagher MD FACP    (signed electronically) on 5/28/2021 at 4:49 PM

## 2021-05-28 NOTE — PROGRESS NOTES
Katina Brown is a 81 y.o. male presenting for/with:    Chief Complaint   Patient presents with    Transitions Of Care     D/C [facility] - 5/17 - 524 - DX: GI bleed, Orthostatic hypotension    Medication Evaluation     Has not started medicaitons (famotidine, gabapentin or metoprolol) since coming home    Fall     (L) buttocks/lower back pain from fall     Gerry Sites Dr Trixie Murphy on 5/26 for heel issues. Was given script for Nuzyra 150mg - has not started        Visit Vitals  BP (!) 118/56 (BP 1 Location: Right upper arm, BP Patient Position: Sitting, BP Cuff Size: Adult long)   Pulse 73   Temp 97.7 °F (36.5 °C) (Temporal)   Resp 18   SpO2 100%     Pain Scale: 3/10  Pain Location: Foot    1. Have you been to the ER, urgent care clinic since your last visit? Hospitalized since your last visit? YES    2. Have you seen or consulted any other health care providers outside of the [address] [place] [address] since your last visit? Include any pap smears or colon screening.  YES    Symptom review:  NO  Fever   NO  Shaking chills  NO  Cough  NO  Body aches  NO  Coughing up blood  NO  Chest congestion  NO  Chest pain  NO  Shortness of breath  NO  Profound Loss of smell/taste  NO  Nausea/Vomiting   NO  Loose stool/Diarrhea  NO  any skin issues    Patient Risk Factors Reviewed as follows:  NO  have you been in Close contact with confirmed COVID19 patient   NO  History of recent travel to affected geographical areas within the past 14 days  NO  COPD  NO  Active Cancer/Leukemia/Lymphoma/Chemotherapy  NO  Oral steroid use  NO  Pregnant  YES  Diabetes Mellitus  YES  Heart disease  NO  Asthma  NO Health care worker at home  [address] care worker  NO Is there a Pregnant Woman in the home  NO Dialysis pt in the home   NO a large number of people living in the home    Learning Assessment 4/13/2021   PRIMARY LEARNER Patient   [address]    LEARNER PREFERENCE PRIMARY LISTENING     DEMONSTRATION   ANSWERED BY patient   RELATIONSHIP SELF     Fall Risk Assessment, last 12 mths 5/28/2021   Able to walk? Yes   Fall in past 12 months? 1   Do you feel unsteady? 1   Are you worried about falling 1   Is TUG test greater than 12 seconds? -   Is the gait abnormal? 1   Number of falls in past 12 months 2   Fall with injury? 1       3 most recent PHQ Screens 5/28/2021   Little interest or pleasure in doing things Nearly every day   Feeling down, depressed, irritable, or hopeless Nearly every day   Total Score PHQ 2 6   Trouble falling or staying asleep, or sleeping too much Nearly every day   Feeling tired or having little energy Nearly every day   Poor appetite, weight loss, or overeating Not at all   Feeling bad about yourself - or that you are a failure or have let yourself or your family down Not at all   Trouble concentrating on things such as school, work, reading, or watching TV Not at all   Moving or speaking so slowly that other people could have noticed; or the opposite being so fidgety that others notice Nearly every day   How difficult have these problems made it for you to do your work, take care of your home and get along with others Very difficult     Abuse Screening Questionnaire 5/28/2021   Do you ever feel afraid of your partner? N   Are you in a relationship with someone who physically or mentally threatens you? N   Is it safe for you to go home?  Y       ADL Assessment 5/28/2021   Feeding yourself Help Needed   Getting from bed to chair Help Needed   Getting dressed Help Needed   Bathing or showering Help Needed   Walk across the room (includes cane/walker) Help Needed   Using the telphone Help Needed   Taking your medications Help Needed   Preparing meals Help Needed   Managing money (expenses/bills) Help Needed   Moderately strenuous housework (laundry) Help Needed   Shopping for personal items (toiletries/medicines) Help Needed   Shopping for groceries Help Needed Driving Help Needed   Climbing a flight of stairs Help Needed   Getting to places beyond walking distances Help Needed

## 2021-06-04 NOTE — PROGRESS NOTES
Kuldeep Mcwilliams is a 81 y.o. male presenting for/with:    Chief Complaint   Patient presents with    Peripheral Edema     (L) LE and foot elevated.  Wound Check     (L) foot    Medication Evaluation       Visit Vitals  /80 (BP 1 Location: Left upper arm, BP Patient Position: Sitting, BP Cuff Size: Adult long)   Pulse 62   Temp 96.9 °F (36.1 °C) (Temporal)   Resp 20   Ht 6' 3\" (1.905 m)   Wt 210 lb 3.2 oz (95.3 kg)   SpO2 97%   BMI 26.27 kg/m²     Pain Scale: /10  Pain Location:     1. Have you been to the ER, urgent care clinic since your last visit? Hospitalized since your last visit? NO    2. Have you seen or consulted any other health care providers outside of the [address] [place] [address] since your last visit? Include any pap smears or colon screening. NO    Symptom review:  NO  Fever   NO  Shaking chills  NO  Cough  NO  Body aches  NO  Coughing up blood  NO  Chest congestion  NO  Chest pain  NO  Shortness of breath  NO  Profound Loss of smell/taste  NO  Nausea/Vomiting   NO  Loose stool/Diarrhea  YES  any skin issues    Patient Risk Factors Reviewed as follows:  NO  have you been in Close contact with confirmed COVID19 patient   NO  History of recent travel to affected geographical areas within the past 14 days  NO  COPD  NO  Active Cancer/Leukemia/Lymphoma/Chemotherapy  NO  Oral steroid use  NO  Pregnant  YES  Diabetes Mellitus  YES  Heart disease  NO  Asthma  NO Health care worker at home  [address] care worker  NO Is there a Pregnant Woman in the home  NO Dialysis pt in the home   NO a large number of people living in the home    Learning Assessment 4/13/2021   PRIMARY LEARNER Patient   [address]    LEARNER PREFERENCE PRIMARY LISTENING     DEMONSTRATION   ANSWERED BY patient   RELATIONSHIP SELF     Fall Risk Assessment, last 12 mths 5/28/2021   Able to walk? Yes   Fall in past 12 months? 1   Do you feel unsteady?  1   Are you worried about falling 1   Is TUG test greater than 12 seconds? -   Is the gait abnormal? 1   Number of falls in past 12 months 2   Fall with injury? 1       3 most recent PHQ Screens 5/28/2021   Little interest or pleasure in doing things Nearly every day   Feeling down, depressed, irritable, or hopeless Nearly every day   Total Score PHQ 2 6   Trouble falling or staying asleep, or sleeping too much Nearly every day   Feeling tired or having little energy Nearly every day   Poor appetite, weight loss, or overeating Not at all   Feeling bad about yourself - or that you are a failure or have let yourself or your family down Not at all   Trouble concentrating on things such as school, work, reading, or watching TV Not at all   Moving or speaking so slowly that other people could have noticed; or the opposite being so fidgety that others notice Nearly every day   How difficult have these problems made it for you to do your work, take care of your home and get along with others Very difficult     Abuse Screening Questionnaire 5/28/2021   Do you ever feel afraid of your partner? N   Are you in a relationship with someone who physically or mentally threatens you? N   Is it safe for you to go home?  Y       ADL Assessment 5/28/2021   Feeding yourself Help Needed   Getting from bed to chair Help Needed   Getting dressed Help Needed   Bathing or showering Help Needed   Walk across the room (includes cane/walker) Help Needed   Using the telphone Help Needed   Taking your medications Help Needed   Preparing meals Help Needed   Managing money (expenses/bills) Help Needed   Moderately strenuous housework (laundry) Help Needed   Shopping for personal items (toiletries/medicines) Help Needed   Shopping for groceries Help Needed   Driving Help Needed   Climbing a flight of stairs Help Needed   Getting to places beyond walking distances Help Needed

## 2021-06-04 NOTE — PROGRESS NOTES
Chief Complaint   Patient presents with    Peripheral Edema     (L) LE and foot elevated.  Wound Check     (L) foot    Medication Evaluation       ASSESSMENT AND PLAN:    1. Cellulitis of heel, left  Stable. No evidence for deep infection at this time. Continue antibiotics and change dressings via Home Health. Elevate left leg so that the ankle is higher than the knee. HH will see Monday and will contact us regarding status. Following up with Dr Shital Boyce      No orders of the defined types were placed in this encounter. Hamlet Perrin MD, FACP      HPI:         is a 81 y.o. male who notes the onset of a skin problem. The details are as follows: Following up after visit with Dr Shital Boyce who placed him on [place]. Complaint with meds. Minimal drainage on dressing. No fever or chills. Allergies   Allergen Reactions    Cat Hair Std Allergenic Ext Unknown (comments)    Codeine Unknown (comments)    Ragweed Unknown (comments)     Mixed ragweed/pollen extract,tree extract       Current Outpatient Medications   Medication Sig    Nuzyra 150 mg tab TAKE 3 TABLETS BY MOUTH ON DAY 1 AND 2. THEN TAKE 2 TABLETS BY MOUTH FOR THE NEXT 8 DAYS. (Patient not taking: Reported on 5/28/2021)    metoprolol tartrate (LOPRESSOR) 25 mg tablet Take 0.5 Tablets by mouth every twelve (12) hours. (Patient not taking: Reported on 5/28/2021)    gabapentin (NEURONTIN) 100 mg capsule Take 1 Capsule by mouth two (2) times a day. Max Daily Amount: 200 mg. (Patient not taking: Reported on 5/28/2021)    famotidine (PEPCID) 20 mg tablet Take 1 Tablet by mouth every evening. (Patient not taking: Reported on 5/28/2021)    carbidopa-levodopa (SINEMET)  mg per tablet TAKE 1 TABLET FOUR TIMES A DAY FOR PARKINSONS DISEASE    cyanocobalamin (VITAMIN B12) 500 mcg tablet TAKE 2 TABLETS DAILY    ferrous sulfate (IRON) 325 mg (65 mg iron) tablet Take 28 mg by mouth.  Indications: 1 TAB ON MONDAY,WEDNESDAY, AND FRIDAY    finasteride (PROSCAR) 5 mg tablet Take 5 mg by mouth daily. take at bedtime    tamsulosin (FLOMAX) 0.4 mg capsule Take 1 Cap by mouth daily. No current facility-administered medications for this visit. Past Medical History:   Diagnosis Date    Asthma     Chronic kidney disease     Dermatophytosis of groin and perianal area 2010    Edema 2008    Encounter for long-term (current) use of other medications 2010    Gout, unspecified 2010    Gouty arthropathy, unspecified 2010    Hypertension     Hypertrophy of prostate without urinary obstruction and other lower urinary tract symptoms (LUTS) 2008    Impotence of organic origin 2008    Memory loss 2009    Obesity, unspecified 2010    Orthostatic hypotension 2008    Other and unspecified hyperlipidemia 2008    Other atopic dermatitis and related conditions 2012    Palpitations 2010    Peripheral angiopathy in diseases classified elsewhere [facility]) 2010    Personal history of malignant neoplasm of rectum, rectosigmoid junction, and anus 2011    Tinea nigra 2011    Unspecified pruritic disorder 2011    Vitamin B12 deficiency 7/23/2017         ROS:  Denies fever, chills, cough, chest pain, SOB,  nausea, vomiting, or diarrhea. Denies wt loss, wt gain, hemoptysis, hematochezia or melena. Physical Examination:    Visit Vitals  /80 (BP 1 Location: Left upper arm, BP Patient Position: Sitting, BP Cuff Size: Adult long)   Pulse 62   Temp 96.9 °F (36.1 °C) (Temporal)   Resp 20   Ht 6' 3\" (1.905 m)   Wt 210 lb 3.2 oz (95.3 kg)   SpO2 97%   BMI 26.27 kg/m²      General:  Alert, cooperative, no distress. Head:  Normocephalic, without obvious abnormality, atraumatic. Eyes:  Conjunctivae/corneas clear. Pupils equal, round, reactive to light. Chest wall:  No tenderness or deformity.    Extremities: BLE edema   Skin:  4 cm shallow pink erosion on the left heel without proximal extension   Lymph nodes: Cervical and supraclavicular nodes are normal.   Neurologic: Moves all extremities, fluent speech

## 2021-06-07 NOTE — TELEPHONE ENCOUNTER
Call from Sudhir Blanchard with Home care to let us know patients blood pressure is 165/74 and he has a lot of edema in his left foot. Per Dr. Dong Diaz keep feet elevated and to make sure heel looks clean. Per Sudhir Blanchard it is clean.

## 2021-06-08 NOTE — Clinical Note
Patient Class[de-identified] OBSERVATION [104]   Type of Bed: Medical [8]   Reason for Observation: orthostatic hypotens   Admitting Diagnosis: Orthostatic hypotension [458. 0. ICD-9-CM]   Admitting Physician: Keegan Li   Attending Physician: Johnathon Mcnulty [[id number]]

## 2021-06-09 PROBLEM — R29.898 WEAKNESS OF BOTH LEGS: Status: ACTIVE | Noted: 2021-01-01

## 2021-06-09 PROBLEM — G62.9 PERIPHERAL NEUROPATHY: Chronic | Status: ACTIVE | Noted: 2021-01-01

## 2021-06-09 NOTE — PROGRESS NOTES
Follow up visit with patient in Rm 200  Provided empathic listening and spiritual support  Advised of  Availability   [address] [place] [address]

## 2021-06-09 NOTE — PROGRESS NOTES
Problem: Falls - Risk of  Goal: *Absence of Falls  Description: Document Naomi Fraga Fall Risk and appropriate interventions in the flowsheet. Outcome: Progressing Towards Goal  Note: Fall Risk Interventions:  Mobility Interventions: Utilize walker, cane, or other assistive device    Mentation Interventions: Adequate sleep, hydration, pain control, Door open when patient unattended, Toileting rounds    Medication Interventions: Bed/chair exit alarm    Elimination Interventions: Bed/chair exit alarm    History of Falls Interventions: Bed/chair exit alarm         Problem: Patient Education: Go to Patient Education Activity  Goal: Patient/Family Education  Outcome: Progressing Towards Goal     Problem: Pressure Injury - Risk of  Goal: *Prevention of pressure injury  Description: Document Jonah Scale and appropriate interventions in the flowsheet.   Outcome: Progressing Towards Goal  Note: Pressure Injury Interventions:  Sensory Interventions: Assess changes in LOC    Moisture Interventions: Absorbent underpads, Check for incontinence Q2 hours and as needed    Activity Interventions: Increase time out of bed    Mobility Interventions: Float heels    Nutrition Interventions: Offer support with meals,snacks and hydration                     Problem: Patient Education: Go to Patient Education Activity  Goal: Patient/Family Education  Outcome: Progressing Towards Goal

## 2021-06-09 NOTE — PROGRESS NOTES
Spoke with patient's wife earlier. She stated she NEVER received a phone call from [facility] [employer] to help patient with medicaid application so he could received personal care aides in the home. Sent patient's information to [facility]. Instructed Ms.  Ricardo to look out for her call

## 2021-06-09 NOTE — PROGRESS NOTES
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 Though he walked short distances he was doing fairly well at home. HOME SUPPORT PRIOR TO ADMISSION: The patient lived with wife and was getting PT, OT and SLP through home health. .    Physical Therapy Goals  Initiated 6/9/2021  1. Patient will move from supine to sit and sit to supine , scoot up and down, and roll side to side in bed with minimal assistance/contact guard assist within 7 day(s). 2.  Patient will transfer from bed to chair and chair to bed with minimal assistance/contact guard assist using the least restrictive device within 7 day(s). 3.  Patient will perform sit to stand with minimal assistance/contact guard assist within 7 day(s). 4.  Patient will ambulate with minimal assistance/contact guard assist for 25 feet with the least restrictive device within 7 day(s). PHYSICAL THERAPY EVALUATION  Patient: Norma Posey (44 y.o. male)  Date: 6/9/2021  Primary Diagnosis: Orthostatic hypotension [I95.1]        Precautions: high HR, PD , fall, mild to mod confusion       ASSESSMENT. Based on the objective data described below, the patient presents with *hypotension, fall at home. He was d/c from facility 24 May and had a dizzy spell at home and fell. X-rays negative. HX Parkinson's, HTN, gout, Orthostatic HtN, and new weakness. Has been getting HH OT/PT/SLP. Today was sitting up in bed, was able to get to the edge w/cues and rails. BP sitting EOB within limits but HR using BP cuff was 137 and with portable pulse ox 143. Nursing staff notified and agreed pt could get to the chair but not strenuous activity.   He was mod assist for sit to stand, transfer and stand to sit per PT.  With set-up was min assist to wash face and back of head. He has LLE wrapped and said his foot doctor did this. He has edema, hard in the RLE. He asked to get dressed as had a foot doctor appt, but has IV going. He claimed he could hear his foot doctor talking in the hallway. Co treated with OT. Pt became more and more agitated throughout the day wanting to go home. Patient does need skilled care to improve mobiity in general, but he was doing this at home with HHPT<OT ans SLP. It is thisPT opinion that this patient would do best to return hime if possible as he seems to be much better there with good family support. (PT is familiar from May admission)     Current Level of Function Impacting Discharge (ADLs/self-care): High HR limits participation although HR before leaving had dropped to 76. Parkinson's disease also makes ADL's more time consuming and tiring. Functional Outcome Measure: The patient scored 40 on the Barthel outcome measure which is indicative of dependent in ADLs. Other factors to consider for discharge:      Patient will benefit from skilled therapy intervention to address the above noted impairments. PLAN :  Recommendations and Planned Interventions: bed mobility training, transfer training, gait training, therapeutic exercises, patient and family training/education, and therapeutic activities      Frequency/Duration: Patient will be followed by physical therapy:  3 times a week to address goals. Recommendation for discharge: (in order for the patient to meet his/her long term goals)  Physical therapy at least 2 days/week in the home     This discharge recommendation:  Has not yet been discussed the attending provider and/or case management    IF patient discharges home will need the following DME: patient owns DME required for discharge         SUBJECTIVE:   Patient stated The foot doctor did that (ace wrap).     OBJECTIVE DATA SUMMARY:   HISTORY:    Past Medical History:   Diagnosis Date    Asthma     Chronic kidney disease     Dermatophytosis of groin and perianal area 2010    Edema 2008    Encounter for long-term (current) use of other medications 2010    Gout, unspecified 2010    Gouty arthropathy, unspecified 2010    Hypertension     Hypertrophy of prostate without urinary obstruction and other lower urinary tract symptoms (LUTS) 2008    Impotence of organic origin 2008    Memory loss 2009    Obesity, unspecified 2010    Orthostatic hypotension 2008    Other and unspecified hyperlipidemia 2008    Other atopic dermatitis and related conditions 2012    Palpitations 2010    Peripheral angiopathy in diseases classified elsewhere [facility]) 2010    Personal history of malignant neoplasm of rectum, rectosigmoid junction, and anus 2011    Tinea nigra 2011    Unspecified pruritic disorder 2011    Vitamin B12 deficiency 7/23/2017     Past Surgical History:   Procedure Laterality Date    ENDOSCOPY, COLON, DIAGNOSTIC  06/2011 05/2007, 01/2004,  12/2000    HX HERNIA REPAIR  1992    INCISIONAL    HX TOTAL COLECTOMY  1991    COLON CANCER S/P RESECTION       Personal factors and/or comorbidities impacting plan of care:     Home Situation  Home Environment: Private residence  # Steps to Enter: 4  Rails to Enter: Yes  One/Two Story Residence: One story  Living Alone: No  Support Systems: Family member(s)  Patient Expects to be Discharged to[de-identified] [employer]  Current DME Used/Available at Home: Walker, rolling, Commode, bedside, Grab bars  Tub or Shower Type: Tub/Shower combination    EXAMINATION/PRESENTATION/DECISION MAKING:   Critical Behavior:  Neurologic State: Alert  Orientation Level: Disoriented to person, Disoriented to place, Disoriented to situation     Safety/Judgement: Awareness of environment  Hearing:   Auditory  Auditory Impairment: Hard of hearing, left side  Skin:  some flaking  Edema:   Range Of Motion:   Not assessed                       Strength:        Generally decreased and moderately functional                 Tone & Sensation:                                  Coordination:     Vision:      Functional Mobility:  Bed Mobility:  Rolling: Modified independent  Supine to Sit: Modified independent  Sit to Supine: Modified independent  Scooting: Moderate assistance  Transfers:  Sit to Stand: Moderate assistance  Stand to Sit: Moderate assistance        Bed to Chair: Moderate assistance              Balance:   Sitting: Intact  Standing: Impaired  Standing - Static: Constant support;Poor  Standing - Dynamic : Constant support;Poor  Ambulation/Gait Training:            none    Therapeutic Exercises:       Functional Measure:    Barthel Index:    Bathin  Bladder: 10  Bowels: 10  Groomin  Dressin  Feedin  Mobility: 0  Stairs: 0  Toilet Use: 5  Transfer (Bed to Chair and Back): 5  Total: 40/100       The Barthel ADL Index: Guidelines  1. The index should be used as a record of what a patient does, not as a record of what a patient could do. 2. The main aim is to establish degree of independence from any help, physical or verbal, however minor and for whatever reason. 3. The need for supervision renders the patient not independent. 4. A patient's performance should be established using the best available evidence. Asking the patient, friends/relatives and nurses are the usual sources, but direct observation and common sense are also important. However direct testing is not needed. 5. Usually the patient's performance over the preceding 24-48 hours is important, but occasionally longer periods will be relevant. 6. Middle categories imply that the patient supplies over 50 per cent of the effort. 7. Use of aids to be independent is allowed. John Vazquez, Barthel, D.W. ([id number]). Functional evaluation: the Barthel Index. [address] [facility] (14)2. LUCHO Lopez, Jessica Flores., Bennie Del Rosario., Bishop Orellana, [address] [place] [address] ().  Measuring the change indisability after inpatient rehabilitation; comparison of the responsiveness of the Barthel Index and Functional [place] Measure. Journal of Neurology, Neurosurgery, and Psychiatry, 66(4), 934-304. ZAIN Bermudez, ANY Infante, & Nathan Mcnulty M.A. (2004.) Assessment of post-stroke quality of life in cost-effectiveness studies: The usefulness of the Barthel Index and the EuroQoL-5D. Quality of Life Research, 15, 209-07         Physical Therapy Evaluation Charge Determination   History Examination Presentation Decision-Making   MEDIUM  Complexity : 1-2 comorbidities / personal factors will impact the outcome/ POC  LOW Complexity : 1-2 Standardized tests and measures addressing body structure, function, activity limitation and / or participation in recreation  LOW Complexity : Stable, uncomplicated  LOW Complexity : FOTO score of       Based on the above components, the patient evaluation is determined to be of the following complexity level: LOW     Pain Rating:    Activity Tolerance:     Fair/Good  After treatment patient left in no apparent distress:   Sitting in chair    COMMUNICATION/EDUCATION:   The patients plan of care was discussed with: Occupational therapist and Case management. In rounds today    Patient is unable to participate in goal setting and plan of care.     Thank you for this referral.  Joni López, PT   Time Calculation: 28 mins

## 2021-06-09 NOTE — PROGRESS NOTES
Care Management Interventions  PCP Verified by CM: Yes  Last Visit to PCP: 06/04/21  Palliative Care Criteria Met (RRAT>21 & CHF Dx)?: No (No MD order)  Mode of Transport at Discharge: Other (see comment) (Wife POV )  Transition of Care Consult (CM Consult): Discharge Planning  Physical Therapy Consult: Yes  Occupational Therapy Consult: Yes  Speech Therapy Consult: No  Current Support Network: Lives with Spouse  Confirm Follow Up Transport: Family  The Plan for Transition of Care is Related to the Following Treatment Goals : Treat s/s orthostatic hypotension  Discharge Location  Discharge Placement: Home with home health      Patient lives at home with his wife. He was last with [facility] during his swing bed stay from 5/17-5/24. He uses a walker at home and a shower care. Patient was discharged from his skilled stay with home health through [facility] where he received PT, OT and nursing services. Patient is in observation status. Patient is confused currently. Spoke with Batool Hernandez (wife) via telephone. She states she thinks patient was dehydrated and that caused him to be In the hospital. She states home health has been working out well for them. Ms. Shai Call notified that patient was in observation status. NARA and CLARITA explained to her. She states understanding and grants verbal permission. Signed copies placed in chart and carbon copies placed in patient's folder as requested by patient's wife. Care management information given to Ms. Shai Call and instructed to call if she has any questions or concerns. She states understanding.      Reason for Admission:   Orthostatic hypotension                     RUR Score:     N/a PT IS IN OBS STATUS        RRAT: 23 HIGH              PCP: First and Last name:   Yumiko Noriega MD     Name of Practice: [facility]    Are you a current patient: Yes/No: Yes    Approximate date of last visit: 6/4/21   Can you participate in a virtual visit if needed: No     Do you (patient/family) have any concerns for transition/discharge? Not at this time                  Plan for utilizing home health:   Yes will resume home health with [facility] [address]:  DNR      Healthcare Decision Maker:   Click here to complete [address] including selection of the Healthcare Decision Maker Relationship (ie \"Primary\")            Primary Decision MakerInis Kp - [phone]    Secondary Decision Maker: Xochitl Jamescassandra  Nadeem - [phone]    Transition of Care Plan:      Home w/HH when medically stable.

## 2021-06-09 NOTE — ED TRIAGE NOTES
Pt reports increased generalized weakness that started today along w/increased edema in the LLE (however, both LE's appear equally edematous up to the knees). Pt denies any pain or dyspnea.

## 2021-06-09 NOTE — ED NOTES
TRANSFER - OUT REPORT:    Verbal report given to Neil Rodriguez RN on Madelyn Guzmán  being transferred to Med/Surg for routine progression of care       Report consisted of patients Situation, Background, Assessment and   Recommendations(SBAR). Information from the following report(s) ED Summary, MAR, Recent Results and Cardiac Rhythm (afib w/rvr), Pain Lvl and Fall Risk was reviewed with the receiving nurse. Lines:   Peripheral IV 06/09/21 Right Wrist (Active)   Site Assessment Clean, dry, & intact 06/09/21 0044   Phlebitis Assessment 0 06/09/21 0044   Infiltration Assessment 0 06/09/21 0044   Dressing Status Clean, dry, & intact 06/09/21 0044   Dressing Type Transparent 06/09/21 0044   Hub Color/Line Status Pink; Infiltrated;Patent 06/09/21 0044   Alcohol Cap Used Yes 06/09/21 0044        Opportunity for questions and clarification was provided.       Patient transported with:   Registered Nurse

## 2021-06-09 NOTE — ED PROVIDER NOTES
EMERGENCY DEPARTMENT HISTORY AND PHYSICAL EXAM          Date: 6/8/2021  Patient Name: Zay Ham    History of Presenting Illness     Chief Complaint   Patient presents with    Fatigue     onset today       History Provided By: Patient and Patient's Wife    HPI: Zay Ham is a 81 y.o. male, pmhx Parkinson's Disease, Htn, Gout, and Orthostatic hypotension, who was brought to the ED because of weakness that he demonstrated this evening. His wife reports that he has been doing well since he was discharged from the hospital 5/24, with PT, OT, and Home health nurse visits. He has been eating fairly well, but tonight, when he finished dinner, he got up to walk into the living room, and his knees buckled. He was unable to get up, and his wife could not help him up, so she called EMS. She does admit that he has not been drinking as much as he normally does, and today, the home health nurse obtained a blood pressure of 105, much lower that his usual 160. His mental status remains at its baseline. Patient specifically denies any recent fevers, chills, nausea, vomiting, diarrhea, abd pain, CP, SOB, urinary sxs, changes in BM, or headache. PCP: Graciela Gibbs MD    Allergies: cat hair, codeine, ragweed  Social Hx: Denies tobacco, alcohol or Illicit Drugs; Lives locally with wife. There are no other complaints, changes, or physical findings at this time.      Current Facility-Administered Medications   Medication Dose Route Frequency Provider Last Rate Last Admin    acetaminophen (TYLENOL) tablet 650 mg  650 mg Oral Q6H PRN Andrade Grullon MD        0.9% sodium chloride infusion  125 mL/hr IntraVENous CONTINUOUS Andrade Grullon  mL/hr at 06/09/21 0256 125 mL/hr at 06/09/21 0256       Past History     Past Medical History:  Past Medical History:   Diagnosis Date    Asthma     Chronic kidney disease     Dermatophytosis of groin and perianal area 2010    Edema 2008    Encounter for long-term (current) use of other medications 2010    Gout, unspecified 2010    Gouty arthropathy, unspecified 2010    Hypertension     Hypertrophy of prostate without urinary obstruction and other lower urinary tract symptoms (LUTS) 2008    Impotence of organic origin 2008    Memory loss 2009    Obesity, unspecified 2010    Orthostatic hypotension 2008    Other and unspecified hyperlipidemia 2008    Other atopic dermatitis and related conditions 2012    Palpitations 2010    Peripheral angiopathy in diseases classified elsewhere [facility]) 2010    Personal history of malignant neoplasm of rectum, rectosigmoid junction, and anus     Tinea nigra 2011    Unspecified pruritic disorder 2011    Vitamin B12 deficiency 2017       Past Surgical History:  Past Surgical History:   Procedure Laterality Date    ENDOSCOPY, COLON, DIAGNOSTIC  2011, 2004,  2000    HX HERNIA REPAIR  1992    INCISIONAL    HX TOTAL COLECTOMY      COLON CANCER S/P RESECTION       Family History:  Family History   Problem Relation Age of Onset    Other Mother         PULMONARY EDEMA    Other Father         PAD    Heart Attack Father     Coronary Artery Disease Father        Social History:  Social History     Tobacco Use    Smoking status: Former Smoker     Packs/day: 1.00     Years: 29.00     Pack years: 29.00     Types: Cigarettes     Start date: 1949     Quit date: 1978     Years since quittin.4    Smokeless tobacco: Never Used   Vaping Use    Vaping Use: Never used   Substance Use Topics    Alcohol use: No    Drug use: No       Allergies: Allergies   Allergen Reactions    Cat Hair Std Allergenic Ext Unknown (comments)    Codeine Unknown (comments)    Ragweed Unknown (comments)     Mixed ragweed/pollen extract,tree extract         Review of Systems   Review of Systems   Constitutional: Negative for activity change, appetite change and fever.    HENT: Negative for congestion and sore throat. Eyes: Negative for photophobia and redness. Respiratory: Negative for cough and shortness of breath. Cardiovascular: Negative for chest pain and leg swelling. Gastrointestinal: Negative for abdominal pain, constipation, diarrhea and nausea. Endocrine: Negative for polydipsia. Genitourinary: Negative for dysuria and hematuria. Musculoskeletal: Negative for back pain and myalgias. Skin: Negative for rash. Allergic/Immunologic: Negative for food allergies and immunocompromised state. Neurological: Negative for dizziness and numbness. Physical Exam   Physical Exam  Vitals reviewed. Constitutional:       General: He is not in acute distress. Appearance: He is well-developed. He is not diaphoretic. HENT:      Head: Normocephalic and atraumatic. Right Ear: External ear normal.      Left Ear: External ear normal.      Mouth/Throat:      Pharynx: No oropharyngeal exudate. Eyes:      General: No scleral icterus. Right eye: No discharge. Left eye: No discharge. Conjunctiva/sclera: Conjunctivae normal.      Pupils: Pupils are equal, round, and reactive to light. Neck:      Thyroid: No thyromegaly. Vascular: No JVD. Trachea: No tracheal deviation. Cardiovascular:      Rate and Rhythm: Normal rate and regular rhythm. Heart sounds: Normal heart sounds. No murmur heard. No friction rub. No gallop. Pulmonary:      Effort: Pulmonary effort is normal. No respiratory distress. Breath sounds: No wheezing or rales. Abdominal:      General: Bowel sounds are normal. There is no distension. Palpations: Abdomen is soft. Tenderness: There is no abdominal tenderness. There is no rebound. Musculoskeletal:         General: No tenderness or deformity. Normal range of motion. Cervical back: Normal range of motion and neck supple. Right lower leg: Edema present. Left lower leg: Edema present.    Skin:     General: Skin is warm and dry. Neurological:      Mental Status: He is alert. Mental status is at baseline. Cranial Nerves: No cranial nerve deficit. Coordination: Coordination normal.      Deep Tendon Reflexes: Reflexes are normal and symmetric. Comments: Answers questions with few words. Diagnostic Study Results     Labs -     Recent Results (from the past 12 hour(s))   CBC WITH AUTOMATED DIFF    Collection Time: 06/08/21 11:20 PM   Result Value Ref Range    WBC 4.3 4.1 - 11.1 K/uL    RBC 3.93 (L) 4.10 - 5.70 M/uL    HGB 10.9 (L) 12.1 - 17.0 g/dL    HCT 34.8 (L) 36.6 - 50.3 %    MCV 88.5 80.0 - 99.0 FL    MCH 27.7 26.0 - 34.0 PG    MCHC 31.3 30.0 - 36.5 g/dL    RDW 15.3 (H) 11.5 - 14.5 %    PLATELET 102 060 - 186 K/uL    MPV 8.9 8.9 - 12.9 FL    NRBC 0.0 0  WBC    ABSOLUTE NRBC 0.00 0.00 - 0.01 K/uL    NEUTROPHILS 69 32 - 75 %    LYMPHOCYTES 19 12 - 49 %    MONOCYTES 12 5 - 13 %    EOSINOPHILS 0 0 - 7 %    BASOPHILS 0 0 - 1 %    IMMATURE GRANULOCYTES 0 0.0 - 0.5 %    ABS. NEUTROPHILS 2.9 1.8 - 8.0 K/UL    ABS. LYMPHOCYTES 0.8 0.8 - 3.5 K/UL    ABS. MONOCYTES 0.5 0.0 - 1.0 K/UL    ABS. EOSINOPHILS 0.0 0.0 - 0.4 K/UL    ABS. BASOPHILS 0.0 0.0 - 0.1 K/UL    ABS. IMM. GRANS. 0.0 0.00 - 0.04 K/UL    DF AUTOMATED     METABOLIC PANEL, COMPREHENSIVE    Collection Time: 06/08/21 11:20 PM   Result Value Ref Range    Sodium 140 136 - 145 mmol/L    Potassium 3.6 3.5 - 5.1 mmol/L    Chloride 107 97 - 108 mmol/L    CO2 26 21 - 32 mmol/L    Anion gap 7 5 - 15 mmol/L    Glucose 101 (H) 65 - 100 mg/dL    BUN 20 6 - 20 MG/DL    Creatinine 1.29 0.70 - 1.30 MG/DL    BUN/Creatinine ratio 16 12 - 20      GFR est AA >60 >60 ml/min/1.73m2    GFR est non-AA 53 (L) >60 ml/min/1.73m2    Calcium 8.2 (L) 8.5 - 10.1 MG/DL    Bilirubin, total 0.4 0.2 - 1.0 MG/DL    ALT (SGPT) 9 (L) 12 - 78 U/L    AST (SGOT) 19 15 - 37 U/L    Alk.  phosphatase 54 45 - 117 U/L    Protein, total 6.5 6.4 - 8.2 g/dL    Albumin 2.6 (L) 3.5 - 5.0 g/dL    Globulin 3.9 2.0 - 4.0 g/dL    A-G Ratio 0.7 (L) 1.1 - 2.2     TROPONIN I    Collection Time: 06/08/21 11:20 PM   Result Value Ref Range    Troponin-I, Qt. <0.05 <0.05 ng/mL   MAGNESIUM    Collection Time: 06/08/21 11:20 PM   Result Value Ref Range    Magnesium 2.0 1.6 - 2.4 mg/dL   URINALYSIS W/MICROSCOPIC    Collection Time: 06/08/21 11:35 PM   Result Value Ref Range    Color YELLOW/STRAW      Appearance CLEAR CLEAR      Specific gravity 1.025 1.003 - 1.030      pH (UA) 6.0 5.0 - 8.0      Protein Negative NEG mg/dL    Glucose Negative NEG mg/dL    Ketone Negative NEG mg/dL    Bilirubin Negative NEG      Blood Negative NEG      Urobilinogen 0.2 0.2 - 1.0 EU/dL    Nitrites Negative NEG      Leukocyte Esterase Negative NEG      WBC 0-4 0 - 4 /hpf    RBC 0-5 0 - 5 /hpf    Epithelial cells FEW FEW /lpf    Bacteria Negative NEG /hpf       Radiologic Studies -   XR CHEST PORT   Final Result   No acute findings. CT Results  (Last 48 hours)    None        CXR Results  (Last 48 hours)               06/08/21 2239  XR CHEST PORT Final result    Impression:  No acute findings. Narrative:  EXAM: XR CHEST PORT       INDICATION: weakness       COMPARISON: 2018       FINDINGS: A portable AP radiograph of the chest was obtained at 2230 hours. The   patient is on a cardiac monitor. The lungs are clear. There is atherosclerosis   of the aorta. The bones and soft tissues are grossly within normal limits. Medical Decision Making   I am the first provider for this patient. I reviewed the vital signs, available nursing notes, past medical history, past surgical history, family history and social history. Vital Signs-Reviewed the patient's vital signs.   Patient Vitals for the past 12 hrs:   Temp Pulse Resp BP SpO2   06/09/21 0210     100 %   06/09/21 0205 97.8 °F (36.6 °C) (!) 118 16 (!) 147/91 100 %   06/09/21 0141 97.9 °F (36.6 °C) (!) 111 16 124/82 100 %   06/09/21 0050  97 16 115/72 96 %   06/08/21 2348  (!) 136  (!) 68/45    06/08/21 2345  (!) 119  (!) 91/51    06/08/21 2342  99  123/79    06/08/21 2159  96 16 116/69    06/08/21 2157 99.1 °F (37.3 °C) (!) 125   97 %       Pulse Oximetry Analysis - 98% on RA    Records Reviewed: Nursing Notes, Old Medical Records and Previous Laboratory Studies    Provider Notes (Medical Decision Making):   MDM: DDx: Orthostatic hypotension secondary to dehydration vs Orthostatic hypotension secondary to Parkinson's vs UTI    ED Course:   Initial assessment performed. The patients presenting problems have been discussed, and they are in agreement with the care plan formulated and outlined with them. I have encouraged them to ask questions as they arise throughout their visit. EKG interpretation: (Preliminary)  Rhythm: Atrial fib, RVR,   This EKG was interpreted by ED Provider Dr Richy Valentin MD    PROGRESS NOTE:    Pt found to have significant orthostasis; supine /79 and standing 68/45. Cardiac echo from 11/2020 showed EF 65-70% with no CHF; he was given 1000 ml NS IV bolus. His case was discussed with Dr. Nigel Clarke, who agreed that bringing patient into hospital for hydration would be appropriate. Diagnosis     Clinical Impression: Orthostatic hypotension                                      Hypovolemia    PLAN:  1.  Shahram Torres MD

## 2021-06-09 NOTE — H&P
[facility]   OBS Admission History & Physical        6/9/2021 8:56 AM  Patient: Lawanda Escalante 1935  PCP: Vansesa Seymour MD    HISTORY  Chief Complaint:   Chief Complaint   Patient presents with    Fatigue     onset today       HPI: 81 y.o. male presenting for admission to [facility] for further evaluation and treatment for Orthostatic hypotension. He  has a past medical history of Asthma, Chronic kidney disease, Dermatophytosis of groin and perianal area (2010), Edema (2008), Encounter for long-term (current) use of other medications (2010), Gout, unspecified (2010), Gouty arthropathy, unspecified (2010), Hypertension, Hypertrophy of prostate without urinary obstruction and other lower urinary tract symptoms (LUTS) (2008), Impotence of organic origin (2008), Memory loss (2009), Obesity, unspecified (2010), Orthostatic hypotension (2008), Other and unspecified hyperlipidemia (2008), Other atopic dermatitis and related conditions (2012), Palpitations (2010), Peripheral angiopathy in diseases classified elsewhere ([facility] [address] (2010), Personal history of malignant neoplasm of rectum, rectosigmoid junction, and anus (2011), Tinea nigra (2011), Unspecified pruritic disorder (2011), and Vitamin B12 deficiency (7/23/2017). He also has no past medical history of Other dyspnea and respiratory abnormality, Personal history of malignant neoplasm of large intestine, Polyphagia(783.6), Psychosexual dysfunction, unspecified, Unspecified hearing loss, or Unspecified visual loss. . \    Patient returns to the ED via EMS following a fall at home last evening. The patient states his legs were weak and they simply gave out and he went down. No apparent injury. Nothing orthostasis documented in the ED with a drop in systolic blood pressure up from 1 15-68 and a jump in heart rate from 97-1 36. Received 1 L of IV fluids in the ED.   The patient's wife stated that there had been some poor fluid intake at homepatient refusing to drink fluids. Continuing fluids at 125 cc/h today. During recent admission diuretic was stopped and Cardizem was changed to low-dose metoprolol. Patient received therapy and rehab prior to disposition. Apparently the patient has been doing fairly well until the day of admission. Patient had been maintained on PT and OT with home health following discharge. He had been eating well. After getting up from the dinner table he was weak and his legs buckled and he went down. Compliance with his walker has been an issueneed to clarify. Patient has cognitive impairment which is baseline. Patient has had follow-up with Dr. Bety Borja his PCP as well as with podiatry since disposition. Patient was started on a tetracycline antibiotic for his heels.       Past Medical History:  Past Medical History:   Diagnosis Date    Asthma     Chronic kidney disease     Dermatophytosis of groin and perianal area 2010    Edema 2008    Encounter for long-term (current) use of other medications 2010    Gout, unspecified 2010    Gouty arthropathy, unspecified 2010    Hypertension     Hypertrophy of prostate without urinary obstruction and other lower urinary tract symptoms (LUTS) 2008    Impotence of organic origin 2008    Memory loss 2009    Obesity, unspecified 2010    Orthostatic hypotension 2008    Other and unspecified hyperlipidemia 2008    Other atopic dermatitis and related conditions 2012    Palpitations 2010    Peripheral angiopathy in diseases classified elsewhere [facility]) 2010    Personal history of malignant neoplasm of rectum, rectosigmoid junction, and anus 2011    Tinea nigra 2011    Unspecified pruritic disorder 2011    Vitamin B12 deficiency 7/23/2017       Past Surgical History:  Past Surgical History:   Procedure Laterality Date    ENDOSCOPY, COLON, DIAGNOSTIC  06/2011 05/2007, 01/2004,  12/2000    [address] COLON CANCER S/P RESECTION       Medication:  Prior to Admission medications    Medication Sig Start Date End Date Taking? Authorizing Provider   Nuzyra 150 mg tab TAKE 3 TABLETS BY MOUTH ON DAY 1 AND 2. THEN TAKE 2 TABLETS BY MOUTH FOR THE NEXT 8 DAYS. Patient not taking: Reported on 2021   Provider, Historical   metoprolol tartrate (LOPRESSOR) 25 mg tablet Take 0.5 Tablets by mouth every twelve (12) hours. Patient not taking: Reported on 2021   Art Weinberg MD   gabapentin (NEURONTIN) 100 mg capsule Take 1 Capsule by mouth two (2) times a day. Max Daily Amount: 200 mg. Patient not taking: Reported on 2021   Art Weinberg MD   famotidine (PEPCID) 20 mg tablet Take 1 Tablet by mouth every evening. Patient not taking: Reported on 2021   Art Weinberg MD   carbidopa-levodopa (SINEMET)  mg per tablet TAKE 1 TABLET FOUR TIMES A DAY FOR PARKINSONS DISEASE 10/5/20   Art Weinberg MD   cyanocobalamin (VITAMIN B12) 500 mcg tablet TAKE 2 TABLETS DAILY 20   Art Weinberg MD   ferrous sulfate (IRON) 325 mg (65 mg iron) tablet Take 28 mg by mouth. Indications: 1 TAB ON MONDAY,WEDNESDAY, AND FRIDAY    Provider, Devin   finasteride (PROSCAR) 5 mg tablet Take 5 mg by mouth daily. take at bedtime 18   Olvin Quintero MD   tamsulosin [facility]) 0.4 mg capsule Take 1 Cap by mouth daily. 18   Cyrus Dotson., NP       Allergies:   Allergies   Allergen Reactions    Cat Hair Std Allergenic Ext Unknown (comments)    Codeine Unknown (comments)    Ragweed Unknown (comments)     Mixed ragweed/pollen extract,tree extract       Social History:  Social History     Tobacco Use    Smoking status: Former Smoker     Packs/day: 1.00     Years: 29.00     Pack years: 29.00     Types: Cigarettes     Start date: 1949     Quit date: 1978     Years since quittin.4    Smokeless tobacco: Never Used   Vaping Use    Vaping Use: Never used   Substance Use Topics    Alcohol use: No    Drug use: No       Family History:  Family History   Problem Relation Age of Onset    Other Mother         PULMONARY EDEMA    Other Father         PAD    Heart Attack Father     Coronary Artery Disease Father        ROS:  Total of 12 systems reviewed as follows:  POSITIVE= bolded text  Negative = text not bolded       General:  fever, chills, sweats, generalized weakness, weight loss/gain, loss of appetite   Eyes:    blurred vision, eye pain, loss of vision, double vision  ENT:    rhinorrhea, pharyngitis   Respiratory:  cough, sputum production, SOB, SARABIA, wheezing, pleuritic pain   Cardiology:   chest pain, palpitations, orthopnea, PND, edema, syncope   Gastrointestinal:  abdominal pain , N/V, diarrhea, dysphagia, constipation, bleeding   Genitourinary:  frequency, urgency, dysuria, hematuria, incontinence, prostatism   Muskuloskeletal: arthralgia, myalgia, back pain  Hematology:   easy bruising, nose or gum bleeding, lymphadenopathy   Dermatological: rash, ulceration, pruritis, color change / jaundice  Endocrine:   hot flashes or polydipsia   Neurological:  headache, dizziness, confusion, focal weakness, paresthesia, speech difficulties, memory loss, gait difficulty  Psychological: feelings of anxiety, depression, agitation      PHYSICAL EXAM:  Patient Vitals for the past 24 hrs:   Temp Pulse Resp BP SpO2   06/09/21 0806 97.8 °F (36.6 °C) 90 20 (!) 158/76 96 %   06/09/21 0210     100 %   06/09/21 0205 97.8 °F (36.6 °C) (!) 118 16 (!) 147/91 100 %   06/09/21 0141 97.9 °F (36.6 °C) (!) 111 16 124/82 100 %   06/09/21 0050  97 16 115/72 96 %   06/08/21 2348  (!) 136  (!) 68/45    06/08/21 2345  (!) 119  (!) 91/51    06/08/21 2342  99  123/79    06/08/21 2159  96 16 116/69    06/08/21 2157 99.1 °F (37.3 °C) (!) 125   97 %       General:    Alert, cooperative, no distress, appears stated age.      HEENT: Atraumatic, anicteric sclerae, pink conjunctivae     No oral ulcers, mucosa moist, throat clear, dentition fair  Neck:  Supple, symmetrical;   thyroid non tender  Lungs:   Clear to auscultation bilaterally. Poor excursion. No wheezing or rhonchi. No rales. Chest wall:  No tenderness. No accessory muscle use. Heart:   Regular rhythm. No  murmur. 2+ pedal edema  Abdomen:   Soft, obese, non-tender. Not distended. Bowel sounds normal  Extremities: No cyanosis. No clubbing      Capillary refill normal,  Radial pulse 2+,  DP 1+  Skin:     Not pale. Not jaundiced. No rashes   Psych:  Not depressed. Not anxious or agitated. Neurologic: EOMs intact. No facial asymmetry. No aphasia or slurred speech. Symmetrical strength, Sensation grossly intact. Alert and oriented. Lab Data Reviewed:    Recent Results (from the past 24 hour(s))   CBC WITH AUTOMATED DIFF    Collection Time: 06/08/21 11:20 PM   Result Value Ref Range    WBC 4.3 4.1 - 11.1 K/uL    RBC 3.93 (L) 4.10 - 5.70 M/uL    HGB 10.9 (L) 12.1 - 17.0 g/dL    HCT 34.8 (L) 36.6 - 50.3 %    MCV 88.5 80.0 - 99.0 FL    MCH 27.7 26.0 - 34.0 PG    MCHC 31.3 30.0 - 36.5 g/dL    RDW 15.3 (H) 11.5 - 14.5 %    PLATELET 189 642 - 772 K/uL    MPV 8.9 8.9 - 12.9 FL    NRBC 0.0 0  WBC    ABSOLUTE NRBC 0.00 0.00 - 0.01 K/uL    NEUTROPHILS 69 32 - 75 %    LYMPHOCYTES 19 12 - 49 %    MONOCYTES 12 5 - 13 %    EOSINOPHILS 0 0 - 7 %    BASOPHILS 0 0 - 1 %    IMMATURE GRANULOCYTES 0 0.0 - 0.5 %    ABS. NEUTROPHILS 2.9 1.8 - 8.0 K/UL    ABS. LYMPHOCYTES 0.8 0.8 - 3.5 K/UL    ABS. MONOCYTES 0.5 0.0 - 1.0 K/UL    ABS. EOSINOPHILS 0.0 0.0 - 0.4 K/UL    ABS. BASOPHILS 0.0 0.0 - 0.1 K/UL    ABS. IMM.  GRANS. 0.0 0.00 - 0.04 K/UL    DF AUTOMATED     METABOLIC PANEL, COMPREHENSIVE    Collection Time: 06/08/21 11:20 PM   Result Value Ref Range    Sodium 140 136 - 145 mmol/L    Potassium 3.6 3.5 - 5.1 mmol/L    Chloride 107 97 - 108 mmol/L    CO2 26 21 - 32 mmol/L    Anion gap 7 5 - 15 mmol/L    Glucose 101 (H) 65 - 100 mg/dL    BUN 20 6 - 20 MG/DL    Creatinine 1.29 0.70 - 1.30 MG/DL    BUN/Creatinine ratio 16 12 - 20      GFR est AA >60 >60 ml/min/1.73m2    GFR est non-AA 53 (L) >60 ml/min/1.73m2    Calcium 8.2 (L) 8.5 - 10.1 MG/DL    Bilirubin, total 0.4 0.2 - 1.0 MG/DL    ALT (SGPT) 9 (L) 12 - 78 U/L    AST (SGOT) 19 15 - 37 U/L    Alk. phosphatase 54 45 - 117 U/L    Protein, total 6.5 6.4 - 8.2 g/dL    Albumin 2.6 (L) 3.5 - 5.0 g/dL    Globulin 3.9 2.0 - 4.0 g/dL    A-G Ratio 0.7 (L) 1.1 - 2.2     TROPONIN I    Collection Time: 06/08/21 11:20 PM   Result Value Ref Range    Troponin-I, Qt. <0.05 <0.05 ng/mL   MAGNESIUM    Collection Time: 06/08/21 11:20 PM   Result Value Ref Range    Magnesium 2.0 1.6 - 2.4 mg/dL   URINALYSIS W/MICROSCOPIC    Collection Time: 06/08/21 11:35 PM   Result Value Ref Range    Color YELLOW/STRAW      Appearance CLEAR CLEAR      Specific gravity 1.025 1.003 - 1.030      pH (UA) 6.0 5.0 - 8.0      Protein Negative NEG mg/dL    Glucose Negative NEG mg/dL    Ketone Negative NEG mg/dL    Bilirubin Negative NEG      Blood Negative NEG      Urobilinogen 0.2 0.2 - 1.0 EU/dL    Nitrites Negative NEG      Leukocyte Esterase Negative NEG      WBC 0-4 0 - 4 /hpf    RBC 0-5 0 - 5 /hpf    Epithelial cells FEW FEW /lpf    Bacteria Negative NEG /hpf       EKG: pending    Radiology:  XR CHEST PORT   Final Result   No acute findings.           Care Plan discussed with:   Patient x    Family wife    RN x     x    Consultant      Expected  Disposition:   Home with Family x   HH/PT/OT/RN    SNF/LTC    LACHELLE      TOTAL TIME:  61 Minutes      Comments    x Reviewed previous records   >50% of visit spent in counseling and coordination of care x Discussion with patient and/or family and questions answered       _______________________________________________________  Given the patient's current clinical presentation, I have a high level of concern for decompensation if discharged from the emergency department. Complex decision making was performed, which includes reviewing the patient's available past medical records, laboratory results, and x-ray films. My assessment of this patient's clinical condition and my plan of care is as follows. ASSESSMENT / PLAN    Principal Problem:    Orthostatic hypotension (5/14/2021)  Active Problems:    Weakness of both legs (6/9/2021)  Patient did fall, the reason for calling EMS  Family will bring in all medicine bottles to clarify exactly what medicines he has been taking  Is unclear whether medication adjustments were made following the last hospital discharge  Placement options need to be considered since the patient/family are having difficulty in the current situation  Flomax could still be a contributing factor, may need to be discontinued  More effort to keep hydrated at home need to be made      History of malignant neoplasm of large intestine (1/15/2015)  Recent CEA and CRP were not significantly elevated  Colonoscopy of follow-up needs to be considered  Reassess fecal occult blood      Vitamin B12 deficiency (7/23/2017)  Recent tests noted for vitamin B12 level on oral replacement. Iron testing consistent with mild iron deficiency. Hyperlipidemia ()  No current statin therapy      Hypertension ()  Discharge plan was to hold Cardizem and HCTZ. Was to initiate low-dose metoprolol 12.5 mg twice daily. Patient's wife will bring medicine bottles in to clarify current treatment      MCI (mild cognitive impairment) with memory loss (1/13/2016)    Parkinson disease ([address] (6/4/2018)  Continue Sinemet 25/250 3 times daily. PT OT evaluation and treatment. Use walker routinely.       Atrial fibrillation ([address] (11/23/2020)  Has been off anticoagulation due to recent GI bleed  Sitter resuming low-dose aspirin  Holding Cardizemwas to initiate metoprolol 12.5 mg twice daily      Essential hypertension (54/65/8171)  Complicated by significant symptomatic orthostatic blood pressure drop  Notify current medication usage  Beta-blocker would be preferred. No diuretic      BPH associated with nocturia (1/13/2016)  Has been using Proscar and Flomax  A need to discontinue Flomax due to the orthostatic issues.       Peripheral neuropathy  Was started on low-dose Neurontin during last admission  Patient had complaints with being pain in the plantar aspect of both feet      SAFETY:   Code Status:DNR  DVT prophylaxis:Lovenox  Stress Ulcer prophylaxis: Pepcid  Bladder catheter:no  Family Contact Info:  Primary Emergency Contact: Farheen Benavides, Home Phone: [phone]  Bedded: [facility] Room 200/01  Disposition: TBD, likely home when stable  Admission status:  Observation    -Tentative plan of care discussed with patient / family, who demonstrated understanding and is in agreement to the above  -Case was reviewed with the ED Provider, MD Anthony Terrazas MD  [facility] Hospitalist  [phone]

## 2021-06-09 NOTE — PROGRESS NOTES
Problem: Self Care Deficits Care Plan (Adult)  Goal: *Acute Goals and Plan of Care (Insert Text)  6/9/2021 1014 by Ying Ramey OT  Outcome: Progressing Towards Goal  Note:   FUNCTIONAL STATUS PRIOR TO ADMISSION:  Pt was getting home health OT services. He was progressing per his report toward independence in self care tasks. HOME SUPPORT: The patient lived with wife, she does the cooking and cleaning. Occupational Therapy Goals  Initiated 6/9/2021  1. Patient will perform grooming with supervision/set-up within 7 day(s). 2.  Patient will perform upper body dressing with supervision/set-up within 7 day(s). 3.  Patient will perform lower body dressing with moderate assistance  within 7 day(s). 4.  Patient will perform toilet transfers with minimal assistance/contact guard assist within 7 day(s). OCCUPATIONAL THERAPY EVALUATION  Patient: Simi Albarado (59 y.o. male)  Date: 6/9/2021  Primary Diagnosis: Orthostatic hypotension [I95.1]        Precautions: fall       ASSESSMENT  Based on the objective data described below, the patient presents with *hypotension, fall at home. He was d/c from facility 24 May and had a dizzy spell at home and fell. X-rays negative. HX Parkinson's, HTN, gout, Orthostatic HtN, and new weakness. Has been getting HH OT/PT/SLP. Today was sitting up in bed, was able to get to the edge w/cues and rails. BP sitting EOB within limits but HR using BP cuff was 137 and with portable pulse ox 143. Nursing staff notified and agreed pt could get to the chair but not strenuous activity. He was mod assist for sit to stand, transfer and stand to sit per PT. With set-up was min assist to wash face and back of head. OTR notes skin on arms is flaking. He has LLE wrapped and said his foot doctor did this. He has edema, hard in the RLE. He asked to get dressed as had a foot doctor appt, but has IV going. He claimed he could hear his foot doctor talking in the hallway. Current Level of Function Impacting Discharge (ADLs/self-care): High HR limits participation although HR before leaving had dropped to 76. Parkinson's disease also makes ADL's more time consuming and tiring. Functional Outcome Measure: The patient scored Total: 40/100 on the Barthel Index outcome measure which is indicative of Significant impaired ability to care for basic self needs/dependency on others; inferred significant dependency on others for instrumental ADLs. Other factors to consider for discharge: Has wife to assist at home and can resume [facility] therapies     Patient will benefit from skilled therapy intervention to address the above noted impairments. PLAN :  Recommendations and Planned Interventions: self care training, functional mobility training, therapeutic activities, and endurance activities    Frequency/Duration: Patient will be followed by occupational therapy 2-4 times per week to address goals. Recommendation for discharge: (in order for the patient to meet his/her long term goals)  Occupational therapy at least 2 days/week in the home     This discharge recommendation:  Has not yet been discussed the attending provider and/or case management    IF patient discharges home will need the following DME: none       SUBJECTIVE:   Patient stated Cameron Avoyelles I get dressed? Jocelyn Garay    OBJECTIVE DATA SUMMARY:   HISTORY:   Past Medical History:   Diagnosis Date    Asthma     Chronic kidney disease     Dermatophytosis of groin and perianal area 2010    Edema 2008    Encounter for long-term (current) use of other medications 2010    Gout, unspecified 2010    Gouty arthropathy, unspecified 2010    Hypertension     Hypertrophy of prostate without urinary obstruction and other lower urinary tract symptoms (LUTS) 2008    Impotence of organic origin 2008    Memory loss 2009    Obesity, unspecified 2010    Orthostatic hypotension 2008    Other and unspecified hyperlipidemia 2008    Other atopic dermatitis and related conditions 2012    Palpitations 2010    Peripheral angiopathy in diseases classified elsewhere [facility]) 2010    Personal history of malignant neoplasm of rectum, rectosigmoid junction, and anus 2011    Tinea nigra 2011    Unspecified pruritic disorder 2011    Vitamin B12 deficiency 7/23/2017     Past Surgical History:   Procedure Laterality Date    ENDOSCOPY, COLON, DIAGNOSTIC  06/2011 05/2007, 01/2004,  12/2000    HX HERNIA REPAIR  1992    INCISIONAL    HX TOTAL COLECTOMY  1991    COLON CANCER S/P RESECTION       Expanded or extensive additional review of patient history:     Home Situation  Home Environment: Private residence  # Steps to Enter: 4  Rails to Enter: Yes  One/Two Story Residence: One story  Living Alone: No  Support Systems: Family member(s)  Patient Expects to be Discharged to[de-identified] [employer]  Current DME Used/Available at Home: Walker, rolling, Commode, bedside, Grab bars  Tub or Shower Type: Tub/Shower combination    Hand dominance: Right    EXAMINATION OF PERFORMANCE DEFICITS:  Cognitive/Behavioral Status:  Neurologic State: Alert  Orientation Level: Disoriented to person;Disoriented to place; Disoriented to situation     Perception: Appears intact  Perseveration: No perseveration noted  Safety/Judgement: Awareness of environment    Skin: flaking noted on arm L; hardened skin noted RLE    Edema: hard edema in RLE visible,     Hearing: Auditory  Auditory Impairment: Hard of hearing, left side    Vision/Perceptual:                                     Range of Motion:  Functional BUE          Strength:  Deferred due to HR      Coordination:     Fine Motor Skills-Upper: Right Impaired;Left Impaired (unable to transition thumb to each finger)    Gross Motor Skills-Upper: Right Intact; Left Intact (extra time)       Balance:  Sitting: Intact  Standing: Impaired  Standing - Static: Constant support;Poor  Standing - Dynamic : Constant support;Poor    Functional Mobility and Transfers for ADLs:  Bed Mobility:  Rolling: Modified independent  Supine to Sit: Modified independent  Sit to Supine: Modified independent  Scooting: Moderate assistance    Transfers:  Sit to Stand: Moderate assistance  Stand to Sit: Moderate assistance  Bed to Chair: Moderate assistance    ADL Assessment:       Oral Facial Hygiene/Grooming: Setup;Minimum assistance                          ADL Intervention and task modifications:       Grooming  Grooming Assistance: Set-up; Minimum assistance  Position Performed: Seated in chair  Washing Face: Minimum assistance;Set-up                   Lower Body Dressing Assistance  Socks: Total assistance (dependent)         Cognitive Retraining  Safety/Judgement: Awareness of environment    Functional Measure:  Barthel Index:    Bathin  Bladder: 10  Bowels: 10  Groomin  Dressin  Feedin  Mobility: 0  Stairs: 0  Toilet Use: 5  Transfer (Bed to Chair and Back): 5  Total: 40/100        The Barthel ADL Index: Guidelines  1. The index should be used as a record of what a patient does, not as a record of what a patient could do. 2. The main aim is to establish degree of independence from any help, physical or verbal, however minor and for whatever reason. 3. The need for supervision renders the patient not independent. 4. A patient's performance should be established using the best available evidence. Asking the patient, friends/relatives and nurses are the usual sources, but direct observation and common sense are also important. However direct testing is not needed. 5. Usually the patient's performance over the preceding 24-48 hours is important, but occasionally longer periods will be relevant. 6. Middle categories imply that the patient supplies over 50 per cent of the effort. 7. Use of aids to be independent is allowed. Silvino Gannon., Barthel, D.W. (1652). Functional evaluation: the Barthel Index. [facility] (14)2.   LUCHO Tee, Faye Whitney., Delvis Queen, Dea White.[place]. (1999). Measuring the change indisability after inpatient rehabilitation; comparison of the responsiveness of the Barthel Index and Functional [place] Measure. Journal of Neurology, Neurosurgery, and Psychiatry, 66(4), 705-478. ZAIN Durán, ANY Infante, & Irwin Araujo M.A. (2004.) Assessment of post-stroke quality of life in cost-effectiveness studies: The usefulness of the Barthel Index and the EuroQoL-5D. Quality of Life Research, 15, 585-53         Occupational Therapy Evaluation Charge Determination   History Examination Decision-Making   MEDIUM Complexity : Expanded review of history including physical, cognitive and psychosocial  history  MEDIUM Complexity : 3-5 performance deficits relating to physical, cognitive , or psychosocial skils that result in activity limitations and / or participation restrictions MEDIUM Complexity : Patient may present with comorbidities that affect occupational performnce. Miniml to moderate modification of tasks or assistance (eg, physical or verbal ) with assesment(s) is necessary to enable patient to complete evaluation       Based on the above components, the patient evaluation is determined to be of the following complexity level: MEDIUM  Pain Ratin/`10    Activity Tolerance:   Poor and increased HR while EOB    After treatment patient left in no apparent distress:    Sitting in chair, Heels elevated for pressure relief, Call bell within reach, and Bed / chair alarm activated    COMMUNICATION/EDUCATION:   The patients plan of care was discussed with: Physical therapist and Registered nurse. Patient/family have participated as able in goal setting and plan of care. This patients plan of care is appropriate for delegation to [facility] [place].     Thank you for this referral.  Torsten Swartz OT  Time Calculation: 27 mins

## 2021-06-09 NOTE — PROGRESS NOTES
Problem: Falls - Risk of  Goal: *Absence of Falls  Description: Document Moise Howell Fall Risk and appropriate interventions in the flowsheet.   Outcome: Progressing Towards Goal  Note: Fall Risk Interventions:  Mobility Interventions: Utilize walker, cane, or other assistive device    Mentation Interventions: More frequent rounding, Toileting rounds    Medication Interventions: Patient to call before getting OOB, Teach patient to arise slowly    Elimination Interventions: Call light in reach, Stay With Me (per policy), Toileting schedule/hourly rounds    History of Falls Interventions: Bed/chair exit alarm

## 2021-06-09 NOTE — PROGRESS NOTES
Bedside shift change report given to AKANKSHA Bae RN (oncoming nurse) by SHAWN Cunningham RN (offgoing nurse). Report included the following information SBAR, Kardex and MAR.

## 2021-06-10 NOTE — DISCHARGE SUMMARY
[facility]  Hospitalist Discharge Summary    Patient ID:  Meredith Reaves  [id number]  81 y.o.  1935    PCP on record: Padmaja Marquez MD    Admit date: 6/8/2021  Discharge date and time: 6/10/2021     DISCHARGE DIAGNOSIS:    Principal Problem:    Orthostatic hypotension (5/14/2021)    Active Problems:    Weakness of both legs (6/9/2021)    History of malignant neoplasm of large intestine (1/15/2015)    Vitamin B12 deficiency (7/23/2017)    Hyperlipidemia ()    Hypertension ()    MCI (mild cognitive impairment) with memory loss (1/13/2016)    Parkinson disease ([facility] [address] (6/4/2018)    Atrial fibrillation ([facility] [address] (11/23/2020)    Essential hypertension (10/30/2017)    BPH associated with nocturia (1/13/2016)    Peripheral neuropathy (6/9/2021)    CONSULTATIONS:  None    Excerpted HPI from H&P of Ravindra Osborne MD:  81 y.o. male presenting for admission to [facility] for further evaluation and treatment for Orthostatic hypotension. He  has a past medical history of Asthma, Chronic kidney disease, Dermatophytosis of groin and perianal area (2010), Edema (2008), Encounter for long-term (current) use of other medications (2010), Gout, unspecified (2010), Gouty arthropathy, unspecified (2010), Hypertension, Hypertrophy of prostate without urinary obstruction and other lower urinary tract symptoms (LUTS) (2008), Impotence of organic origin (2008), Memory loss (2009), Obesity, unspecified (2010), Orthostatic hypotension (2008), Other and unspecified hyperlipidemia (2008), Other atopic dermatitis and related conditions (2012), Palpitations (2010), Peripheral angiopathy in diseases classified elsewhere ([address] (2010), Personal history of malignant neoplasm of rectum, rectosigmoid junction, and anus (2011), Tinea nigra (2011), Unspecified pruritic disorder (2011), and Vitamin B12 deficiency (7/23/2017).  He also has no past medical history of Other dyspnea and respiratory abnormality, Personal history of malignant neoplasm of large intestine, Polyphagia(783.6), Psychosexual dysfunction, unspecified, Unspecified hearing loss, or Unspecified visual loss. . \     Patient returns to the ED via EMS following a fall at home last evening. The patient states his legs were weak and they simply gave out and he went down. No apparent injury. Nothing orthostasis documented in the ED with a drop in systolic blood pressure up from 1 15-68 and a jump in heart rate from 97-1 36. Received 1 L of IV fluids in the ED. The patient's wife stated that there had been some poor fluid intake at homepatient refusing to drink fluids. Continuing fluids at 125 cc/h today. During recent admission diuretic was stopped and Cardizem was changed to low-dose metoprolol. Patient received therapy and rehab prior to disposition. Apparently the patient has been doing fairly well until the day of admission. Patient had been maintained on PT and OT with home health following discharge. He had been eating well. After getting up from the dinner table he was weak and his legs buckled and he went down. Compliance with his walker has been an issueneed to clarify. Patient has cognitive impairment which is baseline. Patient has had follow-up with Dr. Dereje España his PCP as well as with podiatry since disposition. Patient was started on a tetracycline antibiotic for his heels. ______________________________________________________________________  DISCHARGE SUMMARY/HOSPITAL COURSE:  for full details see H&P, daily progress notes, labs, consult notes. Principal Problem:    Orthostatic hypotension (5/14/2021)  Pt admitted for hydration following a fall in home with documented drop in BP to 60 sys standing upright. Following IVF x 24hr, no orthostasis was detected. Pt will attempt to consume more fluids on d/c. Will drink water and supplement intake with Gatoraid.   Home health will need to assist monitoring orthostatic BP status and fluid intake. Active Problems:    Weakness of both legs (6/9/2021)  Leading to ground level fall PTA  Weakness attributed poor hydration and chronic illness      History of malignant neoplasm of large intestine (1/15/2015)  Likely still active, however no tx or detected cancer at present      Vitamin B12 deficiency (7/23/2017)  Cont supplemental intake      Hyperlipidemia ()      Hypertension ()  Cont low dose Metoprolol  Stopped Diltiazem last appt      MCI (mild cognitive impairment) with memory loss (1/13/2016)    Parkinson disease ([facility] [address] (6/4/2018)  Assist with ADL  Cont Sinemet      Atrial fibrillation ([facility] [address] (11/23/2020)  Rate controlled on Metoprolol      BPH associated with nocturia (1/13/2016)  Symptoms managed with Flomax / Proscar  Plan to hold Flomax if continued problems      Peripheral neuropathy (6/9/2021)  LE burning pains  Has been off the low dose Neurontin Rx last admission  ____________________________________________________________  Patient seen and examined by me on discharge day. Pertinent Findings:  Visit Vitals  /64 (BP 1 Location: Left upper arm, BP Patient Position: Sitting)   Pulse 83   Temp 97.5 °F (36.4 °C)   Resp 16   Ht 6' (1.829 m)   Wt 102.2 kg (225 lb 4.8 oz)   SpO2 93%   BMI 30.56 kg/m²     Gen:    Not in distress  Chest: Nonlabored respiration, Clear lungs  CVS:   Regular rhythm.   No edema  Abd:  Soft, not distended, not tender  Neuro:  Alert, nonfocal, weak    LAB:  Results for Dasha Ng (MRN [id number]) as of 6/10/2021 21:59   6/8/2021 23:20 6/10/2021 05:46   WBC 4.3 4.8   NRBC 0.0 0.0   RBC 3.93 (L) 3.82 (L)   HGB 10.9 (L) 10.6 (L)   HCT 34.8 (L) 34.0 (L)   MCV 88.5 89.0   MCH 27.7 27.7   MCHC 31.3 31.2   RDW 15.3 (H) 15.3 (H)   PLATELET 148 670   MPV 8.9 9.2   NEUTROPHILS 69 70   LYMPHOCYTE 19 20   MONOCYTES 12 10     Results for Dasha Hernandez (MRN [id number]) as of 6/10/2021 21:59   6/8/2021 23:35   Color YELLOW/STRAW   Appearance CLEAR   Specific gravity 1.025   pH (UA) 6.0   Protein Negative   Glucose Negative   Ketone Negative   Blood Negative   Bilirubin Negative   Urobilinogen 0.2   Nitrites Negative   Leukocyte Esterase Negative   Epithelial cells FEW   WBC 0-4   RBC 0-5   Bacteria Negative     Results for Wanda Vaz (MRN [id number]) as of 6/10/2021 21:59   6/8/2021 23:20 6/10/2021 05:46   Sodium 140 143   Potassium 3.6 3.6   Chloride 107 111 (H)   CO2 26 24   Anion gap 7 8   Glucose 101 (H) 85   BUN 20 17   Creatinine 1.29 1.12   BUN/Cr ratio 16 15   Calcium 8.2 (L) 8.2 (L)   Magnesium 2.0 1.7   GFR est AA >60 >60   Bilirubin, total 0.4    Protein, total 6.5    Albumin 2.6 (L)    Globulin 3.9    A-G Ratio 0.7 (L)    ALT 9 (L)    AST 19    Alk. phos 54    Troponin-I, Qt. <0.05        EKG: pending     Radiology:  XR CHEST PORT   Final Result   No acute findings. _______________________________________________________________________  DISCHARGE MEDICATIONS:   Current Discharge Medication List        CONTINUE these medications which have CHANGED    Details   tamsulosin (FLOMAX) 0.4 mg capsule Take 1 Capsule by mouth nightly. Qty: 30 Capsule, Refills: 0  Start date: 6/10/2021           CONTINUE these medications which have NOT CHANGED    Details   metoprolol tartrate (LOPRESSOR) 25 mg tablet Take 0.5 Tablets by mouth every twelve (12) hours. Qty: 30 Tablet, Refills: 5    Comments: Short fill while waiting for mail order      famotidine (PEPCID) 20 mg tablet Take 1 Tablet by mouth every evening. Qty: 14 Tablet, Refills: 0    Comments: Short fill while waiting for mail order      carbidopa-levodopa (SINEMET)  mg per tablet TAKE 1 TABLET FOUR TIMES A DAY FOR PARKINSONS DISEASE  Qty: 360 Tab, Refills: 3      cyanocobalamin (VITAMIN B12) 500 mcg tablet TAKE 2 TABLETS DAILY  Qty: 180 Tab, Refills: 3      ferrous sulfate (IRON) 325 mg (65 mg iron) tablet Take 28 mg by mouth.  Indications: 1 TAB ON MONDAY,WEDNESDAY, AND FRIDAY      finasteride (PROSCAR) 5 mg tablet Take 5 mg by mouth daily.  take at bedtime           STOP taking these medications       Nuzyra 150 mg tab Comments:   Reason for Stopping:         gabapentin (NEURONTIN) 100 mg capsule Comments:   Reason for Stopping:               My Recommended  Diet: Regular  Activity: Ad Ericka  Wound Care: none  Follow-up labs: routine  Continue HH with Home PT / OT therapy    Low BP has improved following IV fluids  You need to drink more at home - Suggest drinking 2 quarts water daily from [place] [address], as well as 2 bottles of Gatoraid daily  HH can monitor for drop BP when standing  Continue same meds as taker prior to this admission  Best to switch Flomax 0.4mg capsule to bedtime (may need to stop if BP drops with dizziness noted)  Dr. Christopher Echeverria advised not taking Neurontin Rx on d/c last appt for symptoms of painful peripheral neruopathy  RAMIRO ROBBINS MD  [phone]    ______________________________________________________________________  DISPOSITION:    Home with Family:    Home with HH/PT/OT/RN: x   SNF/LTC:    LACHELLE:    OTHER:        Condition at Discharge:  Stable  _____________________________________________________________________  Follow up with:   PCP : Sathya Gallardo MD  Follow-up Information       Follow up With Specialties Details Why Contact Info    Sathya Gallardo MD Internal Medicine In 1 week  [address] [place] [address]  [phone]            Total time in minutes spent coordinating this discharge (includes going over instructions, follow-up, prescriptions, and preparing report for sign off to her PCP) :35 minutes    Signed:  Raven Lord MD  [facility] Hospitalist  [phone]

## 2021-06-10 NOTE — PROGRESS NOTES
Problem: Falls - Risk of  Goal: *Absence of Falls  Description: Document Omer Martin Fall Risk and appropriate interventions in the flowsheet. Outcome: Resolved/Met     Problem: Patient Education: Go to Patient Education Activity  Goal: Patient/Family Education  Outcome: Resolved/Met     Problem: Pressure Injury - Risk of  Goal: *Prevention of pressure injury  Description: Document Jonah Scale and appropriate interventions in the flowsheet.   Outcome: Resolved/Met     Problem: Patient Education: Go to Patient Education Activity  Goal: Patient/Family Education  Outcome: Resolved/Met     Problem: Patient Education: Go to Patient Education Activity  Goal: Patient/Family Education  Outcome: Resolved/Met     Problem: Patient Education: Go to Patient Education Activity  Goal: Patient/Family Education  Outcome: Resolved/Met

## 2021-06-10 NOTE — DISCHARGE INSTRUCTIONS
Low BP has improved following IV fluids  You need to drink more at home - Suggest drinking 2 quarts water daily from [facility], as well as 2 bottles of Gatoraid daily  HH can monitor for drop BP when standing  Continue same meds as taker prior to this admission  Best to switch Flomax 0.4mg capsule to bedtime (may need to stop if BP drops with dizziness noted)  Dr. William Archuleta advised not taking Neurontin Rx on d/c last appt for symptoms of painful peripheral neruopathy  RAMIRO ROBBINS MD  [phone]  DISCHARGE SUMMARY from Nurse    PATIENT INSTRUCTIONS:    After general anesthesia or intravenous sedation, for 24 hours or while taking prescription Narcotics:  · Limit your activities  · Do not drive and operate hazardous machinery  · Do not make important personal or business decisions  · Do  not drink alcoholic beverages  · If you have not urinated within 8 hours after discharge, please contact your surgeon on call. Report the following to your surgeon:  · Excessive pain, swelling, redness or odor of or around the surgical area  · Temperature over 100.5  · Nausea and vomiting lasting longer than 4 hours or if unable to take medications  · Any signs of decreased circulation or nerve impairment to extremity: change in color, persistent  numbness, tingling, coldness or increase pain  · Any questions    What to do at Home:  Recommended activity: Activity as tolerated,     If you experience any of the following symptoms return of symptoms, please follow up with PCP or return to ED. *  Please give a list of your current medications to your Primary Care Provider. *  Please update this list whenever your medications are discontinued, doses are      changed, or new medications (including over-the-counter products) are added. *  Please carry medication information at all times in case of emergency situations.     These are general instructions for a healthy lifestyle:    No smoking/ No tobacco products/ Avoid exposure to second hand smoke  Surgeon General's Warning:  Quitting smoking now greatly reduces serious risk to your health. Obesity, smoking, and sedentary lifestyle greatly increases your risk for illness    A healthy diet, regular physical exercise & weight monitoring are important for maintaining a healthy lifestyle    You may be retaining fluid if you have a history of heart failure or if you experience any of the following symptoms:  Weight gain of 3 pounds or more overnight or 5 pounds in a week, increased swelling in our hands or feet or shortness of breath while lying flat in bed. Please call your doctor as soon as you notice any of these symptoms; do not wait until your next office visit. The discharge information has been reviewed with the patient and spouse. The patient and spouse verbalized understanding. Discharge medications reviewed with the patient and spouse and appropriate educational materials and side effects teaching were provided.   ___________________________________________________________________________________________________________________________________

## 2021-06-10 NOTE — PROGRESS NOTES
Discharge instructions reviewed with pt and caregiver  Personal belongings returned: pt  Home meds returned: na  To front entrance via w/c  Discharged home with  caregiver @ [phone]

## 2021-06-10 NOTE — PROGRESS NOTES
Problem: Self Care Deficits Care Plan (Adult)  Goal: *Acute Goals and Plan of Care (Insert Text)  Description:    Occupational Therapy Goals  Initiated 6/9/2021  1. Patient will perform grooming with supervision/set-up within 7 day(s). 2.  Patient will perform upper body dressing with supervision/set-up within 7 day(s). 3.  Patient will perform lower body dressing with moderate assistance  within 7 day(s). 4.  Patient will perform toilet transfers with minimal assistance/contact guard assist within 7 day(s). Outcome: Progressing Towards Goal   OCCUPATIONAL THERAPY TREATMENT  Patient: Rima Hairston (49 y.o. male)  Date: 6/10/2021  Diagnosis: Orthostatic hypotension [I95.1] Orthostatic hypotension       Precautions:    Chart, occupational therapy assessment, plan of care, and goals were reviewed. ASSESSMENT  Patient continues with skilled OT services and is progressing towards goals. Pt up in chair preparing for ADL when OTR entered. He needed min assist to brush teeth as had difficulty getting toothbrush into mouth. OTR notes he rinsed mouth and had a lot of food particles in spit. He    He is able to wash face today with set-up, able to wash chest 50% with verbal and tactile cues. Washed under arms, however OTR rewashed and notes a lot of dead skin removal indicataing he is not fully washing. He had been washed by staff last night. Gown removed and he was able to pull up elastic waisted shorts however needed assist to lift legs to get feet inside. Needs min assist additionally to pull up fully over undergarment. Toileted with min assist sit to stand and stand to sit and during clothing management. Dependent for bowel hygiene. Set-up and assist to pull t-shirt down in the back. Extra time all tasks. Ambulated for several mins inside of room. Anticipated DC to home today to resume [facility] services.     Current Level of Function Impacting Discharge (ADLs): will continue to need assist with ADL and mobility at home    Other factors to consider for discharge: was already getting [facility] services and will resume         PLAN :  Patient continues to benefit from skilled intervention to address the above impairments. Continue treatment per established plan of care to address goals. Recommend with staff: encourage participation in tasks    Recommend next OT session: if he is still heere practice LE dressing    Recommendation for discharge: (in order for the patient to meet his/her long term goals)  Occupational therapy at least 2 days/week in the home     This discharge recommendation:  Has been made in collaboration with the attending provider and/or case management    IF patient discharges home will need the following DME: none       SUBJECTIVE:   Patient stated I started my career in [place].  PTA asking pt questions about his life and he mentioned [facility] but when asked if that was where he started work this was his response. He thanked therapist at end of session. OBJECTIVE DATA SUMMARY:   Cognitive/Behavioral Status:  Neurologic State: Alert  Orientation Level: Oriented to person     Perseveration: No perseveration noted  Safety/Judgement: Decreased awareness of need for assistance;Decreased awareness of need for safety    Functional Mobility and Transfers for ADLs:  Transfers:     Functional Transfers  Toilet Transfer : Minimum assistance  Adaptive Equipment: Bedside commode;Walker (comment)    Balance:  Sitting: Impaired  Sitting - Static: Fair (occasional)  Sitting - Dynamic: Fair (occasional)  Standing - Static: Constant support  Standing - Dynamic : Constant support    ADL Intervention:       Grooming  Washing Face: Set-up  Washing Hands: Set-up  Brushing Teeth: Minimum assistance    Upper Body Bathing  Bathing Assistance: Moderate assistance  Position Performed: Seated in chair  Cues: Verbal cues provided         Upper Body Dressing Assistance  Pullover Shirt: Set-up; Minimum assistance  Cues: Verbal cues provided    Lower Body Dressing Assistance  Pants With Elastic Waist: Moderate assistance  Socks: Total assistance (dependent)    Toileting  Bowel Hygiene: Total assistance (dependent) (attempts but could not reach back far enough)  Clothing Management: Minimum assistance    Cognitive Retraining  Safety/Judgement: Decreased awareness of need for assistance;Decreased awareness of need for safety      Pain:  0/10    Activity Tolerance:   Fair    After treatment patient left in no apparent distress:   Sitting in chair, Call bell within reach and Bed / chair alarm activated    COMMUNICATION/COLLABORATION:   The patients plan of care was discussed with: Physical therapy assistant.      Rona Araujo OT  Time Calculation: 46 mins

## 2021-06-10 NOTE — PROGRESS NOTES
Transition of Care (SHEILA) Plan:  Patient will be discharged home with resumption of home health care provided by [facility]. SHEILA Transportation:      How is patient being transported at discharge? POV/Wife     When? 6/10/21     Is transport scheduled? NA    Follow-up appointment and transportation:     PCP? Dr. Sheba Orozco     Specialist?     Time/Date? June 14, 2021  11:30AM     Who is transporting to the follow-up appointment? Wife/POV    Is transport for follow up appointment scheduled? NA    Communication plan (with patient/family): Who is being called? Patient's  wife      What number(s) is to be used? [phone]    What service provider is calling for [facility] services?   home health agency and PCP office      When are they calling? Probably 24 - 48 hours before appointment/visit      Click here to complete [address] including selection of the Healthcare Decision Maker Relationship (ie \"Primary\")  @[email or web address]    Care Management Interventions  PCP Verified by CM: Yes  Last Visit to PCP: 06/04/21  Palliative Care Criteria Met (RRAT>21 & CHF Dx)?: No (No MD order)  Mode of Transport at Discharge:  Other (see comment) (Wife POV )  Transition of Care Consult (CM Consult): Discharge Planning  Physical Therapy Consult: Yes  Occupational Therapy Consult: Yes  Speech Therapy Consult: No  Current Support Network: Lives with Spouse  Confirm Follow Up Transport: Family  The Plan for Transition of Care is Related to the Following Treatment Goals : Treat s/s orthostatic hypotension  Discharge Location  Discharge Placement: Home with home health (Already has [facility])

## 2021-06-10 NOTE — PROGRESS NOTES
Problem: Mobility Impaired (Adult and Pediatric)  Goal: *Acute Goals and Plan of Care (Insert Text)  Outcome: Progressing Towards Goal   physical Therapy TREATMENT    Patient: Jaquelin Monroy (3 y.o. male)  Date: 6/10/2021  Diagnosis: Orthostatic hypotension [I95.1] Orthostatic hypotension       Precautions:     Chart, physical therapy assessment, plan of care and goals were reviewed. ASSESSMENT:  Pt is seen with OT for assessment. Found in chair, performing ADLs. Posture is posterior in sitting, requiring assist for standing transfers. Delay present mid - standand physical assist is give to transition hands to RW. BSC transfers are similar. Ambulation is fair, with cues fr walker spacing. Supervision/assist is suggested for all OOB, but pt notes having assist for activity PTA. Progression toward goals:  []      Improving appropriately and progressing toward goals  [x]      Improving slowly and progressing toward goals  []      Not making progress toward goals and plan of care will be adjusted     PLAN:  Patient continues to benefit from skilled intervention to address the above impairments. Continue treatment per established plan of care. Discharge Recommendations:  Home Health 24 hr care  Further Equipment Recommendations for Discharge:  gait belt and rolling walker     SUBJECTIVE:   Patient stated Do you see my shoes somewhere?     OBJECTIVE DATA SUMMARY:   Critical Behavior:  Neurologic State: Alert  Orientation Level: Oriented to person     Safety/Judgement: Decreased awareness of need for assistance, Decreased awareness of need for safety  Functional Mobility Training:  Bed Mobility:  Rolling: Supervision  Supine to Sit: Supervision  Sit to Supine: Supervision  Transfers:  Sit to Stand: Minimum assistance; Moderate assistance  Stand to Sit: Minimum assistance; Moderate assistance   Balance:  Sitting: Impaired  Sitting - Static: Fair (occasional)  Sitting - Dynamic: Fair (occasional)  Standing - Static: Constant support  Standing - Dynamic : Constant support  Ambulation/Gait Training:  Distance (ft): 90 Feet (ft)  Ambulation - Level of Assistance: Contact guard assistance  Gait Abnormalities: Shuffling gait  Base of Support: Widened  Speed/Kim: Slow  Step Length: Left shortened;Right shortened  Pain:  Pain Scale 1: Numeric (0 - 10)  Pain Intensity 1: 0  Pain out: 0  Activity Tolerance:   Fair  Please refer to the flowsheet for vital signs taken during this treatment.   After treatment:   [x] Patient left in no apparent distress sitting up in chair  [] Patient left in no apparent distress in bed  [x] Call bell left within reach  [x] Nursing notified  [] Caregiver present  [] Bed alarm activated      Garry Reeves PTA   Time Calculation: 46 mins

## 2021-06-10 NOTE — PROGRESS NOTES
Problem: Falls - Risk of  Goal: *Absence of Falls  Description: Document Kira Romo Fall Risk and appropriate interventions in the flowsheet. Outcome: Progressing Towards Goal  Note: Fall Risk Interventions:  Mobility Interventions: Utilize walker, cane, or other assistive device    Mentation Interventions: Room close to nurse's station    Medication Interventions: Bed/chair exit alarm, Teach patient to arise slowly    Elimination Interventions: Call light in reach    History of Falls Interventions: Bed/chair exit alarm         Problem: Patient Education: Go to Patient Education Activity  Goal: Patient/Family Education  Outcome: Progressing Towards Goal     Problem: Pressure Injury - Risk of  Goal: *Prevention of pressure injury  Description: Document Jonah Scale and appropriate interventions in the flowsheet.   Outcome: Progressing Towards Goal  Note: Pressure Injury Interventions:  Sensory Interventions: Keep linens dry and wrinkle-free    Moisture Interventions: Check for incontinence Q2 hours and as needed    Activity Interventions: Chair cushion    Mobility Interventions: HOB 30 degrees or less    Nutrition Interventions: Offer support with meals,snacks and hydration                     Problem: Patient Education: Go to Patient Education Activity  Goal: Patient/Family Education  Outcome: Progressing Towards Goal     Problem: Patient Education: Go to Patient Education Activity  Goal: Patient/Family Education  Outcome: Progressing Towards Goal     Problem: Patient Education: Go to Patient Education Activity  Goal: Patient/Family Education  Outcome: Progressing Towards Goal

## 2021-06-11 NOTE — PROGRESS NOTES
Madelyn Guzmán is a 81 y.o. male evaluated via telephone on 6/11/2021. This is a SHEILA encounter  [address]  Orthostatic Hypotension  A:  6/8/21  D:  6/10/21      Consent:  He and/or health care decision maker is aware that that he may receive a bill for this telephone service, depending on his insurance coverage, and has provided verbal consent to proceed: Yes    A/P:  1. Orthostatic hypotension  He is drinking the Gatorade without any problem. Advised not to eat country ham.  It is likely that his hypotension is due to autonomic dysfunction related to his PD.    2. Parkinson disease ([address]  Continue with his usual PD meds. 3. Cellulitis of heel, left  Continue with . He has followup with Podiatry    HPI: This 67-year-old gentleman has advanced Parkinson's disease and has a very slow gait and requires assistance with standing and all ADLs. His wife of over 61 years and her sister are his primary caretakers. At baseline he has very slow speech. He is quite bradykinetic. He suffered a ground-level fall on June 8 and was evaluated at the emergency room at [address] [place] [address] where he was noted to be quite orthostatic. He received IV fluids and his condition improved. At the time of discharge he was encouraged to consume sodium with Gatorade and water, up to a gallon a day if needed and it was suggested that he also consume country ham for added salt. Review of his laboratory studies demonstrated normal sodium upon admission and at discharge. At baseline he has massive bilateral lower extremity edema. It was suggested to the patient and his wife that he not consume salt or salty foods at this time. I affirm this is a Patient Initiated Episode with an Established Patient who has not had a related appointment within my department in the past 7 days or scheduled within the next 24 hours.       On this date 06/11/2021 I have spent 30 minutes reviewing previous notes, test results and face to face with the patient discussing the diagnosis and importance of compliance with the treatment plan as well as documenting on the day of the visit.      Note: not billable if this call serves to triage the patient into an appointment for the relevant concern      Aydin Terry MD

## 2021-06-15 NOTE — TELEPHONE ENCOUNTER
S/W Alem Lawton (At [facility]). Verified patient with two patient identifiers. Alem Lawton assessed Mr. Shai Call this AM.    She reports that he does not elevate his legs. \"His Lower extremities are very swollen (legs, ankles, insteps). Legs are measuring 34.5 CM. \"   Weeping of the LL? \"No.\"  Pitting edema scale? \"Not done. \"  Sob? \"Not while I was there and I did have him stand. \"  Alem Lawton reports that his flomax has been changed to qhs. Alem Lawton questions why he isn't on a diuretic? Namrata checked his BP sitting & standing. Sittin/80  Standin/60   (No reported dizziness from Alem Lawton.)    Advised to have pt be transported to his appt today with Dr. Sly Deng. Wheelchair will be provided.

## 2021-06-15 NOTE — PROGRESS NOTES
Sussy Stephens is a 81 y.o. male is here for hospital f/u--at \[facility]\"" 6/8-6/10/21 with orthostatic hypotension, dizziness/syncope/dehydration/generalized weakness. Hx hypertension, Parkinson's, colon ca, BPH, dyslipidemia, memory issues, CKD, gout, orthostatic hypotension. At \[facility]\"" 11/23-11/24/20 with new onset paroxysmal afib. Given Cardizem Eliquis, back to NSR, seen in fu here 12/20/ .  Echo 11/23/20 with mild LVH, LVEF 65-70, mild MR, otherwise normal.. CHADs2-VASC 3, started on Eliquis 2.5mg bid + Cardizem  every day. No recurrence of syncope, still some dizziness. Diuretics held in hosp. Given IVF\"s. Now with worsening edema. Still weak/dizzy. The patient denies chest pain/ shortness of breath, orthopnea, PND.     Patient Active Problem List    Diagnosis Date Noted    Weakness of both legs 06/09/2021    Peripheral neuropathy 06/09/2021    GI bleed 05/16/2021    Orthostatic hypotension 05/14/2021    Fall from ground level 05/13/2021    Weakness generalized 05/13/2021    Anemia due to acute blood loss 05/13/2021    Chronic anticoagulation 05/13/2021    'light-for-dates' infant with signs of fetal malnutrition 05/11/2021    Atrial fibrillation ([address] 11/23/2020    A-fib ([address] 11/23/2020    Parkinson disease [address] 06/04/2018    Bilateral leg edema 06/04/2018    Encounter for rehabilitation 05/11/2018   Elfredia Severe hematuria 05/08/2018    Tremor 10/30/2017    Essential hypertension 10/30/2017    Vitamin B12 deficiency 07/23/2017    BPH associated with nocturia 01/13/2016    MCI (mild cognitive impairment) with memory loss 01/13/2016    History of malignant neoplasm of large intestine 01/15/2015    EKG abnormality 01/15/2015    Obesity, Class II, BMI 35-39.9, with comorbidity 01/15/2015    Multiple lung nodules 07/23/2014    Weight loss 07/08/2014    Constipation 07/08/2014    Gouty arthropathy     Asthma     Chronic kidney disease     Hyperlipidemia     Hypertension Lauren Castañeda MD  Past Medical History:   Diagnosis Date    Asthma     Chronic kidney disease     Dermatophytosis of groin and perianal area 2010    Edema 2008    Encounter for long-term (current) use of other medications 2010    Gout, unspecified 2010    Gouty arthropathy, unspecified 2010    Hypertension     Hypertrophy of prostate without urinary obstruction and other lower urinary tract symptoms (LUTS) 2008    Impotence of organic origin 2008    Memory loss 2009    Obesity, unspecified 2010    Orthostatic hypotension 2008    Other and unspecified hyperlipidemia 2008    Other atopic dermatitis and related conditions 2012    Palpitations 2010    Peripheral angiopathy in diseases classified elsewhere [facility]) 2010    Personal history of malignant neoplasm of rectum, rectosigmoid junction, and anus 2011    Tinea nigra 2011    Unspecified pruritic disorder 2011    Vitamin B12 deficiency 7/23/2017      Past Surgical History:   Procedure Laterality Date    ENDOSCOPY, COLON, DIAGNOSTIC  06/2011 05/2007, 01/2004,  12/2000    HX HERNIA REPAIR  1992    INCISIONAL    HX TOTAL COLECTOMY  1991    COLON CANCER S/P RESECTION     Allergies   Allergen Reactions    Cat Hair Std Allergenic Ext Unknown (comments)    Codeine Unknown (comments)    Ragweed Unknown (comments)     Mixed ragweed/pollen extract,tree extract      Family History   Problem Relation Age of Onset    Other Mother         PULMONARY EDEMA    Other Father         PAD    Heart Attack Father     Coronary Artery Disease Father       Social History     Socioeconomic History    Marital status:      Spouse name: Not on file    Number of children: Not on file    Years of education: Not on file    Highest education level: Not on file   Occupational History    Not on file   Tobacco Use    Smoking status: Former Smoker     Packs/day: 1.00     Years: 29.00     Pack years: 29.00     Types: Cigarettes     Start date: 1/1/1949 Quit date: 1978     Years since quittin.3    Smokeless tobacco: Never Used   Vaping Use    Vaping Use: Never used   Substance and Sexual Activity    Alcohol use: No    Drug use: No    Sexual activity: Not Currently   Other Topics Concern     Service No    Blood Transfusions No    Caffeine Concern No    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    Hobby Hazards No    Sleep Concern Not Asked    Stress Concern No    Weight Concern Yes     Comment: loss of weight    Special Diet No    Back Care Yes     Comment: pain from kidney cyst    Exercise No    Bike Helmet No     Comment: n/a    Seat Belt Yes    Self-Exams Not Asked   Social History Narrative    Not on file     Social Determinants of Health     Financial Resource Strain: Low Risk     Difficulty of Paying Living Expenses: Not very hard   Food Insecurity: No Food Insecurity    Worried About Running Out of Food in the Last Year: Never true    Trinity of Food in the Last Year: Never true   Transportation Needs: No Transportation Needs    Lack of Transportation (Medical): No    Lack of Transportation (Non-Medical): No   Physical Activity: Inactive    Days of Exercise per Week: 0 days    Minutes of Exercise per Session: 0 min   Stress: No Stress Concern Present    Feeling of Stress : Only a little   Social Connections: Socially Isolated    Frequency of Communication with Friends and Family: Never    Frequency of Social Gatherings with Friends and Family: Never    Attends Moravian Services: Never    Active Member of Clubs or Organizations: No    Attends Club or Organization Meetings: Not asked    Marital Status:    Intimate Partner Violence: Not At Risk    Fear of Current or Ex-Partner: No    Emotionally Abused: No    Physically Abused: No    Sexually Abused: No      Current Outpatient Medications   Medication Sig    tamsulosin (FLOMAX) 0.4 mg capsule Take 1 Capsule by mouth nightly.     metoprolol tartrate (LOPRESSOR) 25 mg tablet Take 0.5 Tablets by mouth every twelve (12) hours.  famotidine (PEPCID) 20 mg tablet Take 1 Tablet by mouth every evening.  carbidopa-levodopa (SINEMET)  mg per tablet TAKE 1 TABLET FOUR TIMES A DAY FOR PARKINSONS DISEASE    cyanocobalamin (VITAMIN B12) 500 mcg tablet TAKE 2 TABLETS DAILY    ferrous sulfate (IRON) 325 mg (65 mg iron) tablet Take 28 mg by mouth. Indications: 1 TAB ON MONDAY,WEDNESDAY, AND FRIDAY    finasteride (PROSCAR) 5 mg tablet Take 5 mg by mouth daily. take at bedtime     No current facility-administered medications for this visit. Review of Symptoms:    CONST  No weight change. No fever, chills, sweats    ENT No visual changes, URI sx, sore throat    CV  See HPI   RESP  No cough, or sputum, wheezing. Also see HPI   GI  No abdominal pain or change in bowel habits. No heartburn or dysphagia. No melena or rectal bleeding.   No dysuria, urgency, frequency, hematuria   MSKEL  No joint pain, swelling. No muscle pain. SKIN  No rash or lesions. NEURO  No headache, syncope, or seizure. No weakness, loss of sensation, or paresthesias. PSYCH  No low mood or depression  No anxiety. HE/LYMPH  No easy bruising, abnormal bleeding, or enlarged glands. Physical ExamPhysical Exam:    Visit Vitals  Temp (!) 96.6 °F (35.9 °C) (Temporal)   Resp 20   Ht 6' (1.829 m)   Wt 215 lb (97.5 kg)   SpO2 100%   BMI 29.16 kg/m²      ORTHOSTATIC--/80 supine, 146/78 sitting    Gen: NAD  HEENT:  PERRL, throat clear  Neck: no adenopathy, no thyromegaly, no JVD   Heart:  Regular,Nl S1S2,  no murmur, gallop or rub. Lungs:  clear  Abdomen:   Soft, non-tender, bowel sounds are active. Extremities:   Mod edema  Pulse: symmetric  Neuro: A&O times 3, No focal neuro deficits    Cardiographics    ECG: sinus messi 58, NST, no acute changes    Labs:   Lab Results   Component Value Date/Time    Sodium 143 06/10/2021 05:46 AM    Sodium 140 06/08/2021 11:20 PM    Sodium 140 05/17/2021 05:39 AM    Sodium 141 05/15/2021 08:00 AM    Sodium 140 05/14/2021 05:31 AM    Potassium 3.6 06/10/2021 05:46 AM    Potassium 3.6 06/08/2021 11:20 PM    Potassium 3.6 05/17/2021 05:39 AM    Potassium 3.7 05/15/2021 08:00 AM    Potassium 3.8 05/14/2021 05:31 AM    Chloride 111 (H) 06/10/2021 05:46 AM    Chloride 107 06/08/2021 11:20 PM    Chloride 106 05/17/2021 05:39 AM    Chloride 104 05/15/2021 08:00 AM    Chloride 107 05/14/2021 05:31 AM    CO2 24 06/10/2021 05:46 AM    CO2 26 06/08/2021 11:20 PM    CO2 27 05/17/2021 05:39 AM    CO2 26 05/15/2021 08:00 AM    CO2 28 05/14/2021 05:31 AM    Anion gap 8 06/10/2021 05:46 AM    Anion gap 7 06/08/2021 11:20 PM    Anion gap 7 05/17/2021 05:39 AM    Anion gap 11 05/15/2021 08:00 AM    Anion gap 5 05/14/2021 05:31 AM    Glucose 85 06/10/2021 05:46 AM    Glucose 101 (H) 06/08/2021 11:20 PM    Glucose 89 05/17/2021 05:39 AM    Glucose 94 05/15/2021 08:00 AM    Glucose 94 05/14/2021 05:31 AM    BUN 17 06/10/2021 05:46 AM    BUN 20 06/08/2021 11:20 PM    BUN 16 05/17/2021 05:39 AM    BUN 17 05/15/2021 08:00 AM    BUN 15 05/14/2021 05:31 AM    Creatinine 1.12 06/10/2021 05:46 AM    Creatinine 1.29 06/08/2021 11:20 PM    Creatinine 1.23 05/17/2021 05:39 AM    Creatinine 1.27 05/15/2021 08:00 AM    Creatinine 1.09 05/14/2021 05:31 AM    BUN/Creatinine ratio 15 06/10/2021 05:46 AM    BUN/Creatinine ratio 16 06/08/2021 11:20 PM    BUN/Creatinine ratio 13 05/17/2021 05:39 AM    BUN/Creatinine ratio 13 05/15/2021 08:00 AM    BUN/Creatinine ratio 14 05/14/2021 05:31 AM    GFR est AA >60 06/10/2021 05:46 AM    GFR est AA >60 06/08/2021 11:20 PM    GFR est AA >60 05/17/2021 05:39 AM    GFR est AA >60 05/15/2021 08:00 AM    GFR est AA >60 05/14/2021 05:31 AM    GFR est non-AA >60 06/10/2021 05:46 AM    GFR est non-AA 53 (L) 06/08/2021 11:20 PM    GFR est non-AA 56 (L) 05/17/2021 05:39 AM    GFR est non-AA 54 (L) 05/15/2021 08:00 AM    GFR est non-AA >60 05/14/2021 05:31 AM    Calcium 8.2 (L) 06/10/2021 05:46 AM    Calcium 8.2 (L) 06/08/2021 11:20 PM    Calcium 8.3 (L) 05/17/2021 05:39 AM    Calcium 8.9 05/15/2021 08:00 AM    Calcium 8.4 (L) 05/14/2021 05:31 AM    Bilirubin, total 0.4 06/08/2021 11:20 PM    Bilirubin, total 1.1 (H) 05/15/2021 08:00 AM    Bilirubin, total 1.1 (H) 05/14/2021 05:31 AM    Bilirubin, total 1.2 (H) 05/13/2021 05:41 AM    Bilirubin, total 0.5 05/11/2021 06:27 PM    Alk. phosphatase 54 06/08/2021 11:20 PM    Alk. phosphatase 58 05/15/2021 08:00 AM    Alk. phosphatase 48 05/14/2021 05:31 AM    Alk. phosphatase 47 05/13/2021 05:41 AM    Alk.  phosphatase 48 05/11/2021 06:27 PM    Protein, total 6.5 06/08/2021 11:20 PM    Protein, total 7.2 05/15/2021 08:00 AM    Protein, total 6.1 (L) 05/14/2021 05:31 AM    Protein, total 6.4 05/13/2021 05:41 AM    Protein, total 6.3 (L) 05/11/2021 06:27 PM    Albumin 2.6 (L) 06/08/2021 11:20 PM    Albumin 2.9 (L) 05/15/2021 08:00 AM    Albumin 2.4 (L) 05/14/2021 05:31 AM    Albumin 2.5 (L) 05/13/2021 05:41 AM    Albumin 2.6 (L) 05/11/2021 06:27 PM    Globulin 3.9 06/08/2021 11:20 PM    Globulin 4.3 (H) 05/15/2021 08:00 AM    Globulin 3.7 05/14/2021 05:31 AM    Globulin 3.9 05/13/2021 05:41 AM    Globulin 3.7 05/11/2021 06:27 PM    A-G Ratio 0.7 (L) 06/08/2021 11:20 PM    A-G Ratio 0.7 (L) 05/15/2021 08:00 AM    A-G Ratio 0.6 (L) 05/14/2021 05:31 AM    A-G Ratio 0.6 (L) 05/13/2021 05:41 AM    A-G Ratio 0.7 (L) 05/11/2021 06:27 PM    ALT (SGPT) 9 (L) 06/08/2021 11:20 PM    ALT (SGPT) 10 (L) 05/15/2021 08:00 AM    ALT (SGPT) 10 (L) 05/14/2021 05:31 AM    ALT (SGPT) 6 (L) 05/13/2021 05:41 AM    ALT (SGPT) 8 (L) 05/11/2021 06:27 PM     Lab Results   Component Value Date/Time    CK 94 05/05/2018 06:00 PM     Lab Results   Component Value Date/Time    Cholesterol, total 229 (H) 04/13/2021 01:53 PM    Cholesterol, total 261 (H) 10/13/2020 11:59 AM    Cholesterol, total 227 (H) 01/25/2017 04:11 PM Cholesterol, total 252 (H) 09/20/2016 09:33 AM    Cholesterol, total 285 (H) 09/10/2015 09:39 AM    HDL Cholesterol 84 04/13/2021 01:53 PM    HDL Cholesterol 81 10/13/2020 11:59 AM    HDL Cholesterol 63 01/25/2017 04:11 PM    HDL Cholesterol 55 09/20/2016 09:33 AM    HDL Cholesterol 54 09/10/2015 09:39 AM    LDL, calculated 131.8 (H) 04/13/2021 01:53 PM    LDL, calculated 172 (H) 10/13/2020 11:59 AM    LDL, calculated 148 (H) 01/25/2017 04:11 PM    LDL, calculated 180 (H) 09/20/2016 09:33 AM    LDL, calculated 215 (H) 09/10/2015 09:39 AM    LDL, calculated 89 10/07/2014 10:35 AM    Triglyceride 66 04/13/2021 01:53 PM    Triglyceride 54 10/13/2020 11:59 AM    Triglyceride 78 01/25/2017 04:11 PM    Triglyceride 87 09/20/2016 09:33 AM    Triglyceride 82 09/10/2015 09:39 AM    CHOL/HDL Ratio 2.7 04/13/2021 01:53 PM     No results found for this or any previous visit.     Assessment:         Patient Active Problem List    Diagnosis Date Noted    Weakness of both legs 06/09/2021    Peripheral neuropathy 06/09/2021    GI bleed 05/16/2021    Orthostatic hypotension 05/14/2021    Fall from ground level 05/13/2021    Weakness generalized 05/13/2021    Anemia due to acute blood loss 05/13/2021    Chronic anticoagulation 05/13/2021    'light-for-dates' infant with signs of fetal malnutrition 05/11/2021    Atrial fibrillation ([address] 11/23/2020    A-fib ([address] 11/23/2020    Parkinson disease ([address] 06/04/2018    Bilateral leg edema 06/04/2018    Encounter for rehabilitation 05/11/2018   Harlin Cumber hematuria 05/08/2018    Tremor 10/30/2017    Essential hypertension 10/30/2017    Vitamin B12 deficiency 07/23/2017    BPH associated with nocturia 01/13/2016    MCI (mild cognitive impairment) with memory loss 01/13/2016    History of malignant neoplasm of large intestine 01/15/2015    EKG abnormality 01/15/2015    Obesity, Class II, BMI 35-39.9, with comorbidity 01/15/2015    Multiple lung nodules 07/23/2014    Weight loss 07/08/2014    Constipation 07/08/2014    Gouty arthropathy     Asthma     Chronic kidney disease     Hyperlipidemia     Hypertension      [facility]21 with orthostatic hypotension, dizziness/syncope/dehydration/generalized weakness. Hx hypertension, Parkinson's, colon ca, BPH, dyslipidemia, memory issues, CKD, gout, orthostatic hypotension. [facility] 11/23-11/24/20 with new onset paroxysmal afib. Given Cardizem Eliquis, back to NSR, seen in fu here 12/20/ .  Echo 11/23/20 with mild LVH, LVEF 65-70, mild MR, otherwise normal.. CHADs2-VASC 3, started on Eliquis 2.5mg bid + Cardizem  every day. No recurrence of syncope, still some dizziness. Diuretics held in hosp. Given IVF\"s. Now with worsening edema. Still weak/dizzy.        Plan:     Discussed at length with pt and spouse--difficult situation with Parkinson's, ortho hypotension, hypertension, edema  Comp wraps/stockings  Elevate legs  Florinef 0.1mg every day  Continue current med/rx incl [address]  Fu with PCP as planned  RTC 6 mos, sooner radha Villatoro MD

## 2021-06-15 NOTE — PROGRESS NOTES
Identified pt with two pt identifiers(name and ). Reviewed record in preparation for visit and have obtained necessary documentation. Chief Complaint   Patient presents with   [facility] Follow Up     [facility] -6/10/21 for ortho. hypo. Vitals:    06/15/21 1115   Resp: 20   Temp: (!) 96.6 °F (35.9 °C)   TempSrc: Temporal   SpO2: 100%   Weight: 215 lb (97.5 kg)   Height: 6' (1.829 m)       Health Maintenance Review: Patient reminded of \"due or due soon\" health maintenance. I have asked the patient to contact his/her primary care provider (PCP) for follow-up on his/her health maintenance. Coordination of Care Questionnaire:  :   1) Have you been to an emergency room, urgent care, or hospitalized since your last visit? If yes, where when, and reason for visit? yes   See today's reason for visit    2. Have seen or consulted any other health care provider since your last visit? If yes, where when, and reason for visit? NO      Patient is accompanied by spouse I have received verbal consent from Antoinette Fuentes to discuss any/all medical information while they are present in the room.

## 2021-06-15 NOTE — PATIENT INSTRUCTIONS
Continue to wrap the legs. Elevate legs as much as possible to help with swelling. Start taking florinef to help with your blood pressure fluctuations. Take this new medication in the morning.

## 2021-07-07 NOTE — PROGRESS NOTES
Mr. Pat Dunham is a 81 y.o. male who is here for evaluation of   Chief Complaint   Patient presents with    Referral Follow Up     Has HH for (B)LE wounds    Hypertension     routine F/U   .       ASSESSMENT AND PLAN:    1. Orthostatic hypotension  Stable at this time. 2. Parkinson disease ([address]  Remains on Sinemet    3. Cellulitis of heel, left  HH continues with wound care. 4. Bilateral leg edema  Remains stable      Orders Placed This Encounter    famotidine (PEPCID) 20 mg tablet     Sig: Take 1 Tablet by mouth every evening. Dispense:  90 Tablet     Refill:  4     Short fill while waiting for mail order           HPI  This 60-year-old gentleman has advanced Parkinson's disease and has a very slow gait and requires assistance with standing and all ADLs. His wife of over 61 years and her sister are his primary caretakers. At baseline he has very slow speech. He is quite bradykinetic. ROS:  Denies  fever, chills, cough, chest pain, SOB,  nausea, vomiting, or diarrhea. Denies wt loss, wt gain, hemoptysis, hematochezia or melena. Physical Examination:    Visit Vitals  /78   Pulse 66   Temp 97.8 °F (36.6 °C) (Temporal)   Resp 18   SpO2 99%      General:  Bradykinetic, cooperative, no distress. Speaks in slow one word phrases   Head:  Normocephalic, without obvious abnormality, atraumatic. Eyes:  Conjunctivae/corneas clear. Pupils equal, round, reactive to light. Extraocular movements intact. Lungs:   Clear to auscultation bilaterally. Chest wall:  No tenderness or deformity. Cardiac:  RRR   Abdomen:   Soft, non-tender. Bowel sounds normal. No masses. No organomegaly. Extremities: BLE edema   Pulses: 2+ and symmetric all extremities. Skin: Skin color, texture, turgor normal. No rashes or lesions. Lymph nodes: Cervical, supraclavicular, and axillary nodes normal.   Neurologic: CNII-XII intact. Normal strength, sensation, and reflexes throughout.      On this date 07/09/2021 I have spent 30 minutes reviewing previous notes, test results and face to face with the patient discussing the diagnosis and importance of compliance with the treatment plan as well as documenting on the day of the visit.     Tunde Pedroza MD FACP    (signed electronically) on 7/9/2021 at 8:00 AM

## 2021-07-08 NOTE — TELEPHONE ENCOUNTER
Mr. Montilla Plants  bp reading 7/8/2021 180/100  Monitor BP and if remains up, we can tweak meds  J

## 2021-07-09 NOTE — PROGRESS NOTES
Andreas Osborne is a 81 y.o. male presenting for/with:    Chief Complaint   Patient presents with    Referral Follow Up     Has HH for (B)LE wounds    Hypertension     routine F/U       Visit Vitals  BP (!) 186/82 (BP 1 Location: Right upper arm, BP Patient Position: Sitting, BP Cuff Size: Adult long)   Pulse 66   Temp 97.8 °F (36.6 °C) (Temporal)   Resp 18   SpO2 99%     Pain Scale: 0 - No pain/10  Pain Location:     1. Have you been to the ER, urgent care clinic since your last visit? Hospitalized since your last visit? NO    2. Have you seen or consulted any other health care providers outside of the [address] [place] [address] since your last visit? Include any pap smears or colon screening. NO    Symptom review:  NO  Fever   NO  Shaking chills  NO  Cough  NO  Body aches  NO  Coughing up blood  NO  Chest congestion  NO  Chest pain  NO  Shortness of breath  NO  Profound Loss of smell/taste  NO  Nausea/Vomiting   NO  Loose stool/Diarrhea  NO  any skin issues    Patient Risk Factors Reviewed as follows:  NO  have you been in Close contact with confirmed COVID19 patient   NO  History of recent travel to affected geographical areas within the past 14 days  NO  COPD  NO  Active Cancer/Leukemia/Lymphoma/Chemotherapy  NO  Oral steroid use  NO  Pregnant  YES  Diabetes Mellitus  YES  Heart disease  NO  Asthma  NO Health care worker at home  [address] care worker  NO Is there a Pregnant Woman in the home  NO Dialysis pt in the home   NO a large number of people living in the home    Learning Assessment 4/13/2021   PRIMARY LEARNER Patient   [address]    LEARNER PREFERENCE PRIMARY LISTENING     DEMONSTRATION   ANSWERED BY patient   RELATIONSHIP SELF     Fall Risk Assessment, last 12 mths 5/28/2021   Able to walk? Yes   Fall in past 12 months? 1   Do you feel unsteady? 1   Are you worried about falling 1   Is TUG test greater than 12 seconds?  -   Is the gait abnormal? 1   Number of falls in past 12 months 2   Fall with injury? 1       3 most recent PHQ Screens 5/28/2021   Little interest or pleasure in doing things Nearly every day   Feeling down, depressed, irritable, or hopeless Nearly every day   Total Score PHQ 2 6   Trouble falling or staying asleep, or sleeping too much Nearly every day   Feeling tired or having little energy Nearly every day   Poor appetite, weight loss, or overeating Not at all   Feeling bad about yourself - or that you are a failure or have let yourself or your family down Not at all   Trouble concentrating on things such as school, work, reading, or watching TV Not at all   Moving or speaking so slowly that other people could have noticed; or the opposite being so fidgety that others notice Nearly every day   How difficult have these problems made it for you to do your work, take care of your home and get along with others Very difficult     Abuse Screening Questionnaire 5/28/2021   Do you ever feel afraid of your partner? N   Are you in a relationship with someone who physically or mentally threatens you? N   Is it safe for you to go home?  Y       ADL Assessment 5/28/2021   Feeding yourself Help Needed   Getting from bed to chair Help Needed   Getting dressed Help Needed   Bathing or showering Help Needed   Walk across the room (includes cane/walker) Help Needed   Using the telphone Help Needed   Taking your medications Help Needed   Preparing meals Help Needed   Managing money (expenses/bills) Help Needed   Moderately strenuous housework (laundry) Help Needed   Shopping for personal items (toiletries/medicines) Help Needed   Shopping for groceries Help Needed   Driving Help Needed   Climbing a flight of stairs Help Needed   Getting to places beyond walking distances Help Needed

## 2021-07-14 NOTE — ED PROVIDER NOTES
EMERGENCY DEPARTMENT HISTORY AND PHYSICAL EXAM      Date: 7/14/2021  Patient Name: Oswaldo Will    History of Presenting Illness     Chief Complaint   Patient presents with    Fatigue     last time up per wife was last night       History Provided By: Patient and EMS    HPI: Oswaldo Will, 81 y.o. male with PMHx significant for orthostatic hypotension, hypertension, hyperlipidemia, Parkinson's, A. fib, presents via EMS to the ED with cc of generalized weakness. EMS was originally called for a lift assist after a ground-level fall at home. No injuries. On EMS arrival patient wished to be evaluated for generalized weakness. Patient is a poor historian. On chart review he was recently admitted June 8, 2021 here for evaluation of orthostatic hypotension after a similar episode of a ground-level fall. He was noted to be orthostatic. His diuretics were stopped he received IV fluids and was discharged with home health for care of her chronic left heel wound. Just saw his primary doctor, Dr. Jseusita Rodríguez on July 9 for checkup. Sueellen Payment Per Dr. Enrique Escalante note patient requires assistance with standing and all ADLs at baseline. His wife and sister are his primary caretakers. He is currently without complaints. He has no pain complaints. Denies any chest pain, abdominal pain. He denies any nausea vomiting or diarrhea. He reports home health has been taking care of his lower extremity wounds. His lower leg edema is a relative baseline. PMHx: Significant for orthostatic hypotension, hypertension, hyperlipidemia, Parkinson's, A. fib  PSHx: Significant for colectomy, hernia repair  Social Hx: Former smoker. Quit in 1978. Pack a day for 29 years. No alcohol or illicit drug use. There are no other complaints, changes, or physical findings at this time. PCP: Patt Marcus MD    No current facility-administered medications on file prior to encounter.      Current Outpatient Medications on File Prior to Encounter   Medication Sig Dispense Refill    famotidine (PEPCID) 20 mg tablet Take 1 Tablet by mouth every evening. 90 Tablet 4    fludrocortisone (FLORINEF) 0.1 mg tablet Take 1 Tablet by mouth daily. 90 Tablet 1    tamsulosin (FLOMAX) 0.4 mg capsule Take 1 Capsule by mouth nightly. 30 Capsule 0    metoprolol tartrate (LOPRESSOR) 25 mg tablet Take 0.5 Tablets by mouth every twelve (12) hours. 30 Tablet 5    carbidopa-levodopa (SINEMET)  mg per tablet TAKE 1 TABLET FOUR TIMES A DAY FOR PARKINSONS DISEASE 360 Tab 3    cyanocobalamin (VITAMIN B12) 500 mcg tablet TAKE 2 TABLETS DAILY 180 Tab 3    ferrous sulfate (IRON) 325 mg (65 mg iron) tablet Take 28 mg by mouth. Indications: 1 TAB ON MONDAY,WEDNESDAY, AND FRIDAY      finasteride (PROSCAR) 5 mg tablet Take 5 mg by mouth daily.  take at bedtime         Past History     Past Medical History:  Past Medical History:   Diagnosis Date    Asthma     Chronic kidney disease     Dermatophytosis of groin and perianal area 2010    Edema 2008    Encounter for long-term (current) use of other medications 2010    Gout, unspecified 2010    Gouty arthropathy, unspecified 2010    Hypertension     Hypertrophy of prostate without urinary obstruction and other lower urinary tract symptoms (LUTS) 2008    Impotence of organic origin 2008    Memory loss 2009    Obesity, unspecified 2010    Orthostatic hypotension 2008    Other and unspecified hyperlipidemia 2008    Other atopic dermatitis and related conditions 2012    Palpitations 2010    Peripheral angiopathy in diseases classified elsewhere [facility]) 2010    Personal history of malignant neoplasm of rectum, rectosigmoid junction, and anus 2011    Tinea nigra 2011    Unspecified pruritic disorder 2011    Vitamin B12 deficiency 7/23/2017       Past Surgical History:  Past Surgical History:   Procedure Laterality Date    ENDOSCOPY, COLON, DIAGNOSTIC  06/2011 05/2007, 01/2004,  12/2000    HX HERNIA REPAIR     INCISIONAL    HX TOTAL COLECTOMY      COLON CANCER S/P RESECTION       Family History:  Family History   Problem Relation Age of Onset    Other Mother         PULMONARY EDEMA    Other Father         PAD    Heart Attack Father     Coronary Artery Disease Father        Social History:  Social History     Tobacco Use    Smoking status: Former Smoker     Packs/day: 1.00     Years: 29.00     Pack years: 29.00     Types: Cigarettes     Start date: 1949     Quit date: 1978     Years since quittin.5    Smokeless tobacco: Never Used   Vaping Use    Vaping Use: Never used   Substance Use Topics    Alcohol use: No    Drug use: No       Allergies: Allergies   Allergen Reactions    Cat Hair Std Allergenic Ext Unknown (comments)    Codeine Unknown (comments)    Ragweed Unknown (comments)     Mixed ragweed/pollen extract,tree extract         Review of Systems   Review of Systems   Constitutional: Positive for fatigue. Negative for chills and fever. HENT: Negative for congestion, sneezing and sore throat. Eyes: Negative for discharge and redness. Respiratory: Negative for cough, chest tightness and shortness of breath. Cardiovascular: Negative for chest pain. Gastrointestinal: Negative for abdominal pain, nausea and vomiting. Genitourinary: Negative for difficulty urinating. Neurological: Negative for seizures. Psychiatric/Behavioral: Negative for hallucinations. Physical Exam   Physical Exam  Constitutional:       General: He is not in acute distress. Appearance: He is well-developed. He is not diaphoretic. Comments: Chronically ill-appearing elderly black male   HENT:      Head: Normocephalic and atraumatic. Mouth/Throat:      Mouth: Mucous membranes are moist.      Pharynx: Oropharynx is clear. No oropharyngeal exudate or posterior oropharyngeal erythema. Eyes:      General: No scleral icterus.      Conjunctiva/sclera: Conjunctivae normal. Pupils: Pupils are equal, round, and reactive to light. Cardiovascular:      Rate and Rhythm: Normal rate. Pulmonary:      Effort: Pulmonary effort is normal.      Breath sounds: Normal breath sounds. Abdominal:      General: There is no distension. Palpations: Abdomen is soft. Tenderness: There is no abdominal tenderness. Musculoskeletal:         General: Normal range of motion. Cervical back: Normal range of motion and neck supple. No rigidity. Right lower leg: Edema present. Left lower leg: Edema present. Skin:     General: Skin is warm and dry. Findings: No rash. Neurological:      Mental Status: He is alert. Mental status is at baseline. Psychiatric:         Behavior: Behavior normal.           Diagnostic Study Results     Labs -     Recent Results (from the past 12 hour(s))   CBC WITH AUTOMATED DIFF    Collection Time: 07/14/21  7:57 AM   Result Value Ref Range    WBC 4.5 4.1 - 11.1 K/uL    RBC 4.99 4. 10 - 5.70 M/uL    HGB 13.6 12.1 - 17.0 g/dL    HCT 43.7 36.6 - 50.3 %    MCV 87.6 80.0 - 99.0 FL    MCH 27.3 26.0 - 34.0 PG    MCHC 31.1 30.0 - 36.5 g/dL    RDW 14.6 (H) 11.5 - 14.5 %    PLATELET 361 (L) 905 - 400 K/uL    MPV 10.1 8.9 - 12.9 FL    NRBC 0.0 0  WBC    ABSOLUTE NRBC 0.00 0.00 - 0.01 K/uL    NEUTROPHILS 76 (H) 32 - 75 %    LYMPHOCYTES 14 12 - 49 %    MONOCYTES 10 5 - 13 %    EOSINOPHILS 0 0 - 7 %    BASOPHILS 0 0 - 1 %    IMMATURE GRANULOCYTES 0 0.0 - 0.5 %    ABS. NEUTROPHILS 3.4 1.8 - 8.0 K/UL    ABS. LYMPHOCYTES 0.6 (L) 0.8 - 3.5 K/UL    ABS. MONOCYTES 0.5 0.0 - 1.0 K/UL    ABS. EOSINOPHILS 0.0 0.0 - 0.4 K/UL    ABS. BASOPHILS 0.0 0.0 - 0.1 K/UL    ABS. IMM.  GRANS. 0.0 0.00 - 0.04 K/UL    DF AUTOMATED      RBC COMMENTS NORMOCYTIC, NORMOCHROMIC     METABOLIC PANEL, COMPREHENSIVE    Collection Time: 07/14/21  7:57 AM   Result Value Ref Range    Sodium 145 136 - 145 mmol/L    Potassium 3.3 (L) 3.5 - 5.1 mmol/L    Chloride 109 (H) 97 - 108 mmol/L CO2 26 21 - 32 mmol/L    Anion gap 10 5 - 15 mmol/L    Glucose 90 65 - 100 mg/dL    BUN 16 6 - 20 MG/DL    Creatinine 1.32 (H) 0.70 - 1.30 MG/DL    BUN/Creatinine ratio 12 12 - 20      GFR est AA >60 >60 ml/min/1.73m2    GFR est non-AA 52 (L) >60 ml/min/1.73m2    Calcium 9.1 8.5 - 10.1 MG/DL    Bilirubin, total 0.8 0.2 - 1.0 MG/DL    ALT (SGPT) 7 (L) 12 - 78 U/L    AST (SGOT) 21 15 - 37 U/L    Alk.  phosphatase 62 45 - 117 U/L    Protein, total 6.6 6.4 - 8.2 g/dL    Albumin 2.6 (L) 3.5 - 5.0 g/dL    Globulin 4.0 2.0 - 4.0 g/dL    A-G Ratio 0.7 (L) 1.1 - 2.2     TROPONIN I    Collection Time: 07/14/21  7:57 AM   Result Value Ref Range    Troponin-I, Qt. <0.05 <0.05 ng/mL   MAGNESIUM    Collection Time: 07/14/21  7:57 AM   Result Value Ref Range    Magnesium 1.8 1.6 - 2.4 mg/dL   EKG, 12 LEAD, INITIAL    Collection Time: 07/14/21  8:00 AM   Result Value Ref Range    Ventricular Rate 88 BPM    Atrial Rate 88 BPM    P-R Interval 168 ms    QRS Duration 82 ms    Q-T Interval 396 ms    QTC Calculation (Bezet) 479 ms    Calculated P Axis 110 degrees    Calculated R Axis -11 degrees    Calculated T Axis -164 degrees    Diagnosis       Normal sinus rhythm  Minimal voltage criteria for LVH, may be normal variant  ST & T wave abnormality, consider anterolateral ischemia  Abnormal ECG  When compared with ECG of 09-JUN-2021 12:53,  Sinus rhythm has replaced Atrial fibrillation  QT has lengthened     URINALYSIS W/MICROSCOPIC    Collection Time: 07/14/21  8:23 AM   Result Value Ref Range    Color DARK YELLOW      Appearance HAZY (A) CLEAR      Specific gravity 1.030 1.003 - 1.030      pH (UA) 6.0 5.0 - 8.0      Protein Negative NEG mg/dL    Glucose Negative NEG mg/dL    Ketone Negative NEG mg/dL    Blood LARGE (A) NEG      Urobilinogen 0.2 0.2 - 1.0 EU/dL    Nitrites Negative NEG      Leukocyte Esterase SMALL (A) NEG      Bilirubin UA, confirm Negative NEG      WBC 5-10 0 - 4 /hpf    RBC 20-50 0 - 5 /hpf    Epithelial cells FEW FEW /lpf    Bacteria 1+ (A) NEG /hpf    Hyaline cast 0-2 0 - 5 /lpf       Radiologic Studies -   No orders to display     CT Results  (Last 48 hours)    None        CXR Results  (Last 48 hours)    None            Medical Decision Making   I am the first provider for this patient. I reviewed the vital signs, available nursing notes, past medical history, past surgical history, family history and social history. Vital Signs-Reviewed the patient's vital signs. Patient Vitals for the past 12 hrs:   Temp Pulse Resp BP SpO2   07/14/21 0810  83  (!) 191/81    07/14/21 0727 98.4 °F (36.9 °C) 82 16 (!) 181/82 95 %       Pulse Oximetry Analysis - 95% on RA    Cardiac Monitor:   Rate: 82 bpm  Rhythm: Normal Sinus Rhythm        ED EKG interpretation:08:40  Rhythm: normal sinus rhythm; and regular . Rate (approx.): 88; Axis: normal; IL Interval: normal; QRS interval: normal ; ST/T wave: non-specific changes; This EKG was interpreted by Heavenly Sharp MD,ED Provider. Records Reviewed: Nursing Notes, Old Medical Records and Ambulance Run Sheet    Provider Notes (Medical Decision Making):   DDx: Chronic illness/debility, metabolic abnormality, orthostatic hypotension, ACS    ED Course:   Initial assessment performed. The patients presenting problems have been discussed, and they are in agreement with the care plan formulated and outlined with them. I have encouraged them to ask questions as they arise throughout their visit. PROGRESS NOTE    Pt reevaluated. Patient without complaints. CBC and CMP grossly unremarkable other than mild hypokalemia at 3.3. Negative troponin. .  No acute EKG changes. Positive UTI with 5-10 whites, 20-50 reds and 1+ bacteria. Urine culture sent. Will discharge with Keflex. No signs stable. Written by Heavenly Sharp MD     Progress note:    Pt noted to be feeling better , ready for discharge. Updated pt and/or family on all final lab  findings. Will follow up as instructed. All questions have been answered, pt voiced understanding and agreement with plan. Specific return precautions provided as well as instructions to return to the ED should sx worsen at any time. Vital signs stable for discharge. I have also put together some discharge instructions for them that include: 1) educational information regarding their diagnosis, 2) how to care for their diagnosis at home, as well a 3) list of reasons why they would want to return to the ED prior to their follow-up appointment, should their condition change. Written by Gray Chavez MD        Critical Care Time:   0    Disposition:  Discharge    PLAN:  1. Current Discharge Medication List        2. Follow-up Information     Follow up With Specialties Details Why Contact Info    Rabia Pollock MD Internal Medicine Schedule an appointment as soon as possible for a visit in 2 days  [address]      [address] [facility] Emergency Medicine Go in 1 day If symptoms worsen [address] [address]  [phone]        Return to ED if worse     Diagnosis     Clinical Impression:   1. Hypokalemia    2. Peripheral edema    3. Acute cystitis with hematuria              Please note that this dictation was completed with [employer], the computer voice recognition software. Quite often unanticipated grammatical, syntax, homophones, and other interpretive errors are inadvertently transcribed by the computer software. Please disregard these errors. Please excuse any errors that have escaped final proofreading.

## 2021-07-14 NOTE — ED TRIAGE NOTES
Unable to get up today. EMS crew found patient on the danielle in the bathroom. Usually ambulates with a walker. Patient knows he is at [facility] and his birthday but not the current date.

## 2021-07-15 NOTE — PROGRESS NOTES
Eloisa Lopez is a 81 y.o. male presenting for/with:    Chief Complaint   Patient presents with    Bladder Infection     C/O UTI DX through ER. Has not picked up ABX R/T script went thorugh mail order. Requests new script go to [facility]    Follow Up Chronic Condition     Rl Garnett states wound to heels is almost completely healed    Referral Follow Up     Using [facility]. Has Medical Social Worker coming tomorrow for eval.     Referral Request     Requests order for phyiscal therapy for strengthing and gait training. Requests order for wheelchair to be faxed [phone]       Visit Vitals  BP (!) 160/80     Pain Scale: /10  Pain Location:     1. Have you been to the ER, urgent care clinic since your last visit? Hospitalized since your last visit? NO    2. Have you seen or consulted any other health care providers outside of the [address] [place] [address] since your last visit? Include any pap smears or colon screening.  NO    Symptom review:  NO  Fever   NO  Shaking chills  NO  Cough  NO  Body aches  NO  Coughing up blood  NO  Chest congestion  NO  Chest pain  NO  Shortness of breath  NO  Profound Loss of smell/taste  NO  Nausea/Vomiting   NO  Loose stool/Diarrhea  YES  any skin issues    Patient Risk Factors Reviewed as follows:  NO  have you been in Close contact with confirmed COVID19 patient   NO  History of recent travel to affected geographical areas within the past 14 days  NO  COPD  NO  Active Cancer/Leukemia/Lymphoma/Chemotherapy  NO  Oral steroid use  NO  Pregnant  YES  Diabetes Mellitus  YES  Heart disease  NO  Asthma  NO Health care worker at home  [address] care worker  NO Is there a Pregnant Woman in the home  NO Dialysis pt in the home   NO a large number of people living in the home    Learning Assessment 4/13/2021   PRIMARY LEARNER Patient   [address]    LEARNER PREFERENCE PRIMARY LISTENING     DEMONSTRATION   ANSWERED BY patient   RELATIONSHIP SELF     Fall Risk Assessment, last 12 mths 5/28/2021   Able to walk? Yes   Fall in past 12 months? 1   Do you feel unsteady? 1   Are you worried about falling 1   Is TUG test greater than 12 seconds? -   Is the gait abnormal? 1   Number of falls in past 12 months 2   Fall with injury? 1       3 most recent PHQ Screens 7/14/2021   Little interest or pleasure in doing things Not at all   Feeling down, depressed, irritable, or hopeless Not at all   Total Score PHQ 2 0   Trouble falling or staying asleep, or sleeping too much -   Feeling tired or having little energy -   Poor appetite, weight loss, or overeating -   Feeling bad about yourself - or that you are a failure or have let yourself or your family down -   Trouble concentrating on things such as school, work, reading, or watching TV -   Moving or speaking so slowly that other people could have noticed; or the opposite being so fidgety that others notice -   How difficult have these problems made it for you to do your work, take care of your home and get along with others -     Abuse Screening Questionnaire 5/28/2021   Do you ever feel afraid of your partner? N   Are you in a relationship with someone who physically or mentally threatens you? N   Is it safe for you to go home?  Y       ADL Assessment 5/28/2021   Feeding yourself Help Needed   Getting from bed to chair Help Needed   Getting dressed Help Needed   Bathing or showering Help Needed   Walk across the room (includes cane/walker) Help Needed   Using the telphone Help Needed   Taking your medications Help Needed   Preparing meals Help Needed   Managing money (expenses/bills) Help Needed   Moderately strenuous housework (laundry) Help Needed   Shopping for personal items (toiletries/medicines) Help Needed   Shopping for groceries Help Needed   Driving Help Needed   Climbing a flight of stairs Help Needed   Getting to places beyond walking distances Help Needed

## 2021-07-15 NOTE — PROGRESS NOTES
Nora Fritz is a 81 y.o. male evaluated via telephone on 7/15/2021. Consent:  He and/or health care decision maker is aware that that he may receive a bill for this telephone service, depending on his insurance coverage, and has provided verbal consent to proceed: Yes    A/P:  1. Acute cystitis with hematuria  I have sent this to [facility] so that he can start his antibiotics today. - cephALEXin (Keflex) 500 mg capsule; Take 1 Capsule by mouth three (3) times daily for 7 days. Dispense: 21 Capsule; Refill: 0    2. Hypokalemia  Sent to [facility]  - potassium chloride (K-DUR, KLOR-CON) 20 mEq tablet; Take 1 Tablet by mouth daily. Dispense: 90 Tablet; Refill: 3        HPI:  Seen in the [facility] ER yesterday with weakness. K was 3.3. UA was positive. Sent to Mail order pharmacy from ER. HH is involved and we are trying to get a WC. He has impaired mobility due to PD. He is increasingly difficult to mobilize to come to the office. I affirm this is a Patient Initiated Episode with an Established Patient who has not had a related appointment within my department in the past 7 days or scheduled within the next 24 hours. On this date 07/15/2021 I have spent 30 minutes reviewing previous notes, test results and face to face with the patient discussing the diagnosis and importance of compliance with the treatment plan as well as documenting on the day of the visit.      Note: not billable if this call serves to triage the patient into an appointment for the relevant concern      Yani Jean Baptiste MD

## 2021-07-21 NOTE — TELEPHONE ENCOUNTER
Returned call to [facility] [phone] in reference to patient BP. Unable to reach. Left message for her to return call.

## 2021-07-27 PROBLEM — R53.1 WEAKNESS: Status: ACTIVE | Noted: 2021-01-01

## 2021-07-27 NOTE — ROUTINE PROCESS
TRANSFER - IN REPORT:    Verbal report received from JULIA Howell RN(name) on Chacho sEcobar  being received from ED(unit) for routine progression of care      Report consisted of patients Situation, Background, Assessment and   Recommendations(SBAR). Information from the following report(s) SBAR, ED Summary, [facility] and Recent Results was reviewed with the receiving nurse. Opportunity for questions and clarification was provided. Assessment completed upon patients arrival to unit and care assumed.

## 2021-07-27 NOTE — ED NOTES
TRANSFER - OUT REPORT:    Verbal report given to Ibrahima Elizabeth, RN name) on Isaiah Beams  being transferred to Med-Surg(unit) for routine progression of care       Report consisted of patients Situation, Background, Assessment and   Recommendations(SBAR). Information from the following report(s) SBAR, Kardex, ED Summary, [facility] and Recent Results was reviewed with the receiving nurse. Lines:   Peripheral IV 07/27/21 Posterior;Right Hand (Active)        Opportunity for questions and clarification was provided.       Patient transported with:   Registered Nurse

## 2021-07-27 NOTE — ROUTINE PROCESS
Bedside and Verbal shift change report given to VEDA Schilling RN (oncoming nurse) by Emely Stover RN (offgoing nurse). Report included the following information SBAR, ED Summary and TAO Foster score 4  Bed/chair alarm is in use. If in use it is set at the highest volume. Intravenous fluids were administered, normal saline 100 ml/hr  Patient Vitals for the past 12 hrs:   Temp Pulse Resp BP SpO2   07/27/21 1754     98 %   07/27/21 1716  96 18 (!) 167/76 97 %   07/27/21 1642  94 16 (!) 174/67 95 %   07/27/21 1633  94  (!) 174/67 99 %   07/27/21 1611  84 18 (!) 162/74    07/27/21 1552  84 17 (!) 185/95 99 %   07/27/21 1542  87  (!) 192/115    07/27/21 1446  82 16 (!) 212/99 98 %   07/27/21 1344  78 16 (!) 201/86 98 %   07/27/21 1246 97.2 °F (36.2 °C) 67 16 (!) 189/74 98 %     No flowsheet data found. All lab results for the last 24 hours reviewed.

## 2021-07-27 NOTE — ED NOTES
Knocked on door to announce to pt. Hands washed. Introduced self to pt and updated whiteboard. Explained role of self to pt. ED flow explained to pt. Pt verbalized understanding.

## 2021-07-27 NOTE — ED PROVIDER NOTES
EMERGENCY DEPARTMENT HISTORY AND PHYSICAL EXAM          Date: 7/27/2021  Patient Name: Chantel Whaley    History of Presenting Illness     Chief Complaint   Patient presents with    Fall       History Provided By: Patient and EMS    HPI: Chantel Whaley is a 81 y.o. male, pmhx CKD, gout, Parkinson's, hypertension, colon cancer status post resection in 1981 who presents via EMS to the ED c/o fall and weakness    Unable to provide HPI and ROS secondary to dementia  According to medics, his wife was helping him to get back into the wheelchair when he was leaving the bathroom just prior to arrival and he collapsed to the floor missing the chair and falling to the ground on his bottom. She states she was unable to get him up so she called EMS. She did tell them that he has become progressively more weak and swollen and she is unable to care for him at home. PCP: Saurav Huntley MD    Allergies: Codeine  Social Hx: Former tobacco, -vaping, -EtOH, -Illicit Drugs; Lives with his wife    There are no other complaints, changes, or physical findings at this time. Current Facility-Administered Medications   Medication Dose Route Frequency Provider Last Rate Last Admin    sodium chloride (NS) flush 5-10 mL  5-10 mL IntraVENous ONCE Jessica Saleh., MD        hydrALAZINE (APRESOLINE) 20 mg/mL injection 10 mg  10 mg IntraVENous NOW Jessica Saleh., MD         Current Outpatient Medications   Medication Sig Dispense Refill    potassium chloride (K-DUR, KLOR-CON) 20 mEq tablet Take 1 Tablet by mouth daily. 90 Tablet 3    Wheel Chair leila Use daily 1 Each 0    famotidine (PEPCID) 20 mg tablet Take 1 Tablet by mouth every evening. 90 Tablet 4    fludrocortisone (FLORINEF) 0.1 mg tablet Take 1 Tablet by mouth daily. 90 Tablet 1    tamsulosin (FLOMAX) 0.4 mg capsule Take 1 Capsule by mouth nightly.  30 Capsule 0    metoprolol tartrate (LOPRESSOR) 25 mg tablet Take 0.5 Tablets by mouth every twelve (12) hours. 30 Tablet 5    carbidopa-levodopa (SINEMET)  mg per tablet TAKE 1 TABLET FOUR TIMES A DAY FOR PARKINSONS DISEASE 360 Tab 3    cyanocobalamin (VITAMIN B12) 500 mcg tablet TAKE 2 TABLETS DAILY 180 Tab 3    ferrous sulfate (IRON) 325 mg (65 mg iron) tablet Take 28 mg by mouth. Indications: 1 TAB ON MONDAY,WEDNESDAY, AND FRIDAY      finasteride (PROSCAR) 5 mg tablet Take 5 mg by mouth daily.  take at bedtime         Past History     Past Medical History:  Past Medical History:   Diagnosis Date    Asthma     Chronic kidney disease     Dermatophytosis of groin and perianal area 2010    Edema 2008    Encounter for long-term (current) use of other medications 2010    Gout, unspecified 2010    Gouty arthropathy, unspecified 2010    Hypertension     Hypertrophy of prostate without urinary obstruction and other lower urinary tract symptoms (LUTS) 2008    Impotence of organic origin 2008    Memory loss 2009    Obesity, unspecified 2010    Orthostatic hypotension 2008    Other and unspecified hyperlipidemia 2008    Other atopic dermatitis and related conditions 2012    Palpitations 2010    Peripheral angiopathy in diseases classified elsewhere [facility]) 2010    Personal history of malignant neoplasm of rectum, rectosigmoid junction, and anus 2011    Tinea nigra 2011    Unspecified pruritic disorder 2011    Vitamin B12 deficiency 7/23/2017       Past Surgical History:  Past Surgical History:   Procedure Laterality Date    ENDOSCOPY, COLON, DIAGNOSTIC  06/2011 05/2007, 01/2004,  12/2000    HX HERNIA REPAIR  1992    INCISIONAL    HX TOTAL COLECTOMY  1991    COLON CANCER S/P RESECTION       Family History:  Family History   Problem Relation Age of Onset    Other Mother         PULMONARY EDEMA    Other Father         PAD    Heart Attack Father     Coronary Artery Disease Father        Social History:  Social History     Tobacco Use    Smoking status: Former Smoker     Packs/day: 1.00 Years: 29.00     Pack years: 29.00     Types: Cigarettes     Start date: 1949     Quit date: 1978     Years since quittin.9    Smokeless tobacco: Never Used   Vaping Use    Vaping Use: Never used   Substance Use Topics    Alcohol use: No    Drug use: No       Allergies: Allergies   Allergen Reactions    Cat Hair Std Allergenic Ext Unknown (comments)    Codeine Unknown (comments)    Ragweed Unknown (comments)     Mixed ragweed/pollen extract,tree extract         Review of Systems   Review of Systems   Unable to perform ROS: Dementia     Physical Exam   Physical Exam  Vitals and nursing note reviewed. Constitutional:       Appearance: He is well-developed. He is not diaphoretic. Comments: Chronically ill-appearing elderly male currently in minimal acute distress with slightly elevated systolic pressure   HENT:      Head: Normocephalic and atraumatic. Eyes:      General:         Right eye: No discharge. Left eye: No discharge. Conjunctiva/sclera: Conjunctivae normal.      Pupils: Pupils are equal, round, and reactive to light. Cardiovascular:      Rate and Rhythm: Normal rate and regular rhythm. Heart sounds: Normal heart sounds. No murmur heard. Pulmonary:      Effort: Pulmonary effort is normal. No respiratory distress. Breath sounds: Normal breath sounds. No wheezing or rales. Abdominal:      General: Bowel sounds are normal. There is no distension. Palpations: Abdomen is soft. Tenderness: There is no abdominal tenderness. Musculoskeletal:         General: Normal range of motion. Cervical back: Normal range of motion and neck supple. Comments: Extensive bilateral lower extremity edema from the toes to the knee   Skin:     General: Skin is warm and dry. Findings: No rash. Neurological:      Mental Status: He is alert and oriented to person, place, and time. Cranial Nerves: No cranial nerve deficit.       Motor: No abnormal muscle tone. Diagnostic Study Results     Labs -     Recent Results (from the past 12 hour(s))   GLUCOSE, POC    Collection Time: 07/27/21  1:30 PM   Result Value Ref Range    Glucose (POC) 70 65 - 117 mg/dL    Performed by [facility]    METABOLIC PANEL, COMPREHENSIVE    Collection Time: 07/27/21  2:02 PM   Result Value Ref Range    Sodium 141 136 - 145 mmol/L    Potassium PENDING mmol/L    Chloride 106 97 - 108 mmol/L    CO2 24 21 - 32 mmol/L    Anion gap 11 5 - 15 mmol/L    Glucose 88 65 - 100 mg/dL    BUN 19 6 - 20 MG/DL    Creatinine 1.15 0.70 - 1.30 MG/DL    BUN/Creatinine ratio 17 12 - 20      GFR est AA >60 >60 ml/min/1.73m2    GFR est non-AA >60 >60 ml/min/1.73m2    Calcium 9.0 8.5 - 10.1 MG/DL    Bilirubin, total 0.6 0.2 - 1.0 MG/DL    ALT (SGPT) 9 (L) 12 - 78 U/L    AST (SGOT) PENDING U/L    Alk.  phosphatase 62 45 - 117 U/L    Protein, total 6.7 6.4 - 8.2 g/dL    Albumin 2.6 (L) 3.5 - 5.0 g/dL    Globulin 4.1 (H) 2.0 - 4.0 g/dL    A-G Ratio 0.6 (L) 1.1 - 2.2     MAGNESIUM    Collection Time: 07/27/21  2:02 PM   Result Value Ref Range    Magnesium 2.0 1.6 - 2.4 mg/dL   NT-PRO BNP    Collection Time: 07/27/21  2:02 PM   Result Value Ref Range    NT pro-BNP 3,736 (H) 0 - 450 PG/ML   EKG, 12 LEAD, INITIAL    Collection Time: 07/27/21  2:53 PM   Result Value Ref Range    Ventricular Rate 80 BPM    Atrial Rate 80 BPM    P-R Interval 136 ms    QRS Duration 84 ms    Q-T Interval 426 ms    QTC Calculation (Bezet) 491 ms    Calculated P Axis 39 degrees    Calculated R Axis -9 degrees    Calculated T Axis 155 degrees    Diagnosis       Normal sinus rhythm  Minimal voltage criteria for LVH, may be normal variant  ST & T wave abnormality, consider anterolateral ischemia  Abnormal ECG  When compared with ECG of 14-JUL-2021 08:00,  No significant change was found         Radiologic Studies -   CT HEAD WO CONT    (Results Pending)     CT Results  (Last 48 hours)    None        CXR Results  (Last 48 hours)    None Medical Decision Making   I am the first provider for this patient. I reviewed the vital signs, available nursing notes, past medical history, past surgical history, family history and social history. Vital Signs-Reviewed the patient's vital signs. Patient Vitals for the past 12 hrs:   Temp Pulse Resp BP SpO2   07/27/21 1446  82 16 (!) 212/99 98 %   07/27/21 1344  78 16 (!) 201/86 98 %   07/27/21 1246 97.2 °F (36.2 °C) 67 16 (!) 189/74 98 %       Pulse Oximetry Analysis - 98% on RA    Cardiac Monitor:   Rate: 78bpm  Rhythm: Normal Sinus Rhythm      Records Reviewed: Nursing Notes, Old Medical Records, Previous Radiology Studies and Previous Laboratory Studies    Provider Notes (Medical Decision Making):   MDM: Elderly male appears chronically debilitated, weak requiring staff x2 to put him into the bed. Vital signs notable for hypertension but without any concern for acute sepsis. Requested patient to be placed in a bed with IV and lab check for infection, electrolyte derangement. ED Course:   Initial assessment performed. The patients presenting problems have been discussed, and they are in agreement with the care plan formulated and outlined with them. I have encouraged them to ask questions as they arise throughout their visit. EKG interpretation: (Preliminary)  Rhythm: Sinus rhythm at a rate of 80 bpm; normal VT; normal QRS; prolongation of the QTc interval with left axis deviation. T wave inversions noted anterolaterally which are unchanged from July 14, 2021. This EKG was interpreted by ED Provider Milly Emanuel MD    PROGRESS NOTE:  2:45 PM  Pt remains hypertensive but also remains asymptomatic. He states he is hungry and he wants lunch. CT head to be completed for any evidence of acute headache or bleeding causing his weakness. Home meds reviewed and patient is only on 25 mg metoprolol twice daily; meds to be provided for hypertension.   BMP also notably elevated but without any prior labs checked so difficult to compare but given his peripheral exam IV diabetic reticulocyte's to be provided. Given his hypokalemia on last visit and the fact that his potassium is still pending, will use Bumex for diuresis over Lasix. CONSULT NOTE:   3:44 AM  Laina Turner MD spoke with after Lizzette Lovett,   Specialty: Hospitalist  Discussed pt's hx, disposition, and available diagnostic and imaging results. Reviewed care plans. Consultant agrees with plans as outlined. He agrees with admission and would like to continue hydralazine every 6 as needed. 4:30 PM  Blood pressure is much improved after hydralazine and diuretics have been provided. Hold further antihypertensives at this time. Admission orders have been placed and patient has a bed ready on the inpatient unit. Critical Care Time:   CRITICAL CARE NOTE :  4:30 p.m. IMPENDING DETERIORATION -Airway, Respiratory, Cardiovascular, CNS, Metabolic, Renal and Hepatic  ASSOCIATED RISK FACTORS - Hypotension, Hypoxia, Dysrhythmia, Metabolic changes, Dehydration and CNS Decompensation  MANAGEMENT- Bedside Assessment and Supervision of Care  INTERPRETATION -  Xrays, ECG and Blood Pressure  INTERVENTIONS - hemodynamic mngmt, Metobolic interventions and IV antihypertensives  CASE REVIEW - Hospitalist/Intensivist, Nursing and Family  TREATMENT RESPONSE -Improved and Stable  PERFORMED BY - Self    NOTES   :  I have spent 45 minutes of critical care time involved in lab review, consultations with specialist, family decision- making, bedside attention and documentation. During this entire length of time I was immediately available to the patient. Jessi Vásquez. MD Delfino        Diagnosis     Clinical Impression:   1. Weakness    2. Elevated blood pressure reading        PLAN:  Admission to \[facility]\"" for further management and care    Please note, this dictation was completed with Run[employer], the [employer] voice recognition software.  Quite often unanticipated grammatical, syntax, homophones, and other interpretive errors are inadvertently transcribed by the computer software. Please disregard these errors. Please excuse any errors that have escaped final proof reading.

## 2021-07-28 NOTE — PROGRESS NOTES
Spiritual Care Assessment/Progress Note  Arsh James      NAME: Meg Bates      MRN: [id number]  AGE: 81 y.o.  SEX: male  Sabianism Affiliation: Yazdanism   Language: English     7/28/2021     Total Time (in minutes): 7     Spiritual Assessment begun in [facility] MED/SURG through conversation with:         [x]Patient        [] Family    [] Friend(s)        Reason for Consult: Initial/Spiritual assessment, patient floor     Spiritual beliefs: (Please include comment if needed)     [x] Identifies with a katerin tradition:         [] Supported by a katerin community:            [] Claims no spiritual orientation:           [] Seeking spiritual identity:                [] Adheres to an individual form of spirituality:           [] Not able to assess:                           Identified resources for coping:      [] Prayer                               [] Music                  [] Guided Imagery     [x] Family/friends                 [] Pet visits     [] Devotional reading                         [] Unknown     [] Other:                                           Interventions offered during this visit: (See comments for more details)    Patient Interventions: Affirmation of emotions/emotional suffering, Affirmation of katerin, Initial/Spiritual assessment, patient floor, Iconic (affirming the presence of God/Higher Power), Prayer (assurance of)           Plan of Care:     [x] Support spiritual and/or cultural needs    [] Support AMD and/or advance care planning process      [] Support grieving process   [] Coordinate Rites and/or Rituals    [] Coordination with community clergy   [] No spiritual needs identified at this time   [] Detailed Plan of Care below (See Comments)  [] Make referral to Music Therapy  [] Make referral to Pet Therapy     [] Make referral to Addiction services  [] Make referral to [facility]  [] Make referral to Spiritual Care Partner  [] No future visits requested        [] Follow up upon further referrals     Comments:

## 2021-07-28 NOTE — PROGRESS NOTES
Problem: Mobility Impaired (Adult and Pediatric)  Goal: *Acute Goals and Plan of Care (Insert Text)  Description: FUNCTIONAL STATUS PRIOR TO ADMISSION: Per chart pt performed assisted transfers to/from commode and wheelchair. On admission spouse reporting inability to provide sufficient care to pt at home (per chart). HOME SUPPORT PRIOR TO ADMISSION: The patient lived with spouse. Physical Therapy Goals  Initiated 7/28/2021  1. Patient will move from supine to sit and sit to supine  in bed with supervision/set-up within 7 day(s). 2.  Patient will transfer from bed to chair and chair to bed with minimal assistance/contact guard assist 4/4 trials using the least restrictive device within 7 day(s). 3.  Patient will perform sit to stand with minimal assistance/contact guard assist 4/4 trials within 7 day(s). 4.  Patient will ambulate with minimal assistance/contact guard assist for 50 feet with the least restrictive device within 7 day(s). 5.  Patient will ascend/descend 6 stairs with one handrail(s) with moderate assistance  within 7 day(s). Outcome: Not Met   PHYSICAL THERAPY EVALUATION  Patient: Mirza Moy (61 y.o. male)  Date: 7/28/2021  Primary Diagnosis: Weakness [R53.1]        Precautions:   Fall, Skin    ASSESSMENT  Based on the objective data described below, the patient presents with generally decreased strength and AROM, bradykinesia, speech impairments, impaired balance, impulsive transfer initiation, and limited functional mobility on day 1 of admission s/p GLF at home. Pt offers good participation and follows ~75% one step commands. He performs transfers x 2 and ambulates briefly toward edge of bed. Pt presents with bradykinesia throughout mobility; he requires rocking and significant weight shifts to facilitate transfers. Current Level of Function Impacting Discharge (mobility/balance): mod assist x 2    Functional Outcome Measure:   The patient scored 10 on the Barthel outcome measure which is indicative of 90% functional impairments. Other factors to consider for discharge: none additional     Patient will benefit from skilled therapy intervention to address the above noted impairments. PLAN :  Recommendations and Planned Interventions: bed mobility training, transfer training, gait training, therapeutic exercises, edema management/control, patient and family training/education, and therapeutic activities      Frequency/Duration: Patient will be followed by physical therapy:  1-2 times/day, 5-6 days/week to address goals. Recommendation for discharge: (in order for the patient to meet his/her long term goals)  Therapy up to 5 days/week in SNF setting or intensive home health therapy program with 24-hour assistance pending progress    This discharge recommendation:  Has not yet been discussed the attending provider and/or case management    IF patient discharges home will need the following DME: to be determined (TBD)         SUBJECTIVE:   Patient stated yes, re: confirming birth year. Pt is unable to enunciate clear sentences and often does not respond to questions. Pt received supine, agreeable to PT and cleared by RN.       OBJECTIVE DATA SUMMARY:   HISTORY:    Past Medical History:   Diagnosis Date    Asthma     Chronic kidney disease     Dermatophytosis of groin and perianal area 2010    Edema 2008    Encounter for long-term (current) use of other medications 2010    Gout, unspecified 2010    Gouty arthropathy, unspecified 2010    Hypertension     Hypertrophy of prostate without urinary obstruction and other lower urinary tract symptoms (LUTS) 2008    Impotence of organic origin 2008    Memory loss 2009    Obesity, unspecified 2010    Orthostatic hypotension 2008    Other and unspecified hyperlipidemia 2008    Other atopic dermatitis and related conditions 2012    Palpitations 2010    Peripheral angiopathy in diseases classified elsewhere [facility]) 2010    Personal history of malignant neoplasm of rectum, rectosigmoid junction, and anus 2011    Tinea nigra 2011    Unspecified pruritic disorder 2011    Vitamin B12 deficiency 7/23/2017     Past Surgical History:   Procedure Laterality Date    ENDOSCOPY, COLON, DIAGNOSTIC  06/2011 05/2007, 01/2004,  12/2000    HX HERNIA REPAIR  1992    INCISIONAL    HX TOTAL COLECTOMY  1991    COLON CANCER S/P RESECTION       Personal factors and/or comorbidities impacting plan of care: as above    Home Situation  Home Environment:  (unable to communicate well enough to gather info)  # Steps to Enter: 6  One/Two Story Residence: One story  Living Alone: No  Support Systems: Spouse/Significant Other/Partner  Patient Expects to be Discharged to[de-identified] Unknown (per ER report NHP)  Current DME Used/Available at Home: None    EXAMINATION/PRESENTATION/DECISION MAKING:   Critical Behavior:  Neurologic State: Alert  Orientation Level: Oriented to person, Disoriented to place  Cognition: Decreased command following, Memory loss  Safety/Judgement: Decreased awareness of environment  Hearing: Auditory  Auditory Impairment: None  Skin:  distal LE skin appears thick  Edema: edematous, non-pitting LLE, 1+ RLE  Range Of Motion:  AROM: Generally decreased, functional                       Strength:    Strength: Generally decreased, functional                    Tone & Sensation:             Rigidity throughout                     Coordination:  Coordination: Generally decreased, functional       Functional Mobility:  Bed Mobility:  Rolling: Stand-by assistance  Supine to Sit: Stand-by assistance     Scooting: Stand-by assistance  Transfers:  Sit to Stand: Minimum assistance; Moderate assistance;Assist x2; Additional time (2 trials); first stand terminated by pt. Pt unable to relate reasons or symptoms. Stand to Sit: Moderate assistance; Additional time        Bed to Chair: Assist x2; Additional time; Moderate assistance Balance:   Sitting: Impaired  Sitting - Static: Fair (occasional)  Sitting - Dynamic: Poor (constant support)  Standing: Impaired  Standing - Static: Constant support;Poor  Standing - Dynamic : Constant support;Poor  Ambulation/Gait Training:  Distance (ft): 5 Feet (ft)  Assistive Device: Walker, rolling;Gait belt  Ambulation - Level of Assistance: Moderate assistance;Assist x2        Gait Abnormalities: Decreased step clearance;Shuffling gait        Base of Support: Narrowed     Speed/Kim: Pace decreased (<100 feet/min); Shuffled  Step Length: Left shortened;Right shortened                                  Functional Measure:  Barthel Index:    Bathin  Bladder: 0  Bowels: 0  Groomin  Dressin  Feedin  Mobility: 0  Stairs: 0  Toilet Use: 0  Transfer (Bed to Chair and Back): 0  Total: 10/100       The Barthel ADL Index: Guidelines  1. The index should be used as a record of what a patient does, not as a record of what a patient could do. 2. The main aim is to establish degree of independence from any help, physical or verbal, however minor and for whatever reason. 3. The need for supervision renders the patient not independent. 4. A patient's performance should be established using the best available evidence. Asking the patient, friends/relatives and nurses are the usual sources, but direct observation and common sense are also important. However direct testing is not needed. 5. Usually the patient's performance over the preceding 24-48 hours is important, but occasionally longer periods will be relevant. 6. Middle categories imply that the patient supplies over 50 per cent of the effort. 7. Use of aids to be independent is allowed. Ginny Sibley., Barthel, D.W. (1276). Functional evaluation: the Barthel Index. [facility] (14)2. LUCHO Bowser, Richard Roy., Remigio Boas., Priscilla, [address]  Measuring the change indisability after inpatient rehabilitation; comparison of the responsiveness of the Barthel Index and Functional [place] Measure. Journal of Neurology, Neurosurgery, and Psychiatry, 66(4), 582-681. ZAIN Roberto, ANY Infante, & Aubree Michael M.A. (2004.) Assessment of post-stroke quality of life in cost-effectiveness studies: The usefulness of the Barthel Index and the EuroQoL-5D. Quality of Life Research, 15, 968-62            Physical Therapy Evaluation Charge Determination   History Examination Presentation Decision-Making   HIGH Complexity :3+ comorbidities / personal factors will impact the outcome/ POC  HIGH Complexity : 4+ Standardized tests and measures addressing body structure, function, activity limitation and / or participation in recreation  HIGH Complexity : Unstable and unpredictable characteristics  Other outcome measures Barthel  HIGH       Based on the above components, the patient evaluation is determined to be of the following complexity level: HIGH     Pain Rating:  Denies pain    Activity Tolerance:   requires rest breaks   +orthostatic BP but denies associated symptoms. BP stable after mobility to chair - monitored but not recorded. After treatment patient left in no apparent distress:   Sitting in chair, Call bell within reach, Bed / chair alarm activated, and LEs elevated; pt visible at nursing desk    COMMUNICATION/EDUCATION:   The patients plan of care was discussed with: Occupational therapist, Registered nurse, and Rehabilitation technician. Fall prevention education was provided and the patient/caregiver indicated understanding., Patient/family have participated as able in goal setting and plan of care. , and Patient/family agree to work toward stated goals and plan of care.     Thank you for this referral.  Buffy Collins, PT, DPT   Time Calculation: 19 mins

## 2021-07-28 NOTE — PROGRESS NOTES
TELEPHONE CALL: Mr. Eladia Garibay has been admitted under Observation status. Mrs. Eladia Garibay mentioned something about a placement for him. I called her to clarify what type of placement she was interested in. She said Mr. Eladia Garibay had home care but after speaking with her it was determined Mr. Eladia Garibay was receiving skilled care through [facility] [employer] not personal care. He does not have Medicaid. Mrs. Eladia Garibay is not interested in a long term placement but rather a rehab placement at [address] [place] [address]. Before anything is pursued she intends to discuss the situation with Mr. Silva's PCP Dr. Eugene Onofre. I gave her my phone number for follow up. She said she would be in the hospital to see Mr. Eladia Garibay either later today or tomorrow.

## 2021-07-28 NOTE — H&P
History and Physical    Patient: Jimmy Ordoñez MRN: [id number]  SSN: [id number]    YOB: 1935  Age: 81 y.o. Sex: male      Subjective:      Chief Complaint:Generalised weakness and fall    HPI:86 y.o. male presenting for admission to [facility] for further evaluation and treatment for Orthostatic hypotension. He  has a past medical history of Asthma, Chronic kidney disease, Dermatophytosis of groin and perianal area (2010), Edema (2008), Encounter for long-term (current) use of other medications (2010), Gout, unspecified (2010), Gouty arthropathy, unspecified (2010), Hypertension, Hypertrophy of prostate without urinary obstruction and other lower urinary tract symptoms (LUTS) (2008), Impotence of organic origin (2008), Memory loss (2009), Obesity, unspecified (2010), Orthostatic hypotension (2008), Other and unspecified hyperlipidemia (2008), Other atopic dermatitis and related conditions (2012), Palpitations (2010), Peripheral angiopathy in diseases classified elsewhere ([facility] [address] (2010), Personal history of malignant neoplasm of rectum, rectosigmoid junction, and anus (2011), Tinea nigra (2011), Unspecified pruritic disorder (2011), and Vitamin B12 deficiency (7/23/2017). He also has no past medical history of Other dyspnea and respiratory abnormality, Personal history of malignant neoplasm of large intestine, Polyphagia(783.6), Psychosexual dysfunction, unspecified, Unspecified hearing loss, or Unspecified visual loss. .   Patient was brought to ER with a complaint of having a fall at home. Patient was trying to get up from his toilet seat and slumped down. Patient's wife tried to get him up and was unable to do so. Patient has very slurred speech and is able to answer only yes and no at this time. I confirmed with patient's wife she states that he does have difficulty with speech. Patient has developed progressive swelling of his lower extremities as well.   Patient's wife is unable to take care of him at this time as she told the ER doctor. Hospitalist service was requested to admit the patient for further work-up and management      Past Medical History:   Diagnosis Date    Asthma     Chronic kidney disease     Dermatophytosis of groin and perianal area 2010    Edema 2008    Encounter for long-term (current) use of other medications 2010    Gout, unspecified 2010    Gouty arthropathy, unspecified 2010    Hypertension     Hypertrophy of prostate without urinary obstruction and other lower urinary tract symptoms (LUTS) 2008    Impotence of organic origin 2008    Memory loss 2009    Obesity, unspecified 2010    Orthostatic hypotension 2008    Other and unspecified hyperlipidemia 2008    Other atopic dermatitis and related conditions 2012    Palpitations 2010    Peripheral angiopathy in diseases classified elsewhere [facility]) 2010    Personal history of malignant neoplasm of rectum, rectosigmoid junction, and anus 2011    Tinea nigra 2011    Unspecified pruritic disorder 2011    Vitamin B12 deficiency 2017     Past Surgical History:   Procedure Laterality Date    ENDOSCOPY, COLON, DIAGNOSTIC  2011, 2004,  2000    HX HERNIA REPAIR      INCISIONAL    HX TOTAL COLECTOMY      COLON CANCER S/P RESECTION      Family History   Problem Relation Age of Onset    Other Mother         PULMONARY EDEMA    Other Father         PAD    Heart Attack Father     Coronary Artery Disease Father      Social History     Tobacco Use    Smoking status: Former Smoker     Packs/day: 1.00     Years: 29.00     Pack years: 29.00     Types: Cigarettes     Start date: 1949     Quit date: 1978     Years since quittin.6    Smokeless tobacco: Never Used   Substance Use Topics    Alcohol use: No      Prior to Admission medications    Medication Sig Start Date End Date Taking?  Authorizing Provider   potassium chloride (AMBER-DUR, KLOR-CON) 20 mEq tablet Take 1 Tablet by mouth daily. 7/15/21  Yes Rashid Orlando MD   fludrocortisone (FLORINEF) 0.1 mg tablet Take 1 Tablet by mouth daily. 6/15/21  Yes Jaun Mandujano MD   tamsulosin [facility]) 0.4 mg capsule Take 1 Capsule by mouth nightly. 6/10/21  Yes Inocente Waters MD   metoprolol tartrate (LOPRESSOR) 25 mg tablet Take 0.5 Tablets by mouth every twelve (12) hours. 5/27/21  Yes Marco Antonio Vazquez MD   carbidopa-levodopa (SINEMET)  mg per tablet TAKE 1 TABLET FOUR TIMES A DAY FOR PARKINSONS DISEASE 10/5/20  Yes Rashid Orlando MD   cyanocobalamin (VITAMIN B12) 500 mcg tablet TAKE 2 TABLETS DAILY 8/24/20  Yes Marco Antonio Vazquez MD   ferrous sulfate (IRON) 325 mg (65 mg iron) tablet Take 28 mg by mouth. Indications: 1 TAB ON MONDAY,WEDNESDAY, AND FRIDAY   Yes Provider, Historical   finasteride (PROSCAR) 5 mg tablet Take 5 mg by mouth daily. take at bedtime 5/30/18  Yes Erick Cartwright MD   Wheel Chair leila Use daily 7/15/21   Rashid Orlando MD   famotidine (PEPCID) 20 mg tablet Take 1 Tablet by mouth every evening. 7/9/21   Marco Antonio Vazquez MD        Allergies   Allergen Reactions    Cat Hair Std Allergenic Ext Unknown (comments)    Codeine Unknown (comments)    Ragweed Unknown (comments)     Mixed ragweed/pollen extract,tree extract       Review of Systems:  Unable to obtain review of system as patient has very slurred speech and unable to communicate well. Objective:     Vitals:    07/27/21 2248 07/28/21 0204 07/28/21 0305 07/28/21 0730   BP: (!) 164/118 (!) 179/80 139/60 (!) 162/80   Pulse: 86 83 96 84   Resp: 18  18 20   Temp: 98.2 °F (36.8 °C)  98.3 °F (36.8 °C) 97.6 °F (36.4 °C)   SpO2: 98%  98% 96%   Weight:       Height:            Physical Exam:  General: Alert and awake and follow commands. Cooperative and friendly. No acute distress. HEAD: Normocephalic, atraumatic. Eye: PERRLA, EOMI. Throat and Neck: normal and no erythema or exudates noted.  No mass   Lung: Clear to auscultation bilaterally without wheezes, rhonchi. Good air movement bilaterally. Heart: Regular rate and rhythm. Normal S1/S2. NO appreciated murmurs, rubs or gallops. Abdomen: soft, non-tender. Bowel sounds present in all four quadrants. No masses appreciated. Extremities:  able to move all extremities normal, atraumatic  Skin: Clean, dry and intact without appreciated lesions. +++ edema of both legs with ch skin changes  Neurologic: Slurred speech, Cranial nerves 2-12 and sensation grossly intact. Psychiatric: non focal, normal affect, normal thought process    Recent Labs     07/27/21  1440   WBC 5.0   HGB 13.0   HCT 40.2   *     Recent Labs     07/27/21  1402      K 3.9      CO2 24   GLU 88   BUN 19   CREA 1.15   CA 9.0   MG 2.0   ALB 2.6*   TBILI 0.6   ALT 9*     No results for input(s): PH, PCO2, PO2, HCO3, FIO2 in the last 72 hours. CT HEAD WO CONT   Final Result   Very limited CT examination of the brain with no definite evidence   of intracranial hemorrhage. Assessment:     Principal Problem:    Weakness (7/27/2021)    Active Problems:    Asthma ()      Chronic kidney disease ()      Hyperlipidemia ()      Hypertension ()      Parkinson disease ([facility] [address] (6/4/2018)      Bilateral leg edema (6/4/2018)      Orthostatic hypotension (5/14/2021)      Peripheral neuropathy (6/9/2021)         Plan:   79-year-old male was brought to the hospital with a complaint of having generalized weakness and a fall. 1.  Severe parkinsonism with recurrent falls. Patient will resume his Sinemet. Will monitor his symptoms on that. CT of the head was negative for any acute pathology. OT PT evaluation of the patient will be done. Patient might benefit from a long-term care setting, discussed with patient's wife. 2.  Orthostatic hypotension: Patient is on Florinef for that we will continue that  3. Hypertension: Continue patient on metoprolol  4. BPH: Continue patient on Proscar and Flomax  5.   Chronic edema of lower extremities: Due to patient's orthostatic hypotension, diuretics use is limited. We will keep the legs elevated to help with the edema. 6.  Iron deficiency anemia: We will resume patient's iron supplements.   Safety:  Code Status: DNR  DVT prophylaxis: Lovenox  Stress Ulcer prophylaxis: Pepcid    Family Contact Info:  Primary Emergency Contact: Donaldo Mcknight Phone: [phone]  Disposition: Likely long-term care/home with home health    Signed By: Martin Lara MD     July 28, 2021

## 2021-07-28 NOTE — PROGRESS NOTES
IDR Team; MD, Nursing, Care Manager, Physical therapy, Nursing Supervisor, Pharmacy and Dietician, met to review patient's plan of care. Discussed goals, interventions, barriers and progress. RUR: NA Observation  RRAT: HIGH     Team will continue to monitor progress and report any concerns to the physician and care management as indicated. Transition of Care Plan:   Spoke with patient's wife to ascertain what her discharge plans are for her . She stated that she wanted to talk to the rest of the family. I informed her that her/family request to make a referral to [facility] was done and her  was denied because they didn't think he would be able to keep up with the intensive rehab program. Talking with the patient's wife, she sounds very hesitant about bringing him back home but I informed her that if insurance company does not approve skilled care, we have no other choice but to discharge him home with home health. She stated she relies on her brother in law, Kodak García to assist with making decisions. He contacted me and I caught him up on what has happened so far (he was the one suggesting [facility]) Informed him that [facility] denied admission and why, discussed skilled care, etc. He stated that he talked with Mrs. Trang Singh and they want a referral initiated with [facility] [place]. Will fax medical information to [facility] Noel for skilled care. Will have SAMMIE Gaona call Ms. Trang Singh tomorrow to initiate a Medicaid application.

## 2021-07-28 NOTE — PROGRESS NOTES
Problem: Self Care Deficits Care Plan (Adult)  Goal: *Acute Goals and Plan of Care (Insert Text)  Note:   FUNCTIONAL STATUS PRIOR TO ADMISSION: At baseline patient uses a walker to use toilet and move around home. He receives help per his yes/no responses for bathing and dressing, meal set-up due to Parkinsons. HOME SUPPORT: The patient lived with wife. Occupational Therapy Goals  Initiated 7/28/2021  1. Patient will perform toileting with supervision/set-up within 7 day(s). 2.  Patient will perform self-feeding with modified independence within 7 day(s). OCCUPATIONAL THERAPY EVALUATION  Patient: Oswaldo Will (37 y.o. male)  Date: 7/28/2021  Primary Diagnosis: Weakness [R53.1]        Precautions: fall       ASSESSMENT  Based on the objective data described below, the patient presents with recent inability to get up from toilet. His wife has been caring for him at home due to [address]. Today he is stuttering making it very difficult to understand but can respond to yes/no questions accurately. He implies his wife assist with bathing/dressing and was unable to put on his socks. OTR believes this is his baseline for assist w/LE dressing. SLP noted pt unable or unwilling to use a spoon with food. He was SBA for bed mobility but able to get to edge of bed with cues. Needs to hold himself up. Does not scoot forward before standing and leans back, needing assist of 2 to stand. He ambulated with assist of PT using walker to chair and cues for sitting. Current Level of Function Impacting Discharge (ADLs/self-care): Pt at baseline for bathing/dressing but OTR will address self feeding. He was using toilet at home. Will address toileting needs. Functional Outcome Measure:   The patient scored Total: 10/100 on the Barthel Index outcome measure which is indicative of 90% impaired ability to care for basic self needs/dependency on others; inferred 90% dependency on others for instrumental ADLs. Other factors to consider for discharge: wife feels he needs more assist than she can provide. Might need to look at hiring assistance or placing in a LTC type facility. Patient will benefit from skilled therapy intervention to address the above noted impairments. PLAN :  Recommendations and Planned Interventions: self care training, functional mobility training, therapeutic activities, and endurance activities    Frequency/Duration: Patient will be followed by occupational therapy 3 times a week to address goals. Recommendation for discharge: (in order for the patient to meet his/her long term goals)  Occupational therapy at least 2 days/week in the home AND ensure assist and/or supervision for safety with transfers, toileting and self carae    This discharge recommendation:  Has not yet been discussed the attending provider and/or case management    IF patient discharges home will need the following DME: hospital bed       SUBJECTIVE:   Patient stated Ricardo Raya.  OTR able to make out his first name when asked.     OBJECTIVE DATA SUMMARY:   HISTORY:   Past Medical History:   Diagnosis Date    Asthma     Chronic kidney disease     Dermatophytosis of groin and perianal area 2010    Edema 2008    Encounter for long-term (current) use of other medications 2010    Gout, unspecified 2010    Gouty arthropathy, unspecified 2010    Hypertension     Hypertrophy of prostate without urinary obstruction and other lower urinary tract symptoms (LUTS) 2008    Impotence of organic origin 2008    Memory loss 2009    Obesity, unspecified 2010    Orthostatic hypotension 2008    Other and unspecified hyperlipidemia 2008    Other atopic dermatitis and related conditions 2012    Palpitations 2010    Peripheral angiopathy in diseases classified elsewhere [facility]) 2010    Personal history of malignant neoplasm of rectum, rectosigmoid junction, and anus 2011    Tinea nigra 2011    Unspecified pruritic disorder 2011    Vitamin B12 deficiency 7/23/2017     Past Surgical History:   Procedure Laterality Date    ENDOSCOPY, COLON, DIAGNOSTIC  06/2011 05/2007, 01/2004,  12/2000    HX HERNIA REPAIR  1992    INCISIONAL    HX TOTAL COLECTOMY  1991    COLON CANCER S/P RESECTION       Expanded or extensive additional review of patient history:     Home Situation  Home Environment:  (unable to communicate well enough to gather info)  # Steps to Enter: 6  One/Two Story Residence: One story  Living Alone: No  Support Systems: Spouse/Significant Other/Partner  Patient Expects to be Discharged to[de-identified] Unknown (per ER report [facility])  Current DME Used/Available at Home: None    Hand dominance: Right    EXAMINATION OF PERFORMANCE DEFICITS:  Cognitive/Behavioral Status:  Neurologic State: Alert  Orientation Level: Oriented to person;Disoriented to place  Cognition: Decreased command following;Memory loss  Perception: Appears intact  Perseveration: Perseverates during conversation  Safety/Judgement: Decreased awareness of environment    Skin: thickened in BLE     Edema: BLE have significant edema non-pitting    Hearing: Auditory  Auditory Impairment: None    Vision/Perceptual:                                     Range of Motion:  Unable to fully assess. He is able to grasp walker, able to use arms in low range but raises arms less than 90 degrees. Strength:  Adequate for use of walker     Coordination:     Fine Motor Skills-Upper: Left Impaired;Right Impaired (pill rolling noted on R)    Gross Motor Skills-Upper: Left Impaired;Right Impaired (unable to effectively reach behind head)    Tone & Sensation:   Tone appears normal    Balance:  Sitting: Impaired  Sitting - Static: Fair (occasional)  Sitting - Dynamic: Poor (constant support)  Standing: Impaired  Standing - Static: Constant support;Poor  Standing - Dynamic : Constant support;Poor    Functional Mobility and Transfers for ADLs:  Bed Mobility:  Rolling: Stand-by assistance  Supine to Sit: Stand-by assistance  Scooting: Stand-by assistance    Transfers:  Sit to Stand: Assist x2  Stand to Sit: Assist x1  Bed to Chair: Assist x1    ADL Assessment:  Feeding: Setup;Stand-by assistance    Oral Facial Hygiene/Grooming: Maximum assistance    Bathing: Maximum assistance    Upper Body Dressing: Moderate assistance    Lower Body Dressing: Total assistance    Cognitive Retraining  Safety/Judgement: Decreased awareness of environment    Functional Measure:  Barthel Index:    Bathin  Bladder: 0  Bowels: 0  Groomin  Dressin  Feedin  Mobility: 0  Stairs: 0  Toilet Use: 0  Transfer (Bed to Chair and Back): 0  Total: 10/100        The Barthel ADL Index: Guidelines  1. The index should be used as a record of what a patient does, not as a record of what a patient could do. 2. The main aim is to establish degree of independence from any help, physical or verbal, however minor and for whatever reason. 3. The need for supervision renders the patient not independent. 4. A patient's performance should be established using the best available evidence. Asking the patient, friends/relatives and nurses are the usual sources, but direct observation and common sense are also important. However direct testing is not needed. 5. Usually the patient's performance over the preceding 24-48 hours is important, but occasionally longer periods will be relevant. 6. Middle categories imply that the patient supplies over 50 per cent of the effort. 7. Use of aids to be independent is allowed. Heide Galeana., Barthel, DAustynW. (1480). Functional evaluation: the Barthel Index. [address] [facility] (14)2. Terry Right simón [place], J.J.M.F, Albina Grady., Dick Harvey.[facility], [address] [place] [address] Ave (). Measuring the change indisability after inpatient rehabilitation; comparison of the responsiveness of the Barthel Index and Functional [place] Measure.  Journal of Neurology, Neurosurgery, and Psychiatry, 66(4), [phone]. ZAIN Mcneill, ANY Infante, & Judith Jimenez M.A. (2004.) Assessment of post-stroke quality of life in cost-effectiveness studies: The usefulness of the Barthel Index and the EuroQoL-5D. Quality of Life Research, 15, 063-32         Occupational Therapy Evaluation Charge Determination   History Examination Decision-Making   LOW Complexity : Brief history review  LOW Complexity : 1-3 performance deficits relating to physical, cognitive , or psychosocial skils that result in activity limitations and / or participation restrictions  LOW Complexity : No comorbidities that affect functional and no verbal or physical assistance needed to complete eval tasks       Based on the above components, the patient evaluation is determined to be of the following complexity level: LOW   Pain Ratin/10 (said No) when asked    Activity Tolerance:   Fair    After treatment patient left in no apparent distress:    Sitting in chair, Heels elevated for pressure relief, Bed / chair alarm activated, and Caregiver / family present    COMMUNICATION/EDUCATION:   The patients plan of care was discussed with: Physical therapist.     Patient is unable to participate in goal setting and plan of care. This patients plan of care is appropriate for delegation to [facility].     Thank you for this referral.  Pamela Hernandez OT  Time Calculation: 25 mins

## 2021-07-28 NOTE — PROGRESS NOTES
Physical Therapy Screening:    An [facility] [place] screening referral was triggered for physical therapy based on results obtained during the nursing admission assessment. The patients chart was reviewed and the patient is appropriate for a skilled therapy evaluation if there is a decline in functional mobility from baseline. Please order a consult for physical therapy if you are in agreement and would like an evaluation to be completed. Thank you.     Shyann Bachelor, PTA

## 2021-07-28 NOTE — PROGRESS NOTES
Problem: Falls - Risk of  Goal: *Absence of Falls  Description: Document Hermilo Treviño Fall Risk and appropriate interventions in the flowsheet. Outcome: Progressing Towards Goal  Note: Fall Risk Interventions:       Mentation Interventions: Bed/chair exit alarm    Medication Interventions: Bed/chair exit alarm    Elimination Interventions: Bed/chair exit alarm, Call light in reach, Patient to call for help with toileting needs    History of Falls Interventions: Bed/chair exit alarm, Door open when patient unattended         Problem: Hypertension  Goal: *Blood pressure within specified parameters  Outcome: Progressing Towards Goal     Problem: Impaired Skin Integrity/Pressure Injury Treatment  Goal: *Improvement of Existing Pressure Injury  Outcome: Progressing Towards Goal     Problem: Pressure Injury - Risk of  Goal: *Prevention of pressure injury  Description: Document Jonah Scale and appropriate interventions in the flowsheet.   Outcome: Progressing Towards Goal  Note: Pressure Injury Interventions:  Sensory Interventions: Assess changes in LOC    Moisture Interventions: Absorbent underpads, Apply protective barrier, creams and emollients    Activity Interventions: Increase time out of bed, Assess need for specialty bed    Mobility Interventions: Float heels, HOB 30 degrees or less    Nutrition Interventions: Document food/fluid/supplement intake    Friction and Shear Interventions: HOB 30 degrees or less, Feet elevated on foot rest, Lift sheet

## 2021-07-28 NOTE — PROGRESS NOTES
Care Management Interventions  PCP Verified by CM: Yes (Dr. Montaño Stands)  Last Visit to PCP: 07/15/21  Palliative Care Criteria Met (RRAT>21 & CHF Dx)?: No (No MD order for Palliative Care)  Mode of Transport at Discharge: Other (see comment) (POV/squad)  Transition of Care Consult (CM Consult): Long Term Care  Discharge Durable Medical Equipment: No  Physical Therapy Consult: Yes  Occupational Therapy Consult: Yes  Speech Therapy Consult: Yes  Current Support Network: Lives with Spouse, Own Home  Confirm Follow Up Transport: Family   Resource Information Provided?: No  Discharge Location  Discharge Placement: [address] (Skilled care vs long term care)     Spoke with the patient's wife regarding plan of care and disposition planning. Patient lives in his home with his wife with home health provided by [facility], in which they will not resume care because patient requires a lot more help than they can give. Patient may need to be placed, but wife is requesting rehab at [facility]. Contacted Zuhair Patel at [facility] who will review the patient's chart. State Observation Letter/MOON Observation Notifications presented to the patient by patient access. Per SAMMIE Coto, after speaking with the patient's wife, we will pursue referral to [facility], will address applying for Medicaid with the patient's wife. Will address long term care placement with the patient's wife if rehab falls through and skilled care in a facility. Reason for Admission:   Severe Parkinsonism with recurrent falls, weakness                    RUR Score:  NA Observation  RRAT: 25  HIGH                PCP: First and Last name:   Carlos Eduardo Weston MD    Name of Practice:  [facility]  Are you a current patient: Yes/No:  YES  Approximate date of last visit:  7/15/21  Can you participate in a virtual visit if needed:  NO    Do you (patient/family) have any concerns for transition/discharge? Patient getting weaker and may need placement in LTC. At [address] will not be resuming care                  Plan for utilizing home health:   TBD    Current Advanced Directive/Advance Care Plan:  DNR      Healthcare Decision Maker:   Click here to complete [address] including selection of the Healthcare Decision Maker Relationship (ie \"Primary\")            Primary Decision MakerRchapincito Timaddie - [phone]    Secondary Decision Maker: Elda Naranjo -  - [phone]    Transition of Care Plan:     TBD: Referral made to [facility].

## 2021-07-28 NOTE — PROGRESS NOTES
Patient will benefit from skilled care because he has gotten extremely weak from his Parkinsons. He lives in the home with his elderly wife. Patient may have to be placed in LTC. Hoping that he will be approved for another chance at skilled care in a [address] [place] [address], with hopes of making a referral to [place] for more intensive rehab.

## 2021-07-28 NOTE — PROGRESS NOTES
Bedside and Verbal shift change report given to Mike Botello RN(oncoming nurse) by Mela Burden RN   (offgoing nurse). Report included the following information SBAR and Kardex.  Bed alarm on

## 2021-07-29 NOTE — PROGRESS NOTES
Problem: Mobility Impaired (Adult and Pediatric)  Goal: *Acute Goals and Plan of Care (Insert Text)  Description: FUNCTIONAL STATUS PRIOR TO ADMISSION: Per chart pt performed assisted transfers to/from commode and wheelchair. On admission spouse reporting inability to provide sufficient care to pt at home (per chart). HOME SUPPORT PRIOR TO ADMISSION: The patient lived with spouse. Physical Therapy Goals  Initiated 7/28/2021  1. Patient will move from supine to sit and sit to supine  in bed with supervision/set-up within 7 day(s). 2.  Patient will transfer from bed to chair and chair to bed with minimal assistance/contact guard assist 4/4 trials using the least restrictive device within 7 day(s). 3.  Patient will perform sit to stand with minimal assistance/contact guard assist 4/4 trials within 7 day(s). 4.  Patient will ambulate with minimal assistance/contact guard assist for 50 feet with the least restrictive device within 7 day(s). 5.  Patient will ascend/descend 6 stairs with one handrail(s) with moderate assistance  within 7 day(s).      7/29/2021 1416 by Christie Tsai, PT, DPT  Outcome: Not Met     PHYSICAL THERAPY TREATMENT  Patient: Milo Hartman (87 y.o. male)  Date: 7/29/2021  Diagnosis: Weakness [R53.1] Weakness       Precautions: Fall, Skin  Chart, physical therapy assessment, plan of care and goals were reviewed. ASSESSMENT  Patient continues with skilled PT services and is progressing towards goals slowly. He appears more lethargic with increased bradykinesia, rigidity, festination, and freezing this afternoon. He performs transfers x 5 with consistent cueing required and performs 2 gait trials with extended seated rest breaks. Current Level of Function Impacting Discharge (mobility/balance): max assist    Other factors to consider for discharge: none additional; case discussed at ID rounds.          PLAN :  Patient continues to benefit from skilled intervention to address the above impairments. Continue treatment per established plan of care. to address goals. Recommendation for discharge: (in order for the patient to meet his/her long term goals)  Therapy up to 5 days/week in SNF setting    This discharge recommendation:  Has been made in collaboration with the attending provider and/or case management    IF patient discharges home will need the following DME: to be determined (TBD)       SUBJECTIVE:   Patient stated I want to walk again.     Pt received seated in recliner, agreeable to PT and cleared by RN. OBJECTIVE DATA SUMMARY:   Critical Behavior:  Neurologic State: Alert  Orientation Level: Oriented to person, Disoriented to place  Cognition: Follows commands  Safety/Judgement: Decreased awareness of environment  Functional Mobility Training:  Bed Mobility:     Supine to Sit: Additional time;Contact guard assistance;Bed Modified (supine to long-sit)  Sit to Supine: Total assistance;Assist x2  Scooting: Total assistance;Maximum assistance        Transfers:  Sit to Stand: Additional time;Maximum assistance; Moderate assistance (one step cues for sequencing, weight shift all trials)  Stand to Sit: Additional time; Moderate assistance                             Balance:  Sitting: Without support  Sitting - Static: Fair (occasional)  Sitting - Dynamic: Poor (constant support)  Standing: With support  Standing - Static: Poor (periods of fair)  Standing - Dynamic : Poor  Ambulation/Gait Training:  Distance (ft):  (7' + 6' with seated rest)  Assistive Device: Walker, rolling;Gait belt  Ambulation - Level of Assistance: Minimal assistance; Moderate assistance        Gait Abnormalities: Festinating gait; Decreased step clearance (flexed knees; max assist RW management)        Base of Support: Narrowed     Speed/Kim: Pace decreased (<100 feet/min)  Step Length: Left shortened;Right shortened                  Cues for widened base of support, anterior lean, upright posture. Constant guarding for RW proximity to pt. Education and cues regarding proper RW positioning. Therapeutic Exercises:   SLR x 5 AAROM  Pain Rating:  No pain complaints    Activity Tolerance:   requires rest breaks  Denies presyncopal symptoms    After treatment patient left in no apparent distress:   Supine in bed, Call bell within reach, Bed / chair alarm activated, Side rails x 3, and B heels floating; B heel mepilexa    COMMUNICATION/COLLABORATION:   The patients plan of care was discussed with: Registered nurse and Rehabilitation technician.      Pritesh Khalil, PT, DPT   Time Calculation: 38 mins

## 2021-07-29 NOTE — PROGRESS NOTES
Resting in bed, was OOB to recliner by P Th earlier. Appetite good, denies acute pain. Mepilex drsg to heels bilat. Twyla Decent

## 2021-07-29 NOTE — PROGRESS NOTES
TELEPHONE CALL: I called Mrs. Pat Dunham to discuss a care plan for Mr. Pat Dunham. Plan decided is that if possible he will go to a SNF bed here at [facility] for the amount of time decided and then reassess. We discussed a long term placement under Medicaid. The Silva's both receive Social Security and Mr. Pat Dunham has some assisted income. I noted that if a spouse goes into a nursing home under Medicaid the remaining spouse stays in the home and income/some resources are protected so that the person can continue living in the home. I noted I can help her look into Medicaid long term care and assist with the application process.

## 2021-07-29 NOTE — PROGRESS NOTES
Hospitalist Progress Note               Daily Progress Note: 7/29/2021      Subjective: The patient is seen for follow  up.   78-year-old male with orthostatic hypotension and severe parkinsonism was admitted to the hospital with a complaint of having a fall at home. Patient has somewhat slurred speech. Patient is comfortable otherwise. Medications reviewed  Current Facility-Administered Medications   Medication Dose Route Frequency    carbidopa-levodopa (SINEMET)  mg per tablet 1 Tablet  1 Tablet Oral QID    cyanocobalamin tablet 500 mcg  500 mcg Oral DAILY    finasteride (PROSCAR) tablet 5 mg  5 mg Oral DAILY    fludrocortisone (FLORINEF) tablet 0.1 mg  0.1 mg Oral DAILY    metoprolol tartrate (LOPRESSOR) tablet 12.5 mg  12.5 mg Oral Q12H    potassium chloride SR (KLOR-CON 10) tablet 20 mEq  20 mEq Oral DAILY    tamsulosin (FLOMAX) capsule 0.4 mg  0.4 mg Oral QHS    sodium chloride (NS) flush 5-40 mL  5-40 mL IntraVENous Q8H    sodium chloride (NS) flush 5-40 mL  5-40 mL IntraVENous PRN    acetaminophen (TYLENOL) tablet 650 mg  650 mg Oral Q6H PRN    Or    acetaminophen (TYLENOL) suppository 650 mg  650 mg Rectal Q6H PRN    polyethylene glycol (MIRALAX) packet 17 g  17 g Oral DAILY PRN    promethazine (PHENERGAN) tablet 12.5 mg  12.5 mg Oral Q6H PRN    Or    ondansetron (ZOFRAN) injection 4 mg  4 mg IntraVENous Q6H PRN    famotidine (PEPCID) tablet 20 mg  20 mg Oral BID    enoxaparin (LOVENOX) injection 40 mg  40 mg SubCUTAneous DAILY    hydrALAZINE (APRESOLINE) 20 mg/mL injection 20 mg  20 mg IntraVENous Q6H PRN       Review of Systems:   A comprehensive review of systems was negative.     Objective:   Physical Exam:     Visit Vitals  BP (!) 160/83 (BP 1 Location: Right upper arm, BP Patient Position: At rest) Comment: Reported to [facility] RN   Pulse 73   Temp 98.8 °F (37.1 °C)   Resp 20   Ht 6' (1.829 m)   Wt 100.8 kg (222 lb 4.8 oz)   SpO2 95%   BMI 30.15 kg/m²      O2 Device: None (Room air)    Temp (24hrs), Av.2 °F (36.8 °C), Min:97.6 °F (36.4 °C), Max:98.8 °F (37.1 °C)    No intake/output data recorded.  190 -  0700  In: 2188 [P.O.:960; I.V.:1228]  Out: 625 [Urine:625]    PHYSICAL EXAM:  Skin: Extremities and face reveal no rashes. HEENT: Sclerae anicteric. Extra-occular muscles are intact. No oral ulcers. No ENT discharge. The neck is supple. Cardiovascular: Regular rate and rhythm. No murmurs, gallops, or rubs. PMI nondisplaced. Carotids without bruits. Respiratory: Comfortable breathing with no accessory muscle use. Clear breath sounds with no wheezes, rales, or rhonchi. GI: Abdomen nondistended, soft, and nontender. Normal active bowel sounds. No enlargement of the liver or spleen. No masses palpable. Rectal: Deferred   Musculoskeletal: No pitting edema of the lower legs. Extremities have good range of motion. No costovertebral tenderness. Neurological: Awake, follow commands, speech is better today  Psychiatric: Mood appears appropriate with judgement intact. Lymphatic: No cervical or supraclavicular adenopathy. Data Review:       Recent Days:  Recent Labs     21  0554 21  1440   WBC 4.1 5.0   HGB 11.4* 13.0   HCT 35.0* 40.2    134*     Recent Labs     21  0554 21  1402    141   K 3.5 3.9    106   CO2 26 24   GLU 82 88   BUN 17 19   CREA 1.22 1.15   CA 8.4* 9.0   MG  --  2.0   ALB  --  2.6*   TBILI  --  0.6   ALT  --  9*     No results for input(s): PH, PCO2, PO2, HCO3, FIO2 in the last 72 hours. CT HEAD WO CONT   Final Result   Very limited CT examination of the brain with no definite evidence   of intracranial hemorrhage.                    Assessment/     Problem List:  Hospital Problems  Date Reviewed: 6/15/2021        Codes Class Noted POA    * (Principal) Weakness ICD-10-CM: R53.1  ICD-9-CM: 780.79  2021 Unknown        Peripheral neuropathy (Chronic) ICD-10-CM: G62.9  ICD-9-CM: 356.9  2021 Yes        Orthostatic hypotension (Chronic) ICD-10-CM: I95.1  ICD-9-CM: 458.0  5/14/2021 Yes        Parkinson disease ([address] ICD-10-CM: Hussein Young  ICD-9-CM: 332.0  6/4/2018 Yes        Bilateral leg edema ICD-10-CM: R60.0  ICD-9-CM: 782.3  6/4/2018 Yes        Asthma ICD-10-CM: J45.909  ICD-9-CM: 493.90  Unknown Yes        Chronic kidney disease ICD-10-CM: N18.9  ICD-9-CM: 775. 9  Unknown Yes        Hyperlipidemia ICD-10-CM: E78.5  ICD-9-CM: 272.4  Unknown Yes        Hypertension ICD-10-CM: I10  ICD-9-CM: 401.9  Unknown Yes                     Plan:  78-year-old male was brought to the hospital with a complaint of having generalized weakness and a fall. 1.  Severe parkinsonism with recurrent falls. Patient will resume his Sinemet. Will monitor his symptoms on that. CT of the head was negative for any acute pathology. OT PT evaluation of the patient will be done. Patient might benefit from a long-term care setting, discussed with patient's wife. 2.  Orthostatic hypotension: Patient is on Florinef for that we will continue that  3. Hypertension: Continue patient on metoprolol  4. BPH: Continue patient on Proscar and Flomax  5. Chronic edema of lower extremities: Due to patient's orthostatic hypotension, diuretics use is limited. We will keep the legs elevated to help with the edema. 6.  Iron deficiency anemia: We will resume patient's iron supplements. Safety:  Code Status: DNR   DVT prophylaxis: Lovenox  Stress Ulcer prophylaxis: Pepcid    Family Contact Info:  Primary Emergency Contact: Tatianna Maciel, Home Phone: [phone]  Disposition:  To be decided    Care Plan discussed with: Patient/Family, Nurse and     Domonique Harris MD

## 2021-07-29 NOTE — PROGRESS NOTES
Contacted the  at the [facility] [place] [facility] to find out when they will have a bed available. She stated probably either Tuesday or Wednesday of next week. We had already called Ms. Chandin Fontaine to inform her that they didn't have any beds and was given other options. She chose to have him utilize the [employer] [facility] here. I updated her on the above about a bed being available at the [facility] next week. She stated that she wants him to stay here. Authorization initiated:  1. Called [phone]  2   Spoke with Domonique to initiate the Authorization  3. PENDING [address] #: [id number]  3. Fax Clinicals to: Clinical Review/Precertification  5. Fax No.:  [phone]  6. Assigned Expected date of Adm: 7/30/21.

## 2021-07-29 NOTE — PROGRESS NOTES
Bedside shift change report given to Carla (oncoming nurse) by Daisy Garcia (offgoing nurse). Report included the following information SBAR, Kardex, Intake/Output, MAR, Recent Results and Med Rec Status.

## 2021-07-29 NOTE — PROGRESS NOTES
Problem: Falls - Risk of  Goal: *Absence of Falls  Description: Document Curry Holloway Fall Risk and appropriate interventions in the flowsheet.   Outcome: Progressing Towards Goal  Note: Fall Risk Interventions:       Mentation Interventions: Adequate sleep, hydration, pain control, Bed/chair exit alarm, Increase mobility, Update white board, Room close to nurse's station    Medication Interventions: Bed/chair exit alarm, Patient to call before getting OOB, Teach patient to arise slowly    Elimination Interventions: Bed/chair exit alarm, Call light in reach, Patient to call for help with toileting needs, Stay With Me (per policy)    History of Falls Interventions: Bed/chair exit alarm         Problem: Hypertension  Goal: *Labs within defined limits  Outcome: Progressing Towards Goal     Problem: Impaired Skin Integrity/Pressure Injury Treatment  Goal: *Improvement of Existing Pressure Injury  Outcome: Progressing Towards Goal

## 2021-07-29 NOTE — PROGRESS NOTES
Received a call from [address] at the [facility]. They do not have skilled beds for a male. Will contact the wife and inform her of this.

## 2021-07-29 NOTE — PROGRESS NOTES
[facility] [place] stating that they are not in the patient's insurance network. Informed the patient's wife. She instructed me to call Jonathan Hardy, patient's brother and get suggestion for another facility. Inder Peck and requested that referral be made to the [facility].  Medical information faxed to the [facility]

## 2021-07-30 PROBLEM — G20 PARKINSON'S DISEASE (HCC): Status: ACTIVE | Noted: 2021-01-01

## 2021-07-30 NOTE — PROGRESS NOTES
Comprehensive Nutrition Assessment    Type and Reason for Visit: Initial    Nutrition Recommendations/Plan: regular diet, low fat/low chol/high fiber/MARY, encourage intake, pt may need help with meal set-up but he can feed himself     Nutrition Assessment:    Present on Admission:   Orthostatic hypotension   Peripheral neuropathy   Asthma   Chronic kidney disease   Hyperlipidemia   Hypertension   Parkinson disease ([address]   Bilateral leg edema    Pt admitted with above. He has gained 20#, in 2 months has LE edema 2+ pitting. He cleaned his plate at lunch today. He fed himself. His appetite on first day admitted was not that great but appetite has come back. All labs WNL except Hgb low 11.9 and albumin 2.6. Encourage good intake and help with meal-set-up as needed. Add supplements if po intake becomes <50%. Malnutrition Assessment:  Malnutrition Status:     Mild malnutrition     Estimated Daily Nutrient Needs:  Energy (kcal): 2250; Weight Used for Energy Requirements: Current  Protein (g): 121; Weight Used for Protein Requirements: Current  Fluid (ml/day): 2250; Method Used for Fluid Requirements: 1 ml/kcal      Nutrition Related Findings:       Patient Vitals for the past 168 hrs:   % Diet Eaten   07/30/21 1650 76 - 100%   07/29/21 0900 51 - 75%   07/28/21 1200 1 - 25%   07/28/21 0900 0%       Wounds:    None       Current Nutrition Therapies:  ADULT DIET Regular; Low Fat/Low Chol/High Fiber/MARY    Anthropometric Measures:  · Height:  6' (182.9 cm)  · Current Body Wt:  100.8 kg (222 lb 3.6 oz)   · Admission Body Wt:  222 lb 3.6 oz    · Usual Body Wt:  95.3 kg (210 lb)     · Ideal Body Wt:  178 lbs:  124.8 %   · Adjusted Body Weight:   ; Weight Adjustment for:     · Adjusted BMI:       · BMI Category:  Obese class 1 (BMI 30.0-34. 9)       Weight Loss Metrics 7/27/2021 7/14/2021 6/15/2021 6/9/2021 6/4/2021 5/17/2021 5/11/2021   Today's Wt 222 lb 4.8 oz 213 lb 215 lb 225 lb 4.8 oz 210 lb 3.2 oz 203 lb 12.8 oz 212 lb 1.3 oz   BMI 30.15 kg/m2 28.89 kg/m2 29.16 kg/m2 30.56 kg/m2 26.27 kg/m2 25.47 kg/m2 25.83 kg/m2       Nutrition Diagnosis:   · Mild malnutrition related to cognitive or neurological impairment as evidenced by poor intake prior to admission      Nutrition Interventions:   Food and/or Nutrient Delivery: Continue current diet  Nutrition Education and Counseling: No recommendations at this time  Coordination of Nutrition Care: Continue to monitor while inpatient, Interdisciplinary rounds    Goals:  po intake at least % of most meals x 5-7 days       Nutrition Monitoring and Evaluation:   Behavioral-Environmental Outcomes: None identified  Food/Nutrient Intake Outcomes: Food and nutrient intake  Physical Signs/Symptoms Outcomes: Weight, Fluid status or edema, Meal time behavior    Discharge Planning:    Continue current diet     Electronically signed by Mayur Segal RD on 7/30/2021 at 5:01 PM

## 2021-07-30 NOTE — PROGRESS NOTES
Problem: Mobility Impaired (Adult and Pediatric)  Goal: *Acute Goals and Plan of Care (Insert Text)  Description: FUNCTIONAL STATUS PRIOR TO ADMISSION: Per chart pt performed assisted transfers to/from commode and wheelchair. On admission spouse reporting inability to provide sufficient care to pt at home (per chart). HOME SUPPORT PRIOR TO ADMISSION: The patient lived with spouse. Physical Therapy Goals  Initiated 7/28/2021  1. Patient will move from supine to sit and sit to supine  in bed with supervision/set-up within 7 day(s). 2.  Patient will transfer from bed to chair and chair to bed with minimal assistance/contact guard assist 4/4 trials using the least restrictive device within 7 day(s). 3.  Patient will perform sit to stand with minimal assistance/contact guard assist 4/4 trials within 7 day(s). 4.  Patient will ambulate with minimal assistance/contact guard assist for 50 feet with the least restrictive device within 7 day(s). 5.  Patient will ascend/descend 6 stairs with one handrail(s) with moderate assistance  within 7 day(s). Outcome: Progressing Towards Goal     PHYSICAL THERAPY TREATMENT  Patient: Fide Arrieta (42 y.o. male)  Date: 7/30/2021  Diagnosis: Weakness [R53.1] Weakness       Precautions: Fall, Skin  Chart, physical therapy assessment, plan of care and goals were reviewed. ASSESSMENT  Patient continues with skilled PT services and is progressing towards goals. He requires increased assistance for bed mobility but decreased assistance for transfers today. He ambulates significantly increased distance over two separate trials using RW. Current Level of Function Impacting Discharge (mobility/balance): max assist bed mobility    Other factors to consider for discharge: current plan is for continued rehab at [facility] following acute hospitalization         PLAN :  Patient continues to benefit from skilled intervention to address the above impairments.   Continue treatment per established plan of care. to address goals. Recommendation for discharge: (in order for the patient to meet his/her long term goals)  Therapy up to 5 days/week in SNF setting    This discharge recommendation:  Has been made in collaboration with the attending provider and/or case management    IF patient discharges home will need the following DME: to be determined (TBD)       SUBJECTIVE:   Patient stated I slept well.     Pt received supine, agreeable to PT and cleared by RN. OBJECTIVE DATA SUMMARY:   Critical Behavior:  Neurologic State: Alert  Orientation Level: Oriented to person, Oriented to place  Cognition: Follows commands, Memory loss  Safety/Judgement: Decreased awareness of environment  Functional Mobility Training:  Bed Mobility:     Supine to Sit:  (supervision +rail for long sit; max assist LEs to EOB)     Scooting: Maximum assistance (forward at edge of bed)        Transfers:  Sit to Stand: Additional time; Moderate assistance;Minimum assistance  Stand to Sit: Additional time; Moderate assistance;Minimum assistance                    Other: bedside commode with mod assist   Cues for anterior weight shift; manual assist for UE placement. Balance:  Sitting: Without support  Sitting - Static: Fair (occasional)  Sitting - Dynamic: Fair (occasional)  Standing: With support  Standing - Static: Fair (increased time for balance after initial standing)  Standing - Dynamic : Fair  Ambulation/Gait Training:  Distance (ft): 130 Feet (ft) (+90 with seated rest)  Assistive Device: Walker, rolling;Gait belt           Gait Abnormalities: Decreased step clearance (R trendelenburg and decreasing step length with fatigue)        Base of Support: Narrowed     Speed/Kim: Pace decreased (<100 feet/min)  Step Length: Left shortened;Right shortened                  Initial step length 90% step through. With fatigue declines to 30-50% step length.                   Pain Rating:  Denies pain    Activity Tolerance:   requires rest breaks    After treatment patient left in no apparent distress:   Sitting in chair, Call bell within reach, Bed / chair alarm activated and OT present for treatment    COMMUNICATION/COLLABORATION:   The patients plan of care was discussed with: Occupational therapist, Registered nurse, Case management and Rehabilitation technician.      Kathleen Loyola, PT, DPT   Time Calculation: 39 mins

## 2021-07-30 NOTE — PROGRESS NOTES
IDR Team; MD, Nursing, Care Manager, Physical therapy, Nursing Supervisor, Pharmacy and Dietician, met to review patient's plan of care. Discussed goals, interventions, barriers and progress. RUR: NA Observation     Team will continue to monitor progress and report any concerns to the physician and care management as indicated. Transition of Care Plan:   Patient is really doing well with physical therapy. Have been approved for skilled services. Received a phone call from [address] [place] [address] stating that the patient has been approved for skilled care services:  1. Authorization number: [id number]  1. Authorized from 7/30/21 - 8/3/21  3   Medical updates to ins. Co: on 8/4/21 for cont. Stay  4. Nurse Reviewer for the case: Carol  5. Number to contact Carley Kim: [phone]  Informed the attending of the approved skilled admission. May not be able to change his status to skilled care today. May have to wait until Saturday, 7/31/21. Will notify the patient's wife.

## 2021-07-30 NOTE — PROGRESS NOTES
Transition of Care (SHEILA) Plan:  Patient is being discharged to Swing Bed at [facility]. RUR: NA  Patient in Observation      Transition of Care Plan to SNF/Rehab    SNF/Rehab Transition:  Patient has been accepted to [facility] and meets criteria for admission. Communication to Patient/Family:  Met with patient and his wife, Richard Rader and they are agreeable to the transition plan. Communication to SNF/Rehab:  Bedside RN, Monty Rodriguez has been notified to update the transition plan to the facility. Discharge information has been updated on the AVS.     Discharge instructions to be fax'd to facility at [facility] #     Nursing Please include all hard scripts for controlled substances, med rec and dc summary, and AVS in packet. Reviewed and confirmed with facility, [address] [place] [address] Program, can manage the patient care needs for the following:     Ketty Martin with (X) only those applicable:    Medication:  [x]  Medications will be available at the facility  []  IV Antibiotics   []  Controlled Substance - hard copy to be sent with patient   []  Weekly Labs   Documents:  [] Hard RX  [] MAR  [x] Kardex  [] AVS  []Transfer Summary  [x]Discharge   Equipment:  []  CPAP/BiPAP  []  Wound Vacuum  []  Stone or Urinary Device  []  PICC/Central Line  []  Nebulizer  []  Ventilator   Treatment:  []Isolation (for MRSA, VRE, etc.)  []Surgical Drain Management  []Tracheostomy Care  []Dressing Changes  []Dialysis with transportation and chair time   []PEG Care  []Oxygen  []Daily Weights for Heart Failure   Dietary:  []Any diet limitations  []Tube Feedings   []Total Parenteral Management (TPN)   Eligible for Medicaid Long Term Services and Supports  Yes:  [] Eligible for medical assistance or will become eligible within 180 days and UAI completed. [] Provider/Patient and/or support system has requested screening.   [] UAI copy provided to patient or responsible party, .  [] UAI unavailable at discharge will send once processed to SNF provider. [] UAI unavailable at discharged mailed to patient  No:   [x] Private pay and is not financially eligible for Medicaid within the next 180 days. [] Reside out-of-state. [] A residents of a state owned/operated facility that is licensed  by [address] [employer] [facility] or [facility]  [] Enrollment in [facility] hospice services  [] [address]  [] Patient /Family declines to have screening completed or provide financial information for screening     Financial Resources:  Medicaid    [] Initiated and application pending   [] Full coverage     Advanced Care Plan:  []Surrogate Decision Maker of Care  []POA  []Communicated Code Status  \"Full\")    Other      Care Management Interventions  PCP Verified by CM: Yes (Dr. Dayana Patrick)  Last Visit to PCP: 07/15/21  Palliative Care Criteria Met (RRAT>21 & CHF Dx)?: No (No MD order for Palliative Care)  Mode of Transport at Discharge:  Other (see comment) (POV/squad)  Transition of Care Consult (CM Consult): [address]  Discharge Durable Medical Equipment: No  Physical Therapy Consult: Yes  Occupational Therapy Consult: Yes  Speech Therapy Consult: Yes  Current Support Network: Lives with Spouse, Own Home  Confirm Follow Up Transport: Family   Resource Information Provided?: No  Discharge Location  Discharge Placement: Skilled nursing facility ([facility])     [address] approved him from 7/30 - 8/3

## 2021-07-30 NOTE — PROGRESS NOTES
Bedside shift change report given to Martha Pinto (oncoming nurse) by Shanti Loyola  (offgoing nurse). Report included the following information SBAR, Kardex, MAR, Recent Results and Med Rec Status.

## 2021-07-30 NOTE — PROGRESS NOTES
FUNCTIONAL STATUS PRIOR TO ADMISSION: At baseline patient uses a walker to use toilet and move around home. He receives help per his yes/no responses for bathing and dressing, meal set-up due to Parkinsons. HOME SUPPORT: The patient lived with wife. Occupational Therapy Goals  Initiated 7/28/2021  1. Patient will perform toileting with supervision/set-up within 7 day(s). 2.  Patient will perform self-feeding with modified independence within 7 day(s). Problem: Self Care Deficits Care Plan (Adult)  Goal: *Acute Goals and Plan of Care (Insert Text)  Outcome: Progressing Towards Goal   OCCUPATIONAL THERAPY TREATMENT  Patient: Deuce Decker (47 y.o. male)  Date: 7/30/2021  Diagnosis: Weakness [R53.1] Weakness       Precautions: Fall, Skin  Chart, occupational therapy assessment, plan of care, and goals were reviewed. ASSESSMENT  Patient continues with skilled OT services and is progressing towards goals. Patient received sitting on BSC with PT. Patient requires additional time for processing and response, however following commands appropriately. With min assist, patient able to thread LLE through fastened protective undergarment while seated on BSC. Patient with significant difficulty maneuvering and threading RLE, requiring physical assist as well as max cues for sequencing and problem-solving. Patient tolerating standing with BUE support via RW for dependent BM hygiene, then participating in pulling up brief from mid-thighs over hips using B hands with overall min assist for brief unsupported standing balance. Patient agreeable to sitting up in chair for brief grooming ADLs - note patient with tendency for R lateral lean, thus pillow placed on R for skin integrity and to facilitate safe self-feeding. Continue to recommend SNF at discharge for max functional gains.     Current Level of Function Impacting Discharge (ADLs): up to max A    Other factors to consider for discharge:          PLAN :  Patient continues to benefit from skilled intervention to address the above impairments. Continue treatment per established plan of care to address goals. Recommend with staff: toileting at [facility] with staff assist x2 and RW; fully upright and at midline for self-feeding    Recommendation for discharge: (in order for the patient to meet his/her long term goals)  Therapy up to 5 days/week in SNF setting    This discharge recommendation:  Has been made in collaboration with the attending provider and/or case management    IF patient discharges home will need the following DME: TBD       SUBJECTIVE:   Patient stated I used to ball when I was younger.     OBJECTIVE DATA SUMMARY:   Cognitive/Behavioral Status:  Neurologic State: Alert  Orientation Level: Oriented to person;Oriented to place  Cognition: Follows commands;Decreased attention/concentration  Perception: Cues to maintain midline in sitting (tendency for R lateral lean)    Functional Mobility and Transfers for ADLs:  Bed Mobility:  Supine to Sit:  (supervision +rail for long sit; max assist LEs to EOB)  Scooting: Maximum assistance (forward at edge of bed)    Transfers:  Sit to Stand: Additional time; Moderate assistance;Minimum assistance  Functional Transfers  Toilet Transfer : Moderate assistance;Minimum assistance; Additional time  Cues: Verbal cues provided;Visual cues provided     Balance:  Sitting: Without support  Sitting - Static: Fair (occasional)  Sitting - Dynamic: Fair (occasional)  Standing: With support  Standing - Static: Fair (increased time for balance after initial standing)  Standing - Dynamic : Fair    ADL Intervention:  Feeding  Feeding Assistance: Minimum assistance  Container Management: Minimum assistance  Drink to Mouth: Stand-by assistance (cues for small sips)  Cues: Verbal cues provided;Visual cues provided    Grooming  Grooming Assistance: Stand-by assistance  Position Performed: Seated in chair  Washing Face: Stand-by assistance (cues for initiation, effectiveness)  Washing Hands: Minimum assistance (for thoroughness)    Lower Body Dressing Assistance  Dressing Assistance: Maximum assistance  Protective Undergarmet: Maximum assistance (able to thread LLE with min; max A RLE and pulling up)  Position Performed:  (seated on BSC; standing)  Cues: Verbal cues provided;Visual cues provided;Physical assistance  Adaptive Equipment Used: Walker    Toileting  Toileting Assistance: Maximum assistance  Bowel Hygiene: Total assistance (dependent)  Clothing Management: Maximum assistance  Cues: Verbal cues provided;Visual cues provided;Physical assistance for pants up  Adaptive Equipment: Walker    Therapeutic Exercises:   Cervical + trunk rotation to L / R     Pain:  No reports. Activity Tolerance:   Fair    After treatment patient left in no apparent distress:   Sitting in chair, Call bell within reach and LEs elevated and seated by nursing station per request    COMMUNICATION/COLLABORATION:   The patients plan of care was discussed with: Physical therapist and Registered nurse.      Lucy Orozco OT  Time Calculation: 29 mins

## 2021-07-30 NOTE — PROGRESS NOTES
[facility]    Psychosocial Assessment Swing Bed Resident    1. APPEARANCE  Resident is well groomed    2. SPEECH & COMMUNICATION SKILLS  Resident is able to converse and make needs known    3. SOCIAL STATUS  Resident desires interaction with others and seeks it out    4. EMOTIONAL STATUS  Which does the resident's normal mood convey? Contentment    5. MENTAL/COGNITIVE STATUS  Does resident understand what is happening to them? YES  Does resident comprehend what is being said to them? YES  Is there a particular time of day that the resident's comprehension level is better than other times? No  Is resident's short term memory accurate? YES  Does resident make decisions using objective information? YES  Is resident irrational in decision making? NO    6. CURRENT RELATIONSHIPS  Resident maintains supportive relationship with (state specific individuals & frequency & type of contact): Staff and family    7. PLACEMENT & DISCHARGE  Does resident understand placement? YES  Does the resident understand diagnosis & intended length of placement? YES  Does the resident express concerns regarding previous residence and/or possessions? NO  Is there support (financial & emotional) when possible discharge occurs? YES    8. ADJUSTMENT DIFFICULTIES  Is resident experiencing any difficulty related to depression, loss, lack of control, uneasy feelings, grieving?  NO  If yes, explain: JEREMIE Borjas Date:7/30/2021

## 2021-07-30 NOTE — PROGRESS NOTES
Follow up visit with patient in Rm 124  Provided empathic listening and spiritual support with prayer  Advised of  Availability   [address] [place] [address]

## 2021-07-30 NOTE — DISCHARGE SUMMARY
Hospitalist Discharge Summary     Patient ID:  Oswaldo Will  [id number]  48 y.o.  1935  7/27/2021    PCP on record: Patt Marcus MD    Admit date: 7/27/2021  Discharge date and time: 7/30/2021    DISCHARGE DIAGNOSIS:    Advanced Parkinson's Disease with Recurrent Falls    CONSULTATIONS:  None    Excerpted HPI from H&P of Marj Richard MD:  HPI:86 y. o. male presenting for admission to [facility] for further evaluation and treatment for Orthostatic hypotension.  He  has a past medical history of Asthma, Chronic kidney disease, Dermatophytosis of groin and perianal area (2010), Edema (2008), Encounter for long-term (current) use of other medications (2010), Gout, unspecified (2010), Gouty arthropathy, unspecified (2010), Hypertension, Hypertrophy of prostate without urinary obstruction and other lower urinary tract symptoms (LUTS) (2008), Impotence of organic origin (2008), Memory loss (2009), Obesity, unspecified (2010), Orthostatic hypotension (2008), Other and unspecified hyperlipidemia (2008), Other atopic dermatitis and related conditions (2012), Palpitations (2010), Peripheral angiopathy in diseases classified elsewhere ([facility] [address] (2010), Personal history of malignant neoplasm of rectum, rectosigmoid junction, and anus (2011), Tinea nigra (2011), Unspecified pruritic disorder (2011), and Vitamin B12 deficiency (7/23/2017). He also has no past medical history of Other dyspnea and respiratory abnormality, Personal history of malignant neoplasm of large intestine, Polyphagia(783.6), Psychosexual dysfunction, unspecified, Unspecified hearing loss, or Unspecified visual loss. .   Patient was brought to ER with a complaint of having a fall at home. Patient was trying to get up from his toilet seat and slumped down. Patient's wife tried to get him up and was unable to do so. Patient has very slurred speech and is able to answer only yes and no at this time.   I confirmed with patient's wife she states that he does have difficulty with speech. Patient has developed progressive swelling of his lower extremities as well. Patient's wife is unable to take care of him at this time as she told the ER doctor. Hospitalist service was requested to admit the patient for further work-up and management    ______________________________________________________________________  DISCHARGE SUMMARY/HOSPITAL COURSE:  for full details see H&P, daily progress notes, labs, consult notes. CTH neg for acute pathology. PT OT evaluated pt. Pt transferred to IP rehab within our facility.    _______________________________________________________________________  Patient seen and examined by me on discharge day. Pertinent Findings:  General: Alert and awake and follow commands. Cooperative and friendly. No acute distress. HEAD: Normocephalic, atraumatic. Eye: PERRLA, EOMI. Throat and Neck: normal and no erythema or exudates noted. No mass   Lung: Clear to auscultation bilaterally without wheezes, rhonchi. Good air movement bilaterally. Heart: Regular rate and rhythm. Normal S1/S2. NO appreciated murmurs, rubs or gallops. Abdomen: soft, non-tender. Bowel sounds present in all four quadrants. No masses appreciated. Extremities:  able to move all extremities normal, atraumatic  Skin: Clean, dry and intact without appreciated lesions. +++ edema of both legs with ch skin changes  Neurologic: Slurred speech, Cranial nerves 2-12 and sensation grossly intact. Psychiatric: non focal, normal affect, normal thought process  _______________________________________________________________________  DISCHARGE MEDICATIONS:   Current Discharge Medication List            Patient Follow Up Instructions:    Activity: PT/OT Eval and Treat  Diet: Cardiac Diet  Wound Care: None needed       Follow-up Information     Follow up With Specialties Details Why Contact Info    Maritza Carlisle MD Internal Medicine   [place] [id number]  [phone]          ________________________________________________________________    Risk of deterioration: Moderate    Condition at Discharge:  Stable  __________________________________________________________________    Disposition  IP Rehab    ____________________________________________________________________    Code Status: DNR/DNI  ___________________________________________________________________      Total time in minutes spent coordinating this discharge (includes going over instructions, follow-up, prescriptions, and preparing report for sign off to her PCP) :  30 minutes    Signed:  DO Austyn Cronin

## 2021-07-30 NOTE — PROGRESS NOTES
Problem: Motor Speech Impaired (Adult)  Goal: *Acute Goals and Plan of Care (Insert Text)  Description: Speech Pathology Goals  Initiated 7/28/2021    1. Patient will identify target item with low tech AAC with 80% accuracy, given moderate cues, within 7 days. DISCONTINUED 7/30  2. Patient will use a big mouth and a loud voice to increase intelligibility at the sentence level in 100% of trials, given minimal cues, within 7 days. 3. Patient will repeat intelligibility strategies independently within 7 days. Outcome: Progressing Towards Goal       SPEECH LANGUAGE PATHOLOGY TREATMENT  Patient: Jimmy Ordoñez (42 y.o. male)  Date: 7/30/2021  Diagnosis: Weakness [R53.1] Weakness         ASSESSMENT:  Patient is being followed by SLP and initial evaluation on 7/28/21 revealed significantly decreased intelligibility at the phrase, sentence, and conversation level. Patient was intelligible at the word (single syllable) level when context was known. Additionally, during initial evaluation, patient observed to present with dysfluent speech repetitions (stuttering-like). On this date, patient was observed with increased intelligibility and appropriately participating in conversation. Patient was intelligible in 100% of instances at the word and phrase level. Intelligibility was mildly affected as utterance length increased. No dysfluent speech repetitions observed on this date. Patient was educated on intelligibility strategies (i.e. \"big mouth, loud voice\") and, given a clinician model, successfully utilized the strategies. Per PT (who worked with patient after), patient utilized the strategies during the beginning of the PT session; suggesting potential for generalization of the strategies. Per RN, patient is tolerating Regular/Thin Liquid diet. Patient observed to take Medication Whole with Thin Liquid on this date without difficulty. No further SLP needs regarding swallowing function.  SLP to continue to follow for voice and motor speech to increase patient intelligibility. PLAN:  Patient continues to benefit from skilled intervention to address the above impairments. Continue treatment per established plan of care. Discharge Recommendations: To Be Determined     SUBJECTIVE:   Patient stated intelligibility strategies (\"big mouth, loud voice\") back to the SLP after strategies were listed on the board. OBJECTIVE:   Mental Status:  Neurologic State: Alert  Orientation Level: Oriented to person, Oriented to place  Cognition: Follows commands, Memory loss  Perception: Cues to maintain midline in sitting (Patient observed to favor his R side and required cues to ma)  Perseveration: Perseverates during conversation  Safety/Judgement: Decreased awareness of environment    Treatment & Interventions: Motor Speech:  Conversation Intelligibility (%): 80 %  Intelligibility: Impaired  Word Intelligibility (%): 100 %  Phrase Intelligibility (%): 100 %  Sentence Intelligibility (%): 80 %  Conversation Intelligibility (%): 80 %           Dysarthric Characteristics: Decreased breath support; Other (comment) (Given cues to use a big mouth and loud voice, patient was intelligible at the word and phrase level independently and at the sentence and conversation level, with minimal to moderate cues)        Voice:   Patient educated on importance of proper breath support to increase vocal loudness and intelligibility. Pain:             After treatment:   Patient left in no apparent distress in bed, Call bell within reach, and Nursing notified    COMMUNICATION/EDUCATION:   Patient was educated regarding his deficit(s) of voice and motor speech as this relates to his diagnosis of Parkinson's disease.  He demonstrated Good understanding as evidenced by reading intelligibility strategies listed on the white board and then utilizing a \"big mouth, strong voice\"    The patient's plan of care including recommendations, planned interventions, and recommended diet changes were discussed with: Registered nurse.      Aditi Swartz SLP  Time Calculation: 15 mins

## 2021-07-30 NOTE — SWING BED INTERDISCIPLINARY CARE PLAN DISCHARGE PLANNING NOTE
Discharge Planning:    Psychosocial concerns/family concerns, status of previous week; discharge plan (place, DME, services): To be able to ambulate with little or no help with his walker when he goes home. He had progressed a lot since working with PT. Discharge Plan Update: Home w/spouse  Further anticipated LOS: 1 week or less  DME to order: Undetermined  Anticipated D/C services: Undetermined  Need for patient/family conference: YES   If yes, planned for when: Toward the end of his stay. Upon review of the patients current care plan, the following issues, problems, or needs remain: Patient must be able to do as much for himself if possible when he is discharged from the facility. Continue with physical therapy for strengthening and endurance, mobility and gait training.     Nick Benitez

## 2021-07-30 NOTE — PROGRESS NOTES
Care Management Interventions  PCP Verified by CM: Yes Tommie Machado MD)  Palliative Care Criteria Met (RRAT>21 & CHF Dx)?: No (No MD order for Palliative Care)  Mode of Transport at Discharge: Other (see comment)  Transition of Care Consult (CM Consult): Discharge Planning, [address]: No  Reason Outside [facility]: Out of service area, Unable to staff case  Discharge Durable Medical Equipment: No  Physical Therapy Consult: Yes  Occupational Therapy Consult: Yes  Speech Therapy Consult: Yes  Current Support Network: Lives with Spouse, [facility] Provided?: No  Discharge Location  Discharge Placement: Home with home health     Met with the patient and informed him that his insurance company approved him for skilled care services here in our [facility]. Patient is progressing well with PT. Skilled care is needed for strengthening, endurance, mobility and gait training     Received a phone call from  [employer], patient's insurance company stating that the patient has been approved for skilled care services:  1. Authorization number: [id number]  4. Authorized from 7/30/21 - 8/3/21  3   Medical updates to ins. Co: on 8/4/21 for cont. Stay  4. Nurse Reviewer for the case: Carol  5. Number to contact Ish Cheema: [phone]  Informed the attending of the approved skilled admission. May not be able to change his status to skilled care today. May have to wait until Saturday, 7/31/21. Wife has been notified. Will be here on Monday to visit with her . Will go over the paperwork with her then. Inda Brittle

## 2021-07-31 NOTE — PROGRESS NOTES
Noted. Telephone call to pt. Her CBC and BMP are improving. Anemia and renal function are improved. K+-3.4. Continue K+ supplement. OK to use Norco for shoulder pain as she is doing--1 tab every 6 hours as necessary. Pt will follow up as scheduled. Problem: Pressure Injury - Risk of  Goal: *Prevention of pressure injury  Description: Document Jonah Scale and appropriate interventions in the flowsheet. Outcome: Progressing Towards Goal  Note: Pressure Injury Interventions:  Sensory Interventions: Assess changes in LOC    Moisture Interventions: Absorbent underpads    Activity Interventions: Increase time out of bed    Mobility Interventions: HOB 30 degrees or less    Nutrition Interventions: Document food/fluid/supplement intake    Friction and Shear Interventions: HOB 30 degrees or less                Problem: Patient Education: Go to Patient Education Activity  Goal: Patient/Family Education  Outcome: Progressing Towards Goal     Problem: Falls - Risk of  Goal: *Absence of Falls  Description: Document Carlos Fall Risk and appropriate interventions in the flowsheet.   Outcome: Progressing Towards Goal  Note: Fall Risk Interventions:  Mobility Interventions: Bed/chair exit alarm    Mentation Interventions: Bed/chair exit alarm    Medication Interventions: Bed/chair exit alarm    Elimination Interventions: Call light in reach    History of Falls Interventions: Bed/chair exit alarm

## 2021-07-31 NOTE — PROGRESS NOTES
Problem: Mobility Impaired (Adult and Pediatric)  Goal: *Acute Goals and Plan of Care (Insert Text)  Outcome: Progressing Towards Goal  Note: FUNCTIONAL STATUS PRIOR TO ADMISSION: Patient was modified independent using a rolling walker for functional mobility. HOME SUPPORT PRIOR TO ADMISSION: The patient lived with wife and she provided assistance. Physical Therapy Goals  Initiated 7/31/2021  1. Patient will move from supine to sit and sit to supine  in bed with supervision/set-up within 7 day(s). 2.  Patient will transfer from bed to chair and chair to bed with supervision/set-up using the least restrictive device within 7 day(s). 3.  Patient will perform sit to stand with supervision/set-up within 7 day(s). 4.  Patient will ambulate with supervision/set-up for 150 feet with the least restrictive device within 7 day(s). 5.  Patient will ascend/descend 2 stairs with 2 handrail(s) with minimal assistance/contact guard assist within 7 day(s). PHYSICAL THERAPY EVALUATION  Patient: Jimmy Ordoñez (2 y.o. male)  Date: 7/31/2021  Primary Diagnosis: Weakness [R53.1]  Parkinson's disease ([facility] [address] [G20]        Precautions: fall precautions    ASSESSMENT  Based on the objective data described below, the patient presents with decreased mobility. Patient is pleasant and participatory in therapy. He requires mod A for sit to stand transfers and ambulates 50' with a RW and min A. Current Level of Function Impacting Discharge (mobility/balance): mod A bed mobility and transfers, min A ambulation    Functional Outcome Measure: The patient scored 2+/5 on the [address] [place] [address] standing balance scale outcome measure which is indicative of decreased balance. Other factors to consider for discharge: lives with spouse, she assists patient with mobility     Patient will benefit from skilled therapy intervention to address the above noted impairments.        PLAN :  Recommendations and Planned Interventions: bed mobility training, transfer training, gait training, therapeutic exercises, neuromuscular re-education, edema management/control, patient and family training/education, and therapeutic activities      Frequency/Duration: Patient will be followed by physical therapy:  4-6 days a week, 1-2x/day to address goals. Recommendation for discharge: (in order for the patient to meet his/her long term goals)  To be determined: patient may need long term care or more assistance at home to meet his needs    This discharge recommendation:  A follow-up discussion with the attending provider and/or case management is planned    IF patient discharges home will need the following DME: rolling walker         SUBJECTIVE:   Patient stated I can walk.     OBJECTIVE DATA SUMMARY:   HISTORY:    Past Medical History:   Diagnosis Date    Asthma     Chronic kidney disease     Dermatophytosis of groin and perianal area 2010    Edema 2008    Encounter for long-term (current) use of other medications 2010    Gout, unspecified 2010    Gouty arthropathy, unspecified 2010    Hypertension     Hypertrophy of prostate without urinary obstruction and other lower urinary tract symptoms (LUTS) 2008    Impotence of organic origin 2008    Memory loss 2009    Obesity, unspecified 2010    Orthostatic hypotension 2008    Other and unspecified hyperlipidemia 2008    Other atopic dermatitis and related conditions 2012    Palpitations 2010    Peripheral angiopathy in diseases classified elsewhere [facility]) 2010    Personal history of malignant neoplasm of rectum, rectosigmoid junction, and anus 2011    Tinea nigra 2011    Unspecified pruritic disorder 2011    Vitamin B12 deficiency 7/23/2017     Past Surgical History:   Procedure Laterality Date    ENDOSCOPY, COLON, DIAGNOSTIC  06/2011 05/2007, 01/2004,  12/2000    HX HERNIA REPAIR  1992    INCISIONAL    HX TOTAL COLECTOMY  1991    COLON CANCER S/P RESECTION       Personal factors and/or comorbidities impacting plan of care: LE swelling    Home Situation  Home Environment: Private residence  # Steps to Enter: 6  One/Two Story Residence: One story  Living Alone: No  Support Systems: Spouse/Significant Other/Partner, Skilled nursing facility  Patient Expects to be Discharged to[de-identified] Unknown  Current DME Used/Available at Home: Walker    EXAMINATION/PRESENTATION/DECISION MAKING:   Critical Behavior:  Neurologic State: Alert  Orientation Level: Disoriented to person  Cognition: Memory loss     Hearing: Auditory  Auditory Impairment: None  Skin:  dry  Edema: LE swelling  Range Of Motion:  AROM: Generally decreased, functional                       Strength:    Strength: Generally decreased, functional          Functional Mobility:  Bed Mobility:        Sit to Supine: Moderate assistance  Scooting: Moderate assistance  Transfers:  Sit to Stand: Moderate assistance  Stand to Sit: Minimum assistance  Stand Pivot Transfers: Minimum assistance                    Balance:   Sitting - Static: Good (unsupported)  Standing - Static: Constant support; Fair  Ambulation/Gait Training:  Distance (ft): 50 Feet (ft)  Assistive Device: Gait belt;Walker, rolling  Ambulation - Level of Assistance: Minimal assistance        Gait Abnormalities: Decreased step clearance        Base of Support: Narrowed     Speed/Kim: Pace decreased (<100 feet/min)          Functional Measure:  [address] standing balance scale 2+/5       Physical Therapy Evaluation Charge Determination   History Examination Presentation Decision-Making   HIGH Complexity :3+ comorbidities / personal factors will impact the outcome/ POC  MEDIUM Complexity : 3 Standardized tests and measures addressing body structure, function, activity limitation and / or participation in recreation  MEDIUM Complexity : Evolving with changing characteristics  MEDIUM Complexity : FOTO score of 26-74      Based on the above components, the patient evaluation is determined to be of the following complexity level: MEDIUM    Pain Ratin-8/10 \"my pain comes and goes in my tailbone since \"    Activity Tolerance:   Fair    After treatment patient left in no apparent distress:   Supine in bed, Heels elevated for pressure relief, Call bell within reach, Bed / chair alarm activated, and Side rails x 3    COMMUNICATION/EDUCATION:   The patients plan of care was discussed with: Registered nurse and Certified nursing assistant/patient care technician. Fall prevention education was provided and the patient/caregiver indicated understanding. and Patient/family agree to work toward stated goals and plan of care.     Thank you for this referral.  Josephine Sampson, PT   Time Calculation: 40 mins

## 2021-07-31 NOTE — PROGRESS NOTES
Bedside shift change report given to D. Karlyn Fabry RN (oncoming nurse) by Soco Aguirre RN (offgoing nurse). Report included the following information Kardex.

## 2021-07-31 NOTE — SWING BED INTERDISCIPLINARY CARE PLAN NURSE NOTE
Nursing:    Week #1: Date 7/30/2021  Medical status (changes from previous week):Progressing  Treatment changes this shift: none  Cognition: Present status: dementia          Is cueing needed?: Yes          Frequency of cueing needs: occasional           Type of cueing used: verbal  ADL (general):  Moderate assistance  Activity type: Social leisure skills  Participation: Individual  Level of participation: No change  Pain status: No c/o pain  Patient/Family Education needs: safety      Upon review of the patients current care plan, the following issues, problems, or needs remain: safety    Quique Martinez RN

## 2021-07-31 NOTE — H&P
Hospitalist Admission Note    NAME: Eulalia Nicolas   :  1935   MRN:  [id number]     Date/Time:  2021 9:58 AM    Patient PCP: Melania Gomez MD  ______________________________________________________________________  Given the patient's current clinical presentation, I have a high level of concern for decompensation if discharged from the emergency department. Complex decision making was performed, which includes reviewing the patient's available past medical records, laboratory results, and x-ray films. Plan:  50-year-old male was brought to the hospital with a complaint of having generalized weakness and a fall. 1.  Severe parkinsonism with recurrent falls.  Patient will resume his Sinemet.  Will monitor his symptoms on that.  CT of the head was negative for any acute pathology.  OT PT evaluation of the patient will be done.  Patient might benefit from a long-term care setting, discussed with patient's wife. 2.  Orthostatic hypotension: Patient is on Florinef for that we will continue that  3.  Hypertension: Continue patient on metoprolol  4.  BPH: Continue patient on Proscar and Flomax  5.  Chronic edema of lower extremities: Due to patient's orthostatic hypotension, diuretics use is limited.  We will keep the legs elevated to help with the edema. 6.  Iron deficiency anemia: We will resume patient's iron supplements.     Safety:  Code Status: DNR   DVT prophylaxis: Lovenox  Stress Ulcer prophylaxis: Pepcid    Family Contact Info:  Primary Emergency Contact: Taisha Park, Home Phone: [phone]  Disposition: To be decided     Care Plan discussed with: Patient/Family, Nurse and              Subjective:   CHIEF COMPLAINT: rehab    HISTORY OF PRESENT ILLNESS:     Admission HPI: 81 y. o. male presenting for admission to [facility] for further evaluation and treatment for Orthostatic hypotension.  He  has a past medical history of Asthma, Chronic kidney disease, Dermatophytosis of groin and perianal area (2010), Edema (2008), Encounter for long-term (current) use of other medications (2010), Gout, unspecified (2010), Gouty arthropathy, unspecified (2010), Hypertension, Hypertrophy of prostate without urinary obstruction and other lower urinary tract symptoms (LUTS) (2008), Impotence of organic origin (2008), Memory loss (2009), Obesity, unspecified (2010), Orthostatic hypotension (2008), Other and unspecified hyperlipidemia (2008), Other atopic dermatitis and related conditions (2012), Palpitations (2010), Peripheral angiopathy in diseases classified elsewhere ([facility] [address] (2010), Personal history of malignant neoplasm of rectum, rectosigmoid junction, and anus (2011), Tinea nigra (2011), Unspecified pruritic disorder (2011), and Vitamin B12 deficiency (7/23/2017). He also has no past medical history of Other dyspnea and respiratory abnormality, Personal history of malignant neoplasm of large intestine, Polyphagia(783.6), Psychosexual dysfunction, unspecified, Unspecified hearing loss, or Unspecified visual loss. .   Patient was brought to ER with a complaint of having a fall at home.  Patient was trying to get up from his toilet seat and slumped down.  Patient's wife tried to get him up and was unable to do so. Aj Smith has very slurred speech and is able to answer only yes and no at this time.  I confirmed with patient's wife she states that he does have difficulty with speech. Aj Smith has developed progressive swelling of his lower extremities as well.  Patient's wife is unable to take care of him at this time as she told the ER doctor. [facility] service was requested to admit the patient for further work-up and management    Hospital Admission:  Patient was evaluated by PT OT and decision was made to have patient do IP rehab here.           Past Medical History:   Diagnosis Date    Asthma     Chronic kidney disease     Dermatophytosis of groin and perianal area 2010    Edema 2008    Encounter for long-term (current) use of other medications 2010    Gout, unspecified 2010    Gouty arthropathy, unspecified 2010    Hypertension     Hypertrophy of prostate without urinary obstruction and other lower urinary tract symptoms (LUTS) 2008    Impotence of organic origin 2008    Memory loss 2009    Obesity, unspecified 2010    Orthostatic hypotension 2008    Other and unspecified hyperlipidemia 2008    Other atopic dermatitis and related conditions 2012    Palpitations 2010    Peripheral angiopathy in diseases classified elsewhere [facility]) 2010    Personal history of malignant neoplasm of rectum, rectosigmoid junction, and anus 2011    Tinea nigra 2011    Unspecified pruritic disorder 2011    Vitamin B12 deficiency 2017        Past Surgical History:   Procedure Laterality Date    ENDOSCOPY, COLON, DIAGNOSTIC  2011, 2004,  2000    HX HERNIA REPAIR      INCISIONAL    HX TOTAL COLECTOMY      COLON CANCER S/P RESECTION       Social History     Tobacco Use    Smoking status: Former Smoker     Packs/day: 1.00     Years: 29.00     Pack years: 29.00     Types: Cigarettes     Start date: 1949     Quit date: 1978     Years since quittin.6    Smokeless tobacco: Never Used   Substance Use Topics    Alcohol use: No        Family History   Problem Relation Age of Onset    Other Mother         PULMONARY EDEMA    Other Father         PAD    Heart Attack Father     Coronary Artery Disease Father       Allergies   Allergen Reactions    Cat Hair Std Allergenic Ext Unknown (comments)    Codeine Unknown (comments)    Ragweed Unknown (comments)     Mixed ragweed/pollen extract,tree extract        Prior to Admission medications    Medication Sig Start Date End Date Taking? Authorizing Provider   potassium chloride (K-DUR, KLOR-CON) 20 mEq tablet Take 1 Tablet by mouth daily.  7/15/21   Mitchell Turner MD   Wheel Chair leila Use daily 7/15/21   Taylor Lyubov Paulino MD   famotidine (PEPCID) 20 mg tablet Take 1 Tablet by mouth every evening. 7/9/21   Melania Gomez MD   fludrocortisone (FLORINEF) 0.1 mg tablet Take 1 Tablet by mouth daily. 6/15/21   Shaylee Og MD   tamsulosin [facility]) 0.4 mg capsule Take 1 Capsule by mouth nightly. 6/10/21   Yelena Zambrano MD   metoprolol tartrate (LOPRESSOR) 25 mg tablet Take 0.5 Tablets by mouth every twelve (12) hours. 5/27/21   Melania Gomez MD   carbidopa-levodopa (SINEMET)  mg per tablet TAKE 1 TABLET FOUR TIMES A DAY FOR PARKINSONS DISEASE 10/5/20   Melania Gomez MD   cyanocobalamin (VITAMIN B12) 500 mcg tablet TAKE 2 TABLETS DAILY 8/24/20   Melania Gomez MD   ferrous sulfate (IRON) 325 mg (65 mg iron) tablet Take 28 mg by mouth. Indications: 1 TAB ON MONDAY,WEDNESDAY, AND FRIDAY    Provider, Historical   finasteride (PROSCAR) 5 mg tablet Take 5 mg by mouth daily.  take at bedtime 5/30/18   Tacho Kinney MD         Total of 12 systems reviewed as follows:       POSITIVE= underlined text  Negative = text not underlined  General:  fever, chills, sweats, generalized weakness, weight loss/gain,      loss of appetite   Eyes:    blurred vision, eye pain, loss of vision, double vision  ENT:    rhinorrhea, pharyngitis   Respiratory:   cough, sputum production, SOB, SARABIA, wheezing, pleuritic pain   Cardiology:   chest pain, palpitations, orthopnea, PND, edema, syncope   Gastrointestinal:  abdominal pain , N/V, diarrhea, dysphagia, constipation, bleeding   Genitourinary:  frequency, urgency, dysuria, hematuria, incontinence   Muskuloskeletal :  arthralgia, myalgia, back pain  Hematology:  easy bruising, nose or gum bleeding, lymphadenopathy   Dermatological: rash, ulceration, pruritis, color change / jaundice  Endocrine:   hot flashes or polydipsia   Neurological:  headache, dizziness, confusion, focal weakness, paresthesia,     Speech difficulties, memory loss, gait difficulty  Psychological: Feelings of anxiety, depression, agitation    Objective:   VITALS:    Visit Vitals  BP (!) 162/86 (BP 1 Location: Right upper arm, BP Patient Position: At rest)   Pulse 74   Temp 98.6 °F (37 °C)   Resp 20   SpO2 96%       PHYSICAL EXAM:    General: Alert and awake and follow commands. Cooperative and friendly. No acute distress. HEAD: Normocephalic, atraumatic. Eye: PERRLA, EOMI. Throat and Neck: normal and no erythema or exudates noted. No mass   Lung: Clear to auscultation bilaterally without wheezes, rhonchi. Good air movement bilaterally. Heart: Regular rate and rhythm. Normal S1/S2. NO appreciated murmurs, rubs or gallops.    Abdomen: soft, non-tender. Bowel sounds present in all four quadrants. No masses appreciated. Extremities:  able to move all extremities normal, atraumatic  Skin: Clean, dry and intact without appreciated lesions. +++ edema of both legs with ch skin changes  Neurologic: Slurred speech, Cranial nerves 2-12 and sensation grossly intact.   Psychiatric: non focal, normal affect, normal thought process     _______________________________________________________________________  Care Plan discussed with:    Comments   Patient x    Family      RN x    Care Manager                    Consultant:      _______________________________________________________________________  Expected  Disposition:   Home with Family x   HH/PT/OT/RN    SNF/LTC    LACHELLE    ________________________________________________________________________  TOTAL TIME:  30 Minutes    Critical Care Provided     Minutes non procedure based      Comments    x Reviewed previous records   >50% of visit spent in counseling and coordination of care x Discussion with patient and/or family and questions answered       ________________________________________________________________________  Signed: Bee Serrano DO

## 2021-08-01 NOTE — PROGRESS NOTES
Offered to assist patient OOB to recliner. Patient refused. Stated he wanted stay in bed. Repositioned for comfort.

## 2021-08-01 NOTE — PROGRESS NOTES
Patient agreed to [address] [place] [address]

## 2021-08-01 NOTE — PROGRESS NOTES
Problem: Pressure Injury - Risk of  Goal: *Prevention of pressure injury  Description: Document Jonah Scale and appropriate interventions in the flowsheet. Outcome: Not Progressing Towards Goal  Note: Pressure Injury Interventions:  Sensory Interventions: Assess changes in LOC    Moisture Interventions: Apply protective barrier, creams and emollients    Activity Interventions: Increase time out of bed    Mobility Interventions: PT/OT evaluation    Nutrition Interventions: Offer support with meals,snacks and hydration    Friction and Shear Interventions: HOB 30 degrees or less                Problem: Patient Education: Go to Patient Education Activity  Goal: Patient/Family Education  Outcome: Not Progressing Towards Goal     Problem: Falls - Risk of  Goal: *Absence of Falls  Description: Document Carlos Fall Risk and appropriate interventions in the flowsheet.   Outcome: Not Progressing Towards Goal  Note: Fall Risk Interventions:  Mobility Interventions: Bed/chair exit alarm    Mentation Interventions: Bed/chair exit alarm    Medication Interventions: Bed/chair exit alarm    Elimination Interventions: Call light in reach    History of Falls Interventions: Bed/chair exit alarm         Problem: Patient Education: Go to Patient Education Activity  Goal: Patient/Family Education  Outcome: Not Progressing Towards Goal     Problem: Patient Education: Go to Patient Education Activity  Goal: Patient/Family Education  Outcome: Not Progressing Towards Goal

## 2021-08-01 NOTE — PROGRESS NOTES
Bedside shift change report given to IRAM Ojeda RN (oncoming nurse) by SHAWN Cunningham RN (offgoing nurse). Report included the following information SBAR, Kardex and MAR.

## 2021-08-01 NOTE — SWING BED INTERDISCIPLINARY CARE PLAN NURSE NOTE
Nursing:    Week #1: Date 8/1/2021  Medical status (changes from previous week):Progressing  Treatment changes this shift: none  Cognition: Present status: dementia          Is cueing needed?: Yes          Frequency of cueing needs: occasional           Type of cueing used: verbal  ADL (general):  Moderate assistance  Activity type: Social leisure skills  Participation: Individual  Level of participation: Improving  Pain status: No c/o pain  Patient/Family Education needs: safety    Upon review of the patients current care plan, the following issues, problems, or needs remain: safety    Puneet Burns RN

## 2021-08-02 NOTE — PROGRESS NOTES
Problem: Pressure Injury - Risk of  Goal: *Prevention of pressure injury  Description: Document Jonah Scale and appropriate interventions in the flowsheet.   Outcome: Progressing Towards Goal  Note: Pressure Injury Interventions:  Sensory Interventions: Assess changes in LOC, Check visual cues for pain, Float heels    Moisture Interventions: Absorbent underpads, Apply protective barrier, creams and emollients    Activity Interventions: Increase time out of bed    Mobility Interventions: Chair cushion, Float heels, HOB 30 degrees or less    Nutrition Interventions: Document food/fluid/supplement intake    Friction and Shear Interventions: Apply protective barrier, creams and emollients, Feet elevated on foot rest

## 2021-08-02 NOTE — PROGRESS NOTES
Bedside shift change report given to VEDA Mckeon RN (oncoming nurse) by Damaris Romero RN   (offgoing nurse). Report included the following information SBAR, Kardex, Intake/Output, MAR and Recent Results. Foster score 5  Bed/chair alarm is in use. If in use it is set at the highest volume. Intravenous fluids were administered, none 0 ml/hr  Patient Vitals for the past 12 hrs:   Temp Pulse Resp BP SpO2   08/02/21 0833 98.1 °F (36.7 °C) 78 16 (!) 161/78 98 %     No flowsheet data found. Lab results reviewed. For significant abnormal values and values requiring intervention, see assessment and plan.

## 2021-08-02 NOTE — PROGRESS NOTES
Bedside shift change report given to AKANKSHA Mayes RN (oncoming nurse) by [employer]. RYAN Mendiola (offgoing nurse). Report included the following information SBAR, Kardex and Intake/Output.

## 2021-08-02 NOTE — PROGRESS NOTES
Problem: Self Care Deficits Care Plan (Adult)  Goal: *Acute Goals and Plan of Care (Insert Text)  Description:   FUNCTIONAL STATUS PRIOR TO ADMISSION: Patient was modified independent using a rolling walker for functional mobility. Wife assisted with ADLs for bathing/dressing, shave    HOME SUPPORT: The patient lived with wife. Occupational Therapy Goals  Initiated 8/2/2021  1. Patient will perform grooming with supervision/set-up within 7 day(s). 2.  Patient will perform lower body dressing with moderate assistance  within 7 day(s). 3.  Patient will perform toileting with modA BSC within 7 day(s). 4. Pt will perform bathing seated in a chair with assist for feet only within 7 days. Outcome: Progressing Towards Goal   OCCUPATIONAL THERAPY EVALUATION  Patient: Oanh Lockett (32 y.o. male)  Date: 8/2/2021  Primary Diagnosis: Weakness [R53.1]  Parkinson's disease ([facility] [address] [G20]        Precautions:   Fall    ASSESSMENT  Based on the objective data described below, the patient presents with progressing Parkinson Disease. He had been at home able to perform transfers and ambulation short distances with walker per wife but has had more weakness and difficulty lately to the point she could not manage him at home. He has persistent BLE edema. Difficulty with language at times but is on a regular diet. SLP noted he had not been able or willing to use a spoon during in-patient eval. OTR assessed self feeding and pt is proficient but needs cues as bites are sometimes too big, and he doesn't clear food in mouth before taking the next bites. He needs food cut up and packages opened. He continues to need assist with self care tasks, which is his baselline. He was mod assist for transfers today w/PT. Able to groom with min assist to open toothpaste tube He is able to put toothpaste on brush and brush/rinse mouth.  He has drooling today not noted during in-pt eval last week While eating he began speaking without stuttering. Current Level of Function Impacting Discharge (ADLs/self-care): Mod assist for ADL (Baseline) mod assist for transfers    Functional Outcome Measure: The patient scored Total: 20/100 on the Barthel Index outcome measure which is indicative of 80% impaired ability to care for basic self needs/dependency on others; inferred 80% dependency on others for instrumental ADLs. Other factors to consider for discharge: pt has progressive disease and an elderly wife to assist.     Patient will benefit from skilled therapy intervention to address the above noted impairments. PLAN :  Recommendations and Planned Interventions: self care training, functional mobility training, therapeutic activities, and endurance activities    Frequency/Duration: Patient will be followed by occupational therapy 3-5 x week to address goals. Recommendation for discharge: (in order for the patient to meet his/her long term goals)  No skilled occupational therapy/ follow up rehabilitation needs identified at this time. This discharge recommendation:  Has been made in collaboration with the attending provider and/or case management    IF patient discharges home will need the following DME: hospital bed       SUBJECTIVE:   Patient stated if you have low expectations of me raise them up.  \" Worked for 33 yrs\"  \"they respected me and I respected them\", Drove a semi with ashpalt\". Pt chatting during lunch, very little stuttering today.     OBJECTIVE DATA SUMMARY:   HISTORY:   Past Medical History:   Diagnosis Date    Asthma     Chronic kidney disease     Dermatophytosis of groin and perianal area 2010    Edema 2008    Encounter for long-term (current) use of other medications 2010    Gout, unspecified 2010    Gouty arthropathy, unspecified 2010    Hypertension     Hypertrophy of prostate without urinary obstruction and other lower urinary tract symptoms (LUTS) 2008    Impotence of organic origin 2008    Memory loss 2009    Obesity, unspecified 2010    Orthostatic hypotension 2008    Other and unspecified hyperlipidemia 2008    Other atopic dermatitis and related conditions 2012    Palpitations 2010    Peripheral angiopathy in diseases classified elsewhere [facility]) 2010    Personal history of malignant neoplasm of rectum, rectosigmoid junction, and anus 2011    Tinea nigra 2011    Unspecified pruritic disorder 2011    Vitamin B12 deficiency 7/23/2017     Past Surgical History:   Procedure Laterality Date    ENDOSCOPY, COLON, DIAGNOSTIC  06/2011 05/2007, 01/2004,  12/2000    HX HERNIA REPAIR  1992    INCISIONAL    HX TOTAL COLECTOMY  1991    COLON CANCER S/P RESECTION       Expanded or extensive additional review of patient history:     Home Situation  Home Environment: Private residence  # Steps to Enter: 6  One/Two Story Residence: One story  Living Alone: No  Support Systems: Spouse/Significant Other/Partner  Patient Expects to be Discharged to[de-identified] Unknown  Current DME Used/Available at Home: Walker, rolling    Hand dominance: Right    EXAMINATION OF PERFORMANCE DEFICITS:  Cognitive/Behavioral Status:  Neurologic State: Alert;Confused  Orientation Level: Oriented to person  Cognition: Follows commands;Decreased attention/concentration;Memory loss        Safety/Judgement: Decreased awareness of environment;Decreased awareness of need for assistance;Decreased awareness of need for safety;Decreased insight into deficits    Skin: intact    Edema: BLE persistent,     Hearing: Auditory  Auditory Impairment: None      Range of Motion:  WFL BUE      Strength:  Difficulty assessing but pt uses arms to assist w/transfers    Coordination:     Fine Motor Skills-Upper: Right Intact; Left Intact    Gross Motor Skills-Upper: Left Intact; Right Intact    Tone & Sensation:  Normal tone, no noted sensation loss    Balance:  Sitting: Intact  Sitting - Static: Good (unsupported)  Sitting - Dynamic: Fair (occasional)  Standing: Impaired; With support  Standing - Static: Poor;Constant support  Standing - Dynamic : Poor;Constant support    Functional Mobility and Transfers for ADLs:  Bed Mobility:  Supine to Sit: Moderate assistance; Additional time;Bed Modified  Scooting: Minimum assistance; Moderate assistance    Transfers:  Sit to Stand: Moderate assistance;Maximum assistance  Stand to Sit: Moderate assistance    ADL Assessment:  Feeding: Setup;Supervision    Oral Facial Hygiene/Grooming: Setup;Minimum assistance    Bathing: Moderate assistance    Upper Body Dressing: Moderate assistance    Lower Body Dressing: Assist x1;Total assistance      ADL Intervention and task modifications:  Feeding  Feeding Assistance: Set-up; Supervision  Container Management: Maximum assistance  Cutting Food: Total assistance (dependent)  Utensil Management: Supervision  Food to Mouth: Independent  Drink to Mouth: Independent  Cues: Verbal cues provided (bite size)    Grooming  Grooming Assistance: Set-up; Minimum assistance  Position Performed: Seated in chair  Washing Face: Set-up  Washing Hands: Minimum assistance  Brushing Teeth: Minimum assistance (tooth paste open/close)         Lower Body Bathing  Lower Body : Moderate assistance  Position Performed: Seated in chair (assist below knees)         Lower Body Dressing Assistance  Socks: Total assistance (dependent)    Toileting  Toileting Assistance: Total assistance(dependent)    Cognitive Retraining  Safety/Judgement: Decreased awareness of environment;Decreased awareness of need for assistance;Decreased awareness of need for safety;Decreased insight into deficits    Functional Measure:  Barthel Index:    Bathin  Bladder: 0  Bowels: 0  Groomin  Dressin  Feedin  Mobility: 0  Stairs: 0  Toilet Use: 0  Transfer (Bed to Chair and Back): 10  Total: 20/100        The Barthel ADL Index: Guidelines  1.  The index should be used as a record of what a patient does, not as a record of what a patient could do. 2. The main aim is to establish degree of independence from any help, physical or verbal, however minor and for whatever reason. 3. The need for supervision renders the patient not independent. 4. A patient's performance should be established using the best available evidence. Asking the patient, friends/relatives and nurses are the usual sources, but direct observation and common sense are also important. However direct testing is not needed. 5. Usually the patient's performance over the preceding 24-48 hours is important, but occasionally longer periods will be relevant. 6. Middle categories imply that the patient supplies over 50 per cent of the effort. 7. Use of aids to be independent is allowed. Ranjit Pimentel., Barthel, D.W. (5027). Functional evaluation: the Barthel Index. [address] [facility] (14)2. Tillman Sport simón LUCHO Sewell, Jay Rodriguez., Benja Irby., [place], [address] [place] [address] (1999). Measuring the change indisability after inpatient rehabilitation; comparison of the responsiveness of the Barthel Index and Functional [place] Measure. Journal of Neurology, Neurosurgery, and Psychiatry, 66(4), 958-861. Norma Duckworth, N.J.A, REGAN Infante.MALVIN, & Jose Swenson MMARIANA. (2004.) Assessment of post-stroke quality of life in cost-effectiveness studies: The usefulness of the Barthel Index and the EuroQoL-5D. Quality of Life Research, 15, 509-56         Occupational Therapy Evaluation Charge Determination   History Examination Decision-Making   LOW Complexity : Brief history review  MEDIUM Complexity : 3-5 performance deficits relating to physical, cognitive , or psychosocial skils that result in activity limitations and / or participation restrictions MEDIUM Complexity : Patient may present with comorbidities that affect occupational performnce.  Miniml to moderate modification of tasks or assistance (eg, physical or verbal ) with assesment(s) is necessary to enable patient to complete evaluation       Based on the above components, the patient evaluation is determined to be of the following complexity level: LOW   Pain Ratin/10    Activity Tolerance:   Fair    After treatment patient left in no apparent distress:    Sitting in chair, alarm on, at nurses station    COMMUNICATION/EDUCATION:   The patients plan of care was discussed with: Physical therapist.     Patient/family have participated as able in goal setting and plan of care. This patients plan of care is appropriate for delegation to GANESH.     Thank you for this referral.  Alex Turner, OT

## 2021-08-02 NOTE — PROGRESS NOTES
Problem: Pressure Injury - Risk of  Goal: *Prevention of pressure injury  Description: Document Jonah Scale and appropriate interventions in the flowsheet. Outcome: Progressing Towards Goal  Note: Pressure Injury Interventions:  Sensory Interventions: Assess changes in LOC, Check visual cues for pain, Keep linens dry and wrinkle-free, Minimize linen layers    Moisture Interventions: Apply protective barrier, creams and emollients, Check for incontinence Q2 hours and as needed, Minimize layers    Activity Interventions: Increase time out of bed    Mobility Interventions: Chair cushion, Float heels, HOB 30 degrees or less    Nutrition Interventions: Offer support with meals,snacks and hydration, Document food/fluid/supplement intake    Friction and Shear Interventions: Apply protective barrier, creams and emollients, Minimize layers                Problem: Falls - Risk of  Goal: *Absence of Falls  Description: Document Carlos Fall Risk and appropriate interventions in the flowsheet. Outcome: Progressing Towards Goal  Note: Fall Risk Interventions:  Mobility Interventions: Bed/chair exit alarm, Patient to call before getting OOB, Utilize walker, cane, or other assistive device    Mentation Interventions: Bed/chair exit alarm, Door open when patient unattended, Room close to nurse's station    Medication Interventions: Bed/chair exit alarm, Patient to call before getting OOB    Elimination Interventions: Bed/chair exit alarm, Call light in reach, Patient to call for help with toileting needs, Stay With Me (per policy)    History of Falls Interventions: Bed/chair exit alarm, Door open when patient unattended, Room close to nurse's station    Pt. Is able to standand move with walker but is weak.

## 2021-08-02 NOTE — PROGRESS NOTES
Problem: Mobility Impaired (Adult and Pediatric)  Goal: *Acute Goals and Plan of Care (Insert Text)  8/2/2021 1549 by Carole Stanton PTA  Outcome: Progressing Towards Goal  8/2/2021 1142 by Carole Stanton PTA  Outcome: Not Progressing Towards Goal     PHYSICAL THERAPY TREATMENT  Patient: Jimmy Ordoñez (43 y.o. male)  Date: 8/2/2021  Diagnosis: Weakness [R53.1]  Parkinson's disease ([address] [G20] <principal problem not specified>       Precautions: Fall  Chart, physical therapy assessment, plan of care and goals were reviewed. ASSESSMENT  Patient continues with skilled PT services and is progressing towards goals. Pt ambulated with decreased assistance this session with RW. Pt still requires moderate-max assistance for sit to stand from joslyn chair. Pt ambulated with improved COG and better positioning in RW, but still has difficulty following commands during session. Current Level of Function Impacting Discharge (mobility/balance): occasional standing balance, mod-max A sit to stand    Other factors to consider for discharge: Pt will need to have 24/7 help at home due to decreased safety awareness         PLAN :  Patient continues to benefit from skilled intervention to address the above impairments. Continue treatment per established plan of care. to address goals. Recommendation for discharge: (in order for the patient to meet his/her long term goals)  Physical therapy at least 2 days/week in the home AND ensure assist and/or supervision for safety with functional mobility    This discharge recommendation:  A follow-up discussion with the attending provider and/or case management is planned    IF patient discharges home will need the following DME: patient owns DME required for discharge       SUBJECTIVE:   Patient stated there's snowballs over there.     OBJECTIVE DATA SUMMARY:   Critical Behavior:  Neurologic State: Alert, Confused  Orientation Level: Oriented to person  Cognition: Follows commands, Decreased attention/concentration, Memory loss  Safety/Judgement: Decreased awareness of environment, Decreased awareness of need for assistance, Decreased awareness of need for safety, Decreased insight into deficits  Functional Mobility Training:  Bed Mobility:     Supine to Sit: Moderate assistance; Additional time;Bed Modified     Scooting: Minimum assistance; Moderate assistance        Transfers:  Sit to Stand: Moderate assistance;Maximum assistance; Additional time  Stand to Sit: Moderate assistance                             Balance:  Sitting: Intact  Sitting - Static: Good (unsupported)  Sitting - Dynamic: Fair (occasional)  Standing: Impaired; With support  Standing - Static: Constant support;Occasional  Standing - Dynamic : Constant support;Occasional  Ambulation/Gait Training:  Distance (ft): 150 Feet (ft)  Assistive Device: Gait belt;Walker, rolling  Ambulation - Level of Assistance: Minimal assistance; Moderate assistance        Gait Abnormalities: Decreased step clearance; Path deviations        Base of Support: Narrowed     Speed/Kim: Pace decreased (<100 feet/min)  Step Length: Left shortened;Right shortened             Pain Ratin/10    Activity Tolerance:   Fair and SpO2 stable on RA    After treatment patient left in no apparent distress:   Sitting in chair, Heels elevated for pressure relief, Call bell within reach, and Bed / chair alarm activated    COMMUNICATION/COLLABORATION:   The patients plan of care was discussed with: Registered nurse, Case management, and Rehabilitation technician.      Karo Curtis PTA   Time Calculation: 11 mins

## 2021-08-02 NOTE — SWING BED INTERDISCIPLINARY CARE PLAN NURSE NOTE
Nursing:    Week #1: Date 8/2/2021  Medical status (changes from previous week):Progressing  Treatment changes this shift: none  Cognition: Present status: oriented          Is cueing needed?: Yes          Frequency of cueing needs: constant           Type of cueing used: verbal  ADL (general): Maximum assistance  Activity type: Reality awareness  Participation: Daily  Level of participation: Improving  Pain status: No c/o pain  Patient/Family Education needs: safety    Upon review of the patients current care plan, the following issues, problems, or needs remain: safety  Karin Siddiqi LPN

## 2021-08-02 NOTE — SWING BED INTERDISCIPLINARY CARE PLAN NURSE NOTE
Nursing:    Week #1: Date 8/2/2021  Medical status (changes from previous week):Progressing  Treatment changes this shift: increase time in recliner  Cognition: Present status: dementia          Is cueing needed?: Yes          Frequency of cueing needs: frequent           Type of cueing used: verbal  ADL (general): Moderate assistance  Activity type: Reality awareness  Participation: Daily and Individual  Level of participation: No change  Pain status: No c/o pain  Patient/Family Education needs: family needs to be aware of medications more and a low sodium diet    Upon review of the patients current care plan, the following issues, problems, or needs remain: decrease edema to legs.     Gagan Juan RN

## 2021-08-02 NOTE — PROGRESS NOTES
Problem: Dysphagia (Adult)  Goal: *Acute Goals and Plan of Care (Insert Text)  Description: Speech Pathology Goals  Initiated 8/2/2021    1. Patient will tolerate Regular/Thin Liquid diet without signs of aspiration within 14 days. 8/2/2021 1052 by NIKOS Hernandez  Outcome: Progressing Towards Goal    Problem: Motor Speech Impaired (Adult)  Goal: *Acute Goals and Plan of Care (Insert Text)  Description: Speech Pathology Goals  Initiated 8/2/2021    1. Patient will use a big mouth and a loud voice to increase intelligibility at the sentence level in 100% of trials, given minimal cues, within 14 days. 2. Patient will repeat intelligibility strategies independently within 7 days. Outcome: Progressing Towards Goal     SPEECH LANGUAGE PATHOLOGY BEDSIDE SWALLOW AND SPEECH EVALUATIONS  Patient: Andreas Osborne (21 y.o. male)  Date: 8/2/2021  Primary Diagnosis: Weakness [R53.1]  Parkinson's disease ([facility] [address] [G20]        Precautions:        ASSESSMENT :  Based on the objective data described below, the patient presents with a functional oropharyngeal swallow across all consistencies tried. Patient tolerated sequential sips of thin liquid via straw, puree, and solid without overt difficulty. Patient observed to have difficulty managing secretions on this date; verbal and tactile cueing was ineffective to improve secretion management on this date. No signs of aspiration visualized. Patient is dysarthric and intelligibility is observed to decrease as utterance length increases. Patient was intelligible on this date in 100% of instances at the word and phrase level. Patient presented with decreased intelligibility at the sentence level. Patient was reminded of intelligibility strategies (i.e. \"big mouth, loud voice\"), which patient was educated on during previous SLP session on 7/30/21.  Patient was observed to be distractible on this date and, with maximal cues (i.e. clinician model), was unable to implement intelligibility strategies. Suspect fluctuating participation and distractibility likely due to Parkinson's dementia. Given diagnosis of Parkinson's disease and cognitive status, patient is at risk for aspiration. Due to functional oral/pharyngeal swallow at bedside, recommend Regular/Thin Liquids with general aspiration precautions outlined below. SLP to follow for voice and motor speech to increase patient intelligibility. Patient will benefit from skilled intervention to address the above impairments. Patients rehabilitation potential is considered to be guarded as Parkinson's disease is progressive. PLAN :  Recommendations and Planned Interventions:  -- Regular/Thin Liquids  -- Medication with Thin Liquid  -- Straws OK  -- Sitting Upright  -- Small Bites/Sips  -- Limit Distractions  -- No further SLP needs regarding swallowing function. SLP to continue to follow for voice and motor speech to increase patient intelligibility    Frequency/Duration: Patient will be followed by speech-language pathology 2-3 times a week to address goals. Discharge Recommendations: To Be Determined     SUBJECTIVE:   Patient stated I gotta put asphalt in the chimney; patient is confused.     OBJECTIVE:     Past Medical History:   Diagnosis Date    Asthma     Chronic kidney disease     Dermatophytosis of groin and perianal area 2010    Edema 2008    Encounter for long-term (current) use of other medications 2010    Gout, unspecified 2010    Gouty arthropathy, unspecified 2010    Hypertension     Hypertrophy of prostate without urinary obstruction and other lower urinary tract symptoms (LUTS) 2008    Impotence of organic origin 2008    Memory loss 2009    Obesity, unspecified 2010    Orthostatic hypotension 2008    Other and unspecified hyperlipidemia 2008    Other atopic dermatitis and related conditions 2012    Palpitations 2010    Peripheral angiopathy in diseases classified elsewhere ([facility] [address] 2010    Personal history of malignant neoplasm of rectum, rectosigmoid junction, and anus 2011    Tinea nigra 2011    Unspecified pruritic disorder 2011    Vitamin B12 deficiency 7/23/2017     Past Surgical History:   Procedure Laterality Date    ENDOSCOPY, COLON, DIAGNOSTIC  06/2011 05/2007, 01/2004,  12/2000    HX HERNIA REPAIR  1992    INCISIONAL    HX TOTAL COLECTOMY  1991    COLON CANCER S/P RESECTION     Prior Level of Function/Home Situation:   Home Situation  Home Environment: Private residence  # Steps to Enter: 6  One/Two Story Residence: One story  Living Alone: No  Support Systems: Spouse/Significant Other/Partner, Skilled nursing facility  Patient Expects to be Discharged to[de-identified] Unknown  Current DME Used/Available at Home: [address] prior to admission: Regular/Thin Liquid  Current Diet: Regular/Thin Liquid     Cognitive and Communication Status:  Neurologic State: Alert, Confused  Orientation Level: Oriented to person, Disoriented to place, Disoriented to situation, Disoriented to time  Cognition: Follows commands, Decreased attention/concentration, Memory loss        Safety/Judgement: Decreased awareness of environment, Decreased awareness of need for assistance, Decreased awareness of need for safety, Decreased insight into deficits    Swallowing Evaluation:   Oral Assessment:  Oral Assessment  Labial: No impairment  Dentition: Full;Natural  Oral Hygiene: Moist oral mucosa  Lingual: No impairment  Velum: Unable to visualize  Mandible: Other (comment) (No impairment with PO intake; reduced jaw opening when following commands)    P.O. Trials:  Patient Position: Upright in chair  Vocal quality prior to P.O.: Low volume  Consistency Presented: Thin liquid;Puree; Solid  How Presented: Self-fed/presented;Straw;Successive swallows;Spoon     Bolus Acceptance: No impairment  Bolus Formation/Control: Impaired  Type of Impairment: Delayed; Other (comment); Anterior;Drooling (Prolonged oral holding)  Propulsion: Delayed (# of seconds)  Oral Residue: Less than 10% of bolus; Lingual;Other (comment) (Mild oral residue observed with solid trial)  Initiation of Swallow: Delayed (# of seconds)  Laryngeal Elevation: Functional  Aspiration Signs/Symptoms: None              Oral Phase Severity: Mild  Pharyngeal Phase Severity : No impairment    NOMS:   The NOMS functional outcome measure was used to quantify this patient's level of swallowing impairment. Based on the NOMS, the patient was determined to be at level 7 for swallow function     NOMS Swallowing Levels:  Level 1 (CN): NPO  Level 2 (CM): NPO but takes consistency in therapy  Level 3 (CL): Takes less than 50% of nutrition p.o. and continues with nonoral feedings; and/or safe with mod cues; and/or max diet restriction  Level 4 (CK): Safe swallow but needs mod cues; and/or mod diet restriction; and/or still requires some nonoral feeding/supplements  Level 5 (CJ): Safe swallow with min diet restriction; and/or needs min cues  Level 6 (CI): Independent with p.o.; rare cues; usually self cues; may need to avoid some foods or needs extra time  Level 7 ([address] [place] [address]): Independent for all p.o.  TATIANA. (2003). National Outcomes Measurement System (NOMS): Adult Speech-Language Pathology User's Guide. Speech/Language Evaluation  Motor Speech:  Oral-Motor Structure/Motor Speech  Labial: No impairment  Dentition: Full;Natural  Oral Hygiene: Moist oral mucosa  Lingual: No impairment  Velum: Unable to visualize  Mandible: Other (comment) (No impairment with PO intake; reduced jaw opening when following commands)  Dysarthric Characteristics: Blended word boundaries; Decreased breath support; Imprecise (due to Parkinson's disease)  Intelligibility: Impaired  Word Intelligibility (%): 100 %  Phrase Intelligibility (%): 100 %  Sentence Intelligibility (%): 50 %  Conversation Intelligibility (%):  (N/A)  Overall Impairment Severity: Mild-moderate    Voice:                 Vocal Quality: Low volume Comments: Patient was distractible on this date and verbal cueing for \"big mouth, loud voice\" was ineffective, which differed from 7/30/21 where cueing allowed patient to be successful in achieving increased volume and increased intelligibility. NOMS: The NOMS functional outcome measure was used to quantify this patient's level of motor speech impairment. Based on the NOMS, the patient was determined to be at level 4 for motor speech function. NOMS Motor Speech:  Level 1 (CN):  100% unintelligible  Level 2 (CM):  Communication partner responsible for message; can do CV or automatic words w/ max cues but rarely intelligible in context  Level 3 (CL): communication partner primarily responsible for message but says CV/automatic words intelligibly; mod cues to say simple words/phrases  Level 4 (CK): In structured conversation with familiar listener can say simple words and phrases. Mod cues for simple sentences  Level 5 (CJ):  Uses simple sentences for ADLs with familiar and unfamiliar listener; min cues for complex sentences  Level 6 (CI):  Intelligible in ADLs; difficulty in voc/social activites; rare cues for complex message; uses comp strategies  Level 7 ([address] [place] [address]):  Intelligible in all activities. May occasionally use compensatory strategies. TATIANA. (2003). National Outcomes Measurement System (NOMS): Adult Speech-Language Pathology User's Guide. Pain:             After treatment:   Patient left in no apparent distress sitting up in chair, Call bell within reach, and Nursing notified    COMMUNICATION/EDUCATION:   Patient was educated regarding his WFL swallow and voice and motor speech deficits as this relates to his diagnosis of Parkinson's disease. He demonstrated poor understanding due to Parkinson's dementia.     The patient's plan of care including recommendations, planned interventions, and recommended diet changes were discussed with: Physical therapy assistant and Registered nurse. Patient/family have participated as able in goal setting and plan of care. Patient/family agree to work toward stated goals and plan of care.     Thank you for this referral.  Franck Ruelas SLP  Time Calculation: 25 mins

## 2021-08-02 NOTE — PROGRESS NOTES
Problem: Mobility Impaired (Adult and Pediatric)  Goal: *Acute Goals and Plan of Care (Insert Text)  Outcome: Not Progressing Towards Goal     PHYSICAL THERAPY TREATMENT  Patient: Fide Arrieta (20 y.o. male)  Date: 8/2/2021  Diagnosis: Weakness [R53.1]  Park[facility]'s disease ([facility] [G20] <principal problem not specified>       Precautions:    Chart, physical therapy assessment, plan of care and goals were reviewed. ASSESSMENT  Patient continues with skilled PT services and is not progressing towards goals. Upon entry of room pt was able to speak in full sentence to respond to current conditions and how he was feeling. Pt then throughout session had garbled speech and was not able to complete full sentences, only answered yes or no. Nursing and SLP were notified of patients change in demeanor. Pt required constant cueing on all transfers and required mod A for all activities. He was not responsive to learning safety with RW and frequently attempted to sit without checking for a chair behind him. Pt exhibited an increase in forward flexed posture during ambulation and was unable to correct with verbal or tactile cueing. Pt exhibited increased lean/COG to the R during ambulation and frequently pushed RW too far anterior even with RW assistance with negotiation. Pt required multiple attempts for sit to stand transfers with mod-max A. Unsure if pt will be able to improve gait due to cognition and possible inability to learn new skills for fall prevention and safety. Current Level of Function Impacting Discharge (mobility/balance): occasional-poor standing balance with support, mod A transfers & ambulation    Other factors to consider for discharge: Pt has parkinsons which may be affecting his capabilities of learning new tasks         PLAN :  Patient continues to benefit from skilled intervention to address the above impairments. Continue treatment per established plan of care.   to address goals. Recommendation for discharge: (in order for the patient to meet his/her long term goals)  To be determined: LTC vs HH    This discharge recommendation:  A follow-up discussion with the attending provider and/or case management is planned    IF patient discharges home will need the following DME: rolling walker       SUBJECTIVE:   Patient stated i'm doing fine today.     OBJECTIVE DATA SUMMARY:   Critical Behavior:  Neurologic State: Alert, Confused  Orientation Level: Oriented to person, Disoriented to place, Disoriented to situation, Disoriented to time  Cognition: Follows commands, Decreased attention/concentration, Memory loss  Safety/Judgement: Decreased awareness of environment, Decreased awareness of need for assistance, Decreased awareness of need for safety, Decreased insight into deficits  Functional Mobility Training:  Bed Mobility:     Supine to Sit: Moderate assistance; Additional time;Bed Modified     Scooting: Minimum assistance; Moderate assistance        Transfers:  Sit to Stand: Moderate assistance;Maximum assistance  Stand to Sit: Moderate assistance                             Balance:  Sitting: Intact  Standing: Impaired; With support  Standing - Static: Constant support;Occasional  Standing - Dynamic : Constant support;Occasional;Poor  Ambulation/Gait Training:  Distance (ft): 75 Feet (ft)  Assistive Device: Gait belt;Walker, rolling  Ambulation - Level of Assistance: Moderate assistance        Gait Abnormalities: Decreased step clearance;Lurching;Path deviations; Shuffling gait        Base of Support: Center of gravity altered;Shift to right;Narrowed     Speed/Kim: Pace decreased (<100 feet/min)  Step Length: Left shortened;Right shortened             Pain Ratin/10    Activity Tolerance:   Fair, Poor, SpO2 stable on RA, and requires rest breaks    After treatment patient left in no apparent distress:   Sitting in chair, Heels elevated for pressure relief, Bed / chair alarm activated, and in front of nursing station for safety    COMMUNICATION/COLLABORATION:   The patients plan of care was discussed with: Occupational therapist, Speech therapist, Registered nurse, Case management, Rehabilitation technician, and Certified nursing assistant/patient care technician.      Lilian Victoria PTA   Time Calculation: 24 mins

## 2021-08-03 NOTE — PROGRESS NOTES
Follow up visit with patient in R[address]  Provided empathic listening and spiritual support  Advised of  Availability   [address] [place] [address]

## 2021-08-03 NOTE — SWING BED INTERDISCIPLINARY CARE PLAN NURSE NOTE
Nursing:    Week #1: Date 8/3/2021  Medical status (changes from previous week):Progressing Slowly  Treatment changes this shift: none  Cognition: Present status: dementia          Is cueing needed?: Yes          Frequency of cueing needs: frequent           Type of cueing used: verbal  ADL (general):  Moderate assistance  Activity type: Reality awareness  Participation: Daily  Level of participation: Improving  Pain status: No c/o pain  Patient/Family Education needs: safety    Upon review of the patients current care plan, the following issues, problems, or needs remain: strengthening/safety    J Luis Milligan LPN

## 2021-08-03 NOTE — PROGRESS NOTES
Problem: Mobility Impaired (Adult and Pediatric)  Goal: *Acute Goals and Plan of Care (Insert Text)  Description: Note    FUNCTIONAL STATUS PRIOR TO ADMISSION: Patient was modified independent using a rolling walker for functional mobility. HOME SUPPORT PRIOR TO ADMISSION: The patient lived with wife and she provided assistance. Physical Therapy Goals  Initiated 7/31/2021  1. Patient will move from supine to sit and sit to supine  in bed with supervision/set-up within 7 day(s). 2.  Patient will transfer from bed to chair and chair to bed with supervision/set-up using the least restrictive device within 7 day(s). 3.  Patient will perform sit to stand with supervision/set-up within 7 day(s). 4.  Patient will ambulate with supervision/set-up for 150 feet with the least restrictive device within 7 day(s). 5.  Patient will ascend/descend 2 stairs with 2 handrail(s) with minimal assistance/contact guard assist within 7 day(s). Outcome: Progressing Towards Goal   physical Therapy TREATMENT    Patient: Poly Liu (46 y.o. male)  Date: 8/3/2021  Diagnosis: Weakness [R53.1]  Parkinson's disease ([facility] [address] [facility] [G20] <principal problem not specified>       Precautions: Fall   Chart, physical therapy assessment, plan of care and goals were reviewed. ASSESSMENT:  Pt easily encouraged to participate in session. 10 min used to provide stretching for LEs and Spine, in preparation for ambulation. Pt has good response to seated anterior leaning, with pull/push to reduce posterior postural deviation. Standing accomplished with mod assist. Ambulation was fair, but occasionally shows slight buckle of R knee during stance phase. Pt will require high amounts of assist with increase of PD symptoms. Will cont to work toward goals and Maintaining functional ROM.   Progression toward goals:  []      Improving appropriately and progressing toward goals  [x]      Improving slowly and progressing toward goals  []      Not making progress toward goals and plan of care will be adjusted     PLAN:  Patient continues to benefit from skilled intervention to address the above impairments. Continue treatment per established plan of care. Discharge Recommendations:  Home Health  Further Equipment Recommendations for Discharge:  gait belt and rolling walker     SUBJECTIVE:   Patient stated I'm not trying to get up now.     OBJECTIVE DATA SUMMARY:   Critical Behavior:  Neurologic State: Confused, Drowsy  Orientation Level: Disoriented to place, Disoriented to situation, Disoriented to time, Oriented to person  Cognition: Follows commands  Safety/Judgement: Decreased awareness of environment, Decreased awareness of need for assistance, Decreased awareness of need for safety, Decreased insight into deficits  Functional Mobility Training:  Bed Mobility:  Rolling: Moderate assistance  Supine to Sit: Moderate assistance  Sit to Supine: Maximum assistance  Scooting: Maximum assistance; Total assistance  Transfers:  Sit to Stand: Moderate assistance  Stand to Sit: Moderate assistance  Bed to Chair: Moderate assistance  Balance:  Sitting: Impaired  Sitting - Static: Poor (constant support); Fair (occasional)  Sitting - Dynamic: Poor (constant support)  Standing: Impaired; Without support  Standing - Static: Poor  Standing - Dynamic : Poor  Ambulation/Gait Training:  Distance (ft): 164 Feet (ft)  Assistive Device: Gait belt;Walker, rolling  Ambulation - Level of Assistance: Contact guard assistance;Minimal assistance  Gait Abnormalities: Decreased step clearance; Path deviations; Shuffling gait  Base of Support: Narrowed; Center of gravity altered  Stance: Weight shift  Speed/Kim: Slow  Step Length: Right shortened;Left shortened  Therapeutic Exercises:   LE ROM for hips, knees and ankles x5  Seated reaching to increase anterior trunk control x 10   Pain:  Pain Scale 1: Visual  Pain Intensity 1: 0  Pain out: 0  Activity Tolerance:   Fair  Please refer to the flowsheet for vital signs taken during this treatment.   After treatment:   [] Patient left in no apparent distress sitting up in chair  [x] Patient left in no apparent distress in bed  [x] Call bell left within reach  [x] Nursing notified  [] Caregiver present  [x] Bed alarm activated      Jesusita Caro PTA   Time Calculation: 28 mins

## 2021-08-03 NOTE — PROGRESS NOTES
Bedside and Verbal shift change report given to Mic Shaikh RN (oncoming nurse) by Hamlet Buckley LPN (offgoing nurse). Report included the following information SBAR, Kardex and Recent Results EMANUEL 5.

## 2021-08-03 NOTE — SWING BED INTERDISCIPLINARY CARE PLAN NURSE NOTE
Nursing:    Week #1: Date 8/3/2021  Medical status (changes from previous week):Progressing  Treatment changes this shift:  Spend time in recliner and sleep in the bed at night. Cognition: Present status: dementia          Is cueing needed?: Yes          Frequency of cueing needs: frequent           Type of cueing used: verbal  ADL (general): Moderate assistance  Activity type: Reality awareness  Participation: Daily  Level of participation: Improving  Pain status: No c/o pain  Patient/Family Education needs: safety, diet,  Upon review of the patients current care plan, the following issues, problems, or needs remain: reduce edema, promote better rest at night.     nAn Amaro, RN

## 2021-08-03 NOTE — PROGRESS NOTES
Problem: Falls - Risk of  Goal: *Absence of Falls  Description: Document  [place] Fall Risk and appropriate interventions in the flowsheet. Outcome: Progressing Towards Goal  Note: Fall Risk Interventions:  Mobility Interventions: Bed/chair exit alarm    Mentation Interventions: Adequate sleep, hydration, pain control, Bed/chair exit alarm, Door open when patient unattended    Medication Interventions: Bed/chair exit alarm    Elimination Interventions: Bed/chair exit alarm, Call light in reach, Toileting schedule/hourly rounds    History of Falls Interventions: Bed/chair exit alarm, Door open when patient unattended         Problem: Patient Education: Go to Patient Education Activity  Goal: Patient/Family Education  Outcome: Progressing Towards Goal  Note: PT/OT see's pt. Up to recliner x2 assist     Problem: Patient Education: Go to Patient Education Activity  Goal: Patient/Family Education  Outcome: Progressing Towards Goal

## 2021-08-03 NOTE — PROGRESS NOTES
Problem: Pressure Injury - Risk of  Goal: *Prevention of pressure injury  Description: Document Jonah Scale and appropriate interventions in the flowsheet. Outcome: Progressing Towards Goal  Note: Pressure Injury Interventions:  Sensory Interventions: Assess changes in LOC, Chair cushion, Check visual cues for pain, Float heels, Keep linens dry and wrinkle-free, Minimize linen layers    Moisture Interventions: Absorbent underpads, Apply protective barrier, creams and emollients, Check for incontinence Q2 hours and as needed    Activity Interventions: Increase time out of bed, Chair cushion    Mobility Interventions: Float heels, HOB 30 degrees or less, Turn and reposition approx. every two hours(pillow and wedges)    Nutrition Interventions: Offer support with meals,snacks and hydration    Friction and Shear Interventions: Apply protective barrier, creams and emollients, Foam dressings/transparent film/skin sealants, HOB 30 degrees or less, Lift sheet, Minimize layers                Problem: Falls - Risk of  Goal: *Absence of Falls  Description: Document [place] Fall Risk and appropriate interventions in the flowsheet.   Outcome: Progressing Towards Goal  Note: Fall Risk Interventions:  Mobility Interventions: Bed/chair exit alarm    Mentation Interventions: Adequate sleep, hydration, pain control, Bed/chair exit alarm, Door open when patient unattended    Medication Interventions: Bed/chair exit alarm    Elimination Interventions: Bed/chair exit alarm, Call light in reach, Patient to call for help with toileting needs    History of Falls Interventions: Bed/chair exit alarm

## 2021-08-03 NOTE — PROGRESS NOTES
Problem: Self Care Deficits Care Plan (Adult)  Goal: *Acute Goals and Plan of Care (Insert Text)  Description:   FUNCTIONAL STATUS PRIOR TO ADMISSION: Patient was modified independent using a rolling walker for functional mobility. Wife assisted with ADLs for bathing/dressing, shave    HOME SUPPORT: The patient lived with wife. Occupational Therapy Goals  Initiated 8/2/2021  1. Patient will perform grooming with supervision/set-up within 7 day(s). 2.  Patient will perform lower body dressing with moderate assistance  within 7 day(s). 3.  Patient will perform toileting with mod A BSC within 7 day(s). 4. Pt will perform bathing seated in a chair with assist for feet only within 7 days. Outcome: Progressing Towards Goal   OCCUPATIONAL THERAPY TREATMENT  Patient: Oswaldo Will (13 y.o. male)  Date: 8/3/2021  Diagnosis: Weakness [R53.1]  Parkinson's disease ([address] [G20] <principal problem not specified>       Precautions: Fall  Chart, occupational therapy assessment, plan of care, and goals were reviewed. ASSESSMENT  Patient continues with skilled OT services and is progressing towards goals. OTR stopped in room and pt was still in bed. He was willing to get up for lunch. Needed bed features to get to the EOB and min assist to scoot out so feet were on the floor. Dependent for socks. Wife will bring in clothing for practice tomorrow. He was mod assist for sit to stand and transfer to chair with multiple verbal and tactile cues as backing up was a difficult task. He has excessive drooling. Will speak to SLP for strategies. Current Level of Function Impacting Discharge (ADLs): needs assist for all tasks, fluctuates on level of assistance needed during transfers. Set-up for meals, dependent for dressing LE.      Other factors to consider for discharge: lives with elderly wife who said he was able to ambuate with walker to the toilet in recent past.         PLAN :  Patient continues to benefit from skilled intervention to address the above impairments. Continue treatment per established plan of care to address goals. Recommend with staff: initiate toileting schedule    Recommend next OT session: dressing practice if clothing available    Recommendation for discharge: (in order for the patient to meet his/her long term goals)  No skilled occupational therapy/ follow up rehabilitation needs identified at this time. This discharge recommendation:  Has not yet been discussed the attending provider and/or case management    IF patient discharges home will need the following DME: none       SUBJECTIVE:   Patient stated i'm ok.     OBJECTIVE DATA SUMMARY:   Cognitive/Behavioral Status:  Neurologic State: Confused;Drowsy  Orientation Level: Disoriented to place; Disoriented to situation;Disoriented to time;Oriented to person  Cognition: Follows commands      Functional Mobility and Transfers for ADLs:  Bed Mobility:  Supine to Sit: Minimum assistance    Transfers:  Sit to Stand: Moderate assistance     Bed to Chair: Moderate assistance    Balance:  Sitting - Static: Poor (constant support)  Sitting - Dynamic: Poor (constant support)  Standing: Impaired; With support  Standing - Static: Poor;Constant support  Standing - Dynamic : Poor;Constant support    ADL Intervention:  Feeding  Container Management: Total assistance (dependent)  Food to Mouth: Independent  Drink to Mouth: Independent    Pain:  No complaint    Activity Tolerance:   Good    After treatment patient left in no apparent distress:   Sitting in chair and Caregiver / family present    COMMUNICATION/COLLABORATION:   The patients plan of care was discussed with: Case management.      Henna Meek OT  Time Calculation: 18 mins

## 2021-08-04 NOTE — PROGRESS NOTES
Problem: Pressure Injury - Risk of  Goal: *Prevention of pressure injury  Description: Document Jonah Scale and appropriate interventions in the flowsheet. Outcome: Progressing Towards Goal  Note: Pressure Injury Interventions:  Sensory Interventions: Float heels, Keep linens dry and wrinkle-free    Moisture Interventions: Absorbent underpads, Apply protective barrier, creams and emollients    Activity Interventions: Increase time out of bed    Mobility Interventions: HOB 30 degrees or less    Nutrition Interventions: Document food/fluid/supplement intake    Friction and Shear Interventions: Apply protective barrier, creams and emollients, HOB 30 degrees or less                Problem: Falls - Risk of  Goal: *Absence of Falls  Description: Document Carlos Fall Risk and appropriate interventions in the flowsheet.   Outcome: Progressing Towards Goal  Note: Fall Risk Interventions:  Mobility Interventions: Assess mobility with egress test    Mentation Interventions: Adequate sleep, hydration, pain control, Bed/chair exit alarm, Door open when patient unattended    Medication Interventions: Bed/chair exit alarm    Elimination Interventions: Bed/chair exit alarm, Call light in reach, Toileting schedule/hourly rounds    History of Falls Interventions: Bed/chair exit alarm         Problem: Patient Education: Go to Patient Education Activity  Goal: Patient/Family Education  Outcome: Progressing Towards Goal     Problem: Patient Education: Go to Patient Education Activity  Goal: Patient/Family Education  Outcome: Progressing Towards Goal     Problem: Patient Education: Go to Patient Education Activity  Goal: Patient/Family Education  Outcome: Progressing Towards Goal

## 2021-08-04 NOTE — PROGRESS NOTES
FOLLOW UP VISIT WITH PATIENT in  124  Provided empathic listening and spiritual support  Advised of  Availability [address] [place] [address]   Paging [phone]

## 2021-08-04 NOTE — PROGRESS NOTES
Bedside and Verbal shift change report given to [address] Emery Pepe (oncoming nurse) by Bela Raygoza LPN (offgoing nurse). Report included the following information SBAR and Kardex EMANUEL 5.

## 2021-08-04 NOTE — PROGRESS NOTES
IDR Team; MD, Nursing, Care Manager, Physical therapy, Nursing Supervisor, Pharmacy and Dietician, met to review patient's plan of care. Discussed goals, interventions, barriers and progress. RUR: 18%  LOW    Team will continue to monitor progress and report any concerns to the physician and care management as indicated. Transition of Care Plan:   Medical/Clinical updates have been faxed to the Clinical Review team at [facility] for authorization for continued stay: [phone]    Several conversations have been held with the patient's wife in an effort to get her to understand that she needed to have a plan in place when it was time for the patient to be discharged. Met with her last Friday,7/30/21 along with SAMMIE March to discuss and hopefully start the Medicaid process. Information/list of documents that is needed to complete the application was given to her. Spoke with her after that and she was trying to get the information together. Spoke with her today, still looking for documents. Also reiterated that when it was time for discharge, and if nothing was done with the Medicaid application, her  will be discharged home with home health. The patient's wife has been made knowledgeable of all of the above from day one. Patient will need more skilled care in order to get him back to baseline where he was pretty much doing more at home. His Parkinsons is progressing and it is making it more difficult for hime to perform ADL's with little or no help from his wife.

## 2021-08-04 NOTE — PROGRESS NOTES
Bedside shift change report given to Opal Jalloh (oncoming nurse) by Clay Gordon  (offgoing nurse). Report included the following information SBAR, Kardex, Intake/Output, MAR and Recent Results.

## 2021-08-04 NOTE — PROGRESS NOTES
Problem: Mobility Impaired (Adult and Pediatric)  Goal: *Acute Goals and Plan of Care (Insert Text)  Description: Note    FUNCTIONAL STATUS PRIOR TO ADMISSION: Patient was modified independent using a rolling walker for functional mobility. HOME SUPPORT PRIOR TO ADMISSION: The patient lived with wife and she provided assistance. Physical Therapy Goals  Initiated 7/31/2021  1. Patient will move from supine to sit and sit to supine  in bed with supervision/set-up within 7 day(s). 2.  Patient will transfer from bed to chair and chair to bed with supervision/set-up using the least restrictive device within 7 day(s). 3.  Patient will perform sit to stand with supervision/set-up within 7 day(s). 4.  Patient will ambulate with supervision/set-up for 150 feet with the least restrictive device within 7 day(s). 5.  Patient will ascend/descend 2 stairs with 2 handrail(s) with minimal assistance/contact guard assist within 7 day(s). Outcome: Progressing Towards Goal   PHYSICAL THERAPY TREATMENT  Patient: Del Kim (85 y.o. male)  Date: 8/4/2021  Diagnosis: Weakness [R53.1]  [facility]'s disease ([facility] [G20] <principal problem not specified>       Precautions: Fall  Chart, physical therapy assessment, plan of care and goals were reviewed. ASSESSMENT  Patient continues with skilled PT services and is progressing towards goals. Patient is ambulating fairly well in the halls once he gets started - in a typical Parkinsons fashion. Patients primary issue in mobility is in transfers and sit to stand where some days are good, and others not as good. Pt needs to work more on scooting, and sit to stand which will help his wife in her caregiving if he goes home. Pt is alert and cooperative but still has continual blank stare. Doing well. .     Current Level of Function Impacting Discharge (mobility/balance): ambulation with RW - cga to min A needs cueing    Other factors to consider for discharge: stiff - does better with stretching out first         PLAN :  Patient continues to benefit from skilled intervention to address the above impairments. Continue treatment per established plan of care. to address goals. Recommendation for discharge: (in order for the patient to meet his/her long term goals)  Physical therapy at least 2 days/week in the home     This discharge recommendation:  Has not yet been discussed the attending provider and/or case management    IF patient discharges home will need the following DME: patient owns DME required for discharge       SUBJECTIVE:   Patient stated Najma Sontodd.     OBJECTIVE DATA SUMMARY:   Critical Behavior:  Neurologic State: Alert  Orientation Level: Disoriented to place, Disoriented to situation, Disoriented to time, Oriented to person  Cognition: Follows commands  Safety/Judgement: Decreased awareness of environment, Decreased awareness of need for assistance, Decreased awareness of need for safety, Decreased insight into deficits  Functional Mobility Training:  Bed Mobility:  Rolling: Bed Modified; Modified independent  Supine to Sit: Bed Modified;Stand-by assistance              Transfers:  Sit to Stand: Moderate assistance  Stand to Sit: Moderate assistance (plops)        Bed to Chair: Moderate assistance                    Balance:  Sitting: Impaired  Sitting - Static: Fair (occasional)  Sitting - Dynamic: Fair (occasional)  Standing: Impaired; Without support  Standing - Static: Constant support;Good  Standing - Dynamic : Constant support; Fair  Ambulation/Gait Training:  Distance (ft): 175 Feet (ft)  Assistive Device: Gait belt;Walker, rolling  Ambulation - Level of Assistance: Contact guard assistance;Minimal assistance        Gait Abnormalities: Decreased step clearance;Shuffling gait        Base of Support: Narrowed     Speed/Kim: Slow;Shuffled  Step Length: Right shortened;Left shortened                    Stairs:               Therapeutic Exercises:   Reach toward your toes  Scoot buttocks forward by hip hiking  Scoot by posterior lean in chair  Scoot by weight shift then walking buttocks  (all needed max cueing and mod to max A)  Pain Rating:      Activity Tolerance:   Fair    After treatment patient left in no apparent distress:   Sitting in chair    COMMUNICATION/COLLABORATION:   The patients plan of care was discussed with: Occupational therapist and Registered nurse.      Dakotah Orozco, PT   Time Calculation: 25 mins

## 2021-08-04 NOTE — PROGRESS NOTES
Problem: Dysphagia (Adult)  Goal: *Acute Goals and Plan of Care (Insert Text)  Description: Speech Pathology Goals  Initiated 8/2/2021    1. Patient will tolerate Regular/Thin Liquid diet without signs of aspiration within 14 days. Outcome: Progressing Towards Goal     Problem: Motor Speech Impaired (Adult)  Goal: *Acute Goals and Plan of Care (Insert Text)  Description: Speech Pathology Goals  Initiated 8/2/2021    1. Patient will use a big mouth and a loud voice to increase intelligibility at the sentence level in 100% of trials, given minimal cues, within 14 days. 2. Patient will repeat intelligibility strategies independently within 7 days. Outcome: Progressing Towards Goal     SPEECH LANGUAGE PATHOLOGY TREATMENT  Patient: Sarika Mccabe (41 y.o. male)  Date: 8/4/2021  Diagnosis: Weakness [R53.1]  Parkinson's disease ([facility] [G20] <principal problem not specified>         ASSESSMENT:  Patient is being followed by SLP and most recent SLP session on 8/2/21 revealed patient to be intelligible in 100% of instances at the word and phrase level, with decreased intelligibility at the sentence level. On this date, patient Ox3 and participating appropriately in conversation. Patient was reminded of intelligibility strategies (i.e. \"big mouth, loud voice\"), and patient reported he remembered said strategies. When asked to repeat intelligibility strategies, patient required maximal to moderate cueing in 80% of trials. Patient independently stated strategies to OT in 20% of trials. Given a clinician model, patient demonstrated his ability to repeat words/phrases/sentences with a big mouth and a loud voice. Patient required maximal to moderate cues to generalize intelligibility strategies. Patient observed to generalize strategies to a 3-turn conversation with hospital employee before patient became distracted and required redirection to task.     Patient observed to have difficulty managing secretions on this date, likely due to his diagnosis of Parkinson's disease. In 70% of instances, patient observed to initiate saliva swallow, given verbal and tactile cues from clinician. In the remainder of instances (30%), maximal cues were ineffective and patient required sips of thin liquid in order to initiate swallow. Patient benefits from repetition, redirecting patient to task before presenting stimulus item (i.e. by saying \"Dave\"), and simple commands. Patient continues to present with a functional oropharyngeal swallow. SLP to continue to follow for voice and motor speech treatment to increase patient intelligibility. PLAN:  Patient continues to benefit from skilled intervention to address the above impairments. Continue treatment per established plan of care. Discharge Recommendations: To Be Determined     SUBJECTIVE:   Patient appropriately replied to questions asked by SLP and independently participated in conversation with SLP although verbal output was limited. OBJECTIVE:   Mental Status:  Neurologic State: Alert  Orientation Level: Oriented to person, Oriented to place, Disoriented to situation, Oriented to time  Cognition: Follows commands        Safety/Judgement: Decreased awareness of environment, Decreased awareness of need for assistance, Decreased awareness of need for safety, Decreased insight into deficits    Treatment & Interventions: Motor Speech:  Intelligibility: Impaired  Word Intelligibility (%): 100 %  Phrase Intelligibility (%): 100 %  Sentence Intelligibility (%): 90 %  Conversation Intelligibility (%):  (Not assessed due to patient's limited verbal output)           Dysarthric Characteristics: Decreased breath support; Imprecise     Voice:  Voice Exercises  Vocal Techniques: LSVT  Comments: Education is provided to patient regarding intelligibility strategies of \"big mouth, loud voice\"    Pain:  Pain Scale 1: Numeric (0 - 10)  Pain Intensity 1: 0       After treatment:   Patient left in no apparent distress in bed, Call bell within reach, and Nursing notified    COMMUNICATION/EDUCATION:   Patient was educated regarding his deficit(s) of voice and motor speech as this relates to his diagnosis of Parkinson's disease. He demonstrated fair understanding due to Parkinson's dementia. The patient's plan of care including recommendations, planned interventions, and recommended diet changes were discussed with: Occupational therapist and Registered nurse.      Nanda Payton SLP  Time Calculation: 20 mins

## 2021-08-04 NOTE — PROGRESS NOTES
Problem: Self Care Deficits Care Plan (Adult)  Goal: *Acute Goals and Plan of Care (Insert Text)  Description:   FUNCTIONAL STATUS PRIOR TO ADMISSION: Patient was modified independent using a rolling walker for functional mobility. Wife assisted with ADLs for bathing/dressing, shave    HOME SUPPORT: The patient lived with wife. Occupational Therapy Goals  Initiated 8/2/2021  1. Patient will perform grooming with supervision/set-up within 7 day(s). 2.  Patient will perform lower body dressing with moderate assistance  within 7 day(s). 3.  Patient will perform toileting with mod A BSC within 7 day(s). 4. Pt will perform bathing seated in a chair with assist for feet only within 7 days. Outcome: Progressing Towards Goal   OCCUPATIONAL THERAPY TREATMENT  Patient: Mirza Moy (49 y.o. male)  Date: 8/4/2021  Diagnosis: Weakness [R53.1]  Parkinson's disease ([facility] [address] [address] [G20] <principal problem not specified>       Precautions: Fall  Chart, occupational therapy assessment, plan of care, and goals were reviewed. ASSESSMENT  Patient continues with skilled OT services and is progressing towards goals. Today OTR saw pt right after speech therapy. He needs repeated verbal cues to swallow and finally a physical touch to his throat at times to get him to close lips and swallow. He spontaneously does this when he drinks water. He was clear and loud in his speech. He was set-up with basin of water using verbal cues to get him to wash his face, arms, trunk, tops of legs as he had been washed already with staff. Still dependent to get to his feet, which is his baseline. Wife had not brought in clothing so he needed assist to get gown on. Raising bed up he was mod assist x 1 to get to stand and CGA to Min assist to ambulate a few steps over to the chair. He tends to lower about 75% then flop into chair.       Current Level of Function Impacting Discharge (ADLs): assist with washing feet, periarea in standing as needs to keep both hands on walker. Mod assist transfers x 1    Other factors to consider for discharge: per wife was using regular toilet at home. PLAN :  Patient continues to benefit from skilled intervention to address the above impairments. Continue treatment per established plan of care to address goals. Recommend with staff: up to use toilet    Recommend next OT session: practice BSC transfers    Recommendation for discharge: (in order for the patient to meet his/her long term goals)  Occupational therapy at least 2 days/week in the home AND ensure assist and/or supervision for safety with transfers/ambulation    This discharge recommendation:  Has not yet been discussed the attending provider and/or case management    IF patient discharges home will need the following DME: none       SUBJECTIVE:   Patient stated Ranjit [place].  Pt able, after SLP told him what to say, to clearly and loudly greet therapist.    OBJECTIVE DATA SUMMARY:   Cognitive/Behavioral Status:  Neurologic State: Alert  Orientation Level: Disoriented to place; Disoriented to situation;Disoriented to time;Oriented to person  Cognition: Follows commands    Functional Mobility and Transfers for ADLs:  Bed Mobility:  Rolling: Bed Modified; Modified independent  Supine to Sit: Bed Modified;Stand-by assistance    Transfers:  Bed to Chair: Moderate assistance    Balance:  Sitting - Static: Fair (occasional)  Sitting - Dynamic: Fair (occasional)  Standing: Impaired  Standing - Static: Constant support;Good  Standing - Dynamic : Constant support; Fair    ADL Intervention:     Grooming  Position Performed: Seated edge of bed  Washing Face: Set-up  Cues: Verbal cues provided    Upper Body Bathing  Bathing Assistance: Set-up  Position Performed: Seated edge of bed  Cues: Verbal cues provided    Lower Body Bathing  Lower Body : Maximum assistance  Position Performed: Seated edge of bed    Upper Body [address]: Maximum assistance    Cognitive Retraining  Following Commands: Other(comment) (not consistent today)    Pain:  0/10    Activity Tolerance:   Good    After treatment patient left in no apparent distress:   Sitting in chair, Heels elevated for pressure relief, and Caregiver / family present    COMMUNICATION/COLLABORATION:   The patients plan of care was discussed with: Case management.      Kyler Palafox OT  Time Calculation: 32 mins

## 2021-08-04 NOTE — SWING BED INTERDISCIPLINARY CARE PLAN NURSE NOTE
Nursing:    Week #1: Date 8/4/2021  Medical status (changes from previous week):Progressing slowly  Treatment changes this shift: none  Cognition: Present status: dementia          Is cueing needed?: Yes          Frequency of cueing needs: frequent           Type of cueing used: verbal  ADL (general):  Moderate assistance  Activity type: Reality awareness  Participation: Daily  Level of participation: Improving  Pain status: No c/o pain  Patient/Family Education needs: safety    Upon review of the patients current care plan, the following issues, problems, or needs remain: strengthening/safety    Zuleyma Salazar LPN

## 2021-08-05 NOTE — PROGRESS NOTES
IDR Team; MD, Nursing, Care Manager, Physical therapy, Nursing Supervisor, Pharmacy met to review patient's plan of care. Discussed goals, interventions, barriers and progress. Team will continue to monitor progress and report any concerns to the physician and care management as indicated. Transition of Care Plan:     Mr. Nathalie Waters is approved to remain in SNF until 8/10/21. He needs max amount of assistance with pants; chewing gum helped with drooling issue. Care management has discussed applying for Medicaid. Will get home health when discharged to home.

## 2021-08-05 NOTE — PROGRESS NOTES
Problem: Pressure Injury - Risk of  Goal: *Prevention of pressure injury  Description: Document Jonah Scale and appropriate interventions in the flowsheet. Outcome: Progressing Towards Goal  Note: Pressure Injury Interventions:  Sensory Interventions: Float heels, Keep linens dry and wrinkle-free    Moisture Interventions: Absorbent underpads, Apply protective barrier, creams and emollients    Activity Interventions: Increase time out of bed    Mobility Interventions: HOB 30 degrees or less    Nutrition Interventions: Document food/fluid/supplement intake    Friction and Shear Interventions: Apply protective barrier, creams and emollients, HOB 30 degrees or less                Problem: Patient Education: Go to Patient Education Activity  Goal: Patient/Family Education  Outcome: Progressing Towards Goal     Problem: Falls - Risk of  Goal: *Absence of Falls  Description: Document Carlos Fall Risk and appropriate interventions in the flowsheet.   Outcome: Progressing Towards Goal  Note: Fall Risk Interventions:  Mobility Interventions: Bed/chair exit alarm    Mentation Interventions: Adequate sleep, hydration, pain control, Door open when patient unattended    Medication Interventions: Bed/chair exit alarm    Elimination Interventions: Bed/chair exit alarm, Call light in reach    History of Falls Interventions: Bed/chair exit alarm, Door open when patient unattended         Problem: Patient Education: Go to Patient Education Activity  Goal: Patient/Family Education  Outcome: Progressing Towards Goal     Problem: Patient Education: Go to Patient Education Activity  Goal: Patient/Family Education  Outcome: Progressing Towards Goal     Problem: Patient Education: Go to Patient Education Activity  Goal: Patient/Family Education  Outcome: Progressing Towards Goal     Problem: Patient Education: Go to Patient Education Activity  Goal: Patient/Family Education  Outcome: Progressing Towards Goal     Problem: Patient Education: Go to Patient Education Activity  Goal: Patient/Family Education  Outcome: Progressing Towards Goal

## 2021-08-05 NOTE — PROGRESS NOTES
Bedside shift change report given to fidencio Johnson RN (oncoming nurse) by SHAWN Cunningham RN (offgoing nurse). Report included the following information SBAR, Kardex and MAR.

## 2021-08-05 NOTE — PROGRESS NOTES
Problem: Mobility Impaired (Adult and Pediatric)  Goal: *Acute Goals and Plan of Care (Insert Text)  Description: Note    FUNCTIONAL STATUS PRIOR TO ADMISSION: Patient was modified independent using a rolling walker for functional mobility. HOME SUPPORT PRIOR TO ADMISSION: The patient lived with wife and she provided assistance. Physical Therapy Goals  Initiated 7/31/2021  1. Patient will move from supine to sit and sit to supine  in bed with supervision/set-up within 7 day(s). 2.  Patient will transfer from bed to chair and chair to bed with supervision/set-up using the least restrictive device within 7 day(s). 3.  Patient will perform sit to stand with supervision/set-up within 7 day(s). 4.  Patient will ambulate with supervision/set-up for 150 feet with the least restrictive device within 7 day(s). 5.  Patient will ascend/descend 2 stairs with 2 handrail(s) with minimal assistance/contact guard assist within 7 day(s). Outcome: Progressing Towards Goal   PHYSICAL THERAPY TREATMENT  Patient: Isaiah Hart (74 y.o. male)  Date: 8/5/2021  Diagnosis: Weakness [R53.1]  [facility] ([facility] [G20] <principal problem not specified>       Precautions: Fall  Chart, physical therapy assessment, plan of care and goals were reviewed. ASSESSMENT  Patient continues with skilled PT services and is progressing towards goals. Pt continues to train in transfers and gait. Working on sitting and standing postural extension, scooting in chair and forward flexion in prep for sit to stand, gait training with RW in open an tighter areas, standing side stepping for improved hip control. Pt tolerated session well today and followed commands well. He was able to transfer to stand with mod A but showing increased ease with cues and post repeated flexion and scoot prior to stand. His ability to control descent to chair was improved with min to mod A and cues.  He was able to  front of mirror to work on postural extension with CGA and cues. He amb with RW with CGA with shortened shuffling steps but did well on turns in between obstacles in gym. Pt continues to have difficulty with managing saliva. Pt returned to nurses station to be set up for lunch. 1449: Pt in hallway in chair. Seen for gait training into room and transfer to bed. Pt again mod A to stand and CGA to amb with RW. He needed cues for safe approach and positioning at bed. Min A for control to sit onto bed. Mod A of 2 to return to supine. Heels floated and alarm on, call bell in reach. Current Level of Function Impacting Discharge (mobility/balance): mod A for transfers, CGA for amb with RW    Other factors to consider for discharge: pt's abilities wax and wane, confusion as well; high fall risk         PLAN :  Patient continues to benefit from skilled intervention to address the above impairments. Continue treatment per established plan of care. to address goals. Recommendation for discharge: (in order for the patient to meet his/her long term goals)  Physical therapy at least 2 days/week in the home to assess safe transition and provide any new family education; ensure assist and/or supervision for safety with mobility and gait    This discharge recommendation:  Has been made in collaboration with the attending provider and/or case management    IF patient discharges home will need the following DME: patient owns DME required for discharge       SUBJECTIVE:   Patient stated Where's my white pants? Kary Mason    OBJECTIVE DATA SUMMARY:   Critical Behavior:  Neurologic State: Alert  Orientation Level: Disoriented to place, Disoriented to situation, Disoriented to time, Oriented to person  Cognition: Follows commands  Safety/Judgement: Decreased awareness of environment, Decreased awareness of need for assistance, Decreased awareness of need for safety, Decreased insight into deficits  Functional Mobility Training:       Transfers:  Sit to Stand: Moderate assistance  Stand to Sit: Moderate assistance; Other (comment) (working in control of descent)        Bed to Chair: Moderate assistance; Other (comment) (to stand to RW, then CGA to turn with RW)                    Balance:  Sitting: Impaired  Sitting - Static: Fair (occasional)  Sitting - Dynamic: Fair (occasional)  Standing: Impaired  Standing - Static: Constant support;Good; Other (comment) (with RW)  Standing - Dynamic : Fair;Constant support; Other (comment) (with RW)  Ambulation/Gait Training:  Distance (ft): 120 Feet (ft)  Assistive Device: Gait belt;Walker, rolling  Ambulation - Level of Assistance: Contact guard assistance        Gait Abnormalities: Decreased step clearance;Shuffling gait        Base of Support: Narrowed     Speed/Kim: Slow;Shuffled;Pace decreased (<100 feet/min)  Step Length: Left shortened;Right shortened            Activity Tolerance:   Fair    After treatment patient left in no apparent distress:   Sitting in chair and set up at nurse's station for lunch    COMMUNICATION/COLLABORATION:   The patients plan of care was discussed with: Occupational therapist and Registered nurse.      Tesfaye Hernandez, PT   Time Calculation: 44 mins

## 2021-08-05 NOTE — PROGRESS NOTES
Received a phone call from Karina Veliz authorizing continued LOS: 8/4 - 8/9 with next review date of 8/10/21

## 2021-08-05 NOTE — SWING BED INTERDISCIPLINARY CARE PLAN NURSE NOTE
Nursing:    Week #1: Date 8/5/2021  Medical status (changes from previous week):Progressing  Treatment changes this shift: none  Cognition: Present status: dementia          Is cueing needed?: Yes          Frequency of cueing needs: occasional           Type of cueing used: verbal  ADL (general):  Moderate assistance  Activity type: Social leisure skills  Participation: Daily  Level of participation: Improving  Pain status: No c/o pain  Patient/Family Education needs: safety    Upon review of the patients current care plan, the following issues, problems, or needs remain: strengthening and safety    Tamiko Caban RN

## 2021-08-05 NOTE — PROGRESS NOTES
Problem: Self Care Deficits Care Plan (Adult)  Goal: *Acute Goals and Plan of Care (Insert Text)  Description:   FUNCTIONAL STATUS PRIOR TO ADMISSION: Patient was modified independent using a rolling walker for functional mobility. Wife assisted with ADLs for bathing/dressing, shave    HOME SUPPORT: The patient lived with wife. Occupational Therapy Goals  Initiated 8/2/2021  1. Patient will perform grooming with supervision/set-up within 7 day(s). 2.  Patient will perform lower body dressing with moderate assistance  within 7 day(s). 3.  Patient will perform toileting with mod A BSC within 7 day(s). 4. Pt will perform bathing seated in a chair with assist for feet only within 7 days. Outcome: Progressing Towards Goal   OCCUPATIONAL THERAPY TREATMENT  Patient: Fide Arrieta (89 y.o. male)  Date: 8/5/2021  Diagnosis: Weakness [R53.1]  Parkinson's disease ([facility] [G20] <principal problem not specified>       Precautions: Fall  Chart, occupational therapy assessment, plan of care, and goals were reviewed. ASSESSMENT  Patient continues with skilled OT services and is progressing towards goals. Today Mr Martina Salcido was able to use bed features to get to edge of bed without physical assist, only cues. He sits on the edge of bed with occasional need to hold the bed rail. He puts on a pull over shirt with min assist, was able to pull pants from ankles to thighs but is dependent in getting feet into leg holes and also tried to help pull up  his undergarment in standing but needs to keep both hands on walker for balance. He realized he was moving his bowels during standing activity and was mod assist to transfer to a BSC. He had more difficulty standing again to get underclothing up after getting cleaned up (dependent) and fatigued. OTR had given pt a piece of gum after speaking with SLP yesterday.  When he was sitting on EOB he was consistently for maybe 10 min able to chew gum, mouth closed and automatic swallowing. But he was head down when we stood up for transfers and drooled excessively. OTR had to convince him to spit out the gum because he really liked it. Might try having him chew gum again while sitting in his chair tomorrow for a few minutes. He also speaks more clearly after chewing gum, gets the muscles in his mouth working. Current Level of Function Impacting Discharge (ADLs): Mr Maureen Beckford has Parkinson's and is not expected to be independent in self care tasks. He still needs min/mod assist for self care tasks. Other factors to consider for discharge: lives with spouse         PLAN :  Patient continues to benefit from skilled intervention to address the above impairments. Continue treatment per established plan of care to address goals. Recommend with staff: up to [facility] in the morning after breakfast    Recommend next OT session: endurance in standing tasks    Recommendation for discharge: (in order for the patient to meet his/her long term goals)  No skilled occupational therapy/ follow up rehabilitation needs identified at this time. This discharge recommendation:  Has been made in collaboration with the attending provider and/or case management    IF patient discharges home will need the following DME: none       SUBJECTIVE:   Patient stated you got some gum? Spenser Recio When OTR asked him if he wanted a piece of gum. OBJECTIVE DATA SUMMARY:   Cognitive/Behavioral Status:     Functional Mobility and Transfers for ADLs:  Bed Mobility:  Rolling: Modified independent  Supine to Sit: Modified independent  Scooting: Modified independent    Transfers:  Sit to Stand: Moderate assistance  Functional Transfers  Toilet Transfer : Moderate assistance  Cues: Verbal cues provided  Bed to Chair: Moderate assistance; Other (comment) (to stand to RW, then CGA to turn with RW)    Balance:  Sitting: Impaired  Sitting - Static: Fair (occasional)  Sitting - Dynamic: Fair (occasional)  Standing: Impaired  Standing - Static: Constant support;Good; Other (comment) (with RW)  Standing - Dynamic : Fair;Constant support; Other (comment) (with RW)    ADL Intervention:       Grooming  Position Performed: Seated in chair  Washing Hands: Set-up              Upper Body Dressing Assistance  Pullover Shirt: Minimum assistance    Lower Body Dressing Assistance  Protective Undergarmet: Total assistance (dependent)  Pants With Elastic Waist: Maximum assistance  Socks: Total assistance (dependent)    Toileting  Bladder Hygiene: Total assistance (dependent)  Bowel Hygiene: Total assistance (dependent)  Clothing Management: Maximum assistance    Cognitive Retraining  Following Commands: Follows one step commands/directions      Pain:  0/10    Activity Tolerance:   Fair    After treatment patient left in no apparent distress:   Sitting in chair, alarm near nurses station  COMMUNICATION/COLLABORATION:   The patients plan of care was discussed with: Physical therapist and Case management.      Satish Wolf OT  Time Calculation: 42 mins

## 2021-08-06 NOTE — PROGRESS NOTES
Problem: Pressure Injury - Risk of  Goal: *Prevention of pressure injury  Description: Document Jonah Scale and appropriate interventions in the flowsheet. Outcome: Progressing Towards Goal  Note: Pressure Injury Interventions:  Sensory Interventions: Assess changes in LOC    Moisture Interventions: Absorbent underpads, Apply protective barrier, creams and emollients, Check for incontinence Q2 hours and as needed    Activity Interventions: Increase time out of bed    Mobility Interventions: HOB 30 degrees or less, Float heels    Nutrition Interventions: Document food/fluid/supplement intake    Friction and Shear Interventions: Apply protective barrier, creams and emollients, HOB 30 degrees or less, Minimize layers                Problem: Patient Education: Go to Patient Education Activity  Goal: Patient/Family Education  Outcome: Progressing Towards Goal     Problem: Falls - Risk of  Goal: *Absence of Falls  Description: Document Carlos Fall Risk and appropriate interventions in the flowsheet.   Outcome: Progressing Towards Goal  Note: Fall Risk Interventions:  Mobility Interventions: Bed/chair exit alarm    Mentation Interventions: Adequate sleep, hydration, pain control, Bed/chair exit alarm, Evaluate medications/consider consulting pharmacy    Medication Interventions: Bed/chair exit alarm, Patient to call before getting OOB, Teach patient to arise slowly    Elimination Interventions: Bed/chair exit alarm, Call light in reach, Patient to call for help with toileting needs    History of Falls Interventions: Bed/chair exit alarm, Door open when patient unattended         Problem: Patient Education: Go to Patient Education Activity  Goal: Patient/Family Education  Outcome: Progressing Towards Goal

## 2021-08-06 NOTE — PROGRESS NOTES
Bedside shift change report given to SAMMY Mendiola (oncoming nurse) by Wes Black RN (offgoing nurse). Report included the following information SBAR, MAR, I&O, VS, activity progress.

## 2021-08-06 NOTE — PROGRESS NOTES
Problem: Self Care Deficits Care Plan (Adult)  Goal: *Acute Goals and Plan of Care (Insert Text)  Description:   FUNCTIONAL STATUS PRIOR TO ADMISSION: Patient was modified independent using a rolling walker for functional mobility. Wife assisted with ADLs for bathing/dressing, shave    HOME SUPPORT: The patient lived with wife. Occupational Therapy Goals  Initiated 8/2/2021  1. Patient will perform grooming with supervision/set-up within 7 day(s). 2.  Patient will perform lower body dressing with moderate assistance  within 7 day(s). 3.  Patient will perform toileting with mod A BSC within 7 day(s). 4. Pt will perform bathing seated in a chair with assist for feet only within 7 days. 8/6/2021 1342 by Miriam Hector OT  Outcome:  Progressing Towards Goal   OCCUPATIONAL THERAPY TREATMENT  Patient: Indio Kelley (82 y.o. male)  Date: 8/6/2021  Diagnosis: Weakness [R53.1]  Parkinson's disease ([address] [G20] <principal problem not specified>       Precautions: Fall  Chart, occupational therapy assessment, plan of care, and goals were reviewed. ASSESSMENT  Patient continues with skilled OT services and is progressing towards goals. This afternoon after lunch Pt was sitting in his chair on entry. Greated OTR appropriately. When asked if he needed to use the toilet he said yes and with mod assist of  One he was able to rock 4 times and stand using walker from chair then move approx 3 feet to [facility] and sit with mod assist x 1. He was depenednet for removing undergarment. He was able to move bowels while seated and asked for toilet paper. With min assist x 2 he stands and is able to reach back with toilet paper to his bottom but cannot effectively wipe himself. Was assist of one in standing. He needed OTR to put new pull up undergarment over his feet then he reached down and pulled to his thighs. Assist x 2 for stand and attempted to pull up but was encouraged to maintain balance using walker instead.  Ambulated with one person and min assist to chair and mod assist with a flop down at the end. Left resting in his room looking out the window at contstruction work occurring. Current Level of Function Impacting Discharge (ADLs): recognized need to move bowels. Able to attempt to perform hygiene but unsuccessful. Fluctuated between 1-2 person assist    Other factors to consider for discharge: lives with wife         PLAN :  Patient seen for rest of session stopped as had a famliy visitor jac. We deferred dressing practice as pt indicated toileting needs on entry. He was better able to move from chair after lunch, attempted to help with bowel hygiene and pulling up pull up but given his performance this week anticipate he might retain this  Level of participation or decreased need for assist to stand and ambulate due to consistency issues. Will see again on Monday. Recommend with staff: up to [facility] every 2 hours during the day as needed    Recommend next OT session: dressing practice    Recommendation for discharge: (in order for the patient to meet his/her long term goals)  To be determined: OTR still believes he might be better served through hospice. He has not made consistent gain to recommend [facility]    This discharge recommendation:  Has been made in collaboration with the attending provider and/or case management    IF patient discharges home will need the following DME: hospital bed        SUBJECTIVE:   Patient stated You're slowing down now.   Pt commented as OTR strolled in this afternoon and evidently this morning was walking faster. OBJECTIVE DATA SUMMARY:   Cognitive/Behavioral Status:  Neurologic State: Alert  Orientation Level: Oriented to person  Cognition: Decreased attention/concentration; Impaired decision making    Functional Mobility and Transfers for ADLs:  Bed Mobility:  Rolling: Modified independent  Supine to Sit: Additional time;Bed Modified;Minimum assistance  Sit to Supine: Additional time;Supervision; Adaptive equipment;Bed Modified  Scooting: Additional time;Maximum assistance    Transfers:  Sit to Stand: Additional time;Assist x2  Functional Transfers  Toilet Transfer : Moderate assistance       Balance:  Sitting: Impaired  Sitting - Static: Fair (occasional)  Sitting - Dynamic: Fair (occasional)  Standing: Impaired  Standing - Static: Constant support;Good  Standing - Dynamic : Constant support;Poor    ADL Intervention:         Toileting  Toileting Assistance: Total assistance(dependent)  Bladder Hygiene: Total assistance (dependent)  Bowel Hygiene: Maximum assistance  Clothing Management: Maximum assistance    Cognitive Retraining  Following Commands: Follows one step commands/directions      Pain:  0/10    Activity Tolerance:   Good    After treatment patient left in no apparent distress:   Sitting in chair, Heels elevated for pressure relief, Call bell within reach, and Bed / chair alarm activated    COMMUNICATION/COLLABORATION:   The patients plan of care was discussed with: Physical therapy assistant.      Rochelle Wren OT  Time Calculation: 34 mins

## 2021-08-06 NOTE — SWING BED INTERDISCIPLINARY CARE PLAN NURSE NOTE
Nursing:    Week # 1: Date 8/6/2021  Medical status (changes from previous week):Progressing  Treatment changes this shift: None  Cognition: Present status: dementia          Is cueing needed?: Yes          Frequency of cueing needs: occasional           Type of cueing used: verbal  ADL (general):  Moderate assistance  Activity type: Social leisure skills  Participation: Daily  Level of participation: Improving  Pain status: No c/o pain  Patient/Family Education needs: Safety    Upon review of the patients current care plan, the following issues, problems, or needs remain: Safety    Ashley Garcia LPN

## 2021-08-06 NOTE — SWING BED INTERDISCIPLINARY CARE PLAN SLP NOTE
Interdisciplinary Care Plan     Speech Language Pathology:    Progress: No change (Suspect patient's fluctuating presentation to be due to Parkinson's disease/dementia)  Swallow Precautions: No    Diet: Regular/Thin Liquids  Cognition: Moderate-severe impairment   Memory: Parkinson's dementia  Communication/Comprehension Deficits: Yes: Hypokinetic Dysarthria, characteristic of Parkinson's disease  Speech: Mild-moderate dysarthria  Comments: Reduced vocal loudness, monotone, consonant and vowel imprecision  Intelligibility: Patient is 100% intelligible at the word and phrase level; patient's intelligibility decreases as utterance length increases (i.e. sentence and/or conversation level); given strategies (i.e. \"big mouth, loud voice\"), patient's intelligibility increases at the sentence and conversation level    Upon review of the patients current care plan, the following issues, problems, or needs remain:   -- The patient continues to present with voice and motor speech deficits as this relates to his diagnosis of Parkinson's disease  -- Due to the neurodegenerative nature of Parkinson's disease, suspect patient to be at/near baseline as improvement is not indicated due to progressive nature of PD    Mikael Ji, SLP

## 2021-08-06 NOTE — PROGRESS NOTES
Problem: Self Care Deficits Care Plan (Adult)  Goal: *Acute Goals and Plan of Care (Insert Text)  Description:   FUNCTIONAL STATUS PRIOR TO ADMISSION: Patient was modified independent using a rolling walker for functional mobility. Wife assisted with ADLs for bathing/dressing, shave    HOME SUPPORT: The patient lived with wife. Occupational Therapy Goals  Initiated 8/2/2021  1. Patient will perform grooming with supervision/set-up within 7 day(s). 2.  Patient will perform lower body dressing with moderate assistance  within 7 day(s). 3.  Patient will perform toileting with mod A BSC within 7 day(s). 4. Pt will perform bathing seated in a chair with assist for feet only within 7 days. Outcome: Not Progressing Towards Goal   OCCUPATIONAL THERAPY TREATMENT  Patient: Jignesh Granger (10 y.o. male)  Date: 8/6/2021  Diagnosis: Weakness [R53.1]  Parkinson's disease ([facility] [address] [G20] <principal problem not specified>       Precautions: Fall  Chart, occupational therapy assessment, plan of care, and goals were reviewed. ASSESSMENT  Patient continues with skilled OT services and is not progressing towards goals. This morning OTR and PT were in patient room together for co-tx. He was found sitting on EOB, legs between the bed rails without alarm going off watching construction out his window. He had gotten himself to EOB. He acknoweldged he needed to use toilet when asked. He had to get back  into bed to transition to the other side and this time needed assist even with additional time to get to EOB. He stands w/assist of 2 after practicing rocking back and forth. He ambulated a few steps and sat on BSC with assist of one but dependent to remove protective garment. He was dependent this morning for bowel hygiene as he had already moved bowel in undergarment. Dependent for new undergarment on. PT continued session but his brother arrived so OTR deferred to  complete session this afternoon.  New note to follow for that session. Current Level of Function Impacting Discharge (ADLs): mosto f the time dependent for hygiene after toileting. Incontinuent of urine and bowel (baseline), assist with transfers and ambulation. Other factors to consider for discharge: lives with wife but may need more equipment at home including hospital bed, possibly yady lift for days he cannot get to standing. PLAN :  Patient is not consistently improving with therapy. Sometimes he is able to get to EOB without assist, using only railing and additional time and at other times he needs max assist.   He sometimes needs min assist of one for standing and other times assist of 2 persons. We will continue to work on goals however OTR plans to d/c no later than 8/13/21 if inconsistency persists. Recommend with staff: toileting every 2 hours using BSC    Recommend next OT session: will work on dressing if appropriate this afternoon    Recommendation for discharge: (in order for the patient to meet his/her long term goals)  To be determined: pt may be better served through Hospice as he is not consistently improving with skills. Some times, even within the same day, he can have more difficulty initiating movement and weight bearing than at other times. Given his progressive disease he might not meet goals for consistent improvement. This discharge recommendation:  Has been made in collaboration with the attending provider and/or case management    IF patient discharges home will need the following DME: hospital bed       SUBJECTIVE:   Patient stated my brother is coming to take me home.     OBJECTIVE DATA SUMMARY:   Cognitive/Behavioral Status:  Neurologic State: Alert  Orientation Level: Oriented to person  Cognition: Decreased attention/concentration; Impaired decision making             Functional Mobility and Transfers for ADLs:  Bed Mobility:  Rolling: Modified independent  Supine to Sit: Additional time;Bed Modified;Minimum assistance  Sit to Supine: Additional time;Supervision; Adaptive equipment;Bed Modified  Scooting: Additional time;Maximum assistance    Transfers:  Sit to Stand: Additional time;Assist x2  Functional Transfers  Toilet Transfer : Moderate assistance       Balance:  Sitting: Impaired  Sitting - Static: Fair (occasional)  Sitting - Dynamic: Fair (occasional)  Standing: Impaired  Standing - Static: Constant support;Poor  Standing - Dynamic : Constant support;Poor    ADL Intervention:       Toileting  Toileting Assistance: Total assistance(dependent)  Bladder Hygiene: Total assistance (dependent)  Bowel Hygiene: Total assistance (dependent)  Clothing Management: Total assistance (dependent)    Cognitive Retraining  Following Commands: Follows one step commands/directions    Pain:  No complaint  Activity Tolerance:   fair    After treatment patient left in no apparent distress:   Sitting in chair, Call bell within reach, Bed / chair alarm activated, and Caregiver / family present    COMMUNICATION/COLLABORATION:   The patients plan of care was discussed with: Physical therapist and Case management.      Annika Wisdom OT  Time Calculation: 34 mins

## 2021-08-06 NOTE — SWING BED INTERDISCIPLINARY CARE PLAN MASTER NOTE
Interdisciplinary Plan of Care Master Note:    Date: 08/06/21  Admit Date: 7/30/2021  Patient: Aaron Gaines    The Interdisciplinary Team met to review the patient's plan of care progress. Goals, inventions, barriers and progress were discussed. Team members disciplines in attendance were:  _x_Physician  _x_Physical Therapist  _x_Occupational Therapist  _x_Speech Therapist  _x_Nurse  __Registered Dietician  __Respiratory Therapy  __Admission/Discharge Nurse  x__Care Manager  __Social Worker  __Pharmacist  __Infection Control  __Chaplain  __Other:     Team will continue to monitor the patient's progress and report any concerns to the Physician and Care Management as indicated. Summary (To be shared with patient and family): Patient is progressing as well as can be expected with therapy. Wife has been informed of the patient's progress. Brother in to visit today. He has been updated on plan of care and disposition plans. Care team Notes:  Swing Bed Interdisciplinary Care Plan Nurse Note by Naga Loving at 08/06/21 0432  Version 1 of 1       Nursing:    Week # 1: Date 8/6/2021  Medical status (changes from previous week):Progressing  Treatment changes this shift: None  Cognition: Present status: dementia          Is cueing needed?: Yes          Frequency of cueing needs: occasional           Type of cueing used: verbal  ADL (general): Moderate assistance  Activity type: Social leisure skills  Participation: Daily  Level of participation: Improving  Pain status: No c/o pain  Patient/Family Education needs: Safety    Upon review of the patients current care plan, the following issues, problems, or needs remain: Safety    Marlyn Nunez LPN           Interdisciplinary Care Plan RT Note    No notes of this type exist for this encounter. Interdisciplinary Care Plan Dietary Note    No notes of this type exist for this encounter.          Swing Bed Interdisciplinary Care Plan Nurse Note by Afshan Armstrong Zenia at 08/06/21 0432  Version 1 of 1       Nursing:    Week # 1: Date 8/6/2021  Medical status (changes from previous week):Progressing  Treatment changes this shift: None  Cognition: Present status: dementia          Is cueing needed?: Yes          Frequency of cueing needs: occasional           Type of cueing used: verbal  ADL (general): Moderate assistance  Activity type: Social leisure skills  Participation: Daily  Level of participation: Improving  Pain status: No c/o pain  Patient/Family Education needs: Safety    Upon review of the patients current care plan, the following issues, problems, or needs remain: Safety    Keila Gregorio LPN         Interdisciplinary Care Plan PT Note    No notes of this type exist for this encounter. Interdisciplinary Care Plan OT Note    No notes of this type exist for this encounter.           Interdisciplinary Care Plan SLP Note      Swing Bed Interdisciplinary Care Plan SLP Note by NIKOS Edgar at 08/06/21 1406  Version 1 of 1       Interdisciplinary Care Plan     Speech Language Pathology:    Progress: No change (Suspect patient's fluctuating presentation to be due to Parkinson's disease/dementia)  Swallow Precautions: No    Diet: Regular/Thin Liquids  Cognition: Moderate-severe impairment   Memory: Parkinson's dementia  Communication/Comprehension Deficits: Yes: Hypokinetic Dysarthria, characteristic of Parkinson's disease  Speech: Mild-moderate dysarthria  Comments: Reduced vocal loudness, monotone, consonant and vowel imprecision  Intelligibility: Patient is 100% intelligible at the word and phrase level; patient's intelligibility decreases as utterance length increases (i.e. sentence and/or conversation level); given strategies (i.e. \"big mouth, loud voice\"), patient's intelligibility increases at the sentence and conversation level    Upon review of the patients current care plan, the following issues, problems, or needs remain:   -- The patient continues to present with voice and motor speech deficits as this relates to his diagnosis of Parkinson's disease  -- Due to the neurodegenerative nature of Parkinson's disease, suspect patient to be at/near baseline as improvement is not indicated due to progressive nature of PD    NIKOS Gold                        Interdisciplinary Care Plan D/C Planning Note      Swing Bed Interdisciplinary Care Plan Discharge Planning Note by Michaela An at 07/30/21 6544  Version 1 of 1       Discharge Planning:    Psychosocial concerns/family concerns, status of previous week; discharge plan (place, DME, services): To be able to ambulate with little or no help with his walker when he goes home. He had progressed a lot since working with PT. Discharge Plan Update: Home w/spouse  Further anticipated LOS: 1 week or less  DME to order: Undetermined  Anticipated D/C services: Undetermined  Need for patient/family conference: YES   If yes, planned for when: Toward the end of his stay. Upon review of the patients current care plan, the following issues, problems, or needs remain: Patient must be able to do as much for himself if possible when he is discharged from the facility. Continue with physical therapy for strengthening and endurance, mobility and gait training.     Temo Rojo

## 2021-08-06 NOTE — PROGRESS NOTES
IDR Team; MD, Nursing, Care Manager, Physical therapy, Nursing Supervisor, Pharmacy  met to review patient's plan of care. Discussed goals, interventions, barriers and progress. Team will continue to monitor progress and report any concerns to the physician and care management as indicated. Transition of Care Plan:   Patient continues to work with therapy. Therapist recommends a hospital bed, wheel chair, and maybe have a discussion with the wife regarding hospice. Brother was present today, shaving the patient. Patient very communicative this morning and could understand what he was saying. . Mrs. Cary Mac was contacted this morning and informed that the insurance company gave patient a few more days for skilled care with tentative date of discharge being 8/9/21 with clinicals being sent to the [employer] on 8/10 to get authorization for continued stay. I explained to Mrs. Cary Mac that she needed to make a decision on what she wanted to do. Her options are limited:  1. Have discussed completing the Medicaid application with every phone call. Started that conversation the day of admission. She has yet to gather any information or willing to address resources that her  has. She stated that she was looking for paperwork but unable to locate it. She was made aware that without a payment source for placement long term care, we would not be able to place him in an ECF. 2.  Hospice subject was discussed with her and asked her to think about it. Explained to her that with his progressive Parkinsons diagnosis, he was not going to get any better with that diagnosis. If she was interested in learning more, contact us and we would make the referral  3. Hospital Bed: she stated she did not have any room for a hospital bed. She has 3 bedrooms and wouldn't be able to disassemble the bed in one of the bedrooms(didnt ask her to do so).  I asked if Mr. Cary Mac' family can assist her and she begins to stated that they are sick themselves and no one in her family can help. 4. Made it clear to Mrs. Cole Martinez that she needed to think about what she was going to do if the [employer] didn't extend his length of stay because we had no other choice but to discharge him home with home health. Will f/u with the patient's wife on Monday.

## 2021-08-06 NOTE — PROGRESS NOTES
Problem: Dysphagia (Adult)  Goal: *Acute Goals and Plan of Care (Insert Text)  Description: Speech Pathology Goals  Initiated 8/2/2021    1. Patient will tolerate Regular/Thin Liquid diet without signs of aspiration within 14 days. Outcome: Progressing Towards Goal     Problem: Motor Speech Impaired (Adult)  Goal: *Acute Goals and Plan of Care (Insert Text)  Description: Speech Pathology Goals  Initiated 8/2/2021    1. Patient will use a big mouth and a loud voice to increase intelligibility at the sentence level in 100% of trials, given minimal cues, within 14 days. 2. Patient will repeat intelligibility strategies independently within 7 days. Outcome: Not Progressing Towards Goal     SPEECH LANGUAGE PATHOLOGY TREATMENT  Patient: Del Kim (89 y.o. male)  Date: 8/6/2021  Diagnosis: Weakness [R53.1]  Parkinson's disease ([facility] [address] Russ Michelle <principal problem not specified>         ASSESSMENT:  Patient is being followed by SLP and most recent treatment session on 8/4/21 revealed increased participation and generalization of intelligibility strategies, in comparison to the previous session on 8/2/21. On this date, patient's brother was present for SLP session and patient Ox3. Patient recalled intelligibility strategies (i.e. \"big mouth, loud voice\") with moderate cueing in 80% of trials and independently in 20% of trials. During motor speech task, patient observed to perseverate by stating intelligibility strategies prior to repetition of stimuli (i.e. \"big mouth, loud voice. . ninety-five percent\"). On this date, patient continued to benefit from redirection to task before presenting stimulus item (i.e. by saying \"Dave\") and simple commands (\"do what I do\"). Patient required maximal cueing in 30% of trials and moderate cueing in 40% of trials to achieve 100% intelligibility. In the remaining 30% of trials, patient achieved 100% intelligibility given minimal cueing.  Patient observed to require greater amounts of cueing as utterance length/complexity increased. On this date, patient's secretion management appeared improved. Patient only required minimal verbal cue x1 to initiate saliva swallow. Chewing gum was trialed as a strategy to improve secretion management as research (see below) has demonstrated some efficacy in increasing swallow frequency and decreasing latency of swallowing in patient's with Parkinson's disease. Patient was agreeable to chewing gum and initiated swallowing independently during 10 minute time span while chewing gum was trialed. Cabrera AR, Elier SM, Gautam MS. Gum chewing improves swallow frequency and latency in Parkinson patients: a preliminary study. Neurology. 2010 Apr 13;74(15):1198-202. doi: 10.1212/[id number]. PMID: [id number]. PLAN:  Due to the neurodegenerative nature of Parkinson's disease, suspect patient to be at baseline and fluctuating presentation to be indicative of Parkinson's dementia. Continue treatment per established plan of care to increase generalization of intelligibility strategies. Anticipate expected discharge from SLP services on 8/11/21 as patient's diagnosis is progressive and improvement is not suspected. Discharge Recommendations: To be determined     SUBJECTIVE:   Patient stated Shanae Laguerre would be good when asked if he wanted sugar in his iced tea. OBJECTIVE:   Mental Status:  Neurologic State: Alert  Orientation Level: Oriented to person, Oriented to place, Disoriented to situation, Oriented to time (day of the week given minimal cueing)  Cognition: Decreased attention/concentration, Impaired decision making        Safety/Judgement: Decreased awareness of need for assistance, Decreased awareness of need for safety, Decreased insight into deficits    Treatment & Interventions:   Motor Speech:  Intelligibility: Impaired  Word Intelligibility (%): 100 %  Phrase Intelligibility (%): 100 %  Sentence Intelligibility (%): 90 %  Conversation Intelligibility (%): 80 %           Dysarthric Characteristics: Imprecise        Voice:  Voice Exercises  Vocal Techniques: LSVT  Comments: Education is provided to patient and patient's brother regarding intelligibility strategies of \"big mouth, loud voice\"    Pain:  Pain Scale 1: Numeric (0 - 10)  Pain Intensity 1: 0       After treatment:   Patient left in no apparent distress sitting up in chair, Call bell within reach, Nursing notified, and Caregiver / family present    COMMUNICATION/EDUCATION:   Patient was educated regarding his voice and motor speech deficits as this relates to his diagnosis of Parkinson's disease. He demonstrated fair to poor understanding due to Parkinson's dementia. His brother demonstrated good understanding as evidenced verbalizing understanding. The patient's plan of care including recommendations, planned interventions, and recommended diet changes were discussed with: Occupational therapist and Registered nurse.      Deland Dubin, SLP  Time Calculation: 30 mins

## 2021-08-06 NOTE — PROGRESS NOTES
Follow up visit with patient in [address]  Provided empathic listening and spiritual support  Advised of  Availability   [address] [place] [address]

## 2021-08-06 NOTE — PROGRESS NOTES
Bedside and Verbal shift change report given to Sneha Rhodes RN (oncoming nurse) by Johanne Zuniga LPN (offgoing nurse). Report included the following information SBAR, Kardex, Intake/Output and MAR.

## 2021-08-06 NOTE — PROGRESS NOTES
Problem: Mobility Impaired (Adult and Pediatric)  Goal: *Acute Goals and Plan of Care (Insert Text)  Description: Note    FUNCTIONAL STATUS PRIOR TO ADMISSION: Patient was modified independent using a rolling walker for functional mobility. HOME SUPPORT PRIOR TO ADMISSION: The patient lived with wife and she provided assistance. Physical Therapy Goals  Initiated 7/31/2021  1. Patient will move from supine to sit and sit to supine  in bed with supervision/set-up within 7 day(s). 2.  Patient will transfer from bed to chair and chair to bed with supervision/set-up using the least restrictive device within 7 day(s). 3.  Patient will perform sit to stand with supervision/set-up within 7 day(s). 4.  Patient will ambulate with supervision/set-up for 150 feet with the least restrictive device within 7 day(s). 5.  Patient will ascend/descend 2 stairs with 2 handrail(s) with minimal assistance/contact guard assist within 7 day(s). Outcome: Not Met    PHYSICAL THERAPY TREATMENT  Patient: Mitul Hare (21 y.o. male)  Date: 8/6/2021  Diagnosis: Weakness [R53.1]  [facility] [G20] <principal problem not specified>       Precautions: Fall; Skin  Chart, physical therapy assessment, plan of care and goals were reviewed. ASSESSMENT  Patient continues with skilled PT services and is progressing towards goals slowly. He demonstrates considerable variation in functional abilities and pacing from day to day and even over course of a single encounter. Today he freezes midway through bed mobility and requires up to maximum assistance to complete bed to bedside commode transfer. Continued to emphasis importance of anterior rocking in preparation for all transfers. Pt's brother present at conclusion of session.     Current Level of Function Impacting Discharge (mobility/balance): max assist some transfers    Other factors to consider for discharge: none additional         PLAN :  Patient continues to benefit from skilled intervention to address the above impairments. Continue treatment per established plan of care. to address goals. Recommendation for discharge: (in order for the patient to meet his/her long term goals)  HHPT with 24-hour physical assistance or long term care depending on level of assistance available to patient at home. This discharge recommendation:  Has been made in collaboration with the attending provider and/or case management    IF patient discharges home will need the following DME: stretcher transport home, hospital bed, HHPT for yady lift assessment, bedside commode       SUBJECTIVE:   Patient stated I didn't know this was a hospital. Pt reports his brother is coming to get him so he can change his pants. Brother does arrive during session but reports he did not tell pt he was coming to visit today. Pt received seated EOB, viewing construction outsides of his window. Pt cleared by RN for PT and agreeable to treatment. OBJECTIVE DATA SUMMARY:   Critical Behavior:  Neurologic State: Alert  Orientation Level: Oriented to person, Oriented to place  Cognition: Decreased attention/concentration, Impaired decision making  Safety/Judgement: Decreased awareness of environment, Decreased awareness of need for assistance, Decreased awareness of need for safety, Decreased insight into deficits  Functional Mobility Training:  Bed Mobility:     Supine to Sit: Additional time; Adaptive equipment;Bed Modified (freezes midway through supine to sit to L side of bed)  Sit to Supine: Additional time;Supervision; Adaptive equipment;Bed Modified (from R side of bed, slow but fluid movements, HOB 60 degrees)  Scooting: Additional time;Maximum assistance; freezes during forward scooting toward edge of bed        Transfers:  Sit to Stand: Additional time;Assist x2;Assist x1;Minimum assistance; Moderate assistance; performed x 4 over course of encounter; consistent cues for rocking, UE placement. Stand to Sit: Additional time;Minimum assistance; Moderate assistance;Assist x2;Assist x1; manual assist UE placement all trials; verbal cues to initiate hip and knee flexion                    Other: bed to bedside commode mod-max x2        Balance:  Sitting: Impaired  Sitting - Static: Fair (occasional)  Sitting - Dynamic: Fair (occasional)  Standing: Impaired; cues for anterior lean, upright posture, weight shift  Standing - Static: Poor  Standing - Dynamic : Poor  Ambulation/Gait Training:  Distance (ft): 110 Feet (ft)  Assistive Device: Walker, rolling;Gait belt  Ambulation - Level of Assistance: Minimal assistance;Contact guard assistance        Gait Abnormalities: Decreased step clearance        Base of Support: Narrowed (ankles brushing one another 75% of steps)     Speed/Kim: Slow;Pace decreased (<100 feet/min); Shuffled  Step Length: Left shortened;Right shortened                  Flexed knees, trunk, neck. No loss of balance. Does not demonstrate response to cues for widened base of support. Pain Rating:  Adult nonverbal 0  Reports lower back pain when asked, does not rate. Activity Tolerance:   requires frequent rest breaks    After treatment patient left in no apparent distress:   Sitting in chair, Call bell within reach, Bed / chair alarm activated, Caregiver / family present, and heels floating    COMMUNICATION/COLLABORATION:   The patients plan of care was discussed with: Occupational therapist, Registered nurse, and Rehabilitation technician.      Maria Isabel Cheung, PT, DPT   Time Calculation: 48 mins

## 2021-08-06 NOTE — SWING BED INTERDISCIPLINARY CARE PLAN OT NOTE
Occupational Therapy:    Progress: No change  Eating: Independent  Grooming (Gen): Extensive assistance  Grooming (Standing at sink): Not tested  Bathing (Gen): Extensive assistance  Bathing (Upper body): Limited assistance  Bathing (Lower body): Extensive assistance  Toileting: Extensive assistance  Upper body dressing: Limited assistance  Lower body dressing: Total assistance  Transfers (BSC/Toilet): Limited assistance  Transfers Shower: Not tested  Homemaking: Not tested  Balance: fluctuates throughout each day. Sometimes is min assist to CGA and at other times can be mod assist. Transfers are more difficult than ambulation with assist of 1 at times to assist of 2 at other times. Upper extremity function: slow but can feed himself, min assist shirt on. Activity tolerated: fluctuates. Sometimes he can tolerate multiple sit to stands and at other times he fatigues quickly  Safety status: still a fall risk. Sometimes confused. Upon review of the patients current care plan, the following issues, problems, or needs remain: will continue to try to see if we can get consistent gains in his performance. Discussed benefit of Hospice services with equipment he may need at home as he lives with spouse and if she plans to single handed care for him he might benefit from a hospital bed as well as a lift device.     Nicole Weems, OT

## 2021-08-07 NOTE — SWING BED INTERDISCIPLINARY CARE PLAN NURSE NOTE
Nursing:    Week #2: Date 8/7/2021  Medical status (changes from previous week):Progressing  Treatment changes this shift: none  Cognition: Present status: dementia          Is cueing needed?: Yes          Frequency of cueing needs: frequent           Type of cueing used: verbal  ADL (general): Maximum assistance  Activity type: Reality awareness  Participation: Daily  Level of participation: Improving  Pain status: No c/o pain  Patient/Family Education needs: safety    Upon review of the patients current care plan, the following issues, problems, or needs remain: safety, continue SNF PT    Micaela Li RN

## 2021-08-07 NOTE — PROGRESS NOTES
Problem: Mobility Impaired (Adult and Pediatric)  Goal: *Acute Goals and Plan of Care (Insert Text)  Description: Note    FUNCTIONAL STATUS PRIOR TO ADMISSION: Patient was modified independent using a rolling walker for functional mobility. HOME SUPPORT PRIOR TO ADMISSION: The patient lived with wife and she provided assistance. Physical Therapy Goals  Initiated 7/31/2021  1. Patient will move from supine to sit and sit to supine  in bed with supervision/set-up within 7 day(s). 2.  Patient will transfer from bed to chair and chair to bed with supervision/set-up using the least restrictive device within 7 day(s). 3.  Patient will perform sit to stand with supervision/set-up within 7 day(s). 4.  Patient will ambulate with supervision/set-up for 150 feet with the least restrictive device within 7 day(s). 5.  Patient will ascend/descend 2 stairs with 2 handrail(s) with minimal assistance/contact guard assist within 7 day(s). Outcome: Progressing Towards Goal    Patient sitting up in recliner by nurse's station. Stated he is willing to walk. Sit to stand with mod/max assist of one with walker in front. Ambulated with CGA/SBA of one in hallway for two rounds around nurse's station. Back to recliner with brakes on. Sitting in front of station. No c/o pain during gait. Maintained balance in walker. Needed cuing to turn with walker and not keep going straight.

## 2021-08-07 NOTE — PROGRESS NOTES
Bedside shift change report given to Vidya South RN (oncoming nurse) by SAMMY Mendiola LPN (offgoing nurse). Report included the following information SBAR, Kardex and MAR.

## 2021-08-07 NOTE — PROGRESS NOTES
Problem: Pressure Injury - Risk of  Goal: *Prevention of pressure injury  Description: Document Jonah Scale and appropriate interventions in the flowsheet.   Outcome: Progressing Towards Goal  Note: Pressure Injury Interventions:  Sensory Interventions: Assess changes in LOC, Check visual cues for pain    Moisture Interventions: Absorbent underpads, Apply protective barrier, creams and emollients    Activity Interventions: Increase time out of bed    Mobility Interventions: Chair cushion, HOB 30 degrees or less, Float heels    Nutrition Interventions: Document food/fluid/supplement intake    Friction and Shear Interventions: Apply protective barrier, creams and emollients, HOB 30 degrees or less

## 2021-08-07 NOTE — PROGRESS NOTES
Bedside and Verbal shift change report given to Rohit Marcos LPN (oncoming nurse) by Andria Burnett RN (offgoing nurse). Report included the following information SBAR, Kardex and MAR.

## 2021-08-07 NOTE — PROGRESS NOTES
Problem: Pressure Injury - Risk of  Goal: *Prevention of pressure injury  Description: Document Jonah Scale and appropriate interventions in the flowsheet. Outcome: Progressing Towards Goal  Note: Pressure Injury Interventions:  Sensory Interventions: Assess changes in LOC, Check visual cues for pain    Moisture Interventions: Absorbent underpads, Check for incontinence Q2 hours and as needed, Apply protective barrier, creams and emollients    Activity Interventions: Increase time out of bed    Mobility Interventions: HOB 30 degrees or less, Float heels    Nutrition Interventions: Document food/fluid/supplement intake    Friction and Shear Interventions: Apply protective barrier, creams and emollients, Feet elevated on foot rest                Problem: Patient Education: Go to Patient Education Activity  Goal: Patient/Family Education  Outcome: Progressing Towards Goal     Problem: Falls - Risk of  Goal: *Absence of Falls  Description: Document Carlos Fall Risk and appropriate interventions in the flowsheet.   Outcome: Progressing Towards Goal  Note: Fall Risk Interventions:  Mobility Interventions: Bed/chair exit alarm, Patient to call before getting OOB, Utilize walker, cane, or other assistive device    Mentation Interventions: Adequate sleep, hydration, pain control, Bed/chair exit alarm, Door open when patient unattended, Reorient patient    Medication Interventions: Bed/chair exit alarm, Patient to call before getting OOB, Teach patient to arise slowly    Elimination Interventions: Bed/chair exit alarm, Call light in reach    History of Falls Interventions: Bed/chair exit alarm

## 2021-08-08 NOTE — PROGRESS NOTES
Problem: Pressure Injury - Risk of  Goal: *Prevention of pressure injury  Description: Document Jonah Scale and appropriate interventions in the flowsheet. Outcome: Progressing Towards Goal  Note: Pressure Injury Interventions:  Sensory Interventions: Assess changes in LOC, Check visual cues for pain, Float heels    Moisture Interventions: Absorbent underpads, Apply protective barrier, creams and emollients, Check for incontinence Q2 hours and as needed    Activity Interventions: Increase time out of bed    Mobility Interventions: HOB 30 degrees or less, Float heels    Nutrition Interventions: Document food/fluid/supplement intake    Friction and Shear Interventions: HOB 30 degrees or less, Apply protective barrier, creams and emollients, Feet elevated on foot rest                Problem: Patient Education: Go to Patient Education Activity  Goal: Patient/Family Education  Outcome: Progressing Towards Goal     Problem: Falls - Risk of  Goal: *Absence of Falls  Description: Document Carlos Fall Risk and appropriate interventions in the flowsheet.   Outcome: Progressing Towards Goal  Note: Fall Risk Interventions:  Mobility Interventions: Bed/chair exit alarm, Utilize walker, cane, or other assistive device    Mentation Interventions: Adequate sleep, hydration, pain control, Bed/chair exit alarm, Door open when patient unattended, Reorient patient, Update white board    Medication Interventions: Bed/chair exit alarm, Patient to call before getting OOB, Teach patient to arise slowly    Elimination Interventions: Bed/chair exit alarm, Call light in reach, Patient to call for help with toileting needs    History of Falls Interventions: Bed/chair exit alarm, Door open when patient unattended         Problem: Patient Education: Go to Patient Education Activity  Goal: Patient/Family Education  Outcome: Progressing Towards Goal     Problem: Patient Education: Go to Patient Education Activity  Goal: Patient/Family Education  Outcome: Progressing Towards Goal     Problem: Patient Education: Go to Patient Education Activity  Goal: Patient/Family Education  Outcome: Progressing Towards Goal     Problem: Patient Education: Go to Patient Education Activity  Goal: Patient/Family Education  Outcome: Progressing Towards Goal     Problem: Patient Education: Go to Patient Education Activity  Goal: Patient/Family Education  Outcome: Progressing Towards Goal

## 2021-08-08 NOTE — PROGRESS NOTES
Bedside and Verbal shift change report given to Ananda Eli RN (oncoming nurse) by Chele Hanna LPN (offgoing nurse). Report included the following information SBAR, Kardex, Intake/Output and MAR.

## 2021-08-08 NOTE — SWING BED INTERDISCIPLINARY CARE PLAN NURSE NOTE
Nursing:    Week #2: Date 8/8/2021  Medical status (changes from previous week):Progressing  Treatment changes this shift: none  Cognition: Present status: dementia          Is cueing needed?: Yes          Frequency of cueing needs: frequent           Type of cueing used: verbal  ADL (general): Maximum assistance and Assistive device,  Walker  Activity type: Social leisure skills  Participation: Individual  Level of participation: Improving  Pain status: No c/o pain  Patient/Family Education needs: Fall prevention, safety, PT/OT    Upon review of the patients current care plan, the following issues, problems, or needs remain: safety, continue SNF patient    Anibal Monroe RN

## 2021-08-08 NOTE — PROGRESS NOTES
Charge nurse Joslyn Tilley) was advised of patients BP of 205/96 with a pulse of 73. Patient was given his scheduled Metoprolol 12.5 MG. Patient was recently transferred from the recliner back to bed which may have contributed to the elevated BP. Patients MEWS score increased from a 1 to a 3. Will continue to monitor and reassess patient. 2225- Patient's BP has improved to 174/83 with a pulse of 72. Will continue to monitor.

## 2021-08-08 NOTE — PROGRESS NOTES
Problem: Pressure Injury - Risk of  Goal: *Prevention of pressure injury  Description: Document Jonah Scale and appropriate interventions in the flowsheet. Outcome: Progressing Towards Goal  Note: Pressure Injury Interventions:  Sensory Interventions: Assess changes in LOC, Check visual cues for pain    Moisture Interventions: Absorbent underpads, Apply protective barrier, creams and emollients, Check for incontinence Q2 hours and as needed    Activity Interventions: Increase time out of bed    Mobility Interventions: HOB 30 degrees or less, Float heels    Nutrition Interventions: Document food/fluid/supplement intake    Friction and Shear Interventions: Apply protective barrier, creams and emollients, Feet elevated on foot rest, HOB 30 degrees or less                Problem: Patient Education: Go to Patient Education Activity  Goal: Patient/Family Education  Outcome: Progressing Towards Goal     Problem: Falls - Risk of  Goal: *Absence of Falls  Description: Document Carlos Fall Risk and appropriate interventions in the flowsheet.   Outcome: Progressing Towards Goal  Note: Fall Risk Interventions:  Mobility Interventions: Bed/chair exit alarm, Utilize walker, cane, or other assistive device    Mentation Interventions: Bed/chair exit alarm, Door open when patient unattended, Adequate sleep, hydration, pain control    Medication Interventions: Bed/chair exit alarm, Patient to call before getting OOB, Teach patient to arise slowly    Elimination Interventions: Bed/chair exit alarm, Call light in reach, Patient to call for help with toileting needs    History of Falls Interventions: Bed/chair exit alarm, Door open when patient unattended, Room close to nurse's station         Problem: Patient Education: Go to Patient Education Activity  Goal: Patient/Family Education  Outcome: Progressing Towards Goal

## 2021-08-08 NOTE — SWING BED INTERDISCIPLINARY CARE PLAN NURSE NOTE
Nursing:    Week # 2: Date 8/8/2021  Medical status (changes from previous week):Progressing  Treatment changes this shift: None  Cognition: Present status: dementia          Is cueing needed?: Yes          Frequency of cueing needs: occasional           Type of cueing used: verbal  ADL (general): Maximum assistance  Activity type: Reality awareness  Participation: Daily  Level of participation: Improving  Pain status: No c/o pain  Patient/Family Education needs: Safety    Upon review of the patients current care plan, the following issues, problems, or needs remain: Safety    Heath Wilson LPN

## 2021-08-09 NOTE — PROGRESS NOTES
Comprehensive Nutrition Assessment    Type and Reason for Visit: Initial, RD nutrition re-screen/LOS    Nutrition Recommendations/Plan: regular diet, low fat/low chol/high fiber/MARY, encourage intake, pt may need help with meal set-up but he can feed himself     Nutrition Assessment:   Pt now in rehab. Admitted with to acute Present on Admission:   Orthostatic hypotension   Peripheral neuropathy   Asthma   Chronic kidney disease   Hyperlipidemia   Hypertension   Parkinson disease ([address]   Bilateral leg edema     Pt gained 20# in 2 months and has bilateral LE edema 3+ and RUE edema 4+. Pt has no new labs. He has good appetite and good po intake usually. Continue to encourage good po intake, pt can feed himself. Estimated Daily Nutrient Needs:  Energy (kcal): 2250; Weight Used for Energy Requirements: Current  Protein (g): 121; Weight Used for Protein Requirements: Current  Fluid (ml/day): 2250; Method Used for Fluid Requirements: 1 ml/kcal      Nutrition Related Findings:       Patient Vitals for the past 168 hrs:   % Diet Eaten   08/09/21 1200 26 - 50%   08/09/21 0900 26 - 50%   08/06/21 0730 76 - 100%   08/05/21 1600 76 - 100%   08/05/21 0813 51 - 75%   08/04/21 1200 1 - 25%   08/04/21 0900 1 - 25%   08/02/21 1800 51 - 75%       Wounds:    None       Current Nutrition Therapies:  ADULT DIET Regular; Low Fat/Low Chol/High Fiber/MARY    Anthropometric Measures:  · Height:  6' (182.9 cm)  · Current Body Wt:  100.8 kg (222 lb 3.6 oz)   · Admission Body Wt:  222 lb 3.6 oz    · Usual Body Wt:  95.3 kg (210 lb)     · Ideal Body Wt:  178 lbs:  124.8 %   · Adjusted Body Weight:   ; Weight Adjustment for:     · Adjusted BMI:       · BMI Category:  Obese class 1 (BMI 30.0-34. 9)       Weight Loss Metrics 7/30/2021 7/27/2021 7/14/2021 6/15/2021 6/9/2021 6/4/2021 5/17/2021   Today's Wt - 222 lb 4.8 oz 213 lb 215 lb 225 lb 4.8 oz 210 lb 3.2 oz 203 lb 12.8 oz   BMI 30.15 kg/m2 30.15 kg/m2 28.89 kg/m2 29.16 kg/m2 30.56 kg/m2 26.27 kg/m2 25.47 kg/m2       Nutrition Diagnosis:   · No nutrition diagnosis at this time related to   as evidenced by        Nutrition Interventions:   Food and/or Nutrient Delivery: Continue current diet  Nutrition Education and Counseling: No recommendations at this time  Coordination of Nutrition Care: Continue to monitor while inpatient, Interdisciplinary rounds    Goals:  po intake % of most meals x 5-7 days       Nutrition Monitoring and Evaluation:   Behavioral-Environmental Outcomes: None identified  Food/Nutrient Intake Outcomes: Food and nutrient intake  Physical Signs/Symptoms Outcomes: Weight, Meal time behavior, Fluid status or edema    Discharge Planning:    Continue current diet     Electronically signed by Ramya Wright RD on 8/9/2021 at 4:43 PM

## 2021-08-09 NOTE — PROGRESS NOTES
Problem: Dysphagia (Adult)  Goal: *Acute Goals and Plan of Care (Insert Text)  Description: Speech Pathology Goals  Initiated 8/2/2021    1. Patient will tolerate Regular/Thin Liquid diet without signs of aspiration within 14 days. Outcome: Progressing Towards Goal     Problem: Motor Speech Impaired (Adult)  Goal: *Acute Goals and Plan of Care (Insert Text)  Description: Speech Pathology Goals  Initiated 8/2/2021    1. Patient will use a big mouth and a loud voice to increase intelligibility at the sentence level in 100% of trials, given minimal cues, within 14 days. 2. Patient will repeat intelligibility strategies independently within 7 days. Outcome: Not Progressing Towards Goal     SPEECH LANGUAGE PATHOLOGY TREATMENT  Patient: Fide Arrieta (45 y.o. male)  Date: 8/9/2021  Diagnosis: Weakness [R53.1]  [facility] ([facility] Dianne Johnny <principal problem not specified>         ASSESSMENT:  Patient is being followed by SLP and most recent treatment session on 8/6/21 revealed maximal to moderate cueing in 70% of trials. On this date, patient recalled intelligibility strategies (i.e. \"big mouth, loud voice\") with maximal cueing in 20% of trials, moderate cueing in 40% of trials, minimal cueing in 20% of trials, and independently in 20% of trials (in comparison to 80% moderate cueing and 20% independently during the previous session on 8/6/21). On this date, patient demonstrated his ability to generalize intelligibility strategies to motor speech task. Patient independently stated stimuli at the sentence level in 70% of trials, with increased breath support and vocal loudness. In the remaining trials, patient required minimal cueing (\"do what I do\") in 30% of trials to implement intelligibility strategies. Patient is 100% intelligible at the sentence/conversation level when context is known; intelligibility decreases during spontaneous speech, but with cueing patient is stimulable for intelligibility strategies. On this date, patient continued to benefit from redirection to task before presenting stimulus item (i.e. by saying \"Dave\") and simple commands (\"do what I do\"). Patient continues to be tolerating Regular/Thin Liquid diet without difficulty. On this date, patient's secretion management appeared improved. Patient only required minimal verbal cue x1 to initiate saliva swallow. Suspect secretion management to mirror cognitive status. PLAN:  Due to the neurodegenerative nature of Parkinson's disease, suspect patient to be at baseline and suspect fluctuating presentation to be indicative of Parkinson's dementia. Continue treatment per established plan of care to increase generalization of intelligibility strategies. Anticipate expected discharge from SLP services on 8/11/21 as patient's diagnosis is progressive and improvement is not suspected. Discharge Recommendations: To Be Determined     SUBJECTIVE:   Patient stated at my mother's house when SLP asked where he was. OBJECTIVE:   Mental Status:  Neurologic State: Alert  Orientation Level: Oriented to person, Disoriented to place, Oriented to time, Disoriented to situation  Cognition: Memory loss        Safety/Judgement: Decreased awareness of environment, Decreased awareness of need for assistance, Decreased awareness of need for safety, Decreased insight into deficits    Treatment & Interventions:   Motor Speech:     Intelligibility: Impaired  Word Intelligibility (%): 100 %  Phrase Intelligibility (%): 100 %  Sentence Intelligibility (%): 100 % (When context is known; intelligibility decreases during spontaneous speech)  Conversation Intelligibility (%): 70% (When context is known; intelligibility decreases during spontaneous speech)           Dysarthric Characteristics: Imprecise;Decreased breath support       Voice:  Voice Exercises  Vocal Techniques: LSVT   Comments: Patient was reminded of intelligibility strategies of \"big mouth, loud voice\" Pain:  Pain Scale 1: Numeric (0 - 10)  Pain Intensity 1: 0       After treatment:   Patient left in no apparent distress in bed, Call bell within reach, and Nursing notified    COMMUNICATION/EDUCATION:   Patient was educated regarding his deficit(s) of voice and motor speech deficits as this relates to his diagnosis of Parkinson's disease. He demonstrated fair to poor understanding due to Parkinson's dementia. The patient's plan of care including recommendations, planned interventions, and recommended diet changes were discussed with: Registered nurse.      NIKOS Mccormick  Time Calculation: 20 mins

## 2021-08-09 NOTE — PROGRESS NOTES
Problem: Pressure Injury - Risk of  Goal: *Prevention of pressure injury  Description: Document Jonah Scale and appropriate interventions in the flowsheet. Outcome: Progressing Towards Goal  Note: Pressure Injury Interventions:  Sensory Interventions: Assess changes in LOC    Moisture Interventions: Absorbent underpads    Activity Interventions: Increase time out of bed    Mobility Interventions: HOB 30 degrees or less    Nutrition Interventions: Document food/fluid/supplement intake    Friction and Shear Interventions: HOB 30 degrees or less                Problem: Patient Education: Go to Patient Education Activity  Goal: Patient/Family Education  Outcome: Progressing Towards Goal     Problem: Falls - Risk of  Goal: *Absence of Falls  Description: Document Carlos Fall Risk and appropriate interventions in the flowsheet.   Outcome: Progressing Towards Goal  Note: Fall Risk Interventions:  Mobility Interventions: Bed/chair exit alarm    Mentation Interventions: Bed/chair exit alarm    Medication Interventions: Bed/chair exit alarm    Elimination Interventions: Bed/chair exit alarm    History of Falls Interventions: Bed/chair exit alarm         Problem: Patient Education: Go to Patient Education Activity  Goal: Patient/Family Education  Outcome: Progressing Towards Goal     Problem: Patient Education: Go to Patient Education Activity  Goal: Patient/Family Education  Outcome: Progressing Towards Goal

## 2021-08-09 NOTE — PROGRESS NOTES
Problem: Pressure Injury - Risk of  Goal: *Prevention of pressure injury  Description: Document Jonah Scale and appropriate interventions in the flowsheet. Outcome: Progressing Towards Goal  Note: Pressure Injury Interventions:  Sensory Interventions: Assess changes in LOC    Moisture Interventions: Absorbent underpads    Activity Interventions: Increase time out of bed    Mobility Interventions: HOB 30 degrees or less    Nutrition Interventions: Document food/fluid/supplement intake    Friction and Shear Interventions: HOB 30 degrees or less                Problem: Patient Education: Go to Patient Education Activity  Goal: Patient/Family Education  Outcome: Progressing Towards Goal     Problem: Falls - Risk of  Goal: *Absence of Falls  Description: Document Carlos Fall Risk and appropriate interventions in the flowsheet.   Outcome: Progressing Towards Goal  Note: Fall Risk Interventions:  Mobility Interventions: Bed/chair exit alarm    Mentation Interventions: Bed/chair exit alarm    Medication Interventions: Bed/chair exit alarm    Elimination Interventions: Call light in reach    History of Falls Interventions: Bed/chair exit alarm

## 2021-08-09 NOTE — PROGRESS NOTES
Problem: Mobility Impaired (Adult and Pediatric)  Goal: *Acute Goals and Plan of Care (Insert Text)  Description: Note    FUNCTIONAL STATUS PRIOR TO ADMISSION: Patient was modified independent using a rolling walker for functional mobility. HOME SUPPORT PRIOR TO ADMISSION: The patient lived with wife and she provided assistance. Physical Therapy Goals  Initiated 7/31/2021  1. Patient will move from supine to sit and sit to supine  in bed with supervision/set-up within 7 day(s). 2.  Patient will transfer from bed to chair and chair to bed with supervision/set-up using the least restrictive device within 7 day(s). 3.  Patient will perform sit to stand with supervision/set-up within 7 day(s). 4.  Patient will ambulate with supervision/set-up for 150 feet with the least restrictive device within 7 day(s). 5.  Patient will ascend/descend 2 stairs with 2 handrail(s) with minimal assistance/contact guard assist within 7 day(s). Outcome: Progressing Towards Goal  PHYSICAL THERAPY TREATMENT  Patient: Joel Leal (57 y.o. male)  Date: 8/9/2021  Diagnosis: Weakness [R53.1]  [facility] [G20] <principal problem not specified>       Precautions: Fall  Chart, physical therapy assessment, plan of care and goals were reviewed. ASSESSMENT  Patient continues with skilled PT services and is progressing towards goals. Pt with improved pace in gait and increased distance. He was able to come to stand after cues for scooting forward and getting feet underneath him with min to mod A of 1 to encourage flexion over feet. He was able to amb 175' with rest in sitting x1 during changing of brief and donning of pants and shirt. He does tend toward posterior lean especially initially but was able to then maintain with CGA after initial assist to correct. He amb with RW with CGA and showed increased upright posture. Pt overall brighter today. Left in supervision of nurses at station.     Current Level of Function Impacting Discharge (mobility/balance): CGA to amb with RW, min to mod A to stand, CGA to turn with RW for transfers    Other factors to consider for discharge: will need 24 hr assist, fall risk. PLAN :  Patient continues to fluctuate with skilled intervention to address the above impairments. Continue treatment per established plan of care but plan a solid DC date of 8/13/2021 if he continues to remain in facility      Recommendation for discharge: (in order for the patient to meet his/her long term goals)  Physical therapy at least 2 days/week in the home AND ensure assist and/or supervision for safety with mobility and gait    This discharge recommendation:  Has been made in collaboration with the attending provider and/or case management    IF patient discharges home will need the following DME: patient owns DME required for discharge       SUBJECTIVE:   Patient stated I'm done now.     OBJECTIVE DATA SUMMARY:   Critical Behavior:  Neurologic State: Alert  Orientation Level: Oriented to person, Oriented to place, Disoriented to time, Disoriented to situation  Cognition: Memory loss  Safety/Judgement: Decreased awareness of environment, Decreased awareness of need for assistance, Decreased awareness of need for safety, Decreased insight into deficits  Functional Mobility Training:  Bed Mobility:  Rolling:  (received in chair at nurses' station)                 Transfers:  Sit to Stand: Moderate assistance;Minimum assistance;Assist x1;Other (comment) (cues to scoot forward in chair and move feet underneath him)  Stand to Sit: Minimum assistance; Other (comment) (A and cues to decrease rate of descent, increase flexion)                             Balance:  Sitting: Impaired  Sitting - Static: Good (unsupported)  Sitting - Dynamic: Fair (occasional)  Standing: Impaired  Standing - Static: Other (comment);Fair;Constant support (RW, posterior lean initially)  Standing - Dynamic : Fair;Constant support; Other (comment) (RW; posterior shift )  Ambulation/Gait Training:  Distance (ft): 175 Feet (ft) (with seated rest during changing brief and clothes)  Assistive Device: Gait belt;Walker, rolling  Ambulation - Level of Assistance: Contact guard assistance        Gait Abnormalities: Decreased step clearance        Base of Support: Widened     Speed/Kim: Slow; Other (comment) (much improved pace this session)  Step Length: Right shortened;Left shortened          Pain Rating:  No c/o    Activity Tolerance:   Good    After treatment patient left in no apparent distress:   Sitting in chair and at nurses' station for supervision    COMMUNICATION/COLLABORATION:   The patients plan of care was discussed with: Occupational therapist, Registered nurse, and Case management.      Sherif Hernandez, PT   Time Calculation: 19 mins

## 2021-08-09 NOTE — PROGRESS NOTES
IDR Team; MD, Nursing, Care Manager, Physical therapy, Nursing Supervisor, Pharmacy and Dietician, met to review patient's plan of care. Discussed goals, interventions, barriers and progress. RUR: 19% Medium    Team will continue to monitor progress and report any concerns to the physician and care management as indicated. Transition of Care Plan:  According to therapy, patient doing good today. As far as ambulation, once he is up, he does ok. Will contact wife first thing in the morning to confirm whether or not she wants the hospital bed. Medical updates/clinical information due 8/10/21 in an effort to get more days in Swing Bed for therapy.

## 2021-08-09 NOTE — SWING BED INTERDISCIPLINARY CARE PLAN NURSE NOTE
Nursing:    Week #2: Date 8/8/2021  Medical status (changes from previous week):Progressing  Treatment changes this shift: none  Cognition: Present status: dementia          Is cueing needed?: Yes          Frequency of cueing needs: occasional           Type of cueing used: verbal  ADL (general):  Moderate assistance  Activity type: Social leisure skills  Participation: Individual  Level of participation: Improving  Pain status: No c/o pain  Patient/Family Education needs: safety    Upon review of the patients current care plan, the following issues, problems, or needs remain: safety    Lennox Nipple, RN

## 2021-08-09 NOTE — PROGRESS NOTES
Problem: Self Care Deficits Care Plan (Adult)  Goal: *Acute Goals and Plan of Care (Insert Text)  Description:   FUNCTIONAL STATUS PRIOR TO ADMISSION: Patient was modified independent using a rolling walker for functional mobility. Wife assisted with ADLs for bathing/dressing, shave    HOME SUPPORT: The patient lived with wife. Occupational Therapy Goals  Initiated 8/2/2021 goals reviewed and updated 8/9/21    1. Patient will perform grooming with supervision/set-up within 7 day(s). MET 8/9/21  2. Patient will perform lower body dressing with moderate assistance  within 7 day(s). CONTINUE  3. Patient will perform toileting with mod A BSC within 7 day(s). CONTINUE  4. Pt will perform bathing seated in a chair with assist for feet only within 7 days. CoNTINUE    Outcome: Progressing Towards Goal   OCCUPATIONAL THERAPY TREATMENT  Patient: Deuce Decker (56 y.o. male)  Date: 8/9/2021  Diagnosis: Weakness [R53.1]  Parkinson's disease ([facility] [G20] <principal problem not specified>       Precautions: Fall  Chart, occupational therapy assessment, plan of care, and goals were reviewed. ASSESSMENT  Patient continues with skilled OT services and is progressing towards goals. Today is a good day. Mr. Efren Altamirano is speaking clearly after lunch, cooperative, no perseverating. He is seated in his chair, able to wash his face including getting wipe from container with extra time with one cue. He was able to take lid off toothpaste and apply paste to brush before brushing his teeth. He was able to get a sip from a cup, then  small basin, rinse and spit without spilling including setting it back onto tray table. He was handed a paper towel and was able to wipe his mouth. OTR reviewed goals. He is not able to perform toileting with mod assist as he can do the transfer but has not been able to let go of walker to manage clothing nor perform russell-hygiene.   He has demonstrated the ability to use wipes to wash his body except for feet but cannot get to his bottom in sitting and cannot let go of walker safely in standing. He has had good and bad days needing more/less assistance at times. Current Level of Function Impacting Discharge (ADLs): disease process is progressive and anticipate good and bad times of day needing more assistance which may be difficult for spouse. Other factors to consider for discharge: live         PLAN :  Patient continues to benefit from skilled intervention to address the above impairments. Continue treatment per established plan of care to address goals. Recommend with staff: up to [facility] as needed throughout the day    Recommend next OT session: continue practicing self care     Recommendation for discharge: (in order for the patient to meet his/her long term goals)  Occupational therapy at least 2 days/week in the home AND ensure assist and/or supervision for safety with transfers/ambulation    This discharge recommendation:  Has been made in collaboration with the attending provider and/or case management    IF patient discharges home will need the following DME: none       SUBJECTIVE:   Patient stated i'm okay.     OBJECTIVE DATA SUMMARY:   Cognitive/Behavioral Status:                      Functional Mobility and Transfers for ADLs:  Bed Mobility:       Transfers:             Balance:       ADL Intervention:       Grooming  Position Performed: Seated in chair  Washing Face: Set-up  Brushing Teeth: Set-up  Cues: Verbal cues provided       Pain:  No complaint    Activity Tolerance:   Good    After treatment patient left in no apparent distress:   Sitting in chair and Caregiver / family present    COMMUNICATION/COLLABORATION:   The patients plan of care was discussed with: Physical therapist.     Jaziel Anand OT  Time Calculation: 24 mins

## 2021-08-09 NOTE — PROGRESS NOTES
Follow up visit with patient in Rm 124  Provided empathic listening and spiritual support  Advised of  Availability   [address] [place] [address]

## 2021-08-09 NOTE — PROGRESS NOTES
Bedside shift change report given to SAMMY Rosales LPN (oncoming nurse) by Louis Aguirre RN (offgoing nurse). Report included the following information Kardex.

## 2021-08-10 NOTE — PROGRESS NOTES
Mavis Scheuermann Mrs. Jabier Chavira regarding long term care planning. I noted a request to the [facility] plan has been made to extend Mr. Silva's SNF stay but nothing has been heard about this yet. Further an extension would only lizandro a finite number of days. Thus, plans for discharge to home will need to be made regardless. She said she cleaned out a room for a hospital bed and will also need a walker. We discussed possibly seeing if Mr. Jabier Chavira would qualify for medical hospice or otherwise skilled home health under Medicare part A. Again the topic of Medicaid was discussed. Mr. Jabier Chavira does not have long term care insurance. Mrs. Jabier Chavira has the list of verifications needed for the Medicaid long term care application but voiced concerns about finding the information needed, such as Mr. Silva's life insurance policy. I noted she will need to know this information at some point anyway and perhaps a family member can help her secure the verifications needed. She expressed concerns about managing Mr. De Jesuss care in the home, and I advised her that one can privately pay for in home help through an agency or private duty caregivers.

## 2021-08-10 NOTE — PROGRESS NOTES
Bedside shift change report given to [facility] [place], LPN (oncoming nurse) by Marilu Farah RN  (offgoing nurse). Report included the following information SBAR.

## 2021-08-10 NOTE — PROGRESS NOTES
Problem: Self Care Deficits Care Plan (Adult)  Goal: *Acute Goals and Plan of Care (Insert Text)  Description:   FUNCTIONAL STATUS PRIOR TO ADMISSION: Patient was modified independent using a rolling walker for functional mobility. Wife assisted with ADLs for bathing/dressing, shave    HOME SUPPORT: The patient lived with wife. Occupational Therapy Goals  Initiated 8/2/2021 goals reviewed and updated 8/9/21    1. Patient will perform grooming with supervision/set-up within 7 day(s). MET 8/9/21  2. Patient will perform lower body dressing with moderate assistance  within 7 day(s). CONTINUE  3. Patient will perform toileting with mod A BSC within 7 day(s). CONTINUE  4. Pt will perform bathing seated in a chair with assist for feet only within 7 days. CoNTINUE    Outcome: Progressing Towards Goal   OCCUPATIONAL THERAPY TREATMENT  Patient: Joel Leal (47 y.o. male)  Date: 8/10/2021  Diagnosis: Weakness [R53.1]  Parkinson's disease ([facility] [G20] <principal problem not specified>       Precautions: Fall  Chart, occupational therapy assessment, plan of care, and goals were reviewed. ASSESSMENT  Patient continues with skilled OT services and is progressing towards goals. Having another good morning. He was finishing breakfast, needed assist to open his juice. He was min assist to EOB using bed rails and cues with extra time. With set-up able to wash his face with wipe, brush his teeth, wash upper body and legs to knees. Min assist getting shirt on, dependent to get pants over feet but can pull up to thigh. He wanted to help pull up pants in standing but OTR requested he hold onto walker instead for safety. Total LE dressing is max assist.  Dependent socks. He is unable to stand and wash his backside due to safety. Mod assist for sit to stand and ambulation to chair with cues.      Current Level of Function Impacting Discharge (ADLs): still max assist for LE dressing/bathing due to balance issues in standing and unable to reach feet bending forward, which is not safe due to balance issues in sitting. He is set-up for grooming, min assist UE dressing. Mod assist still for sit to stand. Other factors to consider for discharge: lives with wife         PLAN :  Patient continues to benefit from skilled intervention to address the above impairments. Continue treatment per established plan of care to address goals. OTR anticipates dc due to plateau/inconsistent progression by Friday Aug 13th    Recommend with staff: up to use toilet today    Recommend next OT session: continue working on sitting balance    Recommendation for discharge: (in order for the patient to meet his/her long term goals)  Occupational therapy at least 2 days/week in the home AND ensure assist and/or supervision for safety with transfers and ambulation    This discharge recommendation:  Has been made in collaboration with the attending provider and/or case management    IF patient discharges home will need the following DME: hospital bed       SUBJECTIVE:   Patient stated Can I get a brian cracker? Ifeanyi Dill    OBJECTIVE DATA SUMMARY:   Cognitive/Behavioral Status:  Neurologic State: Alert     Cognition: Follows commands     Functional Mobility and Transfers for ADLs:  Bed Mobility:  Rolling: Modified independent  Supine to Sit: Minimum assistance    Transfers: Mod assist sit to stand, stand to sit    Balance:  Sitting - Dynamic: Fair (occasional)  Standing: Impaired  Standing - Static: Constant support; Fair  Standing - Dynamic : Constant support; Fair    ADL Intervention:  Feeding  Container Management: Moderate assistance  Food to Mouth: Independent  Drink to Mouth: Independent    Grooming  Brushing Teeth: Minimum assistance    Upper Body Bathing  Bathing Assistance: Set-up  Position Performed: Seated edge of bed  Cues: Verbal cues provided    Lower Body Bathing  Lower Body :  Moderate assistance  Position Performed: Seated edge of bed    Upper Body Dressing Assistance  Pullover Shirt: Minimum assistance    Lower Body Dressing Assistance  Protective Undergarmet: Total assistance (dependent)  Pants With Elastic Waist: Maximum assistance  Socks: Total assistance (dependent)      Pain:  No complaints    Activity Tolerance:   Good    After treatment patient left in no apparent distress:   Sitting in chair and Caregiver / family present    COMMUNICATION/COLLABORATION:   The patients plan of care was discussed with: Physical therapist, Registered nurse and Case management.      Luma Samson OT  Time Calculation: 30 mins

## 2021-08-10 NOTE — ROUTINE PROCESS
Bedside and Verbal shift change report given to VEDA Lopez RN (oncoming nurse) by Alondra Momin LPN (offgoing nurse). Report included the following information SBAR and Kardex     Bed Alarm activated.

## 2021-08-10 NOTE — ROUTINE PROCESS
Bedside and Verbal shift change report given to [id number] [employer]  (oncoming nurse) by Toño Figueroa LPN (offgoing nurse). Report included the following information SBAR and Kardex     Bed alarm activated.

## 2021-08-10 NOTE — PROGRESS NOTES
IDR Team; MD, Nursing, Care Manager, Physical therapy, Nursing Supervisor, Pharmacy and Dietician, met to review patient's plan of care. Discussed goals, interventions, barriers and progress. RUR: 19% MEDIUM    Team will continue to monitor progress and report any concerns to the physician and care management as indicated. Transition of Care Plan:   Clinical information/medical update faxed to insurance company today for approval for extended length of stay. According to the therapist, patient is a lot clearer today. Only was min assist to dress, one person to assist. Patient did do stairs today and did well. Await decision from insurance company.

## 2021-08-10 NOTE — PROGRESS NOTES
Physical Therapy Note: Caregiver Communication    Call placed to pt's spouse ([phone]) to discuss pt's functional status and request her presence for family training in hospital prior to pt discharge into her care. Spouse notified regarding pt's varying performance and required assistance for all functional mobility as documented in PT notes. Spouse states, \"No, I don't think I really need to come. \" Offered additional encouragement for her to be present and availability for training session to accommodate her schedule. She continues to decline participation in family training session with patient while admitted. She verbalizes understanding regarding need for physical assistance to patient for all transfers, ambulation, bed mobility, and toileting. She states she is prepared to care for patient at his current functional level and denies concerns. Regarding home set-up she reports pt will return to their private residence, 1 story home with 3 stairs to enter +bilateral but widespread handrails. She reports sister can provide transportation to pt via private vehicle when required.      Enmanuel Gordon, PT, PT, CEEAA  12 mins

## 2021-08-10 NOTE — SWING BED INTERDISCIPLINARY CARE PLAN NURSE NOTE
Nursing:    Week # 3: Date 8/10/2021  Medical status (changes from previous week):Progressing  Treatment changes this shift: no changes  Cognition: Present status: oriented          Is cueing needed?: Yes          Frequency of cueing needs: occasional           Type of cueing used: verbal  ADL (general):  Independent  Activity type: Social leisure skills  Participation: Daily  Level of participation: Improving  Pain status: No c/o pain  Patient/Family Education needs: safety    Upon review of the patients current care plan, the following issues, problems, or needs remain: quan Haynes LPN

## 2021-08-10 NOTE — PROGRESS NOTES
Problem: Pressure Injury - Risk of  Goal: *Prevention of pressure injury  Description: Document Jonah Scale and appropriate interventions in the flowsheet. Outcome: Progressing Towards Goal  Note: Pressure Injury Interventions:  Sensory Interventions: Assess changes in LOC    Moisture Interventions: Absorbent underpads, Apply protective barrier, creams and emollients    Activity Interventions: Increase time out of bed    Mobility Interventions: HOB 30 degrees or less    Nutrition Interventions: Document food/fluid/supplement intake    Friction and Shear Interventions: HOB 30 degrees or less                Problem: Falls - Risk of  Goal: *Absence of Falls  Description: Document Carlos Fall Risk and appropriate interventions in the flowsheet.   Outcome: Progressing Towards Goal  Note: Fall Risk Interventions:  Mobility Interventions: Bed/chair exit alarm, Patient to call before getting OOB    Mentation Interventions: Bed/chair exit alarm, Door open when patient unattended, Adequate sleep, hydration, pain control    Medication Interventions: Bed/chair exit alarm    Elimination Interventions: Bed/chair exit alarm    History of Falls Interventions: Bed/chair exit alarm, Door open when patient unattended

## 2021-08-10 NOTE — PROGRESS NOTES
Follow up visit with patient in Rm 124  Provided empathic listening and spiritual support  Advised of  Availability   [address] [place] [address]

## 2021-08-10 NOTE — SWING BED INTERDISCIPLINARY CARE PLAN NURSE NOTE
Nursing:    Week #2: Date 8/10/2021  Medical status (changes from previous week):Progressing  Treatment changes this shift: none  Cognition: Present status: dementia          Is cueing needed?: Yes          Frequency of cueing needs: occasional           Type of cueing used: verbal  ADL (general):  Moderate assistance  Activity type: Social leisure skills  Participation: Daily  Level of participation: Improving  Pain status: No c/o pain  Patient/Family Education needs: safety    Upon review of the patients current care plan, the following issues, problems, or needs remain: safety  Zachary De León RN

## 2021-08-10 NOTE — PROGRESS NOTES
Problem: Pressure Injury - Risk of  Goal: *Prevention of pressure injury  Description: Document Jonah Scale and appropriate interventions in the flowsheet.   Outcome: Progressing Towards Goal  Note: Pressure Injury Interventions:  Sensory Interventions: Assess changes in LOC, Check visual cues for pain, Keep linens dry and wrinkle-free    Moisture Interventions: Absorbent underpads    Activity Interventions: Increase time out of bed    Mobility Interventions: HOB 30 degrees or less    Nutrition Interventions: Document food/fluid/supplement intake    Friction and Shear Interventions: HOB 30 degrees or less

## 2021-08-10 NOTE — PROGRESS NOTES
Problem: Mobility Impaired (Adult and Pediatric)  Goal: *Acute Goals and Plan of Care (Insert Text)  Description:   FUNCTIONAL STATUS PRIOR TO ADMISSION: Patient was modified independent using a rolling walker for functional mobility. HOME SUPPORT PRIOR TO ADMISSION: The patient lived with wife and she provided assistance. Physical Therapy Goals  Revised 8/10/2021  1. Patient will move from supine to sit and sit to supine in bed with min assist 5/5 trials within 7 day(s). 2.  Patient will transfer from bed to chair and chair to bed with min assist 5/5 trials using the least restrictive device within 7 day(s). 3.  Patient will perform sit to stand with min assist 5/5 trials within 7 day(s). 4.  Patient will ambulate with supervision/set-up for 150 feet with the least restrictive device within 7 day(s). 5.  Patient will ascend/descend 6 stairs with 1 handrail(s) with minimal assistance/contact guard assist within 7 day(s). Initiated 7/31/2021  1. Patient will move from supine to sit and sit to supine  in bed with supervision/set-up within 7 day(s). 2.  Patient will transfer from bed to chair and chair to bed with supervision/set-up using the least restrictive device within 7 day(s). 3.  Patient will perform sit to stand with supervision/set-up within 7 day(s). 4.  Patient will ambulate with supervision/set-up for 150 feet with the least restrictive device within 7 day(s). 5.  Patient will ascend/descend 2 stairs with 2 handrail(s) with minimal assistance/contact guard assist within 7 day(s). Outcome: Not Met    PHYSICAL THERAPY TREATMENT: WEEKLY REASSESSMENT  Patient: Milo Hartman (5 y.o. male)  Date: 8/10/2021  Primary Diagnosis: Weakness [R53.1]  Parkinson's disease ([facility] [address] [G20]        Precautions: skin; Fall      ASSESSMENT  Patient continues with skilled PT services and is progressing towards goals slowly.  Since initial PT evaluation pt is ambulating increased distances using RW and requiring intermittently decreased assistance for transfers, gait, and bed mobility. Pt's ability to follow simple and complex commands, initiate mobility, and perform functional tasks varies considerably between days and within the same day. He consistently offers good participation in therapy encounters. He remains with decreased strength, decreased endurance, decreased ROM, and limited functional mobility. CM working to clarify home set-up and baseline transportation with spouse. Pt initiates stair training today. Current Level of Function Impacting Discharge (mobility/balance): min assist    Functional Outcome Measure: The patient scored 35 on the Barthel outcome measure which is indicative of 65% functional impairment. Other factors to consider for discharge: none additional         PLAN :  Goals have been updated based on progression since last assessment. Patient continues to benefit from skilled intervention to address the above impairments. Recommendations and Planned Interventions: bed mobility training, transfer training, gait training, therapeutic exercises, neuromuscular re-education, edema management/control, patient and family training/education, and therapeutic activities      Frequency/Duration: Patient will be followed by physical therapy: 1-2x/day, 5-6 days/week to address goals. Recommendation for discharge: (in order for the patient to meet his/her long term goals)  Physical therapy at least 2 days/week in the home AND ensure assist and/or supervision for safety with all functional mobility vs. Long term care if family unable to provide necessary supervision/assist    This discharge recommendation:  Has been made in collaboration with the attending provider and/or case management    IF patient discharges home will need the following DME: hospital bed and rolling walker         SUBJECTIVE:   Patient stated no,\" when asked if anything is in his mouth.  Pt requires staff assist to wipe mouth - frequent saliva filled with small bits of food. RN aware. Pt received seated, agreeable to PT and cleared by RN.       OBJECTIVE DATA SUMMARY:   HISTORY:    Past Medical History:   Diagnosis Date    Asthma     Chronic kidney disease     Dermatophytosis of groin and perianal area 2010    Edema 2008    Encounter for long-term (current) use of other medications 2010    Gout, unspecified 2010    Gouty arthropathy, unspecified 2010    Hypertension     Hypertrophy of prostate without urinary obstruction and other lower urinary tract symptoms (LUTS) 2008    Impotence of organic origin 2008    Memory loss 2009    Obesity, unspecified 2010    Orthostatic hypotension 2008    Other and unspecified hyperlipidemia 2008    Other atopic dermatitis and related conditions 2012    Palpitations 2010    Peripheral angiopathy in diseases classified elsewhere [facility]) 2010    Personal history of malignant neoplasm of rectum, rectosigmoid junction, and anus 2011    Tinea nigra 2011    Unspecified pruritic disorder 2011    Vitamin B12 deficiency 7/23/2017     Past Surgical History:   Procedure Laterality Date    ENDOSCOPY, COLON, DIAGNOSTIC  06/2011 05/2007, 01/2004,  12/2000    HX HERNIA REPAIR  1992    INCISIONAL    HX TOTAL COLECTOMY  1991    COLON CANCER S/P RESECTION       Personal factors and/or comorbidities impacting plan of care: as above    Home Situation  Home Environment: Private residence  # Steps to Enter: 6  One/Two Story Residence: One story  Living Alone: No  Support Systems: Spouse/Significant Other/Partner  Patient Expects to be Discharged to[de-identified] Unknown  Current DME Used/Available at Home: paloma Santillan    EXAMINATION/PRESENTATION/DECISION MAKING:   Critical Behavior:  Neurologic State: Alert  Orientation Level: Oriented to person, Oriented to place  Cognition: Follows commands  Safety/Judgement: Decreased awareness of environment, Decreased awareness of need for assistance, Decreased awareness of need for safety, Decreased insight into deficits  Hearing: Auditory  Auditory Impairment: None  Skin:  exposed skin intact; heel wound not visualized today  Edema: B LE swelling throughout admission, consistent with prior days. Range Of Motion:  AROM: Generally decreased, functional           PROM: Generally decreased, functional           Strength:    Strength: Generally decreased, functional                    Tone & Sensation:   Tone:  (increased throughout)                              Coordination:  Coordination:  (substantially increased time; max cueing)       Functional Mobility:  Bed Mobility:  Rolling: Modified independent  Supine to Sit: Minimum assistance        Transfers:  Sit to Stand: Additional time;Minimum assistance; Moderate assistance  Stand to Sit: Additional time;Minimum assistance               Education, cues, and manual assist for rocking, cues for UE placement. Balance:   Sitting: Impaired  Sitting - Static: Good (unsupported)  Sitting - Dynamic: Fair (occasional)  Standing: Impaired  Standing - Static: Fair  Standing - Dynamic : Fair  Ambulation/Gait Training:  Distance (ft): 250 Feet (ft)  Assistive Device: Walker, rolling;Gait belt  Ambulation - Level of Assistance: Contact guard assistance;Minimal assistance        Gait Abnormalities: Decreased step clearance (R trendelenburg, upper trunk lean to R, kyphotic)        Base of Support: Narrowed  Stance: Weight shift  Speed/Kim: Shuffled;Pace decreased (<100 feet/min)  Step Length: Right shortened;Left shortened                     Stairs:  Number of Stairs Trained: 5 (x2)  Stairs - Level of Assistance: Minimum assistance   Rail Use: Both        Functional Measure:  Barthel Index:    Bathin  Bladder: 0  Bowels: 0  Groomin  Dressin  Feedin  Mobility: 10  Stairs: 5  Toilet Use: 0  Transfer (Bed to Chair and Back): 10  Total: 35/100       The Barthel ADL Index: Guidelines  1.  The index should be used as a record of what a patient does, not as a record of what a patient could do. 2. The main aim is to establish degree of independence from any help, physical or verbal, however minor and for whatever reason. 3. The need for supervision renders the patient not independent. 4. A patient's performance should be established using the best available evidence. Asking the patient, friends/relatives and nurses are the usual sources, but direct observation and common sense are also important. However direct testing is not needed. 5. Usually the patient's performance over the preceding 24-48 hours is important, but occasionally longer periods will be relevant. 6. Middle categories imply that the patient supplies over 50 per cent of the effort. 7. Use of aids to be independent is allowed. Luz Boo., Barthel, D.W. (2399). Functional evaluation: the Barthel Index. [facility] (14)2. LUCHO Fall, Bianka Burns., Jermaine Rdz., [place], [address] (1999). Measuring the change indisability after inpatient rehabilitation; comparison of the responsiveness of the Barthel Index and Functional [place] Measure. Journal of Neurology, Neurosurgery, and Psychiatry, 66(4), 751-639. Leopold Lark, N.J.A, ANY Infante, & Agata Guajardo, M.A. (2004.) Assessment of post-stroke quality of life in cost-effectiveness studies: The usefulness of the Barthel Index and the EuroQoL-5D. Quality of Life Research, 13, 427-43           Pain Rating:  Denies pain    Activity Tolerance:   Good    After treatment patient left in no apparent distress:   Sitting in chair, Call bell within reach, Bed / chair alarm activated, and LEs elevated, visible from nursing desk. COMMUNICATION/EDUCATION:   The patients plan of care was discussed with: Occupational therapist, Registered nurse, Rehabilitation technician, and interdisciplinary team at rounds .      Fall prevention education was provided and the patient/caregiver indicated understanding., Patient/family have participated as able in goal setting and plan of care. , and Patient/family agree to work toward stated goals and plan of care.     Thank you for this referral.  Jarod Alfaro, PT, DPT   Time Calculation: 38 mins

## 2021-08-11 NOTE — PROGRESS NOTES
The documentation for this period (0100-0400) is being entered following the guidelines as defined in the [facility] policy by [employer].

## 2021-08-11 NOTE — PROGRESS NOTES
IDR Team; MD, Nursing, Care Manager, Physical therapy, Nursing Supervisor, Pharmacy and Dietician, met to review patient's plan of care. Discussed goals, interventions, barriers and progress. Team will continue to monitor progress and report any concerns to the physician and care management as indicated. Transition of Care Plan:      Care management waiting to hear if [employer] grants extension of SNF stay. Mr. Bliss Silence showing function decline as compared with a couple of days ago. Will need DME arranged for discharge (hospital bed, wheelchair, bedside commonde). Sister in law spoke with care management today about securing verifications for Medicaid application. OT still seeing Mr. Ruthy Canela.

## 2021-08-11 NOTE — SWING BED INTERDISCIPLINARY CARE PLAN NURSE NOTE
Nursing:    Week # 3: Date 8/11/2021  Medical status (changes from previous week):Progressing  Treatment changes this shift: No changes  Cognition: Present status: oriented          Is cueing needed?: Yes          Frequency of cueing needs: occasional           Type of cueing used: verbal  ADL (general):  Minimum assistance  Activity type: Social leisure skills  Participation: Daily  Level of participation: Improving  Pain status: No c/o pain  Patient/Family Education needs: Safety    Upon review of the patients current care plan, the following issues, problems, or needs remain: Safety    Sheliah Cowden, LPN

## 2021-08-11 NOTE — PROGRESS NOTES
Problem: Mobility Impaired (Adult and Pediatric)  Goal: *Acute Goals and Plan of Care (Insert Text)  Description:   FUNCTIONAL STATUS PRIOR TO ADMISSION: Patient was modified independent using a rolling walker for functional mobility. HOME SUPPORT PRIOR TO ADMISSION: The patient lived with wife and she provided assistance. Physical Therapy Goals  Revised 8/10/2021  1. Patient will move from supine to sit and sit to supine in bed with min assist 5/5 trials within 7 day(s). 2.  Patient will transfer from bed to chair and chair to bed with min assist 5/5 trials using the least restrictive device within 7 day(s). 3.  Patient will perform sit to stand with min assist 5/5 trials within 7 day(s). 4.  Patient will ambulate with supervision/set-up for 150 feet with the least restrictive device within 7 day(s). 5.  Patient will ascend/descend 6 stairs with 1 handrail(s) with minimal assistance/contact guard assist within 7 day(s). Initiated 7/31/2021  1. Patient will move from supine to sit and sit to supine  in bed with supervision/set-up within 7 day(s). 2.  Patient will transfer from bed to chair and chair to bed with supervision/set-up using the least restrictive device within 7 day(s). 3.  Patient will perform sit to stand with supervision/set-up within 7 day(s). 4.  Patient will ambulate with supervision/set-up for 150 feet with the least restrictive device within 7 day(s). 5.  Patient will ascend/descend 2 stairs with 2 handrail(s) with minimal assistance/contact guard assist within 7 day(s). Outcome: Not Met   PHYSICAL THERAPY TREATMENT  Patient: Mirza Moy (87 y.o. male)  Date: 8/11/2021  Diagnosis: Weakness [R53.1]  Parkinson's disease ([facility] [address] [G20] <principal problem not specified>       Precautions: Fall  Chart, physical therapy assessment, plan of care and goals were reviewed.     ASSESSMENT  Patient continues with skilled PT services and is progressing towards goals inconsistently overall. Today he continues stair, gait, and repeated transfer training. He demonstrates use of \"big and loud\" counting prior to transfers 75% of trials and proper UE placement 80% of trials without cues. He continues to require minimal to moderate assistance for transfers and significant cueing for techniques. Spouse aware of necessary assistance for pt safety and mobility at home (see prior note by jacquelin Rodríguez). Plan for continued transfer training and stair negotiation using 1 handrail at next session. Current Level of Function Impacting Discharge (mobility/balance): mod assist some transfers    Other factors to consider for discharge: none additional         PLAN :  Patient continues to benefit from skilled intervention to address the above impairments. Continue treatment per established plan of care. to address goals. Recommendation for discharge: (in order for the patient to meet his/her long term goals)  Physical therapy at least 2 days/week in the home AND ensure assist and/or supervision for safety with all functional mobility    This discharge recommendation:  Has been made in collaboration with the attending provider and/or case management    IF patient discharges home will need the following DME: no changes       SUBJECTIVE:   Patient stated I need to go to the bathroom, in clearly audible and intelligible sentence after mobility. Pt received seated, agreeable to PT and cleared by RN.       OBJECTIVE DATA SUMMARY:   Critical Behavior:  Neurologic State: Alert  Orientation Level: Oriented to person  Cognition: Follows commands  Safety/Judgement: Decreased awareness of environment, Decreased awareness of need for assistance, Decreased awareness of need for safety, Decreased insight into deficits  Functional Mobility Training:  Bed Mobility:          Scooting:  (max assist to CGA in recliner and wheelchair - various abilities in both chairs)        Transfers:  Sit to Stand: Additional time;Minimum assistance; Moderate assistance  Stand to Sit: Minimum assistance; Moderate assistance; Additional time      Performed x 8 over course of encounter              Other: bedside commode min assist        Balance:  Sitting: Without support  Sitting - Static: Good (unsupported)  Sitting - Dynamic: Fair (occasional)  Standing: With support  Standing - Static: Fair  Standing - Dynamic : Fair  Ambulation/Gait Training:  Distance (ft): 180 Feet (ft)  Assistive Device: Walker, rolling;Gait belt  Ambulation - Level of Assistance: Contact guard assistance        Gait Abnormalities: Decreased step clearance        Base of Support: Narrowed     Speed/Kim: Shuffled;Pace decreased (<100 feet/min)  Step Length: Right shortened;Left shortened                  Forward head, thoracic kyphosis, flexed knees. Stairs:  Number of Stairs Trained:  (5 + 10 with seated rest; standard and low step heights)  Stairs - Level of Assistance: Contact guard assistance;Minimum assistance   Rail Use: Both    Pain Rating:  Denies pain    Activity Tolerance:   requires rest breaks    After treatment patient left in no apparent distress:   Sitting in chair, Call bell within reach, and Bed / chair alarm activated    COMMUNICATION/COLLABORATION:   The patients plan of care was discussed with: Occupational therapist, Registered nurse, and Rehabilitation technician.      Sary Keith PT, DPT   Time Calculation: 55 mins

## 2021-08-11 NOTE — PROGRESS NOTES
Bedside and Verbal shift change report given to AMADOR Fitzgerald LPN (oncoming nurse) by Didi Wang LPN (offgoing nurse). Report included the following information SBAR, Kardex, Intake/Output and MAR.

## 2021-08-11 NOTE — PROGRESS NOTES
Problem: Dysphagia (Adult)  Goal: *Acute Goals and Plan of Care (Insert Text)  Description: Speech Pathology Goals  Initiated 8/2/2021    1. Patient will tolerate Regular/Thin Liquid diet without signs of aspiration within 14 days. Outcome: Progressing Towards Goal     Problem: Motor Speech Impaired (Adult)  Goal: *Acute Goals and Plan of Care (Insert Text)  Description: Speech Pathology Goals  Initiated 8/2/2021    1. Patient will use a big mouth and a loud voice to increase intelligibility at the sentence level in 100% of trials, given minimal cues, within 14 days. 2. Patient will repeat intelligibility strategies independently within 7 days. Outcome: Not Progressing Towards Goal     SPEECH LANGUAGE PATHOLOGY TREATMENT  Patient: Del Kim (32 y.o. male)  Date: 8/11/2021  Diagnosis: Weakness [R53.1]  Parkinson's disease ([facility] [address] [G20] <principal problem not specified>         ASSESSMENT:  Patient is being followed by SLP and most recent treatment session on 8/9/21 revealed 100% intelligibility at the sentence/conversation level when context is known with intelligibility decreasing during spontaneous speech, and improved secretion management. Patient continues to present with fluctuating participation, likely due to patient's Parkinson's disease/dementia diagnosis. On this date, patient's intelligibility continued to decrease during spontaneous utterances as session progressed. Initially patient was intelligible independently at the word, phrase, and sentence level. Patient observed to become distractible as session progressed, requiring minimal cueing (30%) and moderate cueing (10%) to maintain intelligibility. Patient continues to benefit from simple commands (\"do as I do\") and a clinician model. Patient independently recalled intelligibility strategies in 40% of instances, requiring minimal to moderate cueing during the remaining trials.     On this date, patient with decreased initiation to swallow secretions, resulting in anterior pooling of saliva from the oral cavity. Patient demonstrated his ability to initiate saliva swallow given verbal and tactile cueing from clinician with decreased command following as the session progressed. Chewing gum was trialed as a strategy to improve secretion management to increase swallow frequency. Patient initially observed to independently initiate swallowing while chewing gum. Following 5 minute span, patient observed to become distracted and stopped chewing gum, requiring cues to initiate saliva swallow. PLAN:  Due to the neurodegenerative nature of Parkinson's disease, suspect patient to be at baseline and suspect fluctuating presentation to be indicative of Parkinson's dementia. Continue treatment per established plan of care to increase generalization of intelligibility strategies. Anticipate expected discharge from SLP services on 8/13/21 as patient's diagnosis is progressive and improvement is not suspected. Discharge Recommendations:  Skilled Nursing Facility     SUBJECTIVE:   Patient stated I thought I heard my brother out there today. OBJECTIVE:   Mental Status:  Neurologic State: Alert  Orientation Level: Oriented to person, Oriented to place (hospital, but called in [facility]), Oriented to time (given cueing to look at board)  Cognition: Decreased command following        Safety/Judgement: Decreased awareness of need for assistance, Decreased awareness of need for safety, Decreased insight into deficits    Treatment & Interventions: Motor Speech:  Intelligibility: Impaired  Word Intelligibility (%): 100 %  Phrase Intelligibility (%): 100 %  Sentence Intelligibility (%): 90 %  Conversation Intelligibility (%): 70 %           Dysarthric Characteristics: Decreased breath support; Imprecise     Language Comprehension and Expression:     Verbal Expression       Voice:  Voice Exercises  Vocal Techniques: LSVT  Comments: Session continued with education regarding intelligibility strategies of \"big mouth, loud voice\"    Pain:  Pain Scale 1: Visual  Pain Intensity 1: 0       After treatment:   Patient left in no apparent distress in bed, Call bell within reach, and Nursing notified    COMMUNICATION/EDUCATION:   Patient was educated regarding his deficit(s) of voice and motor speech deficits as this relates to his diagnosis of Parkinson's disease. He demonstrated fair to poor understanding due to Parkinson's dementia. The patient's plan of care including recommendations, planned interventions, and recommended diet changes were discussed with: Occupational therapist and Registered nurse.      NIKOS Almanzar  Time Calculation: 20 mins

## 2021-08-11 NOTE — PROGRESS NOTES
Problem: Pressure Injury - Risk of  Goal: *Prevention of pressure injury  Description: Document Jonah Scale and appropriate interventions in the flowsheet. Outcome: Progressing Towards Goal  Note: Pressure Injury Interventions:  Sensory Interventions: Assess changes in LOC    Moisture Interventions: Absorbent underpads, Apply protective barrier, creams and emollients, Check for incontinence Q2 hours and as needed    Activity Interventions: Increase time out of bed    Mobility Interventions: HOB 30 degrees or less, Float heels    Nutrition Interventions: Document food/fluid/supplement intake    Friction and Shear Interventions: HOB 30 degrees or less                Problem: Patient Education: Go to Patient Education Activity  Goal: Patient/Family Education  Outcome: Progressing Towards Goal     Problem: Falls - Risk of  Goal: *Absence of Falls  Description: Document Carlos Fall Risk and appropriate interventions in the flowsheet.   Outcome: Progressing Towards Goal  Note: Fall Risk Interventions:  Mobility Interventions: Bed/chair exit alarm    Mentation Interventions: Bed/chair exit alarm, Door open when patient unattended    Medication Interventions: Bed/chair exit alarm    Elimination Interventions: Call light in reach, Bed/chair exit alarm    History of Falls Interventions: Bed/chair exit alarm, Door open when patient unattended, Room close to nurse's station         Problem: Patient Education: Go to Patient Education Activity  Goal: Patient/Family Education  Outcome: Progressing Towards Goal

## 2021-08-12 NOTE — PROGRESS NOTES
IDR Team; MD, Nursing, Care Manager, Physical therapy, Nursing Supervisor, Pharmacy, met to review patient's plan of care. Discussed goals, interventions, barriers and progress. RUR: 20%  MEDIUM    Team will continue to monitor progress and report any concerns to the physician and care management as indicated. Transition of Care Plan:   Received a call from Pedro Pablo Carey RN, Enio Cagle approving more days for the patient. 8/10 - 8/17 and medical update due on 8/18/21. Patient waxes and wanes with his progress with therapy because of his Parkinsons,  DME: Hospital bed, Wheelchair, and bedside commode.  etc ordered through [facility].

## 2021-08-12 NOTE — PROGRESS NOTES
Bedside and Verbal shift change report given to IRAM Andrea RN (oncoming nurse) by Costa Ray LPN (offgoing nurse). Report included the following information SBAR, Kardex, Intake/Output and MAR.

## 2021-08-12 NOTE — SWING BED INTERDISCIPLINARY CARE PLAN NURSE NOTE
Nursing:    Week # 3: Date 8/12/2021  Medical status (changes from previous week):Progressing  Treatment changes this shift: None  Cognition: Present status: oriented          Is cueing needed?: Yes          Frequency of cueing needs: occasional           Type of cueing used: verbal  ADL (general):  Minimum assistance  Activity type: Social leisure skills  Participation: Daily  Level of participation: Improving  Pain status: No c/o pain  Patient/Family Education needs: Safety    Upon review of the patients current care plan, the following issues, problems, or needs remain: Safety    Shira Bowen LPN

## 2021-08-12 NOTE — PROGRESS NOTES
Problem: Pressure Injury - Risk of  Goal: *Prevention of pressure injury  Description: Document Jonah Scale and appropriate interventions in the flowsheet. Outcome: Progressing Towards Goal  Note: Pressure Injury Interventions:  Sensory Interventions: Assess changes in LOC    Moisture Interventions: Absorbent underpads, Apply protective barrier, creams and emollients    Activity Interventions: Increase time out of bed    Mobility Interventions: Float heels, HOB 30 degrees or less    Nutrition Interventions: Document food/fluid/supplement intake    Friction and Shear Interventions: HOB 30 degrees or less                Problem: Patient Education: Go to Patient Education Activity  Goal: Patient/Family Education  Outcome: Progressing Towards Goal     Problem: Falls - Risk of  Goal: *Absence of Falls  Description: Document Carlos Fall Risk and appropriate interventions in the flowsheet.   Outcome: Progressing Towards Goal  Note: Fall Risk Interventions:  Mobility Interventions: Bed/chair exit alarm    Mentation Interventions: Adequate sleep, hydration, pain control, Bed/chair exit alarm, Door open when patient unattended    Medication Interventions: Bed/chair exit alarm    Elimination Interventions: Bed/chair exit alarm, Call light in reach, Toileting schedule/hourly rounds    History of Falls Interventions: Bed/chair exit alarm, Door open when patient unattended         Problem: Patient Education: Go to Patient Education Activity  Goal: Patient/Family Education  Outcome: Progressing Towards Goal

## 2021-08-12 NOTE — SWING BED INTERDISCIPLINARY CARE PLAN NURSE NOTE
Nursing:    Week #3: Date 8/12/2021  Medical status (changes from previous week):Progressing  Treatment changes this shift: None  Cognition: Present status: dementia          Is cueing needed?: Yes          Frequency of cueing needs: frequent           Type of cueing used: verbal  ADL (general): Moderate assistance  Activity type: Social leisure skills  Participation: Daily  Level of participation: Improving  Pain status: No c/o pain  Patient/Family Education needs: safety    Upon review of the patients current care plan, the following issues, problems, or needs remain: safety.     Rishabh Joseph RN

## 2021-08-12 NOTE — PROGRESS NOTES
Problem: Pressure Injury - Risk of  Goal: *Prevention of pressure injury  Description: Document Jonah Scale and appropriate interventions in the flowsheet. Outcome: Progressing Towards Goal  Note: Pressure Injury Interventions:  Sensory Interventions: Assess changes in LOC, Float heels    Moisture Interventions: Absorbent underpads, Apply protective barrier, creams and emollients, Check for incontinence Q2 hours and as needed    Activity Interventions: Increase time out of bed    Mobility Interventions: Float heels, HOB 30 degrees or less    Nutrition Interventions: Document food/fluid/supplement intake    Friction and Shear Interventions: HOB 30 degrees or less, Apply protective barrier, creams and emollients                Problem: Patient Education: Go to Patient Education Activity  Goal: Patient/Family Education  Outcome: Progressing Towards Goal     Problem: Falls - Risk of  Goal: *Absence of Falls  Description: Document Carlos Fall Risk and appropriate interventions in the flowsheet.   Outcome: Progressing Towards Goal  Note: Fall Risk Interventions:  Mobility Interventions: Bed/chair exit alarm    Mentation Interventions: Adequate sleep, hydration, pain control, Bed/chair exit alarm, Door open when patient unattended    Medication Interventions: Bed/chair exit alarm    Elimination Interventions: Bed/chair exit alarm, Call light in reach, Toileting schedule/hourly rounds    History of Falls Interventions: Bed/chair exit alarm, Door open when patient unattended         Problem: Patient Education: Go to Patient Education Activity  Goal: Patient/Family Education  Outcome: Progressing Towards Goal     Problem: Patient Education: Go to Patient Education Activity  Goal: Patient/Family Education  Outcome: Progressing Towards Goal

## 2021-08-12 NOTE — PROGRESS NOTES
Problem: Self Care Deficits Care Plan (Adult)  Goal: *Acute Goals and Plan of Care (Insert Text)  Description:   FUNCTIONAL STATUS PRIOR TO ADMISSION: Patient was modified independent using a rolling walker for functional mobility. Wife assisted with ADLs for bathing/dressing, shave    HOME SUPPORT: The patient lived with wife. Occupational Therapy Goals  Initiated 8/2/2021 goals reviewed and updated 8/9/21    1. Patient will perform grooming with supervision/set-up within 7 day(s). MET 8/9/21  2. Patient will perform lower body dressing with moderate assistance  within 7 day(s). CONTINUE  3. Patient will perform toileting with mod A BSC within 7 day(s). CONTINUE  4. Pt will perform bathing seated in a chair with assist for feet only within 7 days. CoNTINUE    8/12/2021 0959 by Abby Norris OT  Outcome: Not Progressing Towards Goal  OCCUPATIONAL THERAPY TREATMENT  Patient: Alpa Almeida (11 y.o. male)  Date: 8/12/2021  Diagnosis: Weakness [R53.1]  Parkinson's disease ([facility] [address] [G20] <principal problem not specified>       Precautions: Fall  Chart, occupational therapy assessment, plan of care, and goals were reviewed. ASSESSMENT  Patient continues with skilled OT services and is not progressing towards goals. Today pt was in bed, finished breakfast. He agreed to get up and needed to use toilet (BSC). He was min assist (consistently this week) to get to EOB using bed features and additional time with verbal cues. He was mod assist using bed features to get to standing and to transfer to [facility]. He is dependent to remove protective garment and max assist to get back on. If it is fashioned as a pull up, once over feet he can reach down in sitting and pull to thigh. He does try to assist in standing to pull it up but is unsteady on feet and needs to hold onto walker. He attempts to clean russell-area in sitting but can't reach back and is dependent for after BM care.  He was set-up and min assist for shirt (consistent) and and max assist LE as again cannot bend forward to get over feet and while he will pull pants up to thighs in standing he is too unsteady to let go of walker to pull all the way up. He brushed teeth with set-up and participated in exercises to loosen up as listed below. Current Level of Function Impacting Discharge (ADLs): Pt fluctuates at times but fairly consistently is min assist for bed mobility using hosptial bed features, mod assist sit to stand and dependent for bowel hygiene and max assist to dependent to manage undergarment. Dressing is thought to be at baseline. Other factors to consider for discharge: wife, reportedly on chart review has complained of her own ailments that might impede her ability to assist with home care for this patient. He is consistently moving his bowels in the a.m. after breakfast.  If she is unable to perform needed duties recommend they consider hiring assistance for his morning routine. PLAN :  Patient has not made any significant or consistent progress now in over a week. OTR has informed case management of plan for discharge due to plateau on Friday Aug 13th and wife has been made aware. He has good times during the day and other times is more challenging which given his Parkinson Disease and progression is not anticipated to change. Recommend with staff: up to toilet regularly    Recommend next OT session: will be final session with client. Will work on endurance. Recommendation for discharge: (in order for the patient to meet his/her long term goals)  Occupational therapy at least 2 days/week in the home AND ensure assist and/or supervision for safety with ambulation and transfers    This discharge recommendation:  Has been made in collaboration with the attending provider and/or case management    IF patient discharges home will need the following DME: hospital bed       SUBJECTIVE:   Patient stated I can see.  When asked if he needed to use the toilet. He also inquired \"when can I go home\". OTR told him within the next few days. OBJECTIVE DATA SUMMARY:   Cognitive/Behavioral Status:     Functional Mobility and Transfers for ADLs:  Bed Mobility:  Rolling: Additional time;Bed Modified; Modified independent  Supine to Sit: Minimum assistance    Transfers:  Sit to Stand: Moderate assistance  Functional Transfers  Toilet Transfer : Moderate assistance  Bed to Chair: Moderate assistance    Balance:  Sitting - Static: Fair (occasional)  Sitting - Dynamic: Fair (occasional)  Standing - Static: Poor;Constant support  Standing - Dynamic : Poor;Constant support    ADL Intervention:     Grooming  Position Performed: Seated in chair  Brushing Teeth: Set-up       Lower Body Bathing  Perineal  : Total assistance (dependent)  Position Performed: Standing    Upper Body Dressing Assistance  Pullover Shirt: Set-up; Minimum assistance    Lower Body Dressing Assistance  Protective Undergarmet: Maximum assistance  Pants With Elastic Waist: Maximum assistance  Socks:  Total assistance (dependent)      Therapeutic Exercises:   Pt participated in the following: Bilateral  Arm reaches x 10 independent and 10 AAROM by therapist. Saumyae Alan to reach to 90 degrees on R and not past 90 degrees on L  Punch outs x 10  Chair sit-up x 10  Finger touches forward 3 times and once from small finger to index  Wrist flex/ext x 10  Hand shake x 10   Knee kicks x 10  Toe taps x 20  Toe neutral to adducted x 10  Mouth Ahh (open) and MMM (closed) x 5  Look up and look down x 10  Look right and look left over shoulder x 5     Pain:  No complaints    Activity Tolerance:   Good    After treatment patient left in no apparent distress:   Sitting in chair, Heels elevated for pressure relief, and Caregiver / family present    COMMUNICATION/COLLABORATION:   The patients plan of care was discussed with: Physical therapist.     Kirill Robert OT  Time Calculation: 54 mins

## 2021-08-12 NOTE — PROGRESS NOTES
Bedside and Verbal shift change report given to Maria Elena DAVIS (oncoming nurse) by Nestor Camargo LPN (offgoing nurse). Report included the following information SBAR and Kardex.

## 2021-08-12 NOTE — PROGRESS NOTES
Problem: Mobility Impaired (Adult and Pediatric)  Goal: *Acute Goals and Plan of Care (Insert Text)  Description:   FUNCTIONAL STATUS PRIOR TO ADMISSION: Patient was modified independent using a rolling walker for functional mobility. HOME SUPPORT PRIOR TO ADMISSION: The patient lived with wife and she provided assistance. Physical Therapy Goals  Revised 8/10/2021  1. Patient will move from supine to sit and sit to supine in bed with min assist 5/5 trials within 7 day(s). 2.  Patient will transfer from bed to chair and chair to bed with min assist 5/5 trials using the least restrictive device within 7 day(s). 3.  Patient will perform sit to stand with min assist 5/5 trials within 7 day(s). 4.  Patient will ambulate with supervision/set-up for 150 feet with the least restrictive device within 7 day(s). 5.  Patient will ascend/descend 6 stairs with 1 handrail(s) with minimal assistance/contact guard assist within 7 day(s). Initiated 7/31/2021  1. Patient will move from supine to sit and sit to supine  in bed with supervision/set-up within 7 day(s). 2.  Patient will transfer from bed to chair and chair to bed with supervision/set-up using the least restrictive device within 7 day(s). 3.  Patient will perform sit to stand with supervision/set-up within 7 day(s). 4.  Patient will ambulate with supervision/set-up for 150 feet with the least restrictive device within 7 day(s). 5.  Patient will ascend/descend 2 stairs with 2 handrail(s) with minimal assistance/contact guard assist within 7 day(s). Outcome: Not Progressing Towards Goal    PHYSICAL THERAPY TREATMENT  Patient: Tommie Domínguez (8 y.o. male)  Date: 8/12/2021  Diagnosis: Weakness [R53.1]  Parkinson's disease ([facility] [address] [G20] <principal problem not specified>       Precautions: Fall  Chart, physical therapy assessment, plan of care and goals were reviewed.     ASSESSMENT  Patient continues with skilled PT services and is not progressing towards goals. Pt received upright in chair and agreeable to PT. Pt. Performed inconsistently throughout therapy sessions with min A/mod A for all sit <>stand transfers although performing well during ambulation activities with contact guard assist throughout. Pt. Ambulated 250ft with a rolling walker requiring frequent verbal cueing for widened base of support in stance as well as hip flexion during swing phases of gait progression. Pt. Performed standing hip abduction in parallel bars with contact guard assistance/min A in order to facilitate activation of hip abductors in gait for the purpose of widened base of support in standing. Pt. Left upright in chair with all needs met and nursing hand off made. Other factors to consider for discharge: Functional mobility         PLAN :  Patient continues to benefit from skilled intervention to address the above impairments. Continue treatment per established plan of care. to address goals. Recommendation for discharge: (in order for the patient to meet his/her long term goals)  Physical therapy at least 2 days/week in the home     This discharge recommendation:  Has been made in collaboration with the attending provider and/or case management    IF patient discharges home will need the following DME: patient owns DME required for discharge       SUBJECTIVE:   Patient stated im feeling good this morning.     OBJECTIVE DATA SUMMARY:   Critical Behavior:  Neurologic State: Alert  Orientation Level: Oriented to person  Cognition: Follows commands  Safety/Judgement: Decreased awareness of environment, Decreased awareness of need for assistance, Decreased awareness of need for safety, Decreased insight into deficits  Functional Mobility Training:  Bed Mobility:  Rolling: Additional time;Bed Modified; Modified independent  Supine to Sit: Minimum assistance              Transfers:  Sit to Stand: Minimum assistance; Moderate assistance  Stand to Sit: Minimum assistance; Moderate assistance        Bed to Chair: Moderate assistance                    Balance:  Sitting - Static: Good (unsupported)  Sitting - Dynamic: Fair (occasional)  Standing - Static: Fair  Standing - Dynamic : Fair  Ambulation/Gait Training:  Distance (ft): 250 Feet (ft)  Assistive Device: Gait belt;Walker, rolling  Ambulation - Level of Assistance: Contact guard assistance        Gait Abnormalities: Decreased step clearance        Base of Support: Narrowed     Speed/Kim: Slow;Shuffled;Pace decreased (<100 feet/min)  Step Length: Left shortened;Right shortened                    Stairs:         Rail Use:  (PB use w standing abduction 2 x 5 BLE w CGA/min A)    Therapeutic Exercises:   Standing Hip abduction 2 x 5 bilateral LE in parallel bars with contact guard assist/min A    Pain Rating:  Did not rate    Activity Tolerance:   Good    After treatment patient left in no apparent distress:   Sitting in chair    COMMUNICATION/COLLABORATION:   The patients plan of care was discussed with: Physical therapist.     Mitch Guevara PT, DPT   Time Calculation: 55 mins

## 2021-08-12 NOTE — PROGRESS NOTES
Bedside and Verbal shift change report given to SAMMY Mendiola LPN (oncoming nurse) by Jean-Claude Gorman RN (offgoing nurse). Report included the following information SBAR, Kardex, Intake/Output, MAR and Recent Results.

## 2021-08-13 NOTE — PROGRESS NOTES
[facility]  Hospitalist Progress Note    NAME: Aaron Gaines   :  1935   MRN:  [id number]     Total duration of encounter: 13 days      Interim Hospital Summary: 81 y.o. male who presented on 2021 with Encounter for rehabilitation. He has a past medical history of Asthma, Chronic kidney disease, Dermatophytosis of groin and perianal area (), Edema (), Encounter for long-term (current) use of other medications (), Gout, unspecified (), Gouty arthropathy, unspecified (), Hypertension, Hypertrophy of prostate without urinary obstruction and other lower urinary tract symptoms (LUTS) (), Impotence of organic origin (), Memory loss (), Obesity, unspecified (), Orthostatic hypotension (), Other and unspecified hyperlipidemia (), Other atopic dermatitis and related conditions (), Palpitations (), Peripheral angiopathy in diseases classified elsewhere ([facility] [address] (), Personal history of malignant neoplasm of rectum, rectosigmoid junction, and anus (), Tinea nigra (), Unspecified pruritic disorder (), and Vitamin B12 deficiency (2017). He also has no past medical history of Other dyspnea and respiratory abnormality, Personal history of malignant neoplasm of large intestine, Polyphagia(783.6), Psychosexual dysfunction, unspecified, Unspecified hearing loss, or Unspecified visual loss. .    Pt admitted from [facility] on  following admission on  . He had fallen at home PTA in the bathroom with associated slurring of speech. Pt's wife stated she was not able to take care of him. Subjective:     Chief Complaint / Reason for Physician Visit  \"no c/o\".   Discussed with RN   Eating well  Walking with HH / walker  Denies CP or SOB  No constipation or diarrhea    Completed his alloted days on [address] record review to allow for more rehab     Review of Systems:  Symptom Y/N Comments  Symptom Y/N Comments Fever/Chills n   Chest Pain n    Poor Appetite n   Edema y    Cough n   Abdominal Pain n    Sputum n   Joint Pain y    SOB/SARABIA y   Pruritis/Rash n    Nausea/vomit n   Tolerating PT/OT y    Diarrhea n   Tolerating Diet y    Constipation n   Other         Current Facility-Administered Medications:     acetaminophen (TYLENOL) tablet 650 mg, 650 mg, Oral, Q6H PRN **OR** acetaminophen (TYLENOL) suppository 650 mg, 650 mg, Rectal, Q6H PRN, Seipp, James, DO    enoxaparin (LOVENOX) injection 40 mg, 40 mg, SubCUTAneous, DAILY, Seipp, James, DO, 40 mg at 08/12/21 1001    famotidine (PEPCID) tablet 20 mg, 20 mg, Oral, BID, Seipp, James, DO, 20 mg at 08/12/21 1826    polyethylene glycol (MIRALAX) packet 17 g, 17 g, Oral, DAILY PRN, Seipp, James, DO    promethazine (PHENERGAN) tablet 12.5 mg, 12.5 mg, Oral, Q6H PRN **OR** ondansetron (ZOFRAN) injection 4 mg, 4 mg, IntraVENous, Q6H PRN, Seipp, James, DO    sodium chloride (NS) flush 5-40 mL, 5-40 mL, IntraVENous, PRN, Seipp, James, DO, 10 mL at 08/05/21 1731    carbidopa-levodopa (SINEMET)  mg per tablet 1 Tablet, 1 Tablet, Oral, QID, Seipp, James, DO, 1 Tablet at 08/12/21 2140    cyanocobalamin tablet 500 mcg, 500 mcg, Oral, DAILY, Seipp, James, DO, 500 mcg at 08/12/21 7600    ferrous sulfate tablet 324 mg, 1 Tablet, Oral, DAILY WITH BREAKFAST, Seipp, James, DO, 324 mg at 08/12/21 0955    finasteride (PROSCAR) tablet 5 mg, 5 mg, Oral, DAILY, Seipp, James, DO, 5 mg at 08/12/21 0955    fludrocortisone (FLORINEF) tablet 0.1 mg, 0.1 mg, Oral, DAILY, Seipp, James, DO, 0.1 mg at 08/12/21 0956    metoprolol tartrate (LOPRESSOR) tablet 12.5 mg, 12.5 mg, Oral, Q12H, Seipp, James, , 12.5 mg at 08/12/21 2140    potassium chloride SR (KLOR-CON 10) tablet 20 mEq, 20 mEq, Oral, DAILY, Seipp, James, , 20 mEq at 08/12/21 0955    tamsulosin (FLOMAX) capsule 0.4 mg, 0.4 mg, Oral, QHS, Seipp, James, , 0.4 mg at 08/12/21 2140    Objective:     VITALS:   Patient Vitals for the past 12 hrs:   Temp Pulse Resp BP SpO2   08/12/21 2059 98.2 °F (36.8 °C) 77 18 (!) 169/76 99 %   08/12/21 1953 -- -- -- -- 96 %       Intake/Output Summary (Last 24 hours) at 8/12/2021 2205  Last data filed at 8/12/2021 1200  Gross per 24 hour   Intake 480 ml   Output --   Net 480 ml        PHYSICAL EXAM:  General: WD, WN. Alert, cooperative, no acute distress    EENT:  EOMI. Anicteric sclerae. MMM  Resp:  CTA bilaterally, no wheezing or rales. No accessory muscle use  CV:  Reg  rhythm,  1+ edema  GI:  Soft, Non distended, Non tender. +Bowel sounds  Neurologic:  Alert and oriented X 3, some slurring of speech, nonfocal  Psych:   Not anxious nor agitated  Skin:  No rashes. No jaundice    LABS:  I reviewed today's most current labs and imaging studies. Pertinent labs include:  Recent Labs     08/12/21  1724   WBC 4.6   HGB 13.8   HCT 42.2        Recent Labs     08/12/21  1724      K 4.4      CO2 25   GLU 96   BUN 21*   CREA 1.14   CA 8.8       Procedures: see electronic medical records for all procedures/Xrays and details which were not copied into this note but were reviewed prior to creation of Plan. Assessment / Plan:    Principal Problem:    Encounter for rehabilitation (5/11/2018)  Active Problems:    Weakness (7/27/2021)  Cont PT/OT on TCU.  Progress limited  Awaiting Insurance to extend Rehab days  Goal is for d/c home   Wife has difficultly being able to assist him   Family has not been active in seeking Medicaid and considering NHP      Orthostatic hypotension (5/14/2021)    Parkinson's disease ([facility] [address] (7/30/2021)  Cont Sinemet qid  Florinef 0.1mg daily      Hyperlipidemia ()  No current medical tx  Cardiac Low Fat MARY diet      Hypertension ()  MARY diet  Metoprolol 12.5mg bid      Atrial fibrillation ([address] (11/23/2020)    Chronic anticoagulation (5/13/2021)    Bilateral leg edema (6/4/2018)  Note currently on full anticoagulation for Afib  On Lovenox DVT prevention  Metoprolol for HR control      BPH associated with nocturia (1/13/2016)  Cont tx Proscar / Flomax  ______________________________________________________________________  SAFETY:   Code Status:DNR  DVT prophylaxis:Lovenox  Stress Ulcer prophylaxis: Pepcid  Bladder catheter:no  Family Contact Info:  Primary Emergency Contact: Taisha Park, Home Phone: [phone]  Bedded: [facility] Room 124/01  Disposition: TBD, likely home when stable  Admission status:  TCU    Reviewed most current lab test results and cultures  YES  Reviewed most current radiology test results   YES  Review and summation of old records today    NO  Reviewed patient's current orders and MAR    YES  PMH/SH reviewed - no change compared to H&P    Care Plan discussed with:                                   Comments  Patient x     Family      RN x     Care Manager  x     Consultant                           Multidiciplinary team rounds were held today with , nursing, pharmacist and clinical coordinator. Patient's plan of care was discussed; medications were reviewed and discharge planning was addressed.         ____________________________________________    Total NON Critical Care TIME:  30   Minutes        Comments   >50% of visit spent in counseling and coordination of care   x      Signed: Irma Zheng MD  [facility] Hospitalist  [phone]

## 2021-08-13 NOTE — PROGRESS NOTES
Problem: Self Care Deficits Care Plan (Adult)  Goal: *Acute Goals and Plan of Care (Insert Text)  Description:   FUNCTIONAL STATUS PRIOR TO ADMISSION: Patient was modified independent using a rolling walker for functional mobility. Wife assisted with ADLs for bathing/dressing, shave    HOME SUPPORT: The patient lived with wife. Occupational Therapy Goals  Initiated 8/2/2021 goals reviewed and updated 8/9/21    1. Patient will perform grooming with supervision/set-up within 7 day(s). MET 8/9/21 except for shaving  2. Patient will perform lower body dressing with moderate assistance  within 7 day(s). NOT MET: Pt has not been able to perform except with max assist. He can pull up to his thighs once over his feet but due to safety in standing has to keep both hands on walker for balance. 3.  Patient will perform toileting with mod A BSC within 7 day(s). NOT MET: Pt has not been able to maintain balance to get clothing up and down with only mod assist, has been dependent in russell-care. 4. Pt will perform bathing seated in a chair with assist for feet only within 7 days. NOT MET consistently, cannot wash to feet, gets to knees, cannot wash russell-area in sitting    Outcome: Resolved/Not Met   OCCUPATIONAL THERAPY TREATMENT/DISCHARGE  Patient: Dario Miller (32 y.o. male)  Date: 8/13/2021  Diagnosis: Weakness [R53.1]  Parkinson's disease ([facility] [address] [G20] Encounter for rehabilitation       Precautions: Fall  Chart, occupational therapy assessment, plan of care, and goals were reviewed. ASSESSMENT  Patient last day with skilled OT services and has not progressed towards goals. For a week now pt has not been able to show consistent gains towards goals. He needs min assist using hospital bed features and more time to get to EOB as cannot scoot himself out. He needs min assist for UE dressing, Max assist for LE dressing as cannot get over feet and cannot pull up in stnading due to balance.  He is able to wash face, UE and to his knees for legs in sitting with set-up and cues but cannot clean russell-area or below knees. He brushes teeth with set-up but is not shaving. He is able to eat with packages opened for him but tends to pocket food in mouth as evident during rinse of mouth during grooming. He has continued at times to have excessive drooling and does not swallow consistently even with cues and physical touch to face. He can verbalize need to use toilet and today asked to urinate but was unsuccessful releasing stream into urnial OTR held while he was in standing. Current Level of Function Impacting Discharge (ADLs): Pt needs min to mod assist for dressing, min assist to get out of hospital bed, mod assist for transfers with cues. Other factors to consider for discharge: dependent for bowel/hygiene         PLAN :  Pt d/c as planned today    Recommend with staff: Louis Dunham is discharging today. He typically needs to use toilet after breakfast, please continue to get up to B    Recommendation for discharge: (in order for the patient to meet his/her long term goals)  Occupational therapy at least 2 days/week in the home AND ensure assist and/or supervision for safety with transfers/mobility, standing tasks    This discharge recommendation:  Has been made in collaboration with the attending provider and/or case management    IF patient discharges home will need the following DME: bedside commode and hospital bed       SUBJECTIVE:   Patient stated I need to pee.     OBJECTIVE DATA SUMMARY:   Cognitive/Behavioral Status:  Neurologic State: Alert;Confused  Orientation Level: Oriented to person;Disoriented to place; Disoriented to situation;Oriented to time  Cognition: Follows commands     Perseveration: No perseveration noted  Safety/Judgement: Decreased awareness of environment    Functional Mobility and Transfers for ADLs:  Bed Mobility:  Rolling: Modified independent; Additional time;Bed Modified  Supine to Sit: Modified independent; Additional time;Bed Modified  Scooting: Minimum assistance; Additional time;Bed Modified    Transfers:  Sit to Stand: Moderate assistance  Functional Transfers  Toilet Transfer : Moderate assistance   Bed to Chair: Moderate assistance but did not follow directions to reach back to chair arms given multiple times on 1 of 2 trials and flopped back into chair. Did better the second time    Balance:  Sitting - Static: Fair (occasional)  Sitting - Dynamic: Poor (constant support)  Standing - Static: Constant support;Good  Standing - Dynamic : Constant support; Fair    ADL Intervention:       Grooming  Washing Face: Set-up  Brushing Teeth: Set-up; Supervision      Upper Body Dressing Assistance  Pullover Shirt: Minimum assistance    Lower Body Dressing Assistance  Protective Undergarmet: Maximum assistance  Pants With Elastic Waist: Maximum assistance  Socks: Total assistance (dependent)  Shoes with Velcro: Total assistance (dependent)    Toileting  Bladder Hygiene: Maximum assistance  Bowel Hygiene: Total assistance (dependent)  Clothing Management: Maximum assistance    Cognitive Retraining  Safety/Judgement: Decreased awareness of environment    Barthel Index:    Bathin  Bladder: 0  Bowels: 5  Groomin (no shaving)  Dressin  Feedin  Mobility: 10  Stairs: 5  Toilet Use: 0  Transfer (Bed to Chair and Back): 10  Total: 40/100        The Barthel ADL Index: Guidelines  1. The index should be used as a record of what a patient does, not as a record of what a patient could do. 2. The main aim is to establish degree of independence from any help, physical or verbal, however minor and for whatever reason. 3. The need for supervision renders the patient not independent. 4. A patient's performance should be established using the best available evidence. Asking the patient, friends/relatives and nurses are the usual sources, but direct observation and common sense are also important.  However direct testing is not needed. 5. Usually the patient's performance over the preceding 24-48 hours is important, but occasionally longer periods will be relevant. 6. Middle categories imply that the patient supplies over 50 per cent of the effort. 7. Use of aids to be independent is allowed. Kristen Pop., Barthel, D.W. (9906). Functional evaluation: the Barthel Index. [address] [facility] (14)2. Arnav Lala simón LUDY SewellF, Earlene aMrina., Richy Griffin., [place], [address] [place] [address] (1999). Measuring the change indisability after inpatient rehabilitation; comparison of the responsiveness of the Barthel Index and Functional [place] Measure. Journal of Neurology, Neurosurgery, and Psychiatry, 66(4), 705-827. Adrian Enriquez, N.J.A, ANY Infante, & Netta Kruse MMARIANA. (2004.) Assessment of post-stroke quality of life in cost-effectiveness studies: The usefulness of the Barthel Index and the EuroQoL-5D. Quality of Life Research, 13, 475-15       Therapeutic Exercises:   Not as willing to participate in activity today. 10 reach forward, 5 reach out and only partially performed seated modified sit-up. Pain:  No complaints    Activity Tolerance:   Fair    After treatment patient left in no apparent distress:   Sitting in chair and Caregiver / family present    COMMUNICATION/COLLABORATION:   The patients plan of care was discussed with: Case management.      Jaziel Anand OT  Time Calculation: 40 mins

## 2021-08-13 NOTE — SWING BED INTERDISCIPLINARY CARE PLAN DIETARY NOTE
Dietary:    Diet: regular,low fat/low chol/high fiber/mercedes        Admit wt.: 222#  Current weight/date: 243# 8/12  Type/Amount of Supplement taken: none  Significant wt. Loss: No  Significant wt.  Gain: YES, 21#/8.6% in 2 wks-3+ pitting edema bilateral legs   Intake: >75% Adequate    Upon review of the patients current care plan, the following issues, problems, or needs remain: eating well past few days, weight does keep increasing r/t fluid imbalance     Patient Vitals for the past 168 hrs:   % Diet Eaten   08/13/21 0900 76 - 100%   08/12/21 1200 76 - 100%   08/12/21 0900 1 - 25%   08/11/21 1330 76 - 100%   08/11/21 0946 76 - 100%   08/10/21 1800 76 - 100%   08/10/21 1200 76 - 100%   08/10/21 0900 51 - 75%   08/09/21 1800 26 - 50%   08/09/21 1200 26 - 50%   08/09/21 0900 26 - 50%         Vandana Tobias RD

## 2021-08-13 NOTE — SWING BED INTERDISCIPLINARY CARE PLAN SLP NOTE
Interdisciplinary Care Plan     Speech Language Pathology:    Progress: Patient has met goals/Plateau has been reached  Swallow Precautions: No    Diet: Regular/Thin Liquids  Cognition: Moderate-severe impairment  Memory: Parkinson's dementia  Communication/Comprehension Deficits: Yes: Hypokinetic Dysarthria, characteristic of Parkinson's disease  Speech: Mild-moderate dysarthria  Comments: Reduced vocal loudness, monotone, consonant and vowel imprecision  Intelligibility: With minimal cues (i.e. \"use a big mouth and a loud voice\"), patient is able to achieve intelligibility at the word, phrase, sentence and conversation level    Upon review of the patients current care plan, the following issues, problems, or needs remain:  -- Patient will be discharged from SLP services given that goals have been met/plateau has been reached  -- With minimal cues (i.e. \"use a big mouth and a loud voice\"), patient is able to achieve intelligibility at the word, phrase, sentence and conversation level  -- Patient continues to require minimal to moderate cueing to recall intelligibility strategies  -- Due to the neurodegenerative nature of Parkinson's disease, suspect patient to be at baseline and suspect fluctuating presentation to be indicative of Parkinson's dementia.     Aditi Swartz, SLP

## 2021-08-13 NOTE — PROGRESS NOTES
Bedside shift change report given to Emmanuel Spann (oncoming nurse) by SAMMY Mendiola LPN (offgoing nurse). Report included the following information SBAR.

## 2021-08-13 NOTE — SWING BED INTERDISCIPLINARY CARE PLAN NURSE NOTE
Nursing:    Week #3: Date 8/13/2021  Medical status (changes from previous week):Progressing  Treatment changes this shift: none  Cognition: Present status: dementia          Is cueing needed?: Yes          Frequency of cueing needs: constant           Type of cueing used: verbal  ADL (general):  Moderate assistance  Activity type: Reality awareness  Participation: Daily  Level of participation: Improving  Pain status: No c/o pain  Patient/Family Education needs: safety    Upon review of the patients current care plan, the following issues, problems, or needs remain: safety    Stephany Alonzo LPN

## 2021-08-13 NOTE — SWING BED INTERDISCIPLINARY CARE PLAN NURSE NOTE
Nursing:    Week #3: Date 8/13/2021  Medical status (changes from previous week): No change  Treatment changes this shift: New order for Lasix 40mg PO BID  Cognition: Present status: dementia          Is cueing needed?: Yes          Frequency of cueing needs: frequent           Type of cueing used: verbal  ADL (general):  Moderate assistance  Activity type: Social leisure skills  Participation: Daily  Level of participation: No change  Pain status: No c/o pain  Patient/Family Education needs: safety, fall prevention    Upon review of the patients current care plan, the following issues, problems, or needs remain: safety, fall prevention    Maxime Rodney RN

## 2021-08-13 NOTE — PROGRESS NOTES
Bedside and Verbal shift change report [facility] nurse[facility] by Linn Steinberg RN (offgoing nurse). Report included the following information SBAR, Kardex and MAR.

## 2021-08-13 NOTE — PROGRESS NOTES
IDR Team; MD, Nursing, Care Manager, Physical therapy, Nursing Supervisor, Pharmacy and Dietician, met to review patient's plan of care. Discussed goals, interventions, barriers and progress. RUR: 22%    Team will continue to monitor progress and report any concerns to the physician and care management as indicated. Transition of Care Plan:   Patient is continuing to receive PT. Patient continues to need assistance with dressing, bathing, etc . Now will be assisted by nursing. . Patient's brother has paid a visit today. Wife was contacted to inform her that the patient was approved for more days and that more information needed to be forwarded to insurance company on 8/18. DME company in the process of contacting the patient/wife. Wife stated that no one has contacted her yet.

## 2021-08-13 NOTE — PROGRESS NOTES
Problem: Mobility Impaired (Adult and Pediatric)  Goal: *Acute Goals and Plan of Care (Insert Text)  Description:   FUNCTIONAL STATUS PRIOR TO ADMISSION: Patient was modified independent using a rolling walker for functional mobility. HOME SUPPORT PRIOR TO ADMISSION: The patient lived with wife and she provided assistance. Physical Therapy Goals  Revised 8/10/2021  1. Patient will move from supine to sit and sit to supine in bed with min assist 5/5 trials within 7 day(s). 2.  Patient will transfer from bed to chair and chair to bed with min assist 5/5 trials using the least restrictive device within 7 day(s). 3.  Patient will perform sit to stand with min assist 5/5 trials within 7 day(s). 4.  Patient will ambulate with supervision/set-up for 150 feet with the least restrictive device within 7 day(s). 5.  Patient will ascend/descend 6 stairs with 1 handrail(s) with minimal assistance/contact guard assist within 7 day(s). Initiated 7/31/2021  1. Patient will move from supine to sit and sit to supine  in bed with supervision/set-up within 7 day(s). 2.  Patient will transfer from bed to chair and chair to bed with supervision/set-up using the least restrictive device within 7 day(s). 3.  Patient will perform sit to stand with supervision/set-up within 7 day(s). 4.  Patient will ambulate with supervision/set-up for 150 feet with the least restrictive device within 7 day(s). 5.  Patient will ascend/descend 2 stairs with 2 handrail(s) with minimal assistance/contact guard assist within 7 day(s). Outcome: No Change    PHYSICAL THERAPY TREATMENT  Patient: Joel Leal (75 y.o. male)  Date: 8/13/2021  Diagnosis: Weakness [R53.1]  Parkinson's disease ([facility] [address] [G20] Encounter for rehabilitation       Precautions: Fall  Chart, physical therapy assessment, plan of care and goals were reviewed. ASSESSMENT  Patient continues with skilled PT services and is progressing towards goals.  Pt. Received upright in chair and agreeable to PT. Pt. Performed various standing activities and exercises throughout session with improved gait performance requiring less verbal cueing for widened base of support as well as step clearance. Pt. Ascended and descended 5 steps x 2 with contact guard assistance and pt. Shows improved overall endurance during session, able to complete multiple activities with less intermittent rest breaks required from previous sessions. Pt. Continues to struggle with turns/transitions from different surfaces with shuffled gait pattern. Pt. Left upright in bed with the head of bed elevated and all needs met. .     Other factors to consider for discharge: Functional mobility         PLAN :  Patient continues to benefit from skilled intervention to address the above impairments. Continue treatment per established plan of care. to address goals. Recommendation for discharge: (in order for the patient to meet his/her long term goals)  Physical therapy at least 2 days/week in the home AND ensure assist and/or supervision for safety with ADLs and functional mobility. This discharge recommendation:  Has been made in collaboration with the attending provider and/or case management    IF patient discharges home will need the following DME: patient owns DME required for discharge       SUBJECTIVE:   Patient stated im feeling good this afternoon.     OBJECTIVE DATA SUMMARY:   Critical Behavior:  Neurologic State: Alert, Confused  Orientation Level: Oriented to person, Disoriented to place, Disoriented to situation, Oriented to time  Cognition: Follows commands  Safety/Judgement: Decreased awareness of environment  Functional Mobility Training:  Bed Mobility:  Rolling: Modified independent  Supine to Sit: Modified independent; Additional time;Bed Modified  Sit to Supine: Supervision  Scooting: Minimum assistance        Transfers:  Sit to Stand:  Moderate assistance;Minimum assistance  Stand to Sit: Moderate assistance;Minimum assistance        Bed to Chair: Minimum assistance; Moderate assistance                    Balance:  Sitting - Static: Fair (occasional)  Sitting - Dynamic: Poor (constant support)  Standing - Static: Constant support;Good  Standing - Dynamic : Constant support; Fair  Ambulation/Gait Training:  Distance (ft): 150 Feet (ft)  Assistive Device: Gait belt;Walker, rolling  Ambulation - Level of Assistance: Contact guard assistance        Gait Abnormalities: Decreased step clearance        Base of Support: Narrowed     Speed/Kim: Shuffled; Slow  Step Length: Left shortened;Right shortened                    Stairs:  Number of Stairs Trained: 5 (x2)  Stairs - Level of Assistance: Contact guard assistance   Rail Use: Both    Therapeutic Exercises:   Standing marches in Parallel bars 2 x 10 Bilateral LEs  Standing toe taps 2 x 10 Bilateral LE at 6\" step  Pain Rating:  Did not rate    Activity Tolerance:   Good    After treatment patient left in no apparent distress:   Supine in bed    COMMUNICATION/COLLABORATION:   The patients plan of care was discussed with: Physical therapist.     Sarah Ruiz PT, DPT   Time Calculation: 54 mins

## 2021-08-13 NOTE — PROGRESS NOTES
History     Chief Complaint:  Chest Pain    A  was used.      Mary Polanco is a 89 year old female with a complex history including CAD,  Hypertension, hyperlipidemia, NSTEMI, acute coronary syndrome and GERD who is currently anticoagulated on Plavix who presents with chest pain. The patient states that since she was discharged on 3/5 for an NSTEMI she has been having intermittent chest pain. The patient states that these episodes last about 30 minutes. She states that the pain is hard to describe. The patient states that yesterday her chest pain was worse. She also reports some shortness of breath and increased pain when laying flat. The patient denies any current chest pain but reports some shortness of breath. The patient denies any fever, cough, nausea, vomiting, diarrhea, or any new leg swelling. When the patient was recently admitted on 3/4 coronary angiogram was discussed but the patient declined at the time. The patient states that she is now wanting the procedure.     Patient's cardiac risk factors include:     CAD/CVA/TIA/PAD: yes  Hypertension:   yes  Hyperlipidemia:  yes  Diabetes:   no  Obesity (BMI>30): no  Hx tobacco use:  no  Male Sex:   no  Age >45:  yes  Familial Hx of CAD:  no    Echo 3/4/20:  1. Normal left ventricular size and systolic function. LVEF 60-65%.  2. No regional wall motion abnormalities.  3. Normal right ventricular size and systolic function.  4. Trileaflet aortic valve sclerosis without significant stenosis and mild  Regurgitation.    XR Chest 3/4/20:  No acute abnormality.    Allergies:  Azithromycin   Gabapentin   Kanamycin   metronidazole  penicillins  Streptomycin   Atorvastatin   Lisinopril      Medications:    Tylenol   Amlodipine   Baby aspirin   Coreg  Plavix   Colace  lasix  Nitrostat      Past Medical History:    Hypertension  CAD  Lung nodule   Mitral regurgitation   Hyperlipidemia   NSTEMI   OA   Sciatica  acute coronary syndrome  Gout  Follow up visit with patient in Rm 124  Provided empathic listening and spiritual support  Advised of  Availability   [address] [place] [address]   Polyarticular arthritis    unstable angina pectoris   Bladder cancer  Coronary artery calcification    Past Surgical History:    Bladder tumor removal   appendectomy  Cholecystectomy   Heart stent placement     Family History:    Diabetes- brother  Hypertension- son  Cerebrovascular disease- mother   Bladder cancer- brother     Social History:  The patient was accompanied to the ED by daughter.  Smoking Status: Never Smoker  Smokeless Tobacco: Never Used  Alcohol Use: No   Marital Status:  Single      Review of Systems   Constitutional: Negative for fever.   Respiratory: Positive for shortness of breath. Negative for cough.    Cardiovascular: Positive for chest pain.   Gastrointestinal: Negative for diarrhea, nausea and vomiting.   A 10 point ROS was obtained and negative except as noted here and in HPI      Physical Exam     Patient Vitals for the past 24 hrs:   BP Temp Temp src Pulse Heart Rate Resp SpO2 Weight   03/12/20 2245 129/66 -- -- 71 71 10 94 % --   03/12/20 2204 -- -- -- -- 64 21 96 % --   03/12/20 2202 -- -- -- -- 64 21 96 % --   03/12/20 2201 137/63 -- -- 66 65 20 96 % --   03/12/20 2009 134/61 97.6  F (36.4  C) Oral 67 -- 16 100 % 54 kg (119 lb)       Physical Exam    VS: Reviewed per above  HENT: Mucous membranes moist  EYES: sclera anicteric  CV: Rate as noted, regular rhythm.   RESP: Effort normal. Breath sounds are normal bilaterally.  GI: no tenderness/rebound/guarding, not distended.  NEURO: Alert, moving all extremities  MSK: No deformity of the extremities  SKIN: Warm and dry    Emergency Department Course     ECG:  ECG taken at 1949, ECG read at 1956  Normal sinus rhythm  normal ECG  Rate 68 bpm. AZ interval 190 ms. QRS duration 88 ms. QT/QTc 398/423 ms. P-R-T axes 668 31 65.  No significant change when compared to EKG dated 3/4/20.    Imaging:  Radiology findings were communicated with the patient who voiced understanding of the findings.    CT chest pulmonary embolism:  1.  No evidence of  pulmonary embolism.   2.  Moderate coronary atherosclerosis.   3.  Incidental 6 mm pulmonary nodule in the left lung.   Reading per radiology    Laboratory:  Laboratory findings were communicated with the patient who voiced understanding of the findings.    CBC: WBC 6.9, HGB 12.6,   CMP: glucose 158 (H) o/w WNL (Creatinine 0.74)    Troponin 2011: <0.015     D dimer: 0.6 (H)     Interventions:  2040 aspirin 324 mg oral     Emergency Department Course:     Nursing notes and vitals reviewed.    2011 IV was inserted and blood was drawn for laboratory testing, results above.    2020 I performed an exam of the patient as documented above.     2114 The patient was sent for a CT while in the emergency department, results above.     2139 I returned to check on patient.  The patient reports suddenly feeling more significantly more short of breath.     2155  I spoke with Dr. Mujica of the Hospitalist service from [facility] regarding patient's presentation, findings, and plan of care.    Impression & Plan      Medical Decision Making:  Mary Polanco is a 89 year old female who presents to the emergency department today for evaluation of chest pain and shortness of breath.  On arrival vital signs within normal limits.  Exam is noncontributory.  EKG is sinus rhythm without acute ischemic change.  A single troponin is negative.  D-dimer was elevated prompting CT pulmonary angiogram which did not reveal evidence of PE.  After return from CT she endorsed worsening shortness of breath but did not appear to have any respiratory distress or other stigmata of anaphylaxis.  After sitting her up in the gurney, her shortness of breath improved.  I did offer Benadryl and Solu-Medrol in the event of possible allergic reaction, but she declined these interventions.     Patient has a history of CAD and has had ongoing intermittent chest pain since discharge from the hospital a few days ago.  At that time she had declined a coronary  angiogram that was recommended to her by cardiology.  At this time she is reconsidering that intervention and would like to be admitted to the hospital for this.  She remained stable in the ER during my shift and was signed out to my colleague while awaiting inpatient bed.    Diagnosis:    ICD-10-CM    1. Chest pain, unspecified type  R07.9    2. Dyspnea, unspecified type  R06.00        Disposition:   Findings and plan explained to the Patient and daughter who consents to admission. Discussed the patient with Dr. Mujica, who will admit the patient to a medical bed for further monitoring, evaluation, and treatment.    Scribe Disclosure:  I, Alize Bae, am serving as a scribe at 8:20 PM on 3/12/2020 to document services personally performed by Noe Juárez MD based on my observations and the provider's statements to me.   EMERGENCY DEPARTMENT       Noe Juárez MD  03/12/20 5093       Noe Juárez MD  03/12/20 0640

## 2021-08-13 NOTE — PROGRESS NOTES
Problem: Pressure Injury - Risk of  Goal: *Prevention of pressure injury  Description: Document Jonah Scale and appropriate interventions in the flowsheet.   Outcome: Progressing Towards Goal  Note: Pressure Injury Interventions:  Sensory Interventions: Assess changes in LOC, Check visual cues for pain    Moisture Interventions: Absorbent underpads, Apply protective barrier, creams and emollients, Check for incontinence Q2 hours and as needed    Activity Interventions: Increase time out of bed    Mobility Interventions: Float heels, HOB 30 degrees or less    Nutrition Interventions: Document food/fluid/supplement intake, Offer support with meals,snacks and hydration    Friction and Shear Interventions: Feet elevated on foot rest, Apply protective barrier, creams and emollients, HOB 30 degrees or less

## 2021-08-13 NOTE — PROGRESS NOTES
Problem: Dysphagia (Adult)  Goal: *Acute Goals and Plan of Care (Insert Text)  Description: Speech Pathology Goals  Initiated 8/2/2021    1. Patient will tolerate Regular/Thin Liquid diet without signs of aspiration within 14 days. MET 8/13/21  Outcome: Resolved/Met     Problem: Motor Speech Impaired (Adult)  Goal: *Acute Goals and Plan of Care (Insert Text)  Description: Speech Pathology Goals  Initiated 8/2/2021    1. Patient will use a big mouth and a loud voice to increase intelligibility at the sentence level in 100% of trials, given minimal cues, within 14 days. MET 8/13/21  2. Patient will repeat intelligibility strategies independently within 7 days. NOT MET 8/13/21  Outcome: Resolved/Met     SPEECH LANGUAGE PATHOLOGY TREATMENT/DISCHARGE  Patient: Severo Hubert (79 y.o. male)  Date: 8/13/2021  Diagnosis: Weakness [R53.1]  Parkinson's disease ([facility] [address] [G20] Encounter for rehabilitation       Precautions: Fall     ASSESSMENT:  Patient has been followed by SLP for dysphagia and motor speech to increase intelligibility since initial admission on 7/28/21. On this date, patient is Ox2 and participating in conversation appropriately. Patient with increased intelligibility on this date, only requiring minimal cues to increase vocal loudness which patient was stimulable to. Patient demonstrated decreased ability to independently recall intelligibility strategies (i.e. \"big mouth, loud voice\") on this date, suspect related to Parkinson's disease. On this date, patient inconsistently stimulable to verbal cues to initiate saliva swallow. On one occasion, patient told SLP he was \"trying to [swallow]. \" Suspect poor secretion management to be related to patient's baseline of Parkinson's disease. Patient continues to tolerate a Regular/Thin Liquid diet without signs of aspiration.  With minimal cues (i.e. \"use a big mouth and a loud voice\"), patient is able to achieve intelligibility at the word, phrase, sentence and conversation level. Patient continues to require minimal to moderate cueing to recall intelligibility strategies. Patient's stimulability and independent implementation of strategies observed to fluctuate, likely due to Parkinson's dementia. Patient will be discharged from SLP services given that goals have been met/plateau has been reached. Due to the neurodegenerative nature of Parkinson's disease, suspect patient to be at baseline and suspect fluctuating presentation to be indicative of Parkinson's dementia. PLAN:  Patient will be discharged from skilled acute speech therapy at this time. Rationale for discharge:  Goals achieved/Plateau reached    Discharge Recommendations:  Orlin Barrios [address]:   Patient stated warm water makes me swallow better. OBJECTIVE:   Mental Status:  Neurologic State: Alert, Confused  Orientation Level: Oriented to person, Disoriented to place, Disoriented to situation, Oriented to time  Cognition: Follows commands     Perseveration: No perseveration noted  Safety/Judgement: Decreased awareness of environment    Treatment & Interventions: Motor Speech:  Intelligibility: Impaired  Word Intelligibility (%): 100 %  Phrase Intelligibility (%): 100 %  Sentence Intelligibility (%): 100 %  Conversation Intelligibility (%): 60 %           Dysarthric Characteristics: Decreased breath support; Imprecise       Voice:  Voice Exercises  Vocal Techniques: LSVT       NOMS: The NOMS functional outcome measure was used to quantify this patient's level of motor speech impairment. Based on the NOMS, the patient was determined to be at level 5 for motor speech function.        NOMS Motor Speech:  Level 1 (CN):  100% unintelligible  Level 2 (CM):  Communication partner responsible for message; can do CV or automatic words w/ max cues but rarely intelligible in context  Level 3 (CL): communication partner primarily responsible for message but says CV/automatic words intelligibly; mod cues to say simple words/phrases  Level 4 (CK): In structured conversation with familiar listener can say simple words and phrases. Mod cues for simple sentences  Level 5 (CJ):  Uses simple sentences for ADLs with familiar and unfamiliar listener; min cues for complex sentences  Level 6 (CI):  Intelligible in ADLs; difficulty in voc/social activites; rare cues for complex message; uses comp strategies  Level 7 ([address] [place] [address]):  Intelligible in all activities. May occasionally use compensatory strategies. TATIANA. (2003). National Outcomes Measurement System (NOMS): Adult Speech-Language Pathology User's Guide. Pain:  Pain Scale 1: Numeric (0 - 10)  Pain Intensity 1: 0       After treatment:   Patient left in no apparent distress in bed, Call bell within reach, and Nursing notified    COMMUNICATION/EDUCATION:   The patient's plan of care including recommendations, planned interventions, and recommended diet changes were discussed with: Registered nurse and Case management.      Buster Curling, SLP  Time Calculation: 15 mins

## 2021-08-13 NOTE — PROGRESS NOTES
Problem: Pressure Injury - Risk of  Goal: *Prevention of pressure injury  Description: Document Jonah Scale and appropriate interventions in the flowsheet. Outcome: Progressing Towards Goal  Note: Pressure Injury Interventions:  Sensory Interventions: Assess changes in LOC, Float heels, Check visual cues for pain    Moisture Interventions: Absorbent underpads, Apply protective barrier, creams and emollients, Check for incontinence Q2 hours and as needed    Activity Interventions: Increase time out of bed    Mobility Interventions: Float heels, HOB 30 degrees or less, Turn and reposition approx. every two hours(pillow and wedges)    Nutrition Interventions: Document food/fluid/supplement intake, Offer support with meals,snacks and hydration    Friction and Shear Interventions: HOB 30 degrees or less, Apply protective barrier, creams and emollients                Problem: Patient Education: Go to Patient Education Activity  Goal: Patient/Family Education  Outcome: Progressing Towards Goal     Problem: Falls - Risk of  Goal: *Absence of Falls  Description: Document Carlos Fall Risk and appropriate interventions in the flowsheet.   Outcome: Progressing Towards Goal  Note: Fall Risk Interventions:  Mobility Interventions: Bed/chair exit alarm, Patient to call before getting OOB, Utilize walker, cane, or other assistive device    Mentation Interventions: Bed/chair exit alarm, Adequate sleep, hydration, pain control, Door open when patient unattended, Reorient patient, Update white board, Room close to nurse's station    Medication Interventions: Bed/chair exit alarm, Patient to call before getting OOB, Teach patient to arise slowly    Elimination Interventions: Bed/chair exit alarm, Call light in reach, Patient to call for help with toileting needs    History of Falls Interventions: Bed/chair exit alarm, Door open when patient unattended, Room close to nurse's station         Problem: Patient Education: Go to Patient Education Activity  Goal: Patient/Family Education  Outcome: Progressing Towards Goal     Problem: Patient Education: Go to Patient Education Activity  Goal: Patient/Family Education  Outcome: Progressing Towards Goal

## 2021-08-14 NOTE — PROGRESS NOTES
Bedside and Verbal shift change report given to AKANKSHA Cobb RN (oncoming nurse) by Maylin Hoffman LPN (offgoing nurse). Report included the following information SBAR, Kardex, Intake/Output and MAR.

## 2021-08-14 NOTE — SWING BED INTERDISCIPLINARY CARE PLAN NURSE NOTE
Nursing:    Week # 3: Date 8/14/2021  Medical status (changes from previous week):Progressing  Treatment changes this shift: None  Cognition: Present status: dementia          Is cueing needed?: Yes          Frequency of cueing needs: occasional           Type of cueing used: verbal  ADL (general):  Moderate assistance  Activity type: Reality awareness  Participation: Daily  Level of participation: No change  Pain status: No c/o pain  Patient/Family Education needs: Safety    Upon review of the patients current care plan, the following issues, problems, or needs remain: Safety    Nikolas Sarah LPN

## 2021-08-14 NOTE — PROGRESS NOTES
Problem: Pressure Injury - Risk of  Goal: *Prevention of pressure injury  Description: Document Jonah Scale and appropriate interventions in the flowsheet. Outcome: Progressing Towards Goal  Note: Pressure Injury Interventions:  Sensory Interventions: Assess changes in LOC, Float heels, Check visual cues for pain    Moisture Interventions: Absorbent underpads, Apply protective barrier, creams and emollients, Check for incontinence Q2 hours and as needed    Activity Interventions: Increase time out of bed    Mobility Interventions: HOB 30 degrees or less, Float heels    Nutrition Interventions: Document food/fluid/supplement intake    Friction and Shear Interventions: HOB 30 degrees or less                Problem: Patient Education: Go to Patient Education Activity  Goal: Patient/Family Education  Outcome: Progressing Towards Goal     Problem: Falls - Risk of  Goal: *Absence of Falls  Description: Document Carlos Fall Risk and appropriate interventions in the flowsheet.   Outcome: Progressing Towards Goal  Note: Fall Risk Interventions:  Mobility Interventions: Bed/chair exit alarm, Patient to call before getting OOB, Utilize walker, cane, or other assistive device    Mentation Interventions: Adequate sleep, hydration, pain control, Bed/chair exit alarm, Door open when patient unattended    Medication Interventions: Bed/chair exit alarm, Patient to call before getting OOB, Teach patient to arise slowly    Elimination Interventions: Bed/chair exit alarm, Call light in reach, Patient to call for help with toileting needs, Toileting schedule/hourly rounds    History of Falls Interventions: Bed/chair exit alarm, Door open when patient unattended, Room close to nurse's station         Problem: Patient Education: Go to Patient Education Activity  Goal: Patient/Family Education  Outcome: Progressing Towards Goal

## 2021-08-14 NOTE — SWING BED INTERDISCIPLINARY CARE PLAN PT NOTE
Physical Therapy:    Progress: No change  Wt. Bearing: No WB restrictions  Subjective: [place] [address]: RW  Bed Mobility: Supervision  Supine to sit: Independent  Ambulation: # of feet 160  Limited assistance  Balance: Sitting static: fair       Sitting dynamic: fair, occasionally requires support       Standing static: constant support fair  W/C Mobility: Not tested  Sit to stand: Limited assistance  Transfers: Limited assistance  Surface: Even  Safety status: poor safety awareness, fall risk  ADL Support- record highest level in each ADL activity over last 7 days   Bed mobility: modified independent   Transfer: min-mod A   Eating: independent   Toilet use: min-mod A, requires total russell care assistance    Upon review of the patients current care plan, the following issues, problems, or needs remain: Pt is most likely at baseline with activity independence and safety at this point. He has progressive disease (PD) which limits his ability to continue to progress with PT services.      Silvina Mcdonald, PTA

## 2021-08-14 NOTE — PROGRESS NOTES
Problem: Mobility Impaired (Adult and Pediatric)  Goal: *Acute Goals and Plan of Care (Insert Text)  Description:   FUNCTIONAL STATUS PRIOR TO ADMISSION: Patient was modified independent using a rolling walker for functional mobility. HOME SUPPORT PRIOR TO ADMISSION: The patient lived with wife and she provided assistance. Physical Therapy Goals  Revised 8/10/2021  1. Patient will move from supine to sit and sit to supine in bed with min assist 5/5 trials within 7 day(s). 2.  Patient will transfer from bed to chair and chair to bed with min assist 5/5 trials using the least restrictive device within 7 day(s). 3.  Patient will perform sit to stand with min assist 5/5 trials within 7 day(s). 4.  Patient will ambulate with supervision/set-up for 150 feet with the least restrictive device within 7 day(s). 5.  Patient will ascend/descend 6 stairs with 1 handrail(s) with minimal assistance/contact guard assist within 7 day(s). Initiated 7/31/2021  1. Patient will move from supine to sit and sit to supine  in bed with supervision/set-up within 7 day(s). 2.  Patient will transfer from bed to chair and chair to bed with supervision/set-up using the least restrictive device within 7 day(s). 3.  Patient will perform sit to stand with supervision/set-up within 7 day(s). 4.  Patient will ambulate with supervision/set-up for 150 feet with the least restrictive device within 7 day(s). 5.  Patient will ascend/descend 2 stairs with 2 handrail(s) with minimal assistance/contact guard assist within 7 day(s). Outcome: Not Progressing Towards Goal    PHYSICAL THERAPY TREATMENT  Patient: Susanne Guidry (82 y.o. male)  Date: 8/14/2021  Diagnosis: Weakness [R53.1]  Parkinson's disease ([facility] [address] [G20] Encounter for rehabilitation       Precautions: Fall  Chart, physical therapy assessment, plan of care and goals were reviewed.     ASSESSMENT  Patient continues with skilled PT services and is not progressing towards goals. At this time, pt is consistently performing activities at similar independence levels throughout the past week, indicating possible plateau with PT services. Pt continues with moderate assistance with transfers sit to/from stand with heavy verbal cues to increase knee and hip extension and decreasing posterior lean. Pt performs sit to stand transfers with min A to unseat self best with counting to 3 and increasing trunk flexion each time, but requires mod A for reaching full standing position. Pt ambulates with shuffled gait and slight increase in weight shift to right side with CGA-min A with RW. Pt exhibits decreased safety awareness with transfers and at times, requires multiple verbal cues to check for chair prior to sitting down. Pt left at nurses station in joslyn chair for safety with chair alarm on and all needs met. Current Level of Function Impacting Discharge (mobility/balance): min-mod A sit to/from stand, modified independent bed mobility    Other factors to consider for discharge: Pt has decreased safety awareness, posterior lean upon initial stand with altered COG, pt has progressing Parkinsons, which can affect progression with independence         PLAN :  Patient continues to benefit from skilled intervention to address the above impairments. Continue treatment per established plan of care. to address goals. Recommendation for discharge: (in order for the patient to meet his/her long term goals)  Physical therapy at least 2 days/week in the home AND ensure assist and/or supervision for safety with functional mobility    This discharge recommendation:  Has been made in collaboration with the attending provider and/or case management    IF patient discharges home will need the following DME: rolling walker       SUBJECTIVE:   Patient stated I would feel better with my pants on.  when asked to put on his shoes for walking    OBJECTIVE DATA SUMMARY:   Critical Behavior:  Neurologic State: Alert, Confused  Orientation Level: Disoriented to place, Oriented to person  Cognition: Decreased command following, Impulsive, Poor safety awareness  Safety/Judgement: Decreased awareness of need for safety  Functional Mobility Training:  Bed Mobility:  Rolling: Supervision;Modified independent  Supine to Sit: Modified independent; Additional time;Bed Modified     Scooting: Minimum assistance; Additional time        Transfers:  Sit to Stand: Moderate assistance; Additional time  Stand to Sit: Minimum assistance; Moderate assistance        Bed to Chair: Minimum assistance; Moderate assistance                    Balance:  Sitting - Static: Fair (occasional)  Sitting - Dynamic: Fair (occasional); Poor (constant support)  Standing - Static: Constant support;Good;Fair  Standing - Dynamic : Constant support; Fair  Ambulation/Gait Training:  Distance (ft): 165 Feet (ft)  Assistive Device: Gait belt;Walker, rolling  Ambulation - Level of Assistance: Contact guard assistance;Minimal assistance        Gait Abnormalities: Decreased step clearance; Path deviations; Shuffling gait        Base of Support: Narrowed; Shift to left     Speed/Kim: Shuffled; Slow  Step Length: Left shortened;Right shortened                Pain Ratin/10    Activity Tolerance:   Fair    After treatment patient left in no apparent distress:   Sitting in chair, Heels elevated for pressure relief, Bed / chair alarm activated, and at nurses station for safety    COMMUNICATION/COLLABORATION:   The patients plan of care was discussed with: Physical therapist and Registered nurse.      Bibi Lowe PTA   Time Calculation: 30 mins

## 2021-08-15 NOTE — ROUTINE PROCESS
Bedside and Verbal shift change report given to SAMMY Mendiola LPN (oncoming nurse) by Sukhwinder Porter, RN (offgoing nurse). Report included the following information SBAR. Foster score 5  Bed/chair alarm is in use. If in use it is set at the highest volume. Patient Vitals for the past 12 hrs:   Temp Pulse Resp BP SpO2   08/15/21 0742 (!) 96 °F (35.6 °C) 76 16 (!) 162/36 96 %     No flowsheet data found. All lab results for the last 24 hours reviewed.

## 2021-08-15 NOTE — SWING BED INTERDISCIPLINARY CARE PLAN NURSE NOTE
Nursing:    Week #3: Date 8/14/2021  Medical status (changes from previous week):Progressing  Treatment changes this shift: none  Cognition: Present status: dementia          Is cueing needed?: Yes          Frequency of cueing needs: occasional           Type of cueing used: verbal  ADL (general):  Moderate assistance  Activity type: Social leisure skills  Participation: Daily  Level of participation: No change  Pain status: No c/o pain  Patient/Family Education needs: safety    Upon review of the patients current care plan, the following issues, problems, or needs remain: safety    Linda Ayers RN

## 2021-08-15 NOTE — PROGRESS NOTES
Problem: Falls - Risk of  Goal: *Absence of Falls  Description: Document Kathy Biggs Fall Risk and appropriate interventions in the flowsheet.   Outcome: Progressing Towards Goal  Note: Fall Risk Interventions:  Mobility Interventions: Utilize walker, cane, or other assistive device    Mentation Interventions: More frequent rounding    Medication Interventions: Patient to call before getting OOB    Elimination Interventions: Call light in reach    History of Falls Interventions: Bed/chair exit alarm

## 2021-08-15 NOTE — SWING BED INTERDISCIPLINARY CARE PLAN NURSE NOTE
Nursing:    Week #3: Date 8/15/2021  Medical status (changes from previous week):Progressing  Treatment changes this shift: none  Cognition: Present status: oriented with some periodic confusion          Is cueing needed?: Yes          Frequency of cueing needs: occasional           Type of cueing used: verbal  ADL (general):  Minimum assistance  Activity type: Social leisure skills  Participation: Individual  Level of participation: Improving  Pain status: No c/o pain  Patient/Family Education needs: continued remainder for safety concerns and to call for help    Upon review of the patients current care plan, the following issues, problems, or needs remain: discharge planning for this upcoming week    Madiha Munoz RN

## 2021-08-15 NOTE — PROGRESS NOTES
Problem: Pressure Injury - Risk of  Goal: *Prevention of pressure injury  Description: Document Jonah Scale and appropriate interventions in the flowsheet. Outcome: Progressing Towards Goal  Note: Pressure Injury Interventions:  Sensory Interventions: Assess changes in LOC, Minimize linen layers    Moisture Interventions: Absorbent underpads, Maintain skin hydration (lotion/cream), Minimize layers, Moisture barrier    Activity Interventions: PT/OT evaluation, Increase time out of bed    Mobility Interventions: PT/OT evaluation    Nutrition Interventions: Document food/fluid/supplement intake    Friction and Shear Interventions: Minimize layers                Problem: Patient Education: Go to Patient Education Activity  Goal: Patient/Family Education  Outcome: Progressing Towards Goal     Problem: Falls - Risk of  Goal: *Absence of Falls  Description: Document Carlos Fall Risk and appropriate interventions in the flowsheet. Outcome: Progressing Towards Goal  Note: Fall Risk Interventions:  Mobility Interventions: Patient to call before getting OOB, Utilize walker, cane, or other assistive device    Mentation Interventions: Reorient patient, More frequent rounding, Toileting rounds, Update white board    Medication Interventions: Patient to call before getting OOB, Teach patient to arise slowly    Elimination Interventions:  Toileting schedule/hourly rounds, Urinal in reach, Call light in reach, Bed/chair exit alarm    History of Falls Interventions: Bed/chair exit alarm, Room close to nurse's station

## 2021-08-16 NOTE — PROGRESS NOTES
Telephone call to Mrs. Marcia Maynard. Mr. Marcia Maynard will be discharged 8/19/21. No more extensions from his Medicare plan. PT, OT no longer working with him here. Message from Christa Del Rosario received here that there will be a co pay for the bed. I gave her Christa Del Rosario telephone number to follow up with this. No DME delivery has been delivered yet.

## 2021-08-16 NOTE — SWING BED INTERDISCIPLINARY CARE PLAN NURSE NOTE
Nursing:    Week #2: Date 8/16/2021  Medical status (changes from previous week):Progressing  Treatment changes this shift: continue to encourage pt. To use restroom  Cognition: Present status: dementia          Is cueing needed?: Yes          Frequency of cueing needs: frequent           Type of cueing used: verbal  ADL (general): Moderate assistance  Activity type: Reality awareness  Participation: Daily and Individual  Level of participation: Improving  Pain status: No c/o pain  Patient/Family Education needs: continue to encourage independence. Upon review of the patients current care plan, the following issues, problems, or needs remain: continue to increase strength.     Lorraine Landau, RN

## 2021-08-16 NOTE — PROGRESS NOTES
Problem: Mobility Impaired (Adult and Pediatric)  Goal: *Acute Goals and Plan of Care (Insert Text)  Description:   FUNCTIONAL STATUS PRIOR TO ADMISSION: Patient was modified independent using a rolling walker for functional mobility. HOME SUPPORT PRIOR TO ADMISSION: The patient lived with wife and she provided assistance. Physical Therapy Goals  Revised 8/10/2021  1. Patient will move from supine to sit and sit to supine in bed with min assist 5/5 trials within 7 day(s). 2.  Patient will transfer from bed to chair and chair to bed with min assist 5/5 trials using the least restrictive device within 7 day(s). 3.  Patient will perform sit to stand with min assist 5/5 trials within 7 day(s). 4.  Patient will ambulate with supervision/set-up for 150 feet with the least restrictive device within 7 day(s). 5.  Patient will ascend/descend 6 stairs with 1 handrail(s) with minimal assistance/contact guard assist within 7 day(s). Initiated 7/31/2021  1. Patient will move from supine to sit and sit to supine  in bed with supervision/set-up within 7 day(s). 2.  Patient will transfer from bed to chair and chair to bed with supervision/set-up using the least restrictive device within 7 day(s). 3.  Patient will perform sit to stand with supervision/set-up within 7 day(s). 4.  Patient will ambulate with supervision/set-up for 150 feet with the least restrictive device within 7 day(s). 5.  Patient will ascend/descend 2 stairs with 2 handrail(s) with minimal assistance/contact guard assist within 7 day(s). Outcome: Goals Met  Patient has reached his baseline level of mobility    PHYSICAL THERAPY TREATMENT/DISCHARGE  Patient: Susanne Guidry (73 y.o. male)  Date: 8/16/2021  Diagnosis: Weakness [R53.1]  Parkinson's disease ([facility] [address] [G20] Encounter for rehabilitation       Precautions: Fall  Chart, physical therapy assessment, plan of care and goals were reviewed.     ASSESSMENT  Patient continues with skilled PT services and has progressed towards goals. Patient is doing better, has made progress, however he does appear to be at his baseline level of activity/mobility. He is cooperative and willing to work with Palkion Loud has made progress since his admission 2.5 weeks ago. However, patient is not making any appreciable progress lately and due to his PD and inability to move more, patient is unable to be more independent. Therapy has given him exercises, balance training, transfer and bed mobility training, gait training, gotten his shoes to be brought in to the hospital which helped a lot in his gait and balance. Patient can ascend and descend 4 steps with 2 rails and min A, and gets up now from chair with min to mod A. Pt is inconsistent with his abilities - one day getting up with VC and min A and CGA and next day requiring mod  A to stand. Patient will always need min to mod A for safety when ambulating with a walker or getting into/out of bed or chair. Family should consider a WC if they do not have one already for transport especially. Other factors to consider for discharge: progressive PD         PLAN :  Patient will be discharged from skilled physical therapy at this time as he no longer meets criteria for progress. Rationale for discharge:  Goals achieved    Recommendation for discharge: (in order for the patient to meet his/her long term goals)  No skilled physical therapy/ follow up rehabilitation needs identified at this time. 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    OBJECTIVE DATA SUMMARY:   Critical Behavior:  Neurologic State: Alert, Confused  Orientation Level: Disoriented to time, Disoriented to situation  Cognition: Follows commands, Impaired decision making  Safety/Judgement: Decreased awareness of need for safety  Functional Mobility Training:  Bed Mobility:      Not observed  According to OT pt uses rail and usually is a min A, but sometimes mod A              Transfers:  Sit to Stand: Moderate assistance  Stand to Sit: Minimum assistance (tends to plop)        Bed to Chair: Moderate assistance                    Balance:  Sitting - Static: Fair (occasional)  Sitting - Dynamic: Fair (occasional); Poor (constant support)  Standing: With support  Standing - Static: Constant support;Good  Standing - Dynamic : Constant support; Fair (flexed posture)  Ambulation/Gait Training:  Distance (ft): 180 Feet (ft) (became tired asked to go back)     Ambulation - Level of Assistance: Contact guard assistance;Minimal assistance        Gait Abnormalities: Decreased step clearance; Path deviations        Base of Support: Narrowed     Speed/Kim: Shuffled; Slow  Step Length: Right shortened;Left shortened                    Stairs:      Not done today , but patient has gone up 4 stairs with 2 rails and min A        Therapeutic Exercises:   Knee extension ex ( 25% of ROM)  Ankle pumps  Marching - raises knees 2 inches  Chair push ups x 3 with S    Pain Rating:      Activity Tolerance:   Fair    After treatment patient left in no apparent distress:   Sitting in chair    COMMUNICATION/COLLABORATION:   The patients plan of care was discussed with: Registered nurse and Case management.      Ana Aguayo, PT   Time Calculation: 30 mins

## 2021-08-16 NOTE — SWING BED INTERDISCIPLINARY CARE PLAN NURSE NOTE
Nursing:    Week #3: Date 8/15/2021  Medical status (changes from previous week):Progressing  Treatment changes this shift: none  Cognition: Present status: dementia          Is cueing needed?: Yes          Frequency of cueing needs: frequent           Type of cueing used: verbal  ADL (general):  Moderate assistance  Activity type: Reality awareness  Participation: Daily  Level of participation: Improving  Pain status: No c/o pain  Patient/Family Education needs: safety and mobility    Upon review of the patients current care plan, the following issues, problems, or needs remain: safety    Karin Siddiqi LPN

## 2021-08-16 NOTE — PROGRESS NOTES
IDR Team; MD, Nursing, Care Manager, Physical therapy, Nursing Supervisor, Pharmacy and Dietician, met to review patient's plan of care. Discussed goals, interventions, barriers and progress. RUR: 21% MEDIUM    Team will continue to monitor progress and report any concerns to the physician and care management as indicated. Transition of Care Plan:   OT and PT has discontinued therapy stating he is at baseline. Mrs. Cole Martinez was informed of this and stated that she will be issued a [employer] by [employer] on Tuesday, 8/17/21. Plans are being made to discharge the patient home. Working with [address] [place] [address] [address] [facility]) with the delivery of his equipment (hospital bed, BSC, WC) before discharge. Wife is to call the agency and make arrangements to pay a co pay before the equipment is delivered.

## 2021-08-16 NOTE — PROGRESS NOTES
Problem: Falls - Risk of  Goal: *Absence of Falls  Description: Document Mohit Adan Fall Risk and appropriate interventions in the flowsheet. Outcome: Progressing Towards Goal  Note: Fall Risk Interventions:  Mobility Interventions: Bed/chair exit alarm, Utilize walker, cane, or other assistive device    Mentation Interventions: Bed/chair exit alarm, Door open when patient unattended, Adequate sleep, hydration, pain control, More frequent rounding, Reorient patient    Medication Interventions: Bed/chair exit alarm, Teach patient to arise slowly    Elimination Interventions:  Toileting schedule/hourly rounds, Call light in reach, Bed/chair exit alarm    History of Falls Interventions: Bed/chair exit alarm, Door open when patient unattended

## 2021-08-16 NOTE — PROGRESS NOTES
Bedside shift change report given to ENID Guerra RN (oncoming nurse) by Tyree Grewal RN   (offgoing nurse). Report included the following information SBAR, Kardex, Intake/Output, MAR and Recent Results. Foster score 5  Bed/chair alarm is in use. If in use it is set at the highest volume. Intravenous fluids were administered, none 0 ml/hr  Patient Vitals for the past 12 hrs:   Temp Pulse Resp BP SpO2   08/16/21 0912 98.3 °F (36.8 °C) 71 20 (!) 145/79 100 %     No flowsheet data found. Lab results reviewed. For significant abnormal values and values requiring intervention, see assessment and plan.

## 2021-08-16 NOTE — PROGRESS NOTES
Follow up visit with patient in Rm 124  Provided empathic listening and spiritual support  Advised of  Availability   [address] [place] [address]

## 2021-08-16 NOTE — PROGRESS NOTES
Bedside shift change report given to AKANKSHA Cronin, RN (oncoming nurse) by [employer]. RYAN Mendiola (offgoing nurse). Report included the following information SBAR and Kardex.

## 2021-08-16 NOTE — PROGRESS NOTES
Problem: Pressure Injury - Risk of  Goal: *Prevention of pressure injury  Description: Document Jonah Scale and appropriate interventions in the flowsheet. Outcome: Progressing Towards Goal  Note: Pressure Injury Interventions:  Sensory Interventions: Assess changes in LOC, Check visual cues for pain, Keep linens dry and wrinkle-free, Minimize linen layers, Turn and reposition approx. every two hours (pillows and wedges if needed)    Moisture Interventions: Apply protective barrier, creams and emollients, Check for incontinence Q2 hours and as needed, Minimize layers    Activity Interventions: Increase time out of bed    Mobility Interventions: Chair cushion, Float heels, HOB 30 degrees or less    Nutrition Interventions: Offer support with meals,snacks and hydration, Document food/fluid/supplement intake    Friction and Shear Interventions: Apply protective barrier, creams and emollients, Minimize layers                Problem: Patient Education: Go to Patient Education Activity  Goal: Patient/Family Education  Outcome: Progressing Towards Goal     Problem: Falls - Risk of  Goal: *Absence of Falls  Description: Document Carlos Fall Risk and appropriate interventions in the flowsheet.   Outcome: Progressing Towards Goal  Note: Fall Risk Interventions:  Mobility Interventions: Bed/chair exit alarm, Patient to call before getting OOB, Utilize walker, cane, or other assistive device    Mentation Interventions: Bed/chair exit alarm, Door open when patient unattended, Reorient patient, Room close to nurse's station    Medication Interventions: Bed/chair exit alarm, Patient to call before getting OOB    Elimination Interventions: Bed/chair exit alarm, Call light in reach, Patient to call for help with toileting needs, Stay With Me (per policy)    History of Falls Interventions: Bed/chair exit alarm, Door open when patient unattended, Room close to nurse's station    Problem: Patient Education: Go to Patient Education Activity  Goal: Patient/Family Education  Outcome: Progressing Towards Goal  Pt. Continues to be confused. He follows commands well. He has more strength and with assistance can use restroom if he chooses to use restroom.

## 2021-08-17 NOTE — PROGRESS NOTES
Bedside and Verbal shift change report given to Adalberto Gallo LPN (oncoming nurse) by Vernia Krabbe, RN (offgoing nurse). Report included the following information SBAR, Kardex and Intake/Output.

## 2021-08-17 NOTE — PROGRESS NOTES
Problem: Pressure Injury - Risk of  Goal: *Prevention of pressure injury  Description: Document Jonah Scale and appropriate interventions in the flowsheet. Outcome: Progressing Towards Goal  Note: Pressure Injury Interventions:  Sensory Interventions: Float heels, Keep linens dry and wrinkle-free    Moisture Interventions: Absorbent underpads, Apply protective barrier, creams and emollients    Activity Interventions: Increase time out of bed    Mobility Interventions: HOB 30 degrees or less    Nutrition Interventions: Document food/fluid/supplement intake    Friction and Shear Interventions: Apply protective barrier, creams and emollients                Problem: Falls - Risk of  Goal: *Absence of Falls  Description: Document Carlos Fall Risk and appropriate interventions in the flowsheet.   Outcome: Progressing Towards Goal  Note: Fall Risk Interventions:  Mobility Interventions: Bed/chair exit alarm    Mentation Interventions: Adequate sleep, hydration, pain control, Bed/chair exit alarm, Door open when patient unattended    Medication Interventions: Bed/chair exit alarm    Elimination Interventions: Bed/chair exit alarm, Call light in reach, Toileting schedule/hourly rounds    History of Falls Interventions: Bed/chair exit alarm, Door open when patient unattended         Problem: Patient Education: Go to Patient Education Activity  Goal: Patient/Family Education  Outcome: Progressing Towards Goal     Problem: Patient Education: Go to Patient Education Activity  Goal: Patient/Family Education  Outcome: Progressing Towards Goal     Problem: Patient Education: Go to Patient Education Activity  Goal: Patient/Family Education  Outcome: Progressing Towards Goal

## 2021-08-17 NOTE — PROGRESS NOTES
[facility] notification [facility] explained to the patient and his wife that Swing Bed/ Skilled Services will end on 8/19/21 and that the date of financial liability will begin on 8/20/21. Provided a copy of the notification to the patient and copy placed in the patients chart.

## 2021-08-17 NOTE — SWING BED INTERDISCIPLINARY CARE PLAN NURSE NOTE
Nursing:    Week #2: Date 8/17/2021  Medical status (changes from previous week):Progressing  Treatment changes this shift: none  Cognition: Present status: dementia          Is cueing needed?: Yes          Frequency of cueing needs: frequent           Type of cueing used: verbal  ADL (general):  Moderate assistance  Activity type: Social leisure skills  Participation: Daily  Level of participation: Improving  Pain status: No c/o pain  Patient/Family Education needs: encourage independence    Upon review of the patients current care plan, the following issues, problems, or needs remain: increase strength    Franklyn Dye RN

## 2021-08-17 NOTE — PROGRESS NOTES
IDR Team; MD, Nursing, Care Manager, Physical therapy, Nursing Supervisor, Pharmacy , met to review patient's plan of care. Discussed goals, interventions, barriers and progress. RUR: 21%  MEDIUM     Team will continue to monitor progress and report any concerns to the physician and care management as indicated. Transition of Care Plan:   [facility] 30 issued today for ending skilled care because patient has reached his goals and PT/OT are no longer working with the patient. He continues to need assistance but services can be delivered at a lower level such as home health. Have made a referral to [facility]. According to the Mrs. Silva's sister, equipment will be delivered to the home on tomorrow. Family will have to pay a copay prior to delivery and a rental monthly fee for the equipment. Will ask Chaplain Chang to assist me in locating a wheelchair and a hospital bed  For the patient.

## 2021-08-17 NOTE — PROGRESS NOTES
Problem: Falls - Risk of  Goal: *Absence of Falls  Description: Document Fatou Gann Fall Risk and appropriate interventions in the flowsheet.   Outcome: Progressing Towards Goal  Note: Fall Risk Interventions:  Mobility Interventions: Bed/chair exit alarm    Mentation Interventions: Bed/chair exit alarm    Medication Interventions: Bed/chair exit alarm    Elimination Interventions: Call light in reach    History of Falls Interventions: Bed/chair exit alarm

## 2021-08-17 NOTE — SWING BED INTERDISCIPLINARY CARE PLAN NURSE NOTE
Nursing:    Week #3: Date 8/17/2021  Medical status (changes from previous week):Progressing  Treatment changes this shift: none  Cognition: Present status: oriented name,place          Is cueing needed?: Yes          Frequency of cueing needs: occasional           Type of cueing used: verbal  ADL (general):  Moderate assistance  Activity type: Social leisure skills  Participation: Daily  Level of participation: Improving  Pain status: No c/o pain  Patient/Family Education needs: safety    Upon review of the patients current care plan, the following issues, problems, or needs remain: safety/strengthening    Hamlet Buckley LPN

## 2021-08-18 NOTE — SWING BED INTERDISCIPLINARY CARE PLAN NURSE NOTE
Nursing:    Week #3: Date 8/18/2021  Medical status (changes from previous week):Progressing  Treatment changes this shift: none  Cognition: Present status: oriented          Is cueing needed?: Yes          Frequency of cueing needs: frequent           Type of cueing used: verbal  ADL (general):  Moderate assistance  Activity type: Social leisure skills  Participation: Daily  Level of participation: Improving  Pain status: No c/o pain  Patient/Family Education needs: safety    Upon review of the patients current care plan, the following issues, problems, or needs remain: safety    Kellie Gonzalez LPN

## 2021-08-18 NOTE — PROGRESS NOTES
Follow up visit with patient in Rm 124  Provided empathic listening and spiritual support  Advised of  Availability   [address] [place] [address]

## 2021-08-18 NOTE — PROGRESS NOTES
Problem: Pressure Injury - Risk of  Goal: *Prevention of pressure injury  Description: Document Jonah Scale and appropriate interventions in the flowsheet. Outcome: Progressing Towards Goal  Note: Pressure Injury Interventions:  Sensory Interventions: Keep linens dry and wrinkle-free    Moisture Interventions: Absorbent underpads, Apply protective barrier, creams and emollients    Activity Interventions: Increase time out of bed    Mobility Interventions: HOB 30 degrees or less    Nutrition Interventions: Document food/fluid/supplement intake    Friction and Shear Interventions: Apply protective barrier, creams and emollients, HOB 30 degrees or less                Problem: Patient Education: Go to Patient Education Activity  Goal: Patient/Family Education  Outcome: Progressing Towards Goal     Problem: Falls - Risk of  Goal: *Absence of Falls  Description: Document Carlos Fall Risk and appropriate interventions in the flowsheet.   Outcome: Progressing Towards Goal  Note: Fall Risk Interventions:  Mobility Interventions: Bed/chair exit alarm    Mentation Interventions: Adequate sleep, hydration, pain control, Bed/chair exit alarm, Door open when patient unattended    Medication Interventions: Bed/chair exit alarm    Elimination Interventions: Bed/chair exit alarm, Call light in reach, Toileting schedule/hourly rounds    History of Falls Interventions: Bed/chair exit alarm, Door open when patient unattended         Problem: Patient Education: Go to Patient Education Activity  Goal: Patient/Family Education  Outcome: Progressing Towards Goal     Problem: Patient Education: Go to Patient Education Activity  Goal: Patient/Family Education  Outcome: Progressing Towards Goal

## 2021-08-18 NOTE — PROGRESS NOTES
Bedside shift change report given to SOHAM Flores LPN (oncoming nurse) by SAMMY Mendiola LPN (offgoing nurse). Report included the following information SBAR, Kardex and Intake/Output.

## 2021-08-18 NOTE — PROGRESS NOTES
Problem: Pressure Injury - Risk of  Goal: *Prevention of pressure injury  Description: Document Jonah Scale and appropriate interventions in the flowsheet.   Outcome: Progressing Towards Goal  Note: Pressure Injury Interventions:  Sensory Interventions: Assess changes in LOC, Check visual cues for pain, Float heels    Moisture Interventions: Absorbent underpads, Apply protective barrier, creams and emollients, Check for incontinence Q2 hours and as needed    Activity Interventions: Chair cushion, Increase time out of bed    Mobility Interventions: Chair cushion, Float heels, HOB 30 degrees or less    Nutrition Interventions: Document food/fluid/supplement intake, Offer support with meals,snacks and hydration    Friction and Shear Interventions: HOB 30 degrees or less

## 2021-08-18 NOTE — PROGRESS NOTES
IDR Team; MD, Nursing, Care Manager, Physical therapy, Nursing Supervisor, Pharmacy , met to review patient's plan of care. Discussed goals, interventions, barriers and progress. RUR: 21% MEDIUM    Team will continue to monitor progress and report any concerns to the physician and care management as indicated. Transition of Care Plan:   Patient is stable. No issues at this time.

## 2021-08-19 NOTE — PROGRESS NOTES
IDR Team; MD, Nursing, Care Manager, Physical therapy, Nursing Supervisor, Pharmacy , met to review patient's plan of care. Discussed goals, interventions, barriers and progress. RUR: 22% MEDIUM    Team will continue to monitor progress and report any concerns to the physician and care management as indicated. Transition of Care Plan:  Patient is scheduled to be discharged on Friday. Home health referral initiated earlier this week with [facility]. They notified me today to tell me that the patient's co pay/out of pocket expense is $3000 and patient has already met $1200, and after the copay, insurance cover at 100%. Faxed referral to [facility] agency.

## 2021-08-19 NOTE — PROGRESS NOTES
Bedside and Verbal shift change report given to [facility] [place], LPN (oncoming nurse) by Kehinde Feliz RN   (offgoing nurse). Report included the following information SBAR and Kardex.

## 2021-08-19 NOTE — PROGRESS NOTES
Follow up TELEPHONE call to Mrs. Trang Singh. Patient is being discharged tomorrow 8/20/21. Family will pick him up in the afternoon. The DME ordered arrived Tuesday. [address] will be the home care provider. I noted there was a volunteer support group serving the area that can provide free diapers, pads and also loans DME. Also there is a volunteer organization which can build ramps in [facility]. Ms. Trang Singh will let me know If she wants me to refer Mr. Trang Singh to any of these services once he returns home.

## 2021-08-19 NOTE — PROGRESS NOTES
Nutrition Assessment     Type and Reason for Visit: Reassess    Nutrition Recommendations/Plan: regular diet, low fat/low chol/high fiber/MARY, meal set-up but he can feed himself     Nutrition Assessment:   Chart reviewed. Pt eats well and feeds himself. No new labs in 5 days or more. He has lost some wt about 6# in 1 wk. He continues to have a lot of edema-LLE 3+ legs and feet and RUE-4+, RLE-3+. He is on a diuretic. Pt is scheduled to go home soon.      Estimated Daily Nutrient Needs:  Energy (kcal):  2250  Protein (g):  121       Fluid (ml/day):  2250    Nutrition Related Findings:       Patient Vitals for the past 168 hrs:   % Diet Eaten   08/19/21 1200 76 - 100%   08/19/21 0900 76 - 100%   08/18/21 1700 51 - 75%   08/18/21 1200 76 - 100%   08/18/21 0900 76 - 100%   08/16/21 1001 76 - 100%   08/15/21 1733 76 - 100%   08/15/21 1225 76 - 100%   08/15/21 0853 76 - 100%   08/13/21 1200 76 - 100%   08/13/21 0900 76 - 100%       Current Nutrition Therapies:  ADULT DIET Regular; Low Fat/Low Chol/High Fiber/MARY    Anthropometric Measures:  · Height:  6' (182.9 cm)  · Current Body Wt:  100.8 kg (222 lb 3.6 oz)  · BMI: 30.1     Weight Loss Metrics 8/19/2021 7/27/2021 7/14/2021 6/15/2021 6/9/2021 6/4/2021 5/17/2021   Today's Wt 216 lb 12.8 oz 222 lb 4.8 oz 213 lb 215 lb 225 lb 4.8 oz 210 lb 3.2 oz 203 lb 12.8 oz   BMI 29.4 kg/m2 30.15 kg/m2 28.89 kg/m2 29.16 kg/m2 30.56 kg/m2 26.27 kg/m2 25.47 kg/m2       Nutrition Diagnosis:   No nutrition diagnosis at this time     Nutrition Intervention:  Food and/or Nutrient Delivery: Continue current diet  Nutrition Education and Counseling: No recommendations at this time  Coordination of Nutrition Care: Continue to monitor while inpatient, Interdisciplinary rounds    Goals:  po intake % of most meals x 5-7 days       Nutrition Monitoring and Evaluation:   Behavioral-Environmental Outcomes: None identified  Food/Nutrient Intake Outcomes: Food and nutrient intake  Physical Signs/Symptoms Outcomes: Weight, Meal time behavior, Fluid status or edema    Discharge Planning:    Continue current diet     Electronically signed by Chano Kelley RD on 8/19/2021 at 2:36 PM

## 2021-08-19 NOTE — SWING BED INTERDISCIPLINARY CARE PLAN NURSE NOTE
Nursing:    Week #3: Date 8/19/2021  Medical status (changes from previous week):Progressing  Treatment changes this shift: none  Cognition: Present status: oriented          Is cueing needed?: Yes          Frequency of cueing needs: frequent           Type of cueing used: verbal  ADL (general):  Moderate assistance  Activity type: Social leisure skills  Participation: Daily  Level of participation: Improving  Pain status: No c/o pain  Patient/Family Education needs: safety    Upon review of the patients current care plan, the following issues, problems, or needs remain: Safety    Ashutosh Holly RN

## 2021-08-19 NOTE — ROUTINE PROCESS
Bedside and Verbal shift change report given to VEDA Unger RN (oncoming nurse) by Cydney Claude, LPN (offgoing nurse). Report included the following information SBAR and Kardex     Bed alarm activated.

## 2021-08-19 NOTE — PROGRESS NOTES
Problem: Pressure Injury - Risk of  Goal: *Prevention of pressure injury  Description: Document Jonah Scale and appropriate interventions in the flowsheet.   Outcome: Progressing Towards Goal  Note: Pressure Injury Interventions:  Sensory Interventions: Keep linens dry and wrinkle-free    Moisture Interventions: Absorbent underpads, Apply protective barrier, creams and emollients    Activity Interventions: Increase time out of bed    Mobility Interventions: HOB 30 degrees or less    Nutrition Interventions: Document food/fluid/supplement intake    Friction and Shear Interventions: Apply protective barrier, creams and emollients Yes

## 2021-08-19 NOTE — PROGRESS NOTES
Bedside and Verbal shift change report given to Ti Dang RN (oncoming nurse) by Ethan Caldwell LPN (offgoing nurse). Report included the following information SBAR and Kardex.  EMANUEL 5

## 2021-08-20 NOTE — PROGRESS NOTES
Transition of Care (SHEILA) Plan:  Patient being discharged home with home health provided by Breanna Tina Ke: 22%  MEDIUM    SHEILA Transportation:      How is patient being transported at discharge? [facility]    When?  8/20/21    Is transport scheduled? YES    Follow-up appointment and transportation:    PCP? Jimenez Rodriguez    Specialist?    Time/Date? Who is transporting to the follow-up appointment? Unknown. May have to do virtual    Is transport for follow up appointment scheduled? Communication plan (with patient/family): Who is being called? Patient's wife    What number(s) is to be used? [phone]    What service provider is calling for [facility] [employer]? PCP office and home health agency    When are they calling? Probably 24 - 48 hours prior to appointment      Click here to [address] including selection of the Healthcare Decision Maker Relationship (ie \"Primary\")  @[email or web address]    Care Management Interventions  PCP Verified by CM: Yes Jimenez Rodriguez MD)  Palliative Care Criteria Met (RRAT>21 & CHF Dx)?: No (No MD order for Palliative Care)  Mode of Transport at Discharge:  Other (see comment)  Transition of Care Consult (CM Consult): Discharge Planning, [address]: No  Reason Outside [facility]: Out of service area, Unable to staff case  Discharge Durable Medical Equipment: No  Physical Therapy Consult: Yes  Occupational Therapy Consult: Yes  Speech Therapy Consult: Yes  Current Support Network: Lives with Spouse, [address] Provided?: No  Discharge Location  Discharge Placement: Home with home health [facility])

## 2021-08-20 NOTE — DISCHARGE SUMMARY
Hospitalist Discharge Summary     Patient ID:  Eloisa Lopez  [id number]  70 y.o.  1935  7/30/2021    PCP on record: Vasile Martinez MD    Admit date: 7/30/2021  Discharge date and time: 8/20/2021    DISCHARGE DIAGNOSIS:    Severe Parkinsonism with recurrent falls    CONSULTATIONS:  None    Excerpted HPI from H&P of Jenna Bui, DO:  81 y. o. male presenting for admission to [facility] for further evaluation and treatment for Orthostatic hypotension.  He  has a past medical history of Asthma, Chronic kidney disease, Dermatophytosis of groin and perianal area (2010), Edema (2008), Encounter for long-term (current) use of other medications (2010), Gout, unspecified (2010), Gouty arthropathy, unspecified (2010), Hypertension, Hypertrophy of prostate without urinary obstruction and other lower urinary tract symptoms (LUTS) (2008), Impotence of organic origin (2008), Memory loss (2009), Obesity, unspecified (2010), Orthostatic hypotension (2008), Other and unspecified hyperlipidemia (2008), Other atopic dermatitis and related conditions (2012), Palpitations (2010), Peripheral angiopathy in diseases classified elsewhere ([facility] [address] (2010), Personal history of malignant neoplasm of rectum, rectosigmoid junction, and anus (2011), Tinea nigra (2011), Unspecified pruritic disorder (2011), and Vitamin B12 deficiency (7/23/2017). He also has no past medical history of Other dyspnea and respiratory abnormality, Personal history of malignant neoplasm of large intestine, Polyphagia(783.6), Psychosexual dysfunction, unspecified, Unspecified hearing loss, or Unspecified visual loss. .   Patient was brought to ER with a complaint of having a fall at home.  Patient was trying to get up from his toilet seat and slumped down.  Patient's wife tried to get him up and was unable to do so. Drew Robles has very slurred speech and is able to answer only yes and no at this time.  I confirmed with patient's wife she states that he does have difficulty with speech. Kan Navas has developed progressive swelling of his lower extremities as well.  Patient's wife is unable to take care of him at this time as she told the ER doctor. [facility] was requested to admit the patient for further work-up and management     Patient was evaluated by PT OT and decision was made to have patient do IP rehab here.     ______________________________________________________________________  DISCHARGE SUMMARY/HOSPITAL COURSE:  for full details see H&P, daily progress notes, labs, consult notes. Patient stayed for 1 month of PT OT in IP rehab. Patient did well. Unfortunately patient is still a significant fall risk due to his Parkinsonism. Fall risk made worse by edema however cannot diurese well considering orthostatic hypotension. Patient discharged home on his home [facility] ER. Renal function at time of discharge was essentially at baseline.       _______________________________________________________________________  Patient seen and examined by me on discharge day. Pertinent Findings:  General: Alert and awake and follow commands. Cooperative and friendly. No acute distress. HEAD: Normocephalic, atraumatic. Eye: PERRLA, EOMI. Throat and Neck: normal and no erythema or exudates noted. No mass   Lung: Clear to auscultation bilaterally without wheezes, rhonchi. Good air movement bilaterally. Heart: Regular rate and rhythm. Normal S1/S2. NO appreciated murmurs, rubs or gallops.    Abdomen: soft, non-tender. Bowel sounds present in all four quadrants. No masses appreciated. Extremities:  able to move all extremities normal, atraumatic  Skin: Clean, dry and intact without appreciated lesions. +++ edema of both legs with ch skin changes  Neurologic: Slurred speech, Cranial nerves 2-12 and sensation grossly intact.   Psychiatric: non focal, normal affect, normal thought process    _______________________________________________________________________  DISCHARGE MEDICATIONS:         Patient Follow Up Instructions: Activity: Activity as tolerated  Diet: Cardiac Diet  Wound Care: None needed    Follow-up with PCP in 1 week.   Follow-up tests/labs   Follow-up Information     Follow up With Specialties Details Why Contact Carol Galvin MD Internal Medicine   [address] [place] [address]  [phone]          ________________________________________________________________    Risk of deterioration: High    Condition at Discharge:  Stable  __________________________________________________________________    Disposition  Home with family and home health services    ____________________________________________________________________    Code Status: DNR/DNI  ___________________________________________________________________      Total time in minutes spent coordinating this discharge (includes going over instructions, follow-up, prescriptions, and preparing report for sign off to her PCP) :  50 minutes    Signed:  DO Austyn Riddle

## 2021-08-20 NOTE — PROGRESS NOTES
Follow up visit with patient in Rm 124  Provided empathic listening and spiritual support  Advised of  Availability   [address] [place] [address]

## 2021-08-20 NOTE — ROUTINE PROCESS
[facility]ed [facility] to arrange transport to home, provided demographics including name, , height, weight, address, insurance, diagnosis, no special needs, required multiple calls to confirm address.  ETA of  received

## 2021-08-20 NOTE — PROGRESS NOTES
Patient is still scheduled for discharge today. Planned transportation with [facility]: 8:45PM. Will inform the sister in law of this. Met with sister in law, Ms. Beckett today. Encouraged her to get documents together and bring them to  so we can assist her in completing a Medicaid application for the patient.  Equipment arrived in the home yesterday (hospital bed and wheelchair)

## 2021-08-21 NOTE — PROGRESS NOTES
Problem: Pressure Injury - Risk of  Goal: *Prevention of pressure injury  Description: Document Jonah Scale and appropriate interventions in the flowsheet.   Outcome: Progressing Towards Goal  Note: Pressure Injury Interventions:  Sensory Interventions: Assess changes in LOC, Check visual cues for pain, Keep linens dry and wrinkle-free    Moisture Interventions: Absorbent underpads, Limit adult briefs    Activity Interventions: Increase time out of bed    Mobility Interventions: HOB 30 degrees or less    Nutrition Interventions: Document food/fluid/supplement intake    Friction and Shear Interventions: Apply protective barrier, creams and emollients

## 2021-08-21 NOTE — ROUTINE PROCESS
NADYA picked pt up to transport home.   Discharge instructions reviewed with pt's wife via phone  Personal belongings returned: pt  Home meds returned: na  To front entrance via stretcher  Discharged home with  NADYA @ [phone]

## 2021-08-21 NOTE — SWING BED INTERDISCIPLINARY CARE PLAN NURSE NOTE
Nursing:    Week # 4: Date 8/20/2021  Medical status (changes from previous week):Progressing  Treatment changes this shift: none  Cognition: Present status: dementia          Is cueing needed?: Yes          Frequency of cueing needs: occasional           Type of cueing used: verbal and gestures  ADL (general): Maximum assistance  Activity type: Social leisure skills  Participation: Daily  Level of participation: Improving  Pain status: No c/o pain  Patient/Family Education needs: safety    Upon review of the patients current care plan, the following issues, problems, or needs remain:safety    Figueroa RYAN Haynes

## 2021-08-21 NOTE — PROGRESS NOTES
Problem: Pressure Injury - Risk of  Goal: *Prevention of pressure injury  Description: Document Jonah Scale and appropriate interventions in the flowsheet. Outcome: Resolved/Met     Problem: Patient Education: Go to Patient Education Activity  Goal: Patient/Family Education  Outcome: Resolved/Met     Problem: Falls - Risk of  Goal: *Absence of Falls  Description: Document Carlos Fall Risk and appropriate interventions in the flowsheet.   Outcome: Resolved/Met     Problem: Patient Education: Go to Patient Education Activity  Goal: Patient/Family Education  Outcome: Resolved/Met     Problem: Patient Education: Go to Patient Education Activity  Goal: Patient/Family Education  Outcome: Resolved/Met     Problem: Patient Education: Go to Patient Education Activity  Goal: Patient/Family Education  Outcome: Resolved/Met     Problem: Patient Education: Go to Patient Education Activity  Goal: Patient/Family Education  Outcome: Resolved/Met     Problem: Patient Education: Go to Patient Education Activity  Goal: Patient/Family Education  Outcome: Resolved/Met

## 2021-08-21 NOTE — ROUTINE PROCESS
Bedside and Verbal shift change report given to CRISPIN Marina RN (oncoming nurse) by Kiera Matamoros LPN (offgoing nurse). Report included the following information SBAR and Kardex     Bed alarm activated.

## 2021-08-23 NOTE — ED PROVIDER NOTES
Patient is an 46-year-old male who is currently at home with his elderly wife who was brought to hospital for evaluation after a fall. He has a history of asthma, chronic kidney disease, lower extremity edema, hypertension, memory loss, Parkinson's disease,. He was just discharged from hospital after being in rehab for about a month. Patient fell out of bed the night prior and slid out of bed again the night of presentation. He was complaining of some back pain. Wife asked for him to be brought to hospital for evaluation. I discussed care care with the wife and she was unable to give any of the history stated that they have a hospital bed at home. They have been trying to use it with the rails up but somehow the patient keeps getting out of bed falling. He has Parkinson's and some mild dementia with that and is an unreliable historian. Is unclear whether he was trying to get up out of the bed or or if he was trying to express discomfort from something. Since discharge home there is been no fever sweats chills nausea or vomiting or diarrhea. No known fall with head trauma has been identified. No obvious complaints of chest pain or abdominal pain to the wife.            Past Medical History:   Diagnosis Date    Asthma     Chronic kidney disease     Dermatophytosis of groin and perianal area 2010    Edema 2008    Encounter for long-term (current) use of other medications 2010    Gout, unspecified 2010    Gouty arthropathy, unspecified 2010    Hypertension     Hypertrophy of prostate without urinary obstruction and other lower urinary tract symptoms (LUTS) 2008    Impotence of organic origin 2008    Memory loss 2009    Obesity, unspecified 2010    Orthostatic hypotension 2008    Other and unspecified hyperlipidemia 2008    Other atopic dermatitis and related conditions 2012    Palpitations 2010    Peripheral angiopathy in diseases classified elsewhere [facility]) 2010    Personal history of malignant neoplasm of rectum, rectosigmoid junction, and anus     Tinea nigra     Unspecified pruritic disorder     Vitamin B12 deficiency 2017       Past Surgical History:   Procedure Laterality Date    ENDOSCOPY, COLON, DIAGNOSTIC  2011, 2004,  2000    HX HERNIA REPAIR  1992    INCISIONAL    HX TOTAL COLECTOMY      COLON CANCER S/P RESECTION         Family History:   Problem Relation Age of Onset    Other Mother         PULMONARY EDEMA    Other Father         PAD    Heart Attack Father     Coronary Artery Disease Father        Social History     Socioeconomic History    Marital status:      Spouse name: Not on file    Number of children: Not on file    Years of education: Not on file    Highest education level: Not on file   Occupational History    Not on file   Tobacco Use    Smoking status: Former Smoker     Packs/day: 1.00     Years: 29.00     Pack years: 29.00     Types: Cigarettes     Start date: 1949     Quit date: 1978     Years since quittin.6    Smokeless tobacco: Never Used   Vaping Use    Vaping Use: Never used   Substance and Sexual Activity    Alcohol use: No    Drug use: No    Sexual activity: Not Currently   Other Topics Concern     Service No    Blood Transfusions No    Caffeine Concern No    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    Hobby Hazards No    Sleep Concern Not Asked    Stress Concern No    Weight Concern Yes     Comment: loss of weight    Special Diet No    Back Care Yes     Comment: pain from kidney cyst    Exercise No    Bike Helmet No     Comment: n/a    Seat Belt Yes    Self-Exams Not Asked   Social History Narrative    Not on file     Social Determinants of Health     Financial Resource Strain: Low Risk     Difficulty of Paying Living Expenses: Not very hard   Food Insecurity: No Food Insecurity    Worried About Running Out of Food in the Last Year: Never true   Royal Garcia Ran Out of Food in the Last Year: Never true   Transportation Needs: No Transportation Needs    Lack of Transportation (Medical): No    Lack of Transportation (Non-Medical): No   Physical Activity: Inactive    Days of Exercise per Week: 0 days    Minutes of Exercise per Session: 0 min   Stress: No Stress Concern Present    Feeling of Stress : Only a little   Social Connections: Socially Isolated    Frequency of Communication with Friends and Family: Never    Frequency of Social Gatherings with Friends and Family: Never    Attends Orthodox Services: Never    Active Member of Clubs or Organizations: No    Attends Club or Organization Meetings: Not asked    Marital Status:    Intimate Partner Violence: Not At Risk    Fear of Current or Ex-Partner: No    Emotionally Abused: No    Physically Abused: No    Sexually Abused: No         ALLERGIES: Cat hair std allergenic ext, Codeine, and Ragweed    Review of Systems   Unable to perform ROS: Dementia       Vitals:    08/23/21 0201 08/23/21 0549 08/23/21 0711 08/23/21 0826   BP: (!) 158/70 (!) 201/79 (!) 180/84 (!) 181/82   Pulse: 86 78 79 74   Resp: 17 18 18 18   Temp: 98.4 °F (36.9 °C)      SpO2: 98% 99% 99%    Weight: 100.2 kg (220 lb 14.4 oz)               Physical Exam  Vitals and nursing note reviewed. Constitutional:       General: He is not in acute distress. Appearance: Normal appearance. He is normal weight. He is not ill-appearing, toxic-appearing or diaphoretic. Comments: Patient appearing stated age brought in by ambulance   HENT:      Head: Normocephalic and atraumatic. Right Ear: External ear normal.      Left Ear: External ear normal.      Nose: Nose normal.      Mouth/Throat:      Mouth: Mucous membranes are moist.      Pharynx: No oropharyngeal exudate or posterior oropharyngeal erythema. Eyes:      Extraocular Movements: Extraocular movements intact.       Conjunctiva/sclera: Conjunctivae normal.      Pupils: Pupils are equal, round, and reactive to light. Cardiovascular:      Rate and Rhythm: Normal rate and regular rhythm. Heart sounds: No murmur heard. No friction rub. No gallop. Pulmonary:      Effort: Pulmonary effort is normal. No respiratory distress. Breath sounds: Normal breath sounds. No wheezing or rales. Abdominal:      General: There is no distension. Palpations: Abdomen is soft. Tenderness: There is no abdominal tenderness. There is no right CVA tenderness, left CVA tenderness, guarding or rebound. Musculoskeletal:         General: Swelling, tenderness and signs of injury present. No deformity. Normal range of motion. Cervical back: Normal range of motion and neck supple. No muscular tenderness. Right lower leg: Edema present. Left lower leg: Edema present. Comments: Back appear to be tender with some abrasions noted on the lower thoracic and lumbar spine. Ulcers noted to the right foot which were had been treated with dressings. No evidence of secondary infection throughout. 4+ chronic pedal edema noted without erythema or obvious pain to palpation   Lymphadenopathy:      Cervical: No cervical adenopathy. Skin:     General: Skin is warm and dry. Capillary Refill: Capillary refill takes less than 2 seconds. Findings: No bruising, erythema or rash. Neurological:      General: No focal deficit present. Mental Status: He is alert. He is disoriented. Sensory: No sensory deficit. Motor: No weakness. Coordination: Coordination normal.      Gait: Gait normal.      Comments: Poverty of expression, flat facies and stiffness throughout   Psychiatric:         Mood and Affect: Mood normal.         Behavior: Behavior normal.      Comments: Patient without agitation, attempted to be cooperative throughout the ED visit.         Labs -  Recent Results (from the past 24 hour(s))   CBC WITH AUTOMATED DIFF    Collection Time: 08/23/21  2:55 AM Result Value Ref Range    WBC 5.5 4.1 - 11.1 K/uL    RBC 4.88 4.10 - 5.70 M/uL    HGB 13.1 12.1 - 17.0 g/dL    HCT 40.7 36.6 - 50.3 %    MCV 83.4 80.0 - 99.0 FL    MCH 26.8 26.0 - 34.0 PG    MCHC 32.2 30.0 - 36.5 g/dL    RDW 15.2 (H) 11.5 - 14.5 %    PLATELET 644 001 - 231 K/uL    MPV 8.8 (L) 8.9 - 12.9 FL    NRBC 0.0 0  WBC    ABSOLUTE NRBC 0.00 0.00 - 0.01 K/uL    NEUTROPHILS 86 (H) 32 - 75 %    BAND NEUTROPHILS 2 %    LYMPHOCYTES 8 (L) 12 - 49 %    MONOCYTES 4 (L) 5 - 13 %    EOSINOPHILS 0 0 - 7 %    BASOPHILS 0 0 - 1 %    IMMATURE GRANULOCYTES 0 0.0 - 0.5 %    ABS. NEUTROPHILS 4.9 1.8 - 8.0 K/UL    ABS. LYMPHOCYTES 0.4 (L) 0.8 - 3.5 K/UL    ABS. MONOCYTES 0.2 0.0 - 1.0 K/UL    ABS. EOSINOPHILS 0.0 0.0 - 0.4 K/UL    ABS. BASOPHILS 0.0 0.0 - 0.1 K/UL    ABS. IMM. GRANS. 0.0 0.00 - 0.04 K/UL    DF MANUAL      PLATELET COMMENTS ADEQUATE PLATELETS      RBC COMMENTS OVALOCYTES  1+        RBC COMMENTS TEARDROP CELLS  1+       METABOLIC PANEL, COMPREHENSIVE    Collection Time: 08/23/21  2:55 AM   Result Value Ref Range    Sodium 143 136 - 145 mmol/L    Potassium 3.9 3.5 - 5.1 mmol/L    Chloride 108 97 - 108 mmol/L    CO2 29 21 - 32 mmol/L    Anion gap 6 5 - 15 mmol/L    Glucose 99 65 - 100 mg/dL    BUN 28 (H) 6 - 20 MG/DL    Creatinine 1.51 (H) 0.70 - 1.30 MG/DL    BUN/Creatinine ratio 19 12 - 20      GFR est AA 54 (L) >60 ml/min/1.73m2    GFR est non-AA 44 (L) >60 ml/min/1.73m2    Calcium 8.8 8.5 - 10.1 MG/DL    Bilirubin, total 0.4 0.2 - 1.0 MG/DL    ALT (SGPT) 18 12 - 78 U/L    AST (SGOT) 28 15 - 37 U/L    Alk.  phosphatase 65 45 - 117 U/L    Protein, total 7.0 6.4 - 8.2 g/dL    Albumin 2.7 (L) 3.5 - 5.0 g/dL    Globulin 4.3 (H) 2.0 - 4.0 g/dL    A-G Ratio 0.6 (L) 1.1 - 2.2     MAGNESIUM    Collection Time: 08/23/21  2:55 AM   Result Value Ref Range    Magnesium 2.1 1.6 - 2.4 mg/dL   TROPONIN I    Collection Time: 08/23/21  2:55 AM   Result Value Ref Range    Troponin-I, Qt. <0.05 <0.05 ng/mL   EKG, 12 LEAD, INITIAL    Collection Time: 08/23/21  3:01 AM   Result Value Ref Range    Ventricular Rate 78 BPM    Atrial Rate 78 BPM    P-R Interval 134 ms    QRS Duration 90 ms    Q-T Interval 452 ms    QTC Calculation (Bezet) 515 ms    Calculated P Axis 19 degrees    Calculated R Axis -5 degrees    Calculated T Axis -179 degrees    Diagnosis       Sinus rhythm with premature supraventricular complexes  Left ventricular hypertrophy with repolarization abnormality  Prolonged QT  Abnormal ECG  When compared with ECG of 27-JUL-2021 14:53,  premature supraventricular complexes are now present       Radiologic Studies -   CT Results  (Last 48 hours)    None          XR Results (most recent):  Results from Hospital Encounter encounter on 08/23/21    XR SPINE LUMB 2 OR 3 V    Narrative  Clinical history: fall  INDICATION:   fall  COMPARISON: None  FINDINGS:  Three views of the lumbar spine are obtained. The vertebral bodies are anatomically aligned. Intervertebral disc heights are well-maintained. Facet arthropathy. . Visualized osseous structures are without evidence of  fracture-dislocation. There is no significant soft tissue abnormality identified. Impression  No acute fracture or dislocation.       MDM  Number of Diagnoses or Management Options  Abrasion: new and requires workup  Falls frequently: new and requires workup  Midline thoracic back pain, unspecified chronicity: new and requires workup  Parkinson's disease ([facility] [address]): established and worsening     Amount and/or Complexity of Data Reviewed  Clinical lab tests: reviewed and ordered  Tests in the radiology section of CPT®: ordered and reviewed  Tests in the medicine section of CPT®: ordered and reviewed  Decide to obtain previous medical records or to obtain history from someone other than the patient: yes  Obtain history from someone other than the patient: yes (Wife)    Risk of Complications, Morbidity, and/or Mortality  Presenting problems: moderate  Diagnostic procedures: moderate  Management options: moderate  General comments: Differential includes fracture, abrasions, infection, cardiac event, dementia    Patient Progress  Patient progress: stable    ED Course as of Aug 24 0110   Mon Aug 23, 2021   0344 ECG with sinus rhythm with premature supraventricular complexes, normal rate of 78, normal , normal QRS of 90, QTc prolonged at 515, LVH with repolarization abnormality noted, compared to ECG of 7/27/2021 ST T wave changes are unchanged, LVH more prominent QT now prolonged no evident ischemia or infarction    [PS]   0347 CBC is stable and unchanged, troponins negative magnesium is normal, CMP essentially unchanged except for creatinine slightly more probably at 1.51 compared to 1.33 obtained 3 weeks earlier. [PS]   0448 X-rays are unremarkable. Patient is stable for transfer back home. Transfer will have to be by ambulance this patient is not ambulatory and is bedbound. [PS]   Tue Aug 24, 2021   0109 Care was transferred to Dr. Michael Mendoza at 0800 hrs. [PS]      ED Course User Index  [PS] Alvaro Hayes MD       Procedures    Medications   bacitracin zinc 500 unit/gram packet 1 Packet (1 Packet Topical Given 8/23/21 0550)       No data found. Discharge note:  Pt re-evaluated and noted to be stable, ready for discharge. Updated wife on all final lab and X-ray findings. Will follow up as instructed. All questions have been answered, pt voiced understanding and agreement with plan. Specific return precautions provided as well as instructions to return to the ED should sx worsen at any time. Vital signs stable for discharge. Diagnosis     Clinical Impression:     ICD-10-CM ICD-9-CM    1. Falls frequently  R29.6 V15.88    2. Abrasion  T14. 8XXA 919.0    3. Midline thoracic back pain, unspecified chronicity  M54.6 724.1    4. [facility]'s disease ([facility]  G20 332.0          PLAN:  1. Discharge Medication List as of 8/23/2021  5:16 AM        2. Follow-up Information     Follow up With Specialties Details Why Contact Oswaldo Gregg MD Internal Medicine Call today Follow up todays symptoms. [facility]  [phone]          Return to ED if worse     Disposition:  Discharged  Home     Please note, this dictation was completed with [email or web address], the computer voice recognition software. Quite often unanticipated grammatical, syntax, homophones, and other interpretive errors are inadvertently transcribed by the computer software. Please disregard these errors. Please excuse any errors that have escaped final proof reading.

## 2021-08-23 NOTE — DISCHARGE INSTRUCTIONS
Bacitracin to abrasions on back. Tylenol if needed for pain. Call Dr. Eugene Onofre today to discuss possible placement or further home health aides. When patient is in hospital bed elevate foot of bed and lower upper portion of bed to try to keep patient from sliding out of bed.

## 2021-08-23 NOTE — ED NOTES
Pt resting comfortably in bed, has finished with meal tray. Pt aware of [facility] ETA, denies any further needs at this time.

## 2021-08-23 NOTE — PROGRESS NOTES
Keara Thompson is a 81 y.o. male evaluated via telephone on 8/23/2021. Consent:  He and/or health care decision maker is aware that that he may receive a bill for this telephone service, depending on his insurance coverage, and has provided verbal consent to proceed: Yes    A/P:  1. Parkinson disease ([address]  He is end stage and very weak in the lower extremities. He was seen in the ER last night after falling at home. He is exceeding his wife's ability to care for him. Continue with HH. Engage case management. He may need long term SNF level care. Metoprolol and B12 sent to [facility] today. I will see him at his home tomorrow. HPI:  80 yo gentleman who has severe PD. Wife is sole caretaker. [address] [place] [address] in twice a week. He is incontinent of stool and urine. Unable to stand without assistance. Requires help with dressing. He is able to feed himself. Unable to walk with assistance. He has been in the ER 3 times in the past week due to falls. Waldo De Leonans (wife) notes that he has a hospital bed. Needs B12 and Metoprolol renewed    I affirm this is a Patient Initiated Episode with an Established Patient who has not had a related appointment within my department in the past 7 days or scheduled within the next 24 hours. On this date 08/23/2021 I have spent 35 minutes reviewing previous notes, test results and face to face with the patient discussing the diagnosis and importance of compliance with the treatment plan as well as documenting on the day of the visit.      Note: not billable if this call serves to triage the patient into an appointment for the relevant concern      Gissell Velázquez MD

## 2021-08-23 NOTE — PROGRESS NOTES
Eloisa Lopez is a 81 y.o. male presenting for/with:    Chief Complaint   Patient presents with    Transitions Of Care     C/O D/C from [facility] on Friday, EMS visit on Saturday, ER visit Sunday. Home now. Very confused, frequently attempting to get out of bed       There were no vitals taken for this visit. Pain Scale: /10  Pain Location:     1. Have you been to the ER, urgent care clinic since your last visit? Hospitalized since your last visit? YES    2. Have you seen or consulted any other health care providers outside of the [address] [place] [address] since your last visit? Include any pap smears or colon screening. NO    Symptom review:  NO  Fever   NO  Shaking chills  NO  Cough  NO  Body aches  NO  Coughing up blood  NO  Chest congestion  NO  Chest pain  NO  Shortness of breath  NO  Profound Loss of smell/taste  NO  Nausea/Vomiting   NO  Loose stool/Diarrhea  YES  any skin issues    Patient Risk Factors Reviewed as follows:  NO  have you been in Close contact with confirmed COVID19 patient   NO  History of recent travel to affected geographical areas within the past 14 days  NO  COPD  NO  Active Cancer/Leukemia/Lymphoma/Chemotherapy  NO  Oral steroid use  NO  Pregnant  YES  Diabetes Mellitus  YES  Heart disease  NO  Asthma  NO Health care worker at home  [address] care worker  NO Is there a Pregnant Woman in the home  NO Dialysis pt in the home   NO a large number of people living in the home    Learning Assessment 4/13/2021   PRIMARY LEARNER Patient   [address]    LEARNER PREFERENCE PRIMARY LISTENING     DEMONSTRATION   ANSWERED BY patient   RELATIONSHIP SELF     Fall Risk Assessment, last 12 mths 5/28/2021   Able to walk? Yes   Fall in past 12 months? 1   Do you feel unsteady? 1   Are you worried about falling 1   Is TUG test greater than 12 seconds? -   Is the gait abnormal? 1   Number of falls in past 12 months 2   Fall with injury?  1       3 most recent PHQ Screens 8/23/2021   [facility] Not [facility] Reason (indicate in comments)   Little interest or pleasure in doing things Several days   Feeling down, depressed, irritable, or hopeless Several days   Total Score PHQ 2 2   Trouble falling or staying asleep, or sleeping too much -   Feeling tired or having little energy -   Poor appetite, weight loss, or overeating -   Feeling bad about yourself - or that you are a failure or have let yourself or your family down -   Trouble concentrating on things such as school, work, reading, or watching TV -   Moving or speaking so slowly that other people could have noticed; or the opposite being so fidgety that others notice -   How difficult have these problems made it for you to do your work, take care of your home and get along with others -     Abuse Screening Questionnaire 5/28/2021   Do you ever feel afraid of your partner? N   Are you in a relationship with someone who physically or mentally threatens you? N   Is it safe for you to go home?  Y       ADL Assessment 5/28/2021   Feeding yourself Help Needed   Getting from bed to chair Help Needed   Getting dressed Help Needed   Bathing or showering Help Needed   Walk across the room (includes cane/walker) Help Needed   Using the telphone Help Needed   Taking your medications Help Needed   Preparing meals Help Needed   Managing money (expenses/bills) Help Needed   Moderately strenuous housework (laundry) Help Needed   Shopping for personal items (toiletries/medicines) Help Needed   Shopping for groceries Help Needed   Driving Help Needed   Climbing a flight of stairs Help Needed   Getting to places beyond walking distances Help Needed      No notes on file

## 2021-08-23 NOTE — ED NOTES
Received assignment, assuming care. Introduced self to pt, pt verbalized understanding. Pt resting now, lights dimmed.

## 2021-08-23 NOTE — ED NOTES
I have reviewed discharge instructions with the patient. The patient verbalized understanding. Discharge medications discussed with patient. No questions at this time. Waiting for ride home with [facility] at this time.

## 2021-08-24 NOTE — PROGRESS NOTES
TELEPHONE CONVERSATION: 8/23/21  5:00PM    Spoke with Ms. Jamie Oliveira regarding her  to [facility] to see if they are doing ok. Patient was in the Emergency Room day after discharge secondary to a fall. Discussed options again regarding care for the patient. She states she cant afford sitters and still haven't found the necessary documentation to initiate and complete a Medicaid application. She stated that Dr. Walter Rogers was going to make a house visit on 8/24/21. Asked her to call  after Dr. Walter Rogers saw her . The subject of hospice care was brought up  but Ms. Jamie Oliveira didn't want to discuss it until after Dr. Deyanira Chávez visit.

## 2021-08-27 NOTE — TELEPHONE ENCOUNTER
----- Message from Edis Abernathy sent at 8/26/2021  4:00 PM EDT -----  Regarding: Dr. Keenan Frazier  General Message/Vendor Calls    Caller's first and last name:Bonnie Amos [facility]      Reason for call:   stated that the family  is requesting hospice eval; requesting an order faxed to [phone] [facility]        Callback required yes/no and why: yes      Best contact number(s): [phone]      Details to clarify the request: n/a      Edis Abernathy

## 2021-10-26 NOTE — PROGRESS NOTES
Bedside shift change report given to [facility] (oncoming nurse) by Britni Frias (offgoing nurse). Report included the following information SBAR, Kardex and MAR. Isaac Ernst MD, FAASM, [facility]  Wolf Mccoy, MSN, RN, [address] [facility] [address]. [id number]  Dept: [phone]  Dept Fax: [phone]  Loc: [phone]    Subjective:     Patient ID: Daisy Whitaker is a 40 y.o. female. Chief Complaint   Patient presents with    Sleep Apnea       HPI:      Sleep Medicine Video Visit    Pursuant to the emergency declaration under the [address] [place] [address], [employer] [facility] waiver authority and the [employer] Act this Telephone Visit was insisted, with patient's consent, to reduce the patient's risk of exposure to COVID-19 and provide continuity of care for an established patient. Services were provided through a synchronous discussion over a telephone and/or Video chat to substitute for in-person clinic visit, and coded as such. While patient is at home.     Machine Modem/Download Info:  Compliance (hours/night): 7.3 hrs/night  Download AHI (/hour): 1.1 /HR  Average CPAP Pressure : 7 cmH2O           APAP - Settings  Pressure Min: 6 cmH2O  Pressure Max: 16 cmH2O                 Comfort Settings  Humidity Level (0-8): 0  Flex/EPR (0-3): 3 PAP Mask  Mask Type: Nasal mask  Clinically Relevant Leak: No     [place] - Total score: 0    Follow-up :     Last Visit : April 2021      Subjective Health Changes: None      Over Night Oximetry: [] Yes  [] No  [x] NA [] WNL   Using O2: [] Yes  [] No  [x] NA   Patient is compliant with the machine  [x] Yes  [] No [] Per patient   Feeling rested when using the machine   [x] Yes  [] No     Pressure is comfortable with inspiration and expiration  [x] Yes  [] No   ([x] NA   [] Feeling of suffocation  [] Feeling like not enough air    [] To much pressure)     Noticed changes in pressure  [x] NA  [] Yes    [] No     Mask is fitting well  [x] Yes  [] No   Noting Mask Air Leak  [] Yes  [x] No Having painful Aerophagia  [] Yes  [x] No   Nocturia   0  per night. Having  HA upon waking  [] Yes  [x] No   Dry mouth upon waking   Dry Nose  Dry Eyes  [x] Yes  [] No   Congestion upon waking   [] Yes  [x] No    Nose Bleeds  [] Yes  [x] No   Using Sleep Aides  Magnerium  [] NA  [x] OTC  [] Per our office   [] Per another provider   Understands how to change humidification and/or tubing temperature for comfort while at home  [x] Yes  [] No     Difficulties falling asleep  [] Yes  [x] No   Difficulties staying asleep  [] Yes  [x] No   Approximate time to bed  9:30pm   Approximate wake time  4am   Taking Naps  occasional   If taking naps usual length  60 minutes   If taking naps using the machine [] NA  [] Yes    [x] No    [] With and With out    Drowsy when driving  [] Yes  [x] No     Does patient carry a DOT/CDL  [] Yes  [x] No     Does patient carry FAA/[employer] License   [] Yes  [x] No      Any concerns noted with the machine at this time  [] Yes  [x] No       Assessment/Plan:   1. MIGDALIA (obstructive sleep apnea)  Assessment & Plan:  After downloading data and reviewing  Reviewed compliance download with pt. Supplies and parts as needed for his machine. These are medically necessary. Continue medications per his PCP and other physicians. Limit caffeine use after 3pm.    The chronic medical conditions listed are directly related to the primary diagnosis listed above. The management of the primary diagnosis affects the secondary diagnosis and vice versa    Patient is complaint encouraged to maintain compliance to aide on controlling other stated healthcare concerns.             - After pulling data and reviewing it   - Reviewed compliance download with patient    -Medically necessary supplies and parts as needed for her machine.   - Continue medications per his primary care provider and other physicians.   - Encouraged to limit caffeine use after 3pm.    - Encouraged her to work on weight loss through diet and exercise  - Educated not to drive when feeling sleepy   - Patient using [employer]  - Napping with the machine  Cleaning of mask, tubing, and water reservoir  Contacting the DME for ordering issues/Mask refit  Filter changes and cleaning  Office locations  Paying for supplies  Short sleepers, average sleepers, and long sleepers  Compliance  Follow up  The risk of undercontrolling Obstructive sleep apnea  After speaking to the patient, patient is currently stable. We will continue with the current machine settings  Recall Information and DME contact     The chronic medical conditions listed are directly related to the primary diagnosis listed above. The management of the primary diagnosis affects the secondary diagnosis and vice versa.     - Will follow up in off in 12 months    Electronically signed by  Latonya Baires MSN, RN, CNP on 10/26/2021 at 4:42 PM

## 2021-11-28 NOTE — PROGRESS NOTES
Problem: Mobility Impaired (Adult and Pediatric)  Goal: *Acute Goals and Plan of Care (Insert Text)  Description: Note    FUNCTIONAL STATUS PRIOR TO ADMISSION: Patient was modified independent using a rolling walker for functional mobility. HOME SUPPORT PRIOR TO ADMISSION: The patient lived with wife and she provided assistance. Physical Therapy Goals  Initiated 7/31/2021  1. Patient will move from supine to sit and sit to supine  in bed with supervision/set-up within 7 day(s). 2.  Patient will transfer from bed to chair and chair to bed with supervision/set-up using the least restrictive device within 7 day(s). 3.  Patient will perform sit to stand with supervision/set-up within 7 day(s). 4.  Patient will ambulate with supervision/set-up for 150 feet with the least restrictive device within 7 day(s). 5.  Patient will ascend/descend 2 stairs with 2 handrail(s) with minimal assistance/contact guard assist within 7 day(s). 8/6/2021 1448 by Kenya Doll, PT, DPT  Outcome: Not Met     PHYSICAL THERAPY TREATMENT  Patient: Mitul Hare (2 y.o. male)  Date: 8/6/2021  Diagnosis: Weakness [R53.1]  Parkinson's disease ([facility] [address] [G20] <principal problem not specified>       Precautions: Fall  Chart, physical therapy assessment, plan of care and goals were reviewed. ASSESSMENT  Patient continues with skilled PT services and is progressing towards goals inconsistently. Pt performs sit to/from stand transfer and AROM of LEs. He requires increased assistance for transfers this afternoon and reports feeling \"not into it,\" re: PT. Case discussed with OT and care manager. Current Level of Function Impacting Discharge (mobility/balance): max assist x 2 some transfers    Other factors to consider for discharge: none additional         PLAN :  Patient continues to benefit from skilled intervention to address the above impairments. Continue treatment per established plan of care.   to address goals. Recommendation for discharge: (in order for the patient to meet his/her long term goals)  Physical therapy at least 2 days/week in the home AND ensure assist and/or supervision for safety with all functional mobility    This discharge recommendation:  Has been made in collaboration with the attending provider and/or case management    IF patient discharges home will need the following DME: see earlier note this date. SUBJECTIVE:   Patient stated I'm not into it, re: PT. Pt states he will be visiting with his sisters later today. He recalls seeing his brother earlier today. Pt received seated, agreeable to PT and cleared by RN. OBJECTIVE DATA SUMMARY:   Critical Behavior:  Neurologic State: Alert  Orientation Level: Oriented to person  Cognition: Decreased attention/concentration, Impaired decision making  Safety/Judgement: Decreased awareness of environment, Decreased awareness of need for assistance, Decreased awareness of need for safety, Decreased insight into deficits  Functional Mobility Training:  Bed Mobility:  Rolling: Modified independent  Supine to Sit: Additional time;Bed Modified;Minimum assistance  Sit to Supine: Additional time;Supervision; Adaptive equipment;Bed Modified  Scooting: Maximum assistance        Transfers:  Sit to Stand: Assist x2;Assist x1;Additional time; Moderate assistance;Maximum assistance; performed x 5; seated rests. Requires one-step cues and assist for anterior lean, scooting forward, LE placement,   Stand to Sit: Assist x2;Assist x1;Additional time;Maximum assistance; Moderate assistance                       Balance:  Sitting: Impaired  Sitting - Static: Fair (occasional)  Sitting - Dynamic: Fair (occasional)  Standing: Impaired  Standing - Static: Poor  Standing - Dynamic : Poor             Therapeutic Exercises:    Ankle pumps x 20  LAQ x 5 AAROM  SLR x 5 AAROM  Pain Rating:  Denies pain    Activity Tolerance:   requires frequent rest breaks    After treatment patient left in no apparent distress:   Sitting in chair, Call bell within reach, and Bed / chair alarm activated, heels floating    COMMUNICATION/COLLABORATION:   The patients plan of care was discussed with: Occupational therapist, Registered nurse, and Rehabilitation technician.      Keyla Cardenas PT, DPT   Time Calculation: 26 mins I have reviewed and confirmed nurses' notes for patient's medications, allergies, medical history, and surgical history.

## 2022-04-21 NOTE — INTERDISCIPLINARY ROUNDS
IDR Team; MD, Nursing, Care Manager, Physical therapy, Nursing Supervisor, Pharmacy and Dietician, met to review patient's plan of care. Discussed goals, interventions, barriers and progress. Team will continue to monitor progress and report any concerns to the physician and care management as indicated. Transition of Care Plan:  Patient has been progressing well with therapy. His blood pressure per H. Rosales LPN has been a little low today so she is going to have PT/OT check bp prior to therapy session. Patient's cognition is better in the mornings than in the evenings. He has issues with drooling as well. ST consult placed. Amol Gallardo from [facility] aware. Probable dc date Monday 5/24. 2020

## 2022-05-12 NOTE — PROGRESS NOTES
Problem: Motor Speech Impaired (Adult)  Goal: *Acute Goals and Plan of Care (Insert Text)  Description: Speech Pathology Goals  Initiated 7/28/2021    1. Patient will identify target item with low tech AAC with 80% accuracy, given moderate cues, within 7 days. Outcome: Progressing Towards Goal     SPEECH LANGUAGE PATHOLOGY BEDSIDE SWALLOW AND SPEECH EVALUATIONS  Patient: Susan Solorio (59 y.o. male)  Date: 7/28/2021  Primary Diagnosis: Weakness [R53.1]        Precautions:        ASSESSMENT :  Based on the objective data described below, the patient presents with a functional oropharyngeal swallow with all consistencies tried. Patient tolerated multiple, sequential sips of thin liquid via straw, puree and solids without signs of aspiration. Patient observed to take pills whole with thin liquid without difficulty. Patient's speech is dysarthric and intelligibility is significantly reduced. Patient is intelligible at the word level (single syllable) when context is known (i.e. responding with yes/no and repeating single word stimuli given prompting). Patient observed to present with dysfluent speech repetitions (stuttering-like) and was completely unintelligible. Patient was provided with a low tech AAC board. Patient identified target item with 40% accuracy using scanning technique and maximal verbal cues from the clinician. Given diagnosis of Parkinson's disease and cognitive status, patient is at risk for aspiration. Due to functional oral/pharyngeal swallow at bedside, recommend Regular/Thin Liquids with general aspiration precautions outlined below. If any signs of aspiration are observed (cough, throat clear, etc.), please notify SLP to re-evaluate swallow function. Patient will benefit from skilled intervention to address the above impairments. Patients rehabilitation potential is considered to be guarded due to progressive nature of Parkinson's disease.      PLAN :  Recommendations and Planned Interventions:  -- Regular/Thin Liquids  -- Medications Whole with Thin Liquid OK  -- Straws OK  -- Sitting Upright  -- Small Bites/Sips  -- SLP to follow acutely for motor speech/communication goals    Frequency/Duration: Patient will be followed by speech-language pathology 3 times a week to address motor speech/communication goals. Discharge Recommendations: To Be Determined     SUBJECTIVE:   Patient stated yes and \"no\", but patient was unintelligible at the phrase, sentence and conversation level.     OBJECTIVE:     Past Medical History:   Diagnosis Date    Asthma     Chronic kidney disease     Dermatophytosis of groin and perianal area 2010    Edema 2008    Encounter for long-term (current) use of other medications 2010    Gout, unspecified 2010    Gouty arthropathy, unspecified 2010    Hypertension     Hypertrophy of prostate without urinary obstruction and other lower urinary tract symptoms (LUTS) 2008    Impotence of organic origin 2008    Memory loss 2009    Obesity, unspecified 2010    Orthostatic hypotension 2008    Other and unspecified hyperlipidemia 2008    Other atopic dermatitis and related conditions 2012    Palpitations 2010    Peripheral angiopathy in diseases classified elsewhere [facility]) 2010    Personal history of malignant neoplasm of rectum, rectosigmoid junction, and anus 2011    Tinea nigra 2011    Unspecified pruritic disorder 2011    Vitamin B12 deficiency 7/23/2017     Past Surgical History:   Procedure Laterality Date    ENDOSCOPY, COLON, DIAGNOSTIC  06/2011 05/2007, 01/2004,  12/2000    HX HERNIA REPAIR  1992    INCISIONAL    HX TOTAL COLECTOMY  1991    COLON CANCER S/P RESECTION     Prior Level of Function/Home Situation:   Home Situation  Home Environment: Private residence  # Steps to Enter: 6  One/Two Story Residence: One story  Living Alone: No  Support Systems: Spouse/Significant Other/Partner  Patient Expects to be Discharged to[de-identified] Unknown (per ER report NHP)  Current DME Used/Available at Home: None    Diet prior to admission: Regular/Thin Liquids  Current Diet: Regular/Thin Liquids     Cognitive and Communication Status:  Neurologic State: Alert  Orientation Level: Oriented to person, Disoriented to place  Cognition: Decreased command following, Memory loss  Perception: Appears intact  Perseveration: Perseverates during conversation  Safety/Judgement: Decreased awareness of environment    Swallowing Evaluation:   Oral Assessment:  Oral Assessment  Labial: No impairment  Dentition: Full;Natural  Oral Hygiene: Moist oral mucosa  Lingual: Other (comment) (Unable to assess due to decreased command following)  Velum: Unable to visualize  Mandible: No impairment    P.O. Trials:  Patient Position: Upright in bed  Vocal quality prior to P.O.: Low volume  Consistency Presented: Thin liquid;Puree; Solid;Pill/Tablet  How Presented: Self-fed/presented;Straw;Successive swallows;SLP-fed/presented;Spoon     Bolus Acceptance: No impairment  Bolus Formation/Control: No impairment     Propulsion: Delayed (# of seconds)  Oral Residue: None  Initiation of Swallow: Delayed (# of seconds)  Laryngeal Elevation: Functional  Aspiration Signs/Symptoms: None  Pharyngeal Phase Characteristics: No impairment, issues, or problems            Oral Phase Severity: Other (comment) (WFL)  Pharyngeal Phase Severity : No impairment    NOMS:   The NOMS functional outcome measure was used to quantify this patient's level of swallowing impairment. Based on the NOMS, the patient was determined to be at level 7 for swallow function     NOMS Swallowing Levels:  Level 1 (CN): NPO  Level 2 (CM): NPO but takes consistency in therapy  Level 3 (CL): Takes less than 50% of nutrition p.o. and continues with nonoral feedings; and/or safe with mod cues; and/or max diet restriction  Level 4 (CK):  Safe swallow but needs mod cues; and/or mod diet restriction; and/or still requires some nonoral feeding/supplements  Level 5 (CJ): Safe swallow with min diet restriction; and/or needs min cues  Level 6 (CI): Independent with p.o.; rare cues; usually self cues; may need to avoid some foods or needs extra time  Level 7 ([address] [place] [address]): Independent for all p.o.  TATIANA. (2003). National Outcomes Measurement System (NOMS): Adult Speech-Language Pathology User's Guide. Speech/Language Evaluation  Motor Speech:  Oral-Motor Structure/Motor Speech  Labial: No impairment  Dentition: Full;Natural  Oral Hygiene: Moist oral mucosa  Lingual: Other (comment) (Unable to assess due to decreased command following)  Velum: Unable to visualize  Mandible: No impairment  Dysarthric Characteristics: Blended word boundaries; Imprecise  Intelligibility: Impaired  Word Intelligibility (%): 100 % (When context is known)  Phrase Intelligibility (%): 0 %  Sentence Intelligibility (%): 0 %  Conversation Intelligibility (%): 0 %  Overall Impairment Severity: Severe  Language Comprehension and Expression:              Neuro-Linguistics:   Unable to assess                                               Pragmatics:   Unable to assess     Voice:                 Vocal Quality: Low volume                                 NOMS: The NOMS functional outcome measure was used to quantify this patient's level of motor speech impairment. Based on the NOMS, the patient was determined to be at level 2 for motor speech function. NOMS Motor Speech:  Level 1 (CN):  100% unintelligible  Level 2 (CM):  Communication partner responsible for message; can do CV or automatic words w/ max cues but rarely intelligible in context  Level 3 (CL): communication partner primarily responsible for message but says CV/automatic words intelligibly; mod cues to say simple words/phrases  Level 4 (CK): In structured conversation with familiar listener can say simple words and phrases.   Mod cues for simple sentences  Level 5 (CJ):  Uses simple sentences for ADLs with familiar and unfamiliar listener; min cues for complex Detail Level: Generalized sentences  Level 6 (CI):  Intelligible in ADLs; difficulty in voc/social activites; rare cues for complex message; uses comp strategies  Level 7 ([address]):  Intelligible in all activities. May occasionally use compensatory strategies. TATIANA. (2003). National Outcomes Measurement System (NOMS): Adult Speech-Language Pathology User's Guide. Pain:  Pain Scale 1: Numeric (0 - 10)  Pain Intensity 1: 0     After treatment:   Patient left in no apparent distress in bed, Call bell within reach, and Nursing notified    COMMUNICATION/EDUCATION:   Patient was educated regarding his deficit(s) of WFL swallow and decreased intelligibility as this relates to his diagnosis of Parkinson's disease. He demonstrated poor understanding as evidenced by cognitive status. The patient's plan of care including recommendations, planned interventions, and recommended diet changes were discussed with: Registered nurse and Case management. Patient/family have participated as able in goal setting and plan of care. Patient/family agree to work toward stated goals and plan of care.     Thank you for this referral.  NIKOS Howard  Time Calculation: 25 mins Detail Level: Zone Detail Level: Detailed Detail Level: Simple

## 2023-02-21 NOTE — PROGRESS NOTES
Bedside shift change report given to Stacy Spence RN (oncoming nurse) by Eloisa Delacruz RN (offgoing nurse). Report included the following information Kardex. no

## 2023-07-04 NOTE — PROGRESS NOTES
Follow up visit with patient in   Provided empathic listening and spiritual support  Advised of  Availability   [address] [place] [address] Patient respectfully declined POA.

## 2024-05-18 NOTE — TELEPHONE ENCOUNTER
----- Message from Connie Shelton sent at 8/19/2021  4:13 PM EDT -----  Regarding: Dr. Jenniffer Pimentel  General Message/Vendor Calls    Caller's first and last name: Edel Hardy; [facility] home care      Reason for call: requesting to know if the pcp will  follow the pt for home health care      Callback required yes/no and why: yes      Best contact number(s):  [phone]      Details to clarify the request: n/a      Connie Shelton English

## (undated) DEVICE — INFECTION CONTROL KIT SYS

## (undated) DEVICE — SNARE ENDOSCP L240CM SHTH DIA2.4MM LOOP W20MM MIN WRK CHN

## (undated) DEVICE — ENDO CARRY-ON PROCEDURE KIT INCLUDES ENZYMATIC SPONGE, GAUZE, BIOHAZARD LABEL, TRAY, LUBRICANT, DIRTY SCOPE LABEL, WATER LABEL, TRAY, DRAWSTRING PAD, AND DEFENDO 4-PIECE KIT.: Brand: ENDO CARRY-ON PROCEDURE KIT

## (undated) DEVICE — SOL IRR STRL H2O 1000ML BTL --

## (undated) DEVICE — SYR 20ML LL STRL LF --

## (undated) DEVICE — FORCEPS ENDOSCP BX L230CM DIA2.8MM ALGTR CUP SPEC RETRV GI

## (undated) DEVICE — REM POLYHESIVE ADULT PATIENT RETURN ELECTRODE: Brand: VALLEYLAB

## (undated) DEVICE — TRAP POLYP 1 CHMBR WIDE EYE